# Patient Record
Sex: FEMALE | Race: BLACK OR AFRICAN AMERICAN | NOT HISPANIC OR LATINO | Employment: OTHER | URBAN - METROPOLITAN AREA
[De-identification: names, ages, dates, MRNs, and addresses within clinical notes are randomized per-mention and may not be internally consistent; named-entity substitution may affect disease eponyms.]

---

## 2019-04-16 ENCOUNTER — HOSPITAL ENCOUNTER (INPATIENT)
Facility: OTHER | Age: 64
LOS: 4 days | Discharge: HOME | DRG: 189 | End: 2019-04-20
Attending: HOSPITALIST | Admitting: HOSPITALIST
Payer: COMMERCIAL

## 2019-04-16 DIAGNOSIS — J96.01 ACUTE RESPIRATORY FAILURE WITH HYPOXIA AND HYPERCARBIA: Primary | ICD-10-CM

## 2019-04-16 DIAGNOSIS — J96.22 ACUTE ON CHRONIC RESPIRATORY FAILURE WITH HYPOXIA AND HYPERCAPNIA: ICD-10-CM

## 2019-04-16 DIAGNOSIS — F29 PSYCHOSIS, UNSPECIFIED PSYCHOSIS TYPE: ICD-10-CM

## 2019-04-16 DIAGNOSIS — J96.21 ACUTE ON CHRONIC RESPIRATORY FAILURE WITH HYPOXIA AND HYPERCAPNIA: ICD-10-CM

## 2019-04-16 DIAGNOSIS — R79.89 ELEVATED BRAIN NATRIURETIC PEPTIDE (BNP) LEVEL: ICD-10-CM

## 2019-04-16 DIAGNOSIS — J96.20 ACUTE ON CHRONIC RESPIRATORY FAILURE: ICD-10-CM

## 2019-04-16 DIAGNOSIS — R44.3 HALLUCINATIONS: ICD-10-CM

## 2019-04-16 DIAGNOSIS — R79.89 ELEVATED TROPONIN: ICD-10-CM

## 2019-04-16 DIAGNOSIS — R07.9 ACUTE CHEST PAIN: ICD-10-CM

## 2019-04-16 DIAGNOSIS — J96.02 ACUTE RESPIRATORY FAILURE WITH HYPOXIA AND HYPERCARBIA: Primary | ICD-10-CM

## 2019-04-16 PROBLEM — Z72.0 TOBACCO ABUSE: Status: ACTIVE | Noted: 2019-04-16

## 2019-04-16 PROBLEM — E87.20 LACTIC ACIDOSIS: Status: ACTIVE | Noted: 2019-04-16

## 2019-04-16 LAB
ALBUMIN SERPL BCP-MCNC: 3.9 G/DL (ref 3.5–5.2)
ALLENS TEST: ABNORMAL
ALP SERPL-CCNC: 70 U/L (ref 55–135)
ALT SERPL W/O P-5'-P-CCNC: 22 U/L (ref 10–44)
AMPHET+METHAMPHET UR QL: NEGATIVE
ANION GAP SERPL CALC-SCNC: 15 MMOL/L (ref 8–16)
APTT BLDCRRT: 36.7 SEC (ref 21–32)
AST SERPL-CCNC: 27 U/L (ref 10–40)
BARBITURATES UR QL SCN>200 NG/ML: NEGATIVE
BASOPHILS # BLD AUTO: 0.01 K/UL (ref 0–0.2)
BASOPHILS NFR BLD: 0.1 % (ref 0–1.9)
BENZODIAZ UR QL SCN>200 NG/ML: NEGATIVE
BILIRUB SERPL-MCNC: 0.6 MG/DL (ref 0.1–1)
BNP SERPL-MCNC: 1138 PG/ML (ref 0–99)
BUN SERPL-MCNC: 28 MG/DL (ref 8–23)
BZE UR QL SCN: NEGATIVE
CALCIUM SERPL-MCNC: 9.7 MG/DL (ref 8.7–10.5)
CANNABINOIDS UR QL SCN: NEGATIVE
CHLORIDE SERPL-SCNC: 96 MMOL/L (ref 95–110)
CHOLEST SERPL-MCNC: 258 MG/DL (ref 120–199)
CHOLEST/HDLC SERPL: 3.4 {RATIO} (ref 2–5)
CO2 SERPL-SCNC: 28 MMOL/L (ref 23–29)
CREAT SERPL-MCNC: 1.2 MG/DL (ref 0.5–1.4)
CREAT UR-MCNC: 74 MG/DL (ref 15–325)
DELSYS: ABNORMAL
DIFFERENTIAL METHOD: ABNORMAL
EOSINOPHIL # BLD AUTO: 0 K/UL (ref 0–0.5)
EOSINOPHIL NFR BLD: 0 % (ref 0–8)
ERYTHROCYTE [DISTWIDTH] IN BLOOD BY AUTOMATED COUNT: 13.9 % (ref 11.5–14.5)
ERYTHROCYTE [SEDIMENTATION RATE] IN BLOOD BY WESTERGREN METHOD: 28 MM/H
EST. GFR  (AFRICAN AMERICAN): 56 ML/MIN/1.73 M^2
EST. GFR  (NON AFRICAN AMERICAN): 48 ML/MIN/1.73 M^2
ESTIMATED AVG GLUCOSE: 114 MG/DL (ref 68–131)
ETHANOL UR-MCNC: <10 MG/DL
FIO2: 28
FLOW: 2
GLUCOSE SERPL-MCNC: 169 MG/DL (ref 70–110)
HBA1C MFR BLD HPLC: 5.6 % (ref 4–5.6)
HCO3 UR-SCNC: 34.5 MMOL/L (ref 24–28)
HCT VFR BLD AUTO: 45.4 % (ref 37–48.5)
HDLC SERPL-MCNC: 77 MG/DL (ref 40–75)
HDLC SERPL: 29.8 % (ref 20–50)
HGB BLD-MCNC: 15.1 G/DL (ref 12–16)
INR PPP: 1 (ref 0.8–1.2)
LACTATE SERPL-SCNC: 2.5 MMOL/L (ref 0.5–2.2)
LACTATE SERPL-SCNC: 3.4 MMOL/L (ref 0.5–2.2)
LDLC SERPL CALC-MCNC: 167.8 MG/DL (ref 63–159)
LYMPHOCYTES # BLD AUTO: 0.7 K/UL (ref 1–4.8)
LYMPHOCYTES NFR BLD: 5.4 % (ref 18–48)
MAGNESIUM SERPL-MCNC: 2 MG/DL (ref 1.6–2.6)
MCH RBC QN AUTO: 31.8 PG (ref 27–31)
MCHC RBC AUTO-ENTMCNC: 33.3 G/DL (ref 32–36)
MCV RBC AUTO: 96 FL (ref 82–98)
METHADONE UR QL SCN>300 NG/ML: NEGATIVE
MODE: ABNORMAL
MONOCYTES # BLD AUTO: 0.5 K/UL (ref 0.3–1)
MONOCYTES NFR BLD: 4.3 % (ref 4–15)
NEUTROPHILS # BLD AUTO: 11 K/UL (ref 1.8–7.7)
NEUTROPHILS NFR BLD: 90 % (ref 38–73)
NONHDLC SERPL-MCNC: 181 MG/DL
OPIATES UR QL SCN: NORMAL
PCO2 BLDA: 56.5 MMHG (ref 35–45)
PCP UR QL SCN>25 NG/ML: NEGATIVE
PH SMN: 7.39 [PH] (ref 7.35–7.45)
PHOSPHATE SERPL-MCNC: 6.1 MG/DL (ref 2.7–4.5)
PLATELET # BLD AUTO: 374 K/UL (ref 150–350)
PMV BLD AUTO: 10.7 FL (ref 9.2–12.9)
PO2 BLDA: 76 MMHG (ref 80–100)
POC BE: 10 MMOL/L
POC SATURATED O2: 95 % (ref 95–100)
POCT GLUCOSE: 188 MG/DL (ref 70–110)
POTASSIUM SERPL-SCNC: 3.8 MMOL/L (ref 3.5–5.1)
PROCALCITONIN SERPL IA-MCNC: 0.31 NG/ML
PROT SERPL-MCNC: 7.1 G/DL (ref 6–8.4)
PROTHROMBIN TIME: 11.3 SEC (ref 9–12.5)
RBC # BLD AUTO: 4.75 M/UL (ref 4–5.4)
SAMPLE: ABNORMAL
SITE: ABNORMAL
SODIUM SERPL-SCNC: 139 MMOL/L (ref 136–145)
SP02: 96
TOXICOLOGY INFORMATION: NORMAL
TRIGL SERPL-MCNC: 66 MG/DL (ref 30–150)
TROPONIN I SERPL DL<=0.01 NG/ML-MCNC: 0.25 NG/ML (ref 0–0.03)
TROPONIN I SERPL DL<=0.01 NG/ML-MCNC: 0.26 NG/ML (ref 0–0.03)
TROPONIN I SERPL DL<=0.01 NG/ML-MCNC: 0.35 NG/ML (ref 0–0.03)
WBC # BLD AUTO: 12.19 K/UL (ref 3.9–12.7)

## 2019-04-16 PROCEDURE — 25000242 PHARM REV CODE 250 ALT 637 W/ HCPCS: Performed by: INTERNAL MEDICINE

## 2019-04-16 PROCEDURE — 83605 ASSAY OF LACTIC ACID: CPT

## 2019-04-16 PROCEDURE — 99222 1ST HOSP IP/OBS MODERATE 55: CPT | Mod: ,,, | Performed by: INTERNAL MEDICINE

## 2019-04-16 PROCEDURE — 84145 PROCALCITONIN (PCT): CPT

## 2019-04-16 PROCEDURE — 83036 HEMOGLOBIN GLYCOSYLATED A1C: CPT

## 2019-04-16 PROCEDURE — 94640 AIRWAY INHALATION TREATMENT: CPT

## 2019-04-16 PROCEDURE — 99900035 HC TECH TIME PER 15 MIN (STAT)

## 2019-04-16 PROCEDURE — 85730 THROMBOPLASTIN TIME PARTIAL: CPT

## 2019-04-16 PROCEDURE — 80307 DRUG TEST PRSMV CHEM ANLYZR: CPT

## 2019-04-16 PROCEDURE — 25000003 PHARM REV CODE 250: Performed by: HOSPITALIST

## 2019-04-16 PROCEDURE — 85025 COMPLETE CBC W/AUTO DIFF WBC: CPT | Mod: 91

## 2019-04-16 PROCEDURE — 27000221 HC OXYGEN, UP TO 24 HOURS

## 2019-04-16 PROCEDURE — 36415 COLL VENOUS BLD VENIPUNCTURE: CPT

## 2019-04-16 PROCEDURE — 99233 PR SUBSEQUENT HOSPITAL CARE,LEVL III: ICD-10-PCS | Mod: ,,, | Performed by: HOSPITALIST

## 2019-04-16 PROCEDURE — S4991 NICOTINE PATCH NONLEGEND: HCPCS | Performed by: HOSPITALIST

## 2019-04-16 PROCEDURE — 83605 ASSAY OF LACTIC ACID: CPT | Mod: 91

## 2019-04-16 PROCEDURE — 99223 PR INITIAL HOSPITAL CARE,LEVL III: ICD-10-PCS | Mod: ,,, | Performed by: NURSE PRACTITIONER

## 2019-04-16 PROCEDURE — 99233 SBSQ HOSP IP/OBS HIGH 50: CPT | Mod: ,,, | Performed by: HOSPITALIST

## 2019-04-16 PROCEDURE — 83880 ASSAY OF NATRIURETIC PEPTIDE: CPT | Mod: 91

## 2019-04-16 PROCEDURE — 36600 WITHDRAWAL OF ARTERIAL BLOOD: CPT

## 2019-04-16 PROCEDURE — 99223 1ST HOSP IP/OBS HIGH 75: CPT | Mod: ,,, | Performed by: NURSE PRACTITIONER

## 2019-04-16 PROCEDURE — 99232 SBSQ HOSP IP/OBS MODERATE 35: CPT | Mod: GT,,, | Performed by: PSYCHIATRY & NEUROLOGY

## 2019-04-16 PROCEDURE — 84100 ASSAY OF PHOSPHORUS: CPT

## 2019-04-16 PROCEDURE — 82803 BLOOD GASES ANY COMBINATION: CPT

## 2019-04-16 PROCEDURE — 63600175 PHARM REV CODE 636 W HCPCS: Performed by: HOSPITALIST

## 2019-04-16 PROCEDURE — 80053 COMPREHEN METABOLIC PANEL: CPT | Mod: 91

## 2019-04-16 PROCEDURE — 99232 PR SUBSEQUENT HOSPITAL CARE,LEVL II: ICD-10-PCS | Mod: GT,,, | Performed by: PSYCHIATRY & NEUROLOGY

## 2019-04-16 PROCEDURE — 27000190 HC CPAP FULL FACE MASK W/VALVE

## 2019-04-16 PROCEDURE — 94660 CPAP INITIATION&MGMT: CPT

## 2019-04-16 PROCEDURE — 85610 PROTHROMBIN TIME: CPT

## 2019-04-16 PROCEDURE — 63600175 PHARM REV CODE 636 W HCPCS: Performed by: NURSE PRACTITIONER

## 2019-04-16 PROCEDURE — 25000242 PHARM REV CODE 250 ALT 637 W/ HCPCS: Performed by: NURSE PRACTITIONER

## 2019-04-16 PROCEDURE — 99222 PR INITIAL HOSPITAL CARE,LEVL II: ICD-10-PCS | Mod: ,,, | Performed by: INTERNAL MEDICINE

## 2019-04-16 PROCEDURE — 84484 ASSAY OF TROPONIN QUANT: CPT | Mod: 91

## 2019-04-16 PROCEDURE — 94761 N-INVAS EAR/PLS OXIMETRY MLT: CPT

## 2019-04-16 PROCEDURE — 80061 LIPID PANEL: CPT

## 2019-04-16 PROCEDURE — 25000003 PHARM REV CODE 250: Performed by: NURSE PRACTITIONER

## 2019-04-16 PROCEDURE — 20000000 HC ICU ROOM

## 2019-04-16 PROCEDURE — 83735 ASSAY OF MAGNESIUM: CPT | Mod: 91

## 2019-04-16 RX ORDER — IBUPROFEN 200 MG
1 TABLET ORAL DAILY
Status: DISCONTINUED | OUTPATIENT
Start: 2019-04-16 | End: 2019-04-20 | Stop reason: HOSPADM

## 2019-04-16 RX ORDER — FLUTICASONE FUROATE AND VILANTEROL 200; 25 UG/1; UG/1
1 POWDER RESPIRATORY (INHALATION) DAILY
Status: DISCONTINUED | OUTPATIENT
Start: 2019-04-16 | End: 2019-04-20 | Stop reason: HOSPADM

## 2019-04-16 RX ORDER — PREDNISONE 20 MG/1
60 TABLET ORAL DAILY
Status: DISCONTINUED | OUTPATIENT
Start: 2019-04-16 | End: 2019-04-16

## 2019-04-16 RX ORDER — HYDRALAZINE HYDROCHLORIDE 20 MG/ML
15 INJECTION INTRAMUSCULAR; INTRAVENOUS EVERY 4 HOURS PRN
Status: DISCONTINUED | OUTPATIENT
Start: 2019-04-16 | End: 2019-04-20 | Stop reason: HOSPADM

## 2019-04-16 RX ORDER — DIVALPROEX SODIUM 125 MG/1
250 CAPSULE, COATED PELLETS ORAL NIGHTLY
Status: DISCONTINUED | OUTPATIENT
Start: 2019-04-16 | End: 2019-04-19

## 2019-04-16 RX ORDER — HYDRALAZINE HYDROCHLORIDE 20 MG/ML
10 INJECTION INTRAMUSCULAR; INTRAVENOUS EVERY 8 HOURS PRN
Status: DISCONTINUED | OUTPATIENT
Start: 2019-04-16 | End: 2019-04-16

## 2019-04-16 RX ORDER — IPRATROPIUM BROMIDE AND ALBUTEROL SULFATE 2.5; .5 MG/3ML; MG/3ML
3 SOLUTION RESPIRATORY (INHALATION) EVERY 4 HOURS
Status: DISCONTINUED | OUTPATIENT
Start: 2019-04-16 | End: 2019-04-20 | Stop reason: HOSPADM

## 2019-04-16 RX ORDER — DOXYCYCLINE HYCLATE 100 MG
100 TABLET ORAL EVERY 12 HOURS
Status: DISCONTINUED | OUTPATIENT
Start: 2019-04-16 | End: 2019-04-20 | Stop reason: HOSPADM

## 2019-04-16 RX ORDER — ACETAMINOPHEN 325 MG/1
650 TABLET ORAL EVERY 4 HOURS PRN
Status: DISCONTINUED | OUTPATIENT
Start: 2019-04-16 | End: 2019-04-20 | Stop reason: HOSPADM

## 2019-04-16 RX ORDER — PREDNISONE 20 MG/1
40 TABLET ORAL DAILY
Status: DISCONTINUED | OUTPATIENT
Start: 2019-04-17 | End: 2019-04-18

## 2019-04-16 RX ORDER — MORPHINE SULFATE 2 MG/ML
2 INJECTION, SOLUTION INTRAMUSCULAR; INTRAVENOUS EVERY 4 HOURS PRN
Status: DISCONTINUED | OUTPATIENT
Start: 2019-04-16 | End: 2019-04-16

## 2019-04-16 RX ORDER — SODIUM CHLORIDE 0.9 % (FLUSH) 0.9 %
10 SYRINGE (ML) INJECTION
Status: DISCONTINUED | OUTPATIENT
Start: 2019-04-16 | End: 2019-04-20 | Stop reason: HOSPADM

## 2019-04-16 RX ORDER — ONDANSETRON 8 MG/1
8 TABLET, ORALLY DISINTEGRATING ORAL EVERY 8 HOURS PRN
Status: DISCONTINUED | OUTPATIENT
Start: 2019-04-16 | End: 2019-04-20 | Stop reason: HOSPADM

## 2019-04-16 RX ORDER — ENOXAPARIN SODIUM 100 MG/ML
40 INJECTION SUBCUTANEOUS EVERY 24 HOURS
Status: DISCONTINUED | OUTPATIENT
Start: 2019-04-16 | End: 2019-04-20 | Stop reason: HOSPADM

## 2019-04-16 RX ADMIN — IPRATROPIUM BROMIDE AND ALBUTEROL SULFATE 3 ML: .5; 3 SOLUTION RESPIRATORY (INHALATION) at 04:04

## 2019-04-16 RX ADMIN — DIVALPROEX SODIUM 250 MG: 125 CAPSULE, COATED PELLETS ORAL at 09:04

## 2019-04-16 RX ADMIN — IPRATROPIUM BROMIDE AND ALBUTEROL SULFATE 3 ML: .5; 3 SOLUTION RESPIRATORY (INHALATION) at 03:04

## 2019-04-16 RX ADMIN — HYDRALAZINE HYDROCHLORIDE 10 MG: 20 INJECTION INTRAMUSCULAR; INTRAVENOUS at 06:04

## 2019-04-16 RX ADMIN — PREDNISONE 60 MG: 20 TABLET ORAL at 08:04

## 2019-04-16 RX ADMIN — DOXYCYCLINE HYCLATE 100 MG: 100 TABLET, COATED ORAL at 08:04

## 2019-04-16 RX ADMIN — ENOXAPARIN SODIUM 40 MG: 100 INJECTION SUBCUTANEOUS at 06:04

## 2019-04-16 RX ADMIN — IPRATROPIUM BROMIDE AND ALBUTEROL SULFATE 3 ML: .5; 3 SOLUTION RESPIRATORY (INHALATION) at 12:04

## 2019-04-16 RX ADMIN — IPRATROPIUM BROMIDE AND ALBUTEROL SULFATE 3 ML: .5; 3 SOLUTION RESPIRATORY (INHALATION) at 07:04

## 2019-04-16 RX ADMIN — MORPHINE SULFATE 2 MG: 2 INJECTION, SOLUTION INTRAMUSCULAR; INTRAVENOUS at 04:04

## 2019-04-16 RX ADMIN — DOXYCYCLINE HYCLATE 100 MG: 100 TABLET, COATED ORAL at 09:04

## 2019-04-16 RX ADMIN — FLUTICASONE FUROATE AND VILANTEROL TRIFENATATE 1 PUFF: 200; 25 POWDER RESPIRATORY (INHALATION) at 12:04

## 2019-04-16 NOTE — HPI
"The patient is a 63 year old black female chronic tobacco smoker with history of COPD who comes in with 3 days of greenish productive cough and SOB.  She has a history of poor compliance to medications.  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  She was so dyspneic today she was unable to have conversations.  She denies any associated chest pain.  No fever/chills.  No sick contacts.  No myalgia.  She denies any history of coronary artery disease but she has been admitted to the ICU for hypercarbic respiratory failure and hypertensive emergency.      The patient has been transferred to [facility] for Pulmonology and Critical Care services.       "

## 2019-04-16 NOTE — PLAN OF CARE
CM met with pt and spouse at bedside for initial discharge planning assessment.    Pt states her PCP is a  in [facility],  to locate and enter into [facility].    [facility] choice [facility].    Pt and spouse deny any mental health issues.    Spouse will transport home at time of discharge.    CM to follow for plans and arrangements.     04/16/19 0943   Discharge Assessment   Assessment Type Discharge Planning Assessment   Confirmed/corrected address and phone number on facesheet? Yes   Assessment information obtained from? Patient;Caregiver;Medical Record   Expected Length of Stay (days) 3   Communicated expected length of stay with patient/caregiver yes   Prior to hospitilization cognitive status: Alert/Oriented   Prior to hospitalization functional status: Independent   Current cognitive status: Alert/Oriented   Current Functional Status: Independent   Lives With spouse   Able to Return to Prior Arrangements yes   Who are your caregiver(s) and their phone number(s)? Griffin Gnozalez, spouse,[phone]   Patient's perception of discharge disposition home or selfcare   Readmission Within the Last 30 Days no previous admission in last 30 days  (pt transferred in from [facility] for pul CC)   Patient currently being followed by outpatient case management? No   Equipment Currently Used at Home none   Do you have any problems affording any of your prescribed medications? TBD   Is the patient taking medications as prescribed? yes   Does the patient have transportation home? Yes   Does the patient receive services at the [facility]? No   Discharge Plan A Home   Discharge Plan B Home   DME Needed Upon Discharge  none   Patient/Family in Agreement with Plan yes

## 2019-04-16 NOTE — PROGRESS NOTES
Pt received on 2LNC;SPO2 normal. 0719 ABG was done. No changes were made. Treatments were given and tolerated well. Will continue to monitor.

## 2019-04-16 NOTE — ASSESSMENT & PLAN NOTE
-could have been due to hypoxia versus mild pulmonary infection  clinically improving  -Repeat in AM.

## 2019-04-16 NOTE — CONSULTS
"Pulmonary / Critical Care Medicine  Consult Note    Primary Attending:  Jordan Natarajan MD   Primary Team: Hospital Medicine   Consultant Attending: Kerry Hernandez MD   Consultant Fellow: Buddy Ceja MD     Reason for Consult  "Hypoxemic/hypercapnic respiratory failure"     History of Present Illness:  Ms. Gonzalez is a 63 year old lady with a long standing smoking history and a reported diagnosis of COPD, who presented to [facility] yesterday evening complaining that she was much more short of breath than usual.  Ms. Gonzalez is a chronically short of breath but able to carry out some of her ADLs without assistance.  Over the past several days she has developed worsening exertional dyspnea, orthopnea and PND prompting her to go to the [facility] emergency department.  There she was found to be in relatively well compensated hypercapnic respiratory failure with hypoxemia, placed on BiPAP and transferred to [facility] for consultation with Pulmonology.  By the time I examined Ms. Gonzalez this morning, she was sitting comfortably on the side of her bed and reported feeling much better and her symptoms near baseline.  She denies any recent or chronic cough, wheezing, sputum production, hemoptysis or chest pain.  She does however, report a significant amount of unintentional weight loss.  Over the course of our conversation it became clear that Ms. Gonzalez also experiences visual hallucinations and delusions.  Her son was present at the bedside, and confirmed that she occasionally demonstrates unusual behavior.     Past Medical History:   COPD (chronic obstructive pulmonary disease)     Hypertension         Past Surgical History:   HYSTERECTOMY          Allergies:  No Known Allergies     Medications:   Home Medications:   [COMPLETED] albuterol-ipratropium 2.5 mg-0.5 mg/3 mL nebulizer solution 3 mL  3 mL Nebulization Once    [COMPLETED] albuterol-ipratropium 2.5 mg-0.5 mg/3 mL nebulizer solution 3 mL  3 " mL Nebulization Once    [COMPLETED] aspirin tablet 325 mg  325 mg Oral ED 1 Time    [COMPLETED] enoxaparin injection 50 mg  1 mg/kg Subcutaneous Once    [COMPLETED] furosemide injection 40 mg  40 mg Intravenous ED 1 Time    [COMPLETED] levoFLOXacin 500 mg/100 mL IVPB 500 mg  500 mg Intravenous Once    [COMPLETED] methylPREDNISolone sodium succinate injection 125 mg  125 mg Intravenous ED 1 Time    [COMPLETED] morphine injection 4 mg  4 mg Intravenous ED 1 Time    [COMPLETED] nitroGLYCERIN 2% TD oint ointment 0.5 inch  0.5 inch Topical (Top) ED 1 Time    [DISCONTINUED] nitroGLYCERIN SL tablet 0.4 mg  0.4 mg Sublingual ED 1 Time         Current Medications:  Scheduled:   albuterol-ipratropium  3 mL Nebulization Q4H    doxycycline  100 mg Oral Q12H    enoxaparin  40 mg Subcutaneous Daily    fluticasone-vilanterol  1 puff Inhalation Daily    nicotine  1 patch Transdermal Daily     predniSONE  40 mg Oral Daily     Continuous Infusions:    PRN:   acetaminophen, hydrALAZINE, ondansetron, pneumoc 13-raya conj-dip cr(PF), sodium chloride 0.9%     Social History:  Tobacco:  reports that she has been smoking.  She started smoking about 39 years ago. She has been smoking about 1.00 pack per day. She has never used smokeless tobacco.   EtOH:  reports that she does not drink alcohol.   Illicit Drugs: Denies   Occupation Retirednurse.       Family History:  Mother: No known pulmonary disease   Father: Alzheimer's disease     Review of Systems:  · Other than those symptoms mentioned above, an extensive review of systems was unremarkable.     Vital Signs   Temp:  [97.5 °F (36.4 °C)-98.4 °F (36.9 °C)]   Pulse:  []   Resp:  [22-91]   BP: (130-199)/()   SpO2:  [85 %-97 %]    Physical Exam   Gen: appears older than stated age, cachectic, no distress   HEENT: lips, mucosa, and tongue normal; teeth and gums normal and no throat erythema  conjunctivae/corneas clear. PERRL.   CVS: tachycardic, no M/G/R   Chest:  normal respiratory effort and diminished breath sounds bilaterally   Abdomen: soft, non-tender non-distended; bowel sounds normal   Ext: warm, well perfused and no cyanosis or edema, or clubbing   Skin: no rashes, no ecchymoses, no petechiae   Neuro: oriented, normal mood, grossly non-focal   Lines: IV, Day# 1      Labs   Recent Labs   Lab 04/16/19 04/16/19 04/16/19   WBC 13.30*  --  12.19  --    RBC 5.39  --  4.75  --    HGB 16.6*  --  15.1  --    HCT 51.2*   < > 45.4  --    *  --  374*  --    MCV 95  --  96  --    MCH 30.9  --  31.8*  --    MCHC 32.5  --  33.3  --      --  139  --    K 4.0  --  3.8  --    CL 94*  --  96  --    CO2 32*  --  28  --    BUN 23  --  28*  --    CREATININE 1.0  --  1.2  --    MG 1.9  --  2.0  --    ALT 25  --  22  --    AST 36  --  27  --    ALKPHOS 72  --  70  --    BILITOT <0.1*  --  0.6  --    PROT 8.1  --  7.1  --    ALBUMIN 4.5  --  3.9  --    PH  --    < >  --  7.394   PCO2  --    < >  --  56.5*   PO2  --    < >  --  76*   HCO3  --    < >  --  34.5*   POCSATURATED  --    < >  --  95   BE  --    < >  --  10   INR  --   --  1.0  --    APTT  --   --  36.7*  --    CPKMB 6.9*  --   --   --    TROPONINI 0.18*  --  0.263*  --    Trop:   0.263  BNP:   1138  Lactate:  2.5 --> 3.4  Procalcitonin:  0.31      Imaging CXR 04/16/2019 I personally reviewed the films and findings are:, hyperinflated; mildy enlarged cardiac sillohuete; no focal infiltrates      Other Studies:   PFTs      None on file   Echo  None on file      Micro Blood Cx 4/16 Collected   Sputum Cx  None ordered      Assessment/Plan:  1. Compensated acute on chronic hypercapnic/hypoxemic respiratory failure  2. Suspected advanced COPD  3. Suspected acute on chronic congestive heart failure  4. Suspected cognitive decline vs. Other psychiatric illness  5. Tobacco Use  · Continue frequent aerosolized bronchodilators as you are.  · Start long acting bronchodilators; unfortunately the only long acting inhaler on the  inpatient formulary is an ICS/LABA combination.  Recommend discharging on LABA/LAMA.  · Agree with a 5 day course of systemic corticosteroids but at a lower dose (I have already adjusted the prednisone dose to 40 mg daily).  · No compelling objective data to warrant antibiotics, but a short course of doxycycline isn't unreasonable.  Will defer to primary service in this matter.  · Chronic hypercapnia seems to be relatively well compensated.  Recommend titration supplemental oxygen to maintain SpO2 of 88-92% and avoiding SpO2 > 94%.  · NIPPV QHS/PRN.  · I suspect her acute illness is more a result of occult cardiac dysfunction rather than her pulmonary disease, as I see no clear evidence of acute pulmonary decompensation.  · Agree with further evaluation of her cardiac function and gentle diuresis.  · Please obtain spirometry piror to discharge (order placed for bedside sarah to be performed tomorrow)  · Thank you for the consult.  PCCM will sign off at this time; please feel free to call with any additional questions or concerns.    Buddy Ceja MD  Pulmonary / Critical Care Fellow  Cell [phone]  04/16/2019  10:36 AM

## 2019-04-16 NOTE — SUBJECTIVE & OBJECTIVE
"Interval History: No acute events.  Breathing better.  Denies pain.  Calm and collected, but the more you talk to her, the more her thoughts seem to go of target.  Nursing and consulting MDs report she was hallucinating.  This does not occur during my interview.  When asked if she sees things others do not, she states, "Shadows" and points to an actual shadow by the window.  Then she goes on the talk about the shadow's elbow and it's thin skin and how that makes her worry about her own thin skinned elbows.  She was redirectible for short times.  She was completely oriented to self, year, month, city, hospital and president.  Her  did not think this was out of the normal for her.      Review of Systems   Constitutional: Negative for activity change, chills, diaphoresis and fatigue.   HENT: Negative for congestion, drooling and hearing loss.    Eyes: Negative for discharge.   Respiratory: Negative for apnea, chest tightness, shortness of breath and wheezing.    Cardiovascular: Negative for palpitations and leg swelling.   Gastrointestinal: Negative for abdominal distention, abdominal pain, constipation and diarrhea.   Musculoskeletal: Negative for arthralgias and gait problem.   Skin: Negative for rash.   Neurological: Negative for seizures, light-headedness and numbness.   Hematological: Negative for adenopathy.   Psychiatric/Behavioral: Positive for decreased concentration and hallucinations. Negative for agitation, behavioral problems, self-injury and suicidal ideas. The patient is nervous/anxious.      Objective:     Vital Signs (Most Recent):  Temp: 97.5 °F (36.4 °C) (04/16/19 0705)  Pulse: 108 (04/16/19 0800)  Resp: (!) 47 (04/16/19 0800)  BP: (!) 161/80 (04/16/19 0800)  SpO2: (!) 88 % (04/16/19 0800) Vital Signs (24h Range):  Temp:  [97.5 °F (36.4 °C)-98.4 °F (36.9 °C)] 97.5 °F (36.4 °C)  Pulse:  [] 108  Resp:  [22-91] 47  SpO2:  [85 %-97 %] 88 %  BP: (130-199)/() 161/80     Weight: 51 kg " "(112 lb 7 oz)  Body mass index is 18.15 kg/m².    Intake/Output Summary (Last 24 hours) at 4/16/2019 1208  Last data filed at 4/16/2019 1023  Gross per 24 hour   Intake --   Output 150 ml   Net -150 ml      Physical Exam   Constitutional: She is oriented to person, place, and time. She appears well-developed and well-nourished.   HENT:   Head: Normocephalic and atraumatic.   Eyes: Pupils are equal, round, and reactive to light. EOM are normal.   Neck: Normal range of motion. Neck supple.   Cardiovascular: Normal rate, regular rhythm and normal heart sounds.   Pulmonary/Chest: Effort normal and breath sounds normal. No respiratory distress.   Abdominal: Soft. Bowel sounds are normal. She exhibits no distension. There is no tenderness.   Musculoskeletal: Normal range of motion. She exhibits no edema.   Neurological: She is oriented to person, place, and time. No cranial nerve deficit. Coordination normal.   Skin: Skin is warm and dry.   Psychiatric:   Mood "ok", affect congruent, thought patterns are tangential and rapidly osscilating, denies SI/HI, denies AH, unclear if she is actually hallucinating or not.   Vitals reviewed.      Significant Labs: All pertinent labs within the past 24 hours have been reviewed.    Significant Imaging: I have reviewed and interpreted all pertinent imaging results/findings within the past 24 hours.  "

## 2019-04-16 NOTE — SUBJECTIVE & OBJECTIVE
Past Medical History:   Diagnosis Date    COPD (chronic obstructive pulmonary disease)     Hypertension        Past Surgical History:   Procedure Laterality Date    HYSTERECTOMY         Review of patient's allergies indicates:  No Known Allergies    Current Facility-Administered Medications on File Prior to Encounter   Medication    [COMPLETED] albuterol-ipratropium 2.5 mg-0.5 mg/3 mL nebulizer solution 3 mL    [COMPLETED] albuterol-ipratropium 2.5 mg-0.5 mg/3 mL nebulizer solution 3 mL    [COMPLETED] aspirin tablet 325 mg    [COMPLETED] enoxaparin injection 50 mg    [COMPLETED] furosemide injection 40 mg    [COMPLETED] levoFLOXacin 500 mg/100 mL IVPB 500 mg    [COMPLETED] methylPREDNISolone sodium succinate injection 125 mg    [COMPLETED] morphine injection 4 mg    [COMPLETED] nitroGLYCERIN 2% TD oint ointment 0.5 inch    [DISCONTINUED] nitroGLYCERIN SL tablet 0.4 mg     No current outpatient medications on file prior to encounter.     Family History     None        Tobacco Use    Smoking status: Current Every Day Smoker   Substance and Sexual Activity    Alcohol use: Not on file    Drug use: Not on file    Sexual activity: Not on file     Review of Systems   Constitutional: Positive for activity change and fatigue. Negative for appetite change and fever.   HENT: Negative for congestion, ear pain, rhinorrhea and sinus pressure.    Eyes: Negative for pain and discharge.   Respiratory: Positive for cough, shortness of breath and wheezing. Negative for chest tightness.    Cardiovascular: Negative for chest pain and leg swelling.   Gastrointestinal: Negative for abdominal distention, abdominal pain, diarrhea, nausea and vomiting.   Endocrine: Negative for cold intolerance and heat intolerance.   Genitourinary: Negative for difficulty urinating, flank pain, frequency, hematuria and urgency.   Musculoskeletal: Positive for arthralgias and myalgias. Negative for joint swelling.   Allergic/Immunologic:  Negative for environmental allergies and food allergies.   Neurological: Negative for dizziness, weakness, light-headedness and headaches.   Hematological: Does not bruise/bleed easily.   Psychiatric/Behavioral: Negative for agitation, behavioral problems and decreased concentration.     Objective:     Vital Signs (Most Recent):  Temp: 97.9 °F (36.6 °C) (04/16/19 0330)  Pulse: 101 (04/16/19 0330)  Resp: (!) 26 (04/16/19 0330)  BP: (!) 144/89 (04/16/19 0330)  SpO2: 97 % (04/16/19 0345) Vital Signs (24h Range):  Temp:  [97.9 °F (36.6 °C)-98.4 °F (36.9 °C)] 97.9 °F (36.6 °C)  Pulse:  [101-129] 101  Resp:  [25-91] 26  SpO2:  [85 %-97 %] 97 %  BP: (130-199)/() 144/89     Weight: 51 kg (112 lb 7 oz)  Body mass index is 18.15 kg/m².    Physical Exam   Constitutional: She is oriented to person, place, and time. She appears well-developed.   HENT:   Head: Normocephalic.   Eyes: Conjunctivae are normal. Right eye exhibits no discharge. Left eye exhibits no discharge.   Neck: Normal range of motion. Neck supple.   Cardiovascular: Regular rhythm, normal heart sounds and intact distal pulses. Tachycardia present.   Pulses:       Radial pulses are 1+ on the right side, and 1+ on the left side.        Dorsalis pedis pulses are 1+ on the right side, and 1+ on the left side.   Pulmonary/Chest: Effort normal. Tachypnea noted. No respiratory distress. She has decreased breath sounds in the right middle field, the right lower field, the left middle field and the left lower field.   Abdominal: Soft. She exhibits no distension. Bowel sounds are decreased. There is no tenderness.   Musculoskeletal: Normal range of motion.   Neurological: She is alert and oriented to person, place, and time. She has normal strength. GCS eye subscore is 4. GCS verbal subscore is 5. GCS motor subscore is 6.   Skin: Skin is warm and dry.   Psychiatric: She has a normal mood and affect. Her speech is normal and behavior is normal.           Significant  Labs: All pertinent labs within the past 24 hours have been reviewed.    Significant Imaging: I have reviewed all pertinent imaging results/findings within the past 24 hours.

## 2019-04-16 NOTE — CONSULTS
"Tele-Consultation to ICU Department from Psychiatry    Please see previous notes:    Patient agreeable to consultation via telepsychiatry.    Consultation started: 4/16/2019 at 1:47pm ended at 2;27pm resumed at 4:44pm  The chief complaint leading to psychiatric consultation is: "Reports of hallucinations at times, non-linear racing thoughts."  This consultation was requested by Dr.John Natarajan, the ICU Department attending physician.  The location of the consulting psychiatrist is [address] [place] [address].  The patient location is [facility].  The patient arrived at the ED at: 1:21am  Also present with the patient at the time of the consultation: nursing    Patient Identification:  Patient information was obtained from patient, spouse/SO and past medical records.  Patient presented voluntarily to the Emergency Department ambulatory.    History of Present Illness:  Donita Gonzalez is a 63 y.o. female with some unspecified past psychiatric history that presented for Acute on chronic respiratory failure.Psychiary consulted for concerning symptoms of hallucinations and racing thoughts.     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 Interrupted pt and asked if she was diagnosed with any psychiatric problems in the past or experienced anything like depression. Pt states "Not depression but I have chastity grieving" When asked she was grieving about what pt responded " I am grieving about 2 million dollars in [employer]" Pt feels that she is owed this much money but she has not heard from anyone about it and feels like someone " "stole all her money. Pt perseverated on this for a long time. Then pt started to ramble about lead "you know capital P and little b" She believes that a company called [employer] poisoned her and that's why she needed to get a hysterectomy. During a brief disconnection of the telemed machine. Per nursing pt was claiming all her information was travelling through the air.     Collateral   Per  pt " has always been this way" however upon further pressing for details he reluctantly admits she does infact need to be on psychiatric medication but refuses to take it. Informed him that upon my evaluation pt sounded psychotic, he reluctantly reports that 'maybe she has been lately". 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 A lot is the history  gave was not adding up.  Upon further inquiring how she is taking care of herself at home he reports "she doesn't he reports she doesn't take her medication for her medical problems nor go see her doctors. He reports that she now avoids even going outside and has been losing a lot of weight as well. He is open to resuming her psych meds if it mean "I get my wife back please help her"    Attempted to contact son, Bret [phone], but didn't answer and unable to leave voicemail as its VMbox is full    Psychiatric History:   Hospitalization: No per pt but yes per   Medication Trials: Yes per chart review pt was on Zyprexa and Depakote in 2005  Suicide Attempts: no  Violence: denied  Depression: denied  Brandi: yes currently exhibiting some grandiosity, irritability, flight of ideas and pressured speech  AH's: denied  VHs: "see some shadows"  Delusions: yes paranoid    Review of Systems:  History obtained from unobtainable from patient due to mental status and lack of " "cooperation    Past Medical History:   Past Medical History:   Diagnosis Date    COPD (chronic obstructive pulmonary disease)     Hypertension         Seizures: as child febrile   Head trauma/l.o.c.: unable to assess    Allergies:   Review of patient's allergies indicates:  No Known Allergies    Medications in ER:   Medications   sodium chloride 0.9% flush 10 mL (has no administration in time range)   acetaminophen tablet 650 mg (has no administration in time range)   ondansetron disintegrating tablet 8 mg (has no administration in time range)   albuterol-ipratropium 2.5 mg-0.5 mg/3 mL nebulizer solution 3 mL (3 mLs Nebulization Given 4/16/19 1246)   doxycycline tablet 100 mg (100 mg Oral Given 4/16/19 0808)   pneumoc 13-raya conj-dip cr(PF) (PREVNAR 13 (PF)) 0.5 mL (has no administration in time range)   hydrALAZINE injection 15 mg (has no administration in time range)   nicotine 14 mg/24 hr 1 patch (1 patch Transdermal Not Given 4/16/19 0808)   enoxaparin injection 40 mg (has no administration in time range)   predniSONE tablet 40 mg (has no administration in time range)   fluticasone-vilanterol 200-25 mcg/dose diskus inhaler 1 puff (1 puff Inhalation Given 4/16/19 1245)       Medications at home: unable to report any    Substance Abuse History:   Alchohol: none  Drug: none  Tobacco: yes but  unable to report.    Legal History:   Past charges/incarcerations: unable to fully assess  Pending charges: unable to fully assess    Family Psychiatric History: mother had depression "they kept her in the hospital for it"    Social History:   History of Physical/Sexual Abuse: unable to assess  Education: college grad   Employment/Disability: retired nurse  Financial: stable  Relationship Status/Sexual Orientation:     Children: 4  Housing Status: with family (  and 2 adult sons)  Congregation: unable to assess   History: "I worked in the [employer]"  Recreational Activities: unable to assess  Access to " "Gun: none    Current Evaluation:     Constitutional  Vitals:  Vitals:    04/16/19 0515 04/16/19 0610 04/16/19 0700 04/16/19 0705   BP:  (!) 176/85 (!) 166/84    Pulse: 104  102    Resp: (!) 25  (!) 33    Temp:    97.5 °F (36.4 °C)   TempSrc:    Oral   SpO2: (!) 90%  (!) 94%    Weight:       Height:        04/16/19 0726 04/16/19 0730 04/16/19 0800 04/16/19 0900   BP:  (!) 171/85 (!) 161/80 (!) 176/83   Pulse: 106 104 108 102   Resp: (!) 28 (!) 34 (!) 47 (!) 37   Temp:       TempSrc:       SpO2: 96% (!) 94% (!) 88% (!) 93%   Weight:       Height:        04/16/19 1000 04/16/19 1100 04/16/19 1200 04/16/19 1245   BP: (!) 156/80 (!) 177/97 (!) 153/72    Pulse: 110 108 (!) 112 104   Resp: (!) 38 (!) 33 (!) 42 18   Temp:       TempSrc:       SpO2: (!) 93% (!) 94% (!) 90%    Weight:       Height:        04/16/19 1246 04/16/19 1300 04/16/19 1330   BP:  (!) 149/77 (!) 162/98   Pulse: 105 102 (!) 112   Resp: 18 (!) 41 (!) 40   Temp:      TempSrc:      SpO2:  95% 95%   Weight:      Height:         General:  unremarkable, age appropriate     Musculoskeletal  Muscle Strength/Tone:   moving arms normally   Gait & Station:   sitting on stretcher     Psychiatric  Level of Consciousness: alert  Orientation: oriented to person, place and time  Grooming: in hospital gown  Psychomotor Behavior: + psychomotor agitation/ restlessness  Speech: some rapid speech loud volume  Language: uses words appropriately  Mood: 'ok"  Affect: irritable, hostile, iappropriate  Thought Process: disorganized, flight of ideas, racing thoguhts  Associations: loose at times  Thought Content: paranoid delusions, + VH denied any SI/HI   Memory: impaired unable to immediately recall 3/3 words  Attention: impaired unable to follow commands; when asked to spell the word "HOUSE" pt spells "CASA" and then when again instructed to spell it pt spelled "HAUS"  Fund of Knowledge: unable to fully test   Abstraction: Impaired; pt unable to explain what the following " "proverb meant pt explains that the phrase "dont  a book by its cover " means "you look on the inside and its all written there". She failed to explain another proverb as well  Insight: impaired  Judgement: impaired    Relevant Elements of Neurological Exam: no abnormality of posture noted    Assessment - Diagnosis - Goals:     Impression  Pt is a 64 y/o female with some unspecified past psychiatric history that presented for Acute on chronic respiratory failure . Upon psychiatric evaluation pt is exhibiting a lot of paranoid delusions, some hallucinations and has disorganized thought process. She also had impaired attention and abstract thinking on testing. Concerned pt may be gravely disabled at home as she has been refusing to take any of her medications or keep up with her doctors appointments.  reports she has been losing a lot of weight as well.      Diagnosis:   Unspecified psychotic disorder at baseline  R/O delirium   R/O underlying Dementia    Rec:   Dispo- unsure if home is safe for her to return in current state. Will need to get pts sons ( who reside with pt) involved as I am not sure how reliable husbands history is as he may be minimizing her symptoms.     Psych meds  May resume Depakote PO or IV 250mg qhs for mood stabilization and sleep with plan to titrate up based on response  May use PRN Zyprexa 2.5mg q6 PO/IM for non redirectable agitation ; do not combine or administer within one hour of giving a Benzodiazepine and please monitor QTC     Legal  Low threshold for PEC as pt is gravely disabled but unsure if outpt psych vs. inpt claribel psych may be appropriate . Would recommending contacting pts children   Recommend 1;1 sitter    Other  · Recommend head CT  · Recommend MOCA memory test by bed side to assess for dementia OR outpt Neuropsych testing  · DELIRIUM BEHAVIOR MANAGEMENT  · Please do CAM ICU test daily- pt is oriented but her attention is impaired  · PLEASE utilize CHEMICAL " restraints with PRN meds first for agitation. Minimize use of PHYSICAL restraints OR have periods of being out of physical restraints if possible.  · Keep window shades open and room lit during day and room dim at night in order to promote normal sleep-wake cycles  · Encourage family at bedside. [place] patient often to situation, location, date.  · Continue to Limit or Discontinue use of Narcotics, Benzos and Anti-cholinergic medications as they may worsen delirium.  · Continue medical workup for causative etiology of Delirium.      More than 50% of the time was spent counseling/coordinating care    Laboratory Data:   Labs Reviewed   COMPREHENSIVE METABOLIC PANEL - Abnormal; Notable for the following components:       Result Value    Glucose 169 (*)     BUN, Bld 28 (*)     eGFR if  56 (*)     eGFR if non  48 (*)     All other components within normal limits    Narrative:     STAT, if not done in ED, then at 2nd and 6th hour from  initial draw.   PHOSPHORUS - Abnormal; Notable for the following components:    Phosphorus 6.1 (*)     All other components within normal limits    Narrative:     STAT, if not done in ED, then at 2nd and 6th hour from  initial draw.   LACTIC ACID, PLASMA - Abnormal; Notable for the following components:    Lactate (Lactic Acid) 2.5 (*)     All other components within normal limits    Narrative:     STAT, if not done in ED, then at 2nd and 6th hour from  initial draw.   TROPONIN I - Abnormal; Notable for the following components:    Troponin I 0.263 (*)     All other components within normal limits    Narrative:     STAT, if not done in ED, then at 2nd and 6th hour from  initial draw.   LIPID PANEL - Abnormal; Notable for the following components:    Cholesterol 258 (*)     HDL 77 (*)     LDL Cholesterol 167.8 (*)     All other components within normal limits    Narrative:     Fasting   B-TYPE NATRIURETIC PEPTIDE - Abnormal; Notable for the following  components:    BNP 1,138 (*)     All other components within normal limits    Narrative:     STAT, if not done in ED, then at 2nd and 6th hour from  initial draw.   CBC W/ AUTO DIFFERENTIAL - Abnormal; Notable for the following components:    MCH 31.8 (*)     Platelets 374 (*)     Gran # (ANC) 11.0 (*)     Lymph # 0.7 (*)     Gran% 90.0 (*)     Lymph% 5.4 (*)     All other components within normal limits    Narrative:     STAT, if not done in ED, then at 2nd and 6th hour from  initial draw.   APTT - Abnormal; Notable for the following components:    aPTT 36.7 (*)     All other components within normal limits    Narrative:     STAT, if not done in ED, then at 2nd and 6th hour from  initial draw.   PROCALCITONIN - Abnormal; Notable for the following components:    Procalcitonin 0.31 (*)     All other components within normal limits   TROPONIN I - Abnormal; Notable for the following components:    Troponin I 0.253 (*)     All other components within normal limits    Narrative:     STAT, if not done in ED, then at 2nd and 6th hour from  initial draw.   LACTIC ACID, PLASMA - Abnormal; Notable for the following components:    Lactate (Lactic Acid) 3.4 (*)     All other components within normal limits   ISTAT PROCEDURE - Abnormal; Notable for the following components:    POC PCO2 56.5 (*)     POC PO2 76 (*)     POC HCO3 34.5 (*)     All other components within normal limits   POCT GLUCOSE - Abnormal; Notable for the following components:    POCT Glucose 188 (*)     All other components within normal limits   HEMOGLOBIN A1C    Narrative:     STAT, if not done in ED, then at 2nd and 6th hour from  initial draw.   MAGNESIUM    Narrative:     STAT, if not done in ED, then at 2nd and 6th hour from  initial draw.   PROTIME-INR    Narrative:     STAT, if not done in ED, then at 2nd and 6th hour from  initial draw.   TOXICOLOGY SCREEN, URINE, RANDOM (COMPLIANCE)   TROPONIN I         Consulting clinician was informed of the  encounter and consult note.    Consultation ended: 4/16/2019 at 5:10pm

## 2019-04-16 NOTE — ASSESSMENT & PLAN NOTE
-Admitted to inpatient status in our ICU  -Thought secondary to COPD however she has a mildly elevated troponin and elevated BNP so could have CHF component as well.  -Was on bipap initially but has been weaned down to supplemental O2  -Suspect chronic CO2 retainer and chronic yet undiagnosed COPD  -Continue prednisone, doxycycline and duonebs.  -Case discussed with pulm critical care whose input is appreciated  -Plan PFTs prior to discharge.  -Transfer to the floor today.

## 2019-04-16 NOTE — ASSESSMENT & PLAN NOTE
-Nursing and consulting MDs note she has had hallucinations.  I do not witness this during my exam.  -However, the more you talk to her, the more her thoughts become tangential and difficult to follow.  -No SI/HI/AH/VH.  She does admit to one brief psychiatric hospitalization before, but I cannot understand why she was there.  -Will consult tele-psych for evaluation.

## 2019-04-16 NOTE — ASSESSMENT & PLAN NOTE
BNP- 690    Lasix at outside facility- Does not appear to be significantly fluid overloaded  Echo pending

## 2019-04-16 NOTE — PROGRESS NOTES
"[facility] Medicine  Progress Note    Patient Name: Donita Gonzalez  MRN: [id number]  Patient Class: IP- Inpatient   Admission Date: 4/16/2019  Length of Stay: 0 days  Attending Physician: Jordan Natarajan MD  Primary Care Provider: Jordan Campbell MD        Subjective:     Principal Problem:Acute on chronic respiratory failure with hypoxia and hypercapnia    HPI:  The patient is a 63 year old black female chronic tobacco smoker with history of COPD who comes in with 3 days of greenish productive cough and SOB.  She has a history of poor compliance to medications.  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  She was so dyspneic today she was unable to have conversations.  She denies any associated chest pain.  No fever/chills.  No sick contacts.  No myalgia.  She denies any history of coronary artery disease but she has been admitted to the ICU for hypercarbic respiratory failure and hypertensive emergency.      The patient has been transferred to [facility] for Pulmonology and Critical Care services.         Hospital Course:  No notes on file    Interval History: No acute events.  Breathing better.  Denies pain.  Calm and collected, but the more you talk to her, the more her thoughts seem to go of target.  Nursing and consulting MDs report she was hallucinating.  This does not occur during my interview.  When asked if she sees things others do not, she states, "Shadows" and points to an actual shadow by the window.  Then she goes on the talk about the shadow's elbow and it's thin skin and how that makes her worry about her own thin skinned elbows.  She was redirectible for short times.  She was completely oriented to self, year, month, city, hospital and president.  Her  did not think this was out of the normal for her.      Review of Systems   Constitutional: Negative for activity change, chills, diaphoresis and fatigue.   HENT: Negative for congestion, drooling " and hearing loss.    Eyes: Negative for discharge.   Respiratory: Negative for apnea, chest tightness, shortness of breath and wheezing.    Cardiovascular: Negative for palpitations and leg swelling.   Gastrointestinal: Negative for abdominal distention, abdominal pain, constipation and diarrhea.   Musculoskeletal: Negative for arthralgias and gait problem.   Skin: Negative for rash.   Neurological: Negative for seizures, light-headedness and numbness.   Hematological: Negative for adenopathy.   Psychiatric/Behavioral: Positive for decreased concentration and hallucinations. Negative for agitation, behavioral problems, self-injury and suicidal ideas. The patient is nervous/anxious.      Objective:     Vital Signs (Most Recent):  Temp: 97.5 °F (36.4 °C) (04/16/19 0705)  Pulse: 108 (04/16/19 0800)  Resp: (!) 47 (04/16/19 0800)  BP: (!) 161/80 (04/16/19 0800)  SpO2: (!) 88 % (04/16/19 0800) Vital Signs (24h Range):  Temp:  [97.5 °F (36.4 °C)-98.4 °F (36.9 °C)] 97.5 °F (36.4 °C)  Pulse:  [] 108  Resp:  [22-91] 47  SpO2:  [85 %-97 %] 88 %  BP: (130-199)/() 161/80     Weight: 51 kg (112 lb 7 oz)  Body mass index is 18.15 kg/m².    Intake/Output Summary (Last 24 hours) at 4/16/2019 1208  Last data filed at 4/16/2019 1023  Gross per 24 hour   Intake --   Output 150 ml   Net -150 ml      Physical Exam   Constitutional: She is oriented to person, place, and time. She appears well-developed and well-nourished.   HENT:   Head: Normocephalic and atraumatic.   Eyes: Pupils are equal, round, and reactive to light. EOM are normal.   Neck: Normal range of motion. Neck supple.   Cardiovascular: Normal rate, regular rhythm and normal heart sounds.   Pulmonary/Chest: Effort normal and breath sounds normal. No respiratory distress.   Abdominal: Soft. Bowel sounds are normal. She exhibits no distension. There is no tenderness.   Musculoskeletal: Normal range of motion. She exhibits no edema.   Neurological: She is oriented to  "person, place, and time. No cranial nerve deficit. Coordination normal.   Skin: Skin is warm and dry.   Psychiatric:   Mood "ok", affect congruent, thought patterns are tangential and rapidly osscilating, denies SI/HI, denies AH, unclear if she is actually hallucinating or not.   Vitals reviewed.      Significant Labs: All pertinent labs within the past 24 hours have been reviewed.    Significant Imaging: I have reviewed and interpreted all pertinent imaging results/findings within the past 24 hours.    Assessment/Plan:      * Acute on chronic respiratory failure with hypoxia and hypercapnia  -Admitted to inpatient status in our ICU  -Thought secondary to COPD however she has a mildly elevated troponin and elevated BNP so could have CHF component as well.  -Was on bipap initially but has been weaned down to supplemental O2  -Suspect chronic CO2 retainer and chronic yet undiagnosed COPD  -Continue prednisone, doxycycline and duonebs.  -Case discussed with pulm critical care whose input is appreciated  -Plan PFTs prior to discharge.  -Transfer to the floor today.    Elevated troponin  -Troponin is gently trending up.  She has no chest pain and is clinically improved  -Await echo  -Consult cardiology.      Hallucinations  -Nursing and consulting MDs note she has had hallucinations.  I do not witness this during my exam.  -However, the more you talk to her, the more her thoughts become tangential and difficult to follow.  -No SI/HI/AH/VH.  She does admit to one brief psychiatric hospitalization before, but I cannot understand why she was there.  -Will consult tele-psych for evaluation.      Lactic acidosis  -could have been due to hypoxia versus mild pulmonary infection  clinically improving  -Repeat in AM.      Tobacco abuse  -Counselled on cessation.  -NRT offered      Elevated brain natriuretic peptide (BNP) level  -Echo pending    VTE Risk Mitigation (From admission, onward)        Ordered     enoxaparin injection " 40 mg  Daily      04/16/19 0713     Place KODI hose  Until discontinued      04/16/19 0352     Place sequential compression device  Until discontinued      04/16/19 0352     IP VTE LOW RISK PATIENT  Once      04/16/19 0352              Jordan Natarajan MD  Department of Hospital Medicine   [facility]

## 2019-04-16 NOTE — ASSESSMENT & PLAN NOTE
-Troponin is gently trending up.  She has no chest pain and is clinically improved  -Await echo  -Consult cardiology.

## 2019-04-16 NOTE — H&P
"[facility] Medicine  History & Physical    Patient Name: Donita Gonzalez  MRN: [id number]  Admission Date: 4/16/2019  Attending Physician: Jordan Natarjaan MD   Primary Care Provider: Primary Doctor No         Patient information was obtained from patient, past medical records and ER records.     Subjective:     Principal Problem:Acute on chronic respiratory failure with hypoxia and hypercapnia    Chief Complaint: No chief complaint on file.       HPI: The patient is a 63 year old black female chronic tobacco smoker with history of COPD who comes in with 3 days of greenish productive cough and SOB.  She has a history of poor compliance to medications.  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  She was so dyspneic today she was unable to have conversations.  She denies any associated chest pain.  No fever/chills.  No sick contacts.  No myalgia.  She denies any history of coronary artery disease but she has been admitted to the ICU for hypercarbic respiratory failure and hypertensive emergency.      The patient has been transferred to [facility] for Pulmonology and Critical Care services.         Past Medical History:   Diagnosis Date    COPD (chronic obstructive pulmonary disease)     Hypertension        Past Surgical History:   Procedure Laterality Date    HYSTERECTOMY         Review of patient's allergies indicates:  No Known Allergies    Current Facility-Administered Medications on File Prior to Encounter   Medication    [COMPLETED] albuterol-ipratropium 2.5 mg-0.5 mg/3 mL nebulizer solution 3 mL    [COMPLETED] albuterol-ipratropium 2.5 mg-0.5 mg/3 mL nebulizer solution 3 mL    [COMPLETED] aspirin tablet 325 mg    [COMPLETED] enoxaparin injection 50 mg    [COMPLETED] furosemide injection 40 mg    [COMPLETED] levoFLOXacin 500 mg/100 mL IVPB 500 mg    [COMPLETED] methylPREDNISolone sodium succinate injection 125 mg    [COMPLETED] morphine injection 4 mg "    [COMPLETED] nitroGLYCERIN 2% TD oint ointment 0.5 inch    [DISCONTINUED] nitroGLYCERIN SL tablet 0.4 mg     No current outpatient medications on file prior to encounter.     Family History     None        Tobacco Use    Smoking status: Current Every Day Smoker   Substance and Sexual Activity    Alcohol use: Not on file    Drug use: Not on file    Sexual activity: Not on file     Review of Systems   Constitutional: Positive for activity change and fatigue. Negative for appetite change and fever.   HENT: Negative for congestion, ear pain, rhinorrhea and sinus pressure.    Eyes: Negative for pain and discharge.   Respiratory: Positive for cough, shortness of breath and wheezing. Negative for chest tightness.    Cardiovascular: Negative for chest pain and leg swelling.   Gastrointestinal: Negative for abdominal distention, abdominal pain, diarrhea, nausea and vomiting.   Endocrine: Negative for cold intolerance and heat intolerance.   Genitourinary: Negative for difficulty urinating, flank pain, frequency, hematuria and urgency.   Musculoskeletal: Positive for arthralgias and myalgias. Negative for joint swelling.   Allergic/Immunologic: Negative for environmental allergies and food allergies.   Neurological: Negative for dizziness, weakness, light-headedness and headaches.   Hematological: Does not bruise/bleed easily.   Psychiatric/Behavioral: Negative for agitation, behavioral problems and decreased concentration.     Objective:     Vital Signs (Most Recent):  Temp: 97.9 °F (36.6 °C) (04/16/19 0330)  Pulse: 101 (04/16/19 0330)  Resp: (!) 26 (04/16/19 0330)  BP: (!) 144/89 (04/16/19 0330)  SpO2: 97 % (04/16/19 0345) Vital Signs (24h Range):  Temp:  [97.9 °F (36.6 °C)-98.4 °F (36.9 °C)] 97.9 °F (36.6 °C)  Pulse:  [101-129] 101  Resp:  [25-91] 26  SpO2:  [85 %-97 %] 97 %  BP: (130-199)/() 144/89     Weight: 51 kg (112 lb 7 oz)  Body mass index is 18.15 kg/m².    Physical Exam   Constitutional: She is  oriented to person, place, and time. She appears well-developed.   HENT:   Head: Normocephalic.   Eyes: Conjunctivae are normal. Right eye exhibits no discharge. Left eye exhibits no discharge.   Neck: Normal range of motion. Neck supple.   Cardiovascular: Regular rhythm, normal heart sounds and intact distal pulses. Tachycardia present.   Pulses:       Radial pulses are 1+ on the right side, and 1+ on the left side.        Dorsalis pedis pulses are 1+ on the right side, and 1+ on the left side.   Pulmonary/Chest: Effort normal. Tachypnea noted. No respiratory distress. She has decreased breath sounds in the right middle field, the right lower field, the left middle field and the left lower field.   Abdominal: Soft. She exhibits no distension. Bowel sounds are decreased. There is no tenderness.   Musculoskeletal: Normal range of motion.   Neurological: She is alert and oriented to person, place, and time. She has normal strength. GCS eye subscore is 4. GCS verbal subscore is 5. GCS motor subscore is 6.   Skin: Skin is warm and dry.   Psychiatric: She has a normal mood and affect. Her speech is normal and behavior is normal.           Significant Labs: All pertinent labs within the past 24 hours have been reviewed.    Significant Imaging: I have reviewed all pertinent imaging results/findings within the past 24 hours.    Assessment/Plan:     * Acute on chronic respiratory failure with hypoxia and hypercapnia  CO2- 61, PO2- 84. CXR consistent with significant COPD history.    Prednisone  Duonebs  Doxycycline  Consult Pulm/CC      Elevated brain natriuretic peptide (BNP) level  BNP- 690    Lasix at outside facility- Does not appear to be significantly fluid overloaded  Echo pending      VTE Risk Mitigation (From admission, onward)        Ordered     Place KODI hose  Until discontinued      04/16/19 0352     Place sequential compression device  Until discontinued      04/16/19 0352     IP VTE LOW RISK PATIENT  Once       04/16/19 0352             Khari Baez NP  Department of Hospital Medicine   [facility]   (0) independent

## 2019-04-16 NOTE — PLAN OF CARE
Problem: Noninvasive Ventilation Acute  Goal: Effective Unassisted Ventilation and Oxygenation    Intervention: Monitor and Manage Noninvasive Ventilation  Patient received on Bipap on documented settings and placed on same settings with no change in respiratory status, will continue to monitor.

## 2019-04-16 NOTE — PLAN OF CARE
Problem: Adult Inpatient Plan of Care  Goal: Plan of Care Review  Outcome: Ongoing (interventions implemented as appropriate)  Mrs. Gonzalez is currently resting, VSS, NAD. Denies pain following PRN IV morphine admin. Pt transitioned from bipap to 2LNC; per NP, keep O2 sats >88%; pt denies SOB @ rest; does exhibit dyspnea on exertion. No cough or sputum production since admit. No c/o chest pain. Due to void following lasix admin @ [facility]. Diet order placed and pt provided sandwich tray; tolerated well. Repositions in bed independently.  remains @ bedside. He and pt up to date with POC. Will continue to monitor.

## 2019-04-16 NOTE — ASSESSMENT & PLAN NOTE
CO2- 61, PO2- 84. CXR consistent with significant COPD history.    Prednisone  Duonebs  Doxycycline  Consult Pulm/CC

## 2019-04-17 PROBLEM — J96.01 ACUTE RESPIRATORY FAILURE WITH HYPOXIA AND HYPERCARBIA: Status: ACTIVE | Noted: 2019-04-16

## 2019-04-17 PROBLEM — N17.9 AKI (ACUTE KIDNEY INJURY): Status: ACTIVE | Noted: 2019-04-17

## 2019-04-17 PROBLEM — J96.02 ACUTE RESPIRATORY FAILURE WITH HYPOXIA AND HYPERCARBIA: Status: ACTIVE | Noted: 2019-04-16

## 2019-04-17 PROBLEM — E87.5 HYPERKALEMIA: Status: ACTIVE | Noted: 2019-04-17

## 2019-04-17 PROBLEM — R79.89 ELEVATED BRAIN NATRIURETIC PEPTIDE (BNP) LEVEL: Status: RESOLVED | Noted: 2019-04-16 | Resolved: 2019-04-17

## 2019-04-17 PROBLEM — N17.9 AKI (ACUTE KIDNEY INJURY): Status: RESOLVED | Noted: 2019-04-17 | Resolved: 2019-04-17

## 2019-04-17 LAB
ANION GAP SERPL CALC-SCNC: 14 MMOL/L (ref 8–16)
ANION GAP SERPL CALC-SCNC: 14 MMOL/L (ref 8–16)
AORTIC ROOT ANNULUS: 3.13 CM
AORTIC VALVE CUSP SEPERATION: 1.42 CM
AV INDEX (PROSTH): 0.56
AV MEAN GRADIENT: 5.37 MMHG
AV PEAK GRADIENT: 10.76 MMHG
AV VALVE AREA: 2.16 CM2
AV VELOCITY RATIO: 0.51
BASOPHILS # BLD AUTO: 0.01 K/UL (ref 0–0.2)
BASOPHILS NFR BLD: 0.1 % (ref 0–1.9)
BSA FOR ECHO PROCEDURE: 1.54 M2
BUN SERPL-MCNC: 45 MG/DL (ref 8–23)
BUN SERPL-MCNC: 47 MG/DL (ref 8–23)
CALCIUM SERPL-MCNC: 9.4 MG/DL (ref 8.7–10.5)
CALCIUM SERPL-MCNC: 9.7 MG/DL (ref 8.7–10.5)
CHLORIDE SERPL-SCNC: 97 MMOL/L (ref 95–110)
CHLORIDE SERPL-SCNC: 98 MMOL/L (ref 95–110)
CO2 SERPL-SCNC: 20 MMOL/L (ref 23–29)
CO2 SERPL-SCNC: 24 MMOL/L (ref 23–29)
CREAT SERPL-MCNC: 1.4 MG/DL (ref 0.5–1.4)
CREAT SERPL-MCNC: 1.5 MG/DL (ref 0.5–1.4)
CV ECHO LV RWT: 0.64 CM
DIFFERENTIAL METHOD: ABNORMAL
DOP CALC AO PEAK VEL: 1.64 M/S
DOP CALC AO VTI: 25.12 CM
DOP CALC LVOT AREA: 3.83 CM2
DOP CALC LVOT DIAMETER: 2.21 CM
DOP CALC LVOT PEAK VEL: 0.83 M/S
DOP CALC LVOT STROKE VOLUME: 54.37 CM3
DOP CALCLVOT PEAK VEL VTI: 14.18 CM
E WAVE DECELERATION TIME: 180.13 MSEC
E/A RATIO: 0.82
E/E' RATIO: 10.13
ECHO LV POSTERIOR WALL: 1.55 CM (ref 0.6–1.1)
EOSINOPHIL # BLD AUTO: 0 K/UL (ref 0–0.5)
EOSINOPHIL NFR BLD: 0 % (ref 0–8)
ERYTHROCYTE [DISTWIDTH] IN BLOOD BY AUTOMATED COUNT: 13.8 % (ref 11.5–14.5)
EST. GFR  (AFRICAN AMERICAN): 42 ML/MIN/1.73 M^2
EST. GFR  (AFRICAN AMERICAN): 46 ML/MIN/1.73 M^2
EST. GFR  (NON AFRICAN AMERICAN): 37 ML/MIN/1.73 M^2
EST. GFR  (NON AFRICAN AMERICAN): 40 ML/MIN/1.73 M^2
FRACTIONAL SHORTENING: 23 % (ref 28–44)
GLUCOSE SERPL-MCNC: 103 MG/DL (ref 70–110)
GLUCOSE SERPL-MCNC: 175 MG/DL (ref 70–110)
HCT VFR BLD AUTO: 46.1 % (ref 37–48.5)
HGB BLD-MCNC: 15.5 G/DL (ref 12–16)
INTERVENTRICULAR SEPTUM: 1.49 CM (ref 0.6–1.1)
LA MAJOR: 5.43 CM
LA MINOR: 5.61 CM
LA WIDTH: 5.16 CM
LACTATE SERPL-SCNC: 3.4 MMOL/L (ref 0.5–2.2)
LEFT ATRIUM SIZE: 2.56 CM
LEFT ATRIUM VOLUME INDEX: 39.6 ML/M2
LEFT ATRIUM VOLUME: 61.96 CM3
LEFT INTERNAL DIMENSION IN SYSTOLE: 3.75 CM (ref 2.1–4)
LEFT VENTRICLE DIASTOLIC VOLUME INDEX: 69.99 ML/M2
LEFT VENTRICLE DIASTOLIC VOLUME: 109.59 ML
LEFT VENTRICLE MASS INDEX: 200.1 G/M2
LEFT VENTRICLE SYSTOLIC VOLUME INDEX: 38.4 ML/M2
LEFT VENTRICLE SYSTOLIC VOLUME: 60.16 ML
LEFT VENTRICULAR INTERNAL DIMENSION IN DIASTOLE: 4.84 CM (ref 3.5–6)
LEFT VENTRICULAR MASS: 313.37 G
LV LATERAL E/E' RATIO: 9
LV SEPTAL E/E' RATIO: 11.57
LYMPHOCYTES # BLD AUTO: 1.2 K/UL (ref 1–4.8)
LYMPHOCYTES NFR BLD: 7.3 % (ref 18–48)
MAGNESIUM SERPL-MCNC: 3.4 MG/DL (ref 1.6–2.6)
MCH RBC QN AUTO: 31.8 PG (ref 27–31)
MCHC RBC AUTO-ENTMCNC: 33.6 G/DL (ref 32–36)
MCV RBC AUTO: 95 FL (ref 82–98)
MONOCYTES # BLD AUTO: 0.9 K/UL (ref 0.3–1)
MONOCYTES NFR BLD: 5.3 % (ref 4–15)
MV PEAK A VEL: 0.99 M/S
MV PEAK E VEL: 0.81 M/S
NEUTROPHILS # BLD AUTO: 14.8 K/UL (ref 1.8–7.7)
NEUTROPHILS NFR BLD: 86.9 % (ref 38–73)
PISA TR MAX VEL: 2.77 M/S
PLATELET # BLD AUTO: 412 K/UL (ref 150–350)
PMV BLD AUTO: 10.8 FL (ref 9.2–12.9)
POTASSIUM SERPL-SCNC: 4.3 MMOL/L (ref 3.5–5.1)
POTASSIUM SERPL-SCNC: 5.4 MMOL/L (ref 3.5–5.1)
PV PEAK VELOCITY: 1.01 CM/S
RA MAJOR: 4.92 CM
RA WIDTH: 4.87 CM
RBC # BLD AUTO: 4.88 M/UL (ref 4–5.4)
SINUS: 3.34 CM
SODIUM SERPL-SCNC: 132 MMOL/L (ref 136–145)
SODIUM SERPL-SCNC: 135 MMOL/L (ref 136–145)
STJ: 3.14 CM
TDI LATERAL: 0.09
TDI SEPTAL: 0.07
TDI: 0.08
TR MAX PG: 30.69 MMHG
TROPONIN I SERPL DL<=0.01 NG/ML-MCNC: 0.28 NG/ML (ref 0–0.03)
WBC # BLD AUTO: 17.02 K/UL (ref 3.9–12.7)

## 2019-04-17 PROCEDURE — S4991 NICOTINE PATCH NONLEGEND: HCPCS | Performed by: HOSPITALIST

## 2019-04-17 PROCEDURE — 94761 N-INVAS EAR/PLS OXIMETRY MLT: CPT

## 2019-04-17 PROCEDURE — 99900035 HC TECH TIME PER 15 MIN (STAT)

## 2019-04-17 PROCEDURE — 63600175 PHARM REV CODE 636 W HCPCS: Performed by: HOSPITALIST

## 2019-04-17 PROCEDURE — 36415 COLL VENOUS BLD VENIPUNCTURE: CPT

## 2019-04-17 PROCEDURE — 25000003 PHARM REV CODE 250: Performed by: HOSPITALIST

## 2019-04-17 PROCEDURE — 27000221 HC OXYGEN, UP TO 24 HOURS

## 2019-04-17 PROCEDURE — 63600175 PHARM REV CODE 636 W HCPCS: Performed by: INTERNAL MEDICINE

## 2019-04-17 PROCEDURE — 83735 ASSAY OF MAGNESIUM: CPT

## 2019-04-17 PROCEDURE — 94640 AIRWAY INHALATION TREATMENT: CPT

## 2019-04-17 PROCEDURE — 25000003 PHARM REV CODE 250: Performed by: NURSE PRACTITIONER

## 2019-04-17 PROCEDURE — 99233 SBSQ HOSP IP/OBS HIGH 50: CPT | Mod: ,,, | Performed by: HOSPITALIST

## 2019-04-17 PROCEDURE — 25000242 PHARM REV CODE 250 ALT 637 W/ HCPCS: Performed by: HOSPITALIST

## 2019-04-17 PROCEDURE — 25000242 PHARM REV CODE 250 ALT 637 W/ HCPCS: Performed by: NURSE PRACTITIONER

## 2019-04-17 PROCEDURE — 25000003 PHARM REV CODE 250: Performed by: INTERNAL MEDICINE

## 2019-04-17 PROCEDURE — 83605 ASSAY OF LACTIC ACID: CPT

## 2019-04-17 PROCEDURE — 20000000 HC ICU ROOM

## 2019-04-17 PROCEDURE — 80048 BASIC METABOLIC PNL TOTAL CA: CPT | Mod: 91

## 2019-04-17 PROCEDURE — 84484 ASSAY OF TROPONIN QUANT: CPT

## 2019-04-17 PROCEDURE — 99233 PR SUBSEQUENT HOSPITAL CARE,LEVL III: ICD-10-PCS | Mod: ,,, | Performed by: HOSPITALIST

## 2019-04-17 PROCEDURE — 85025 COMPLETE CBC W/AUTO DIFF WBC: CPT

## 2019-04-17 RX ORDER — SODIUM CHLORIDE 9 MG/ML
INJECTION, SOLUTION INTRAVENOUS CONTINUOUS
Status: DISCONTINUED | OUTPATIENT
Start: 2019-04-17 | End: 2019-04-18

## 2019-04-17 RX ORDER — METOPROLOL TARTRATE 50 MG/1
50 TABLET ORAL 2 TIMES DAILY
Status: DISCONTINUED | OUTPATIENT
Start: 2019-04-17 | End: 2019-04-20 | Stop reason: HOSPADM

## 2019-04-17 RX ORDER — METOPROLOL TARTRATE 25 MG/1
25 TABLET, FILM COATED ORAL ONCE
Status: COMPLETED | OUTPATIENT
Start: 2019-04-17 | End: 2019-04-17

## 2019-04-17 RX ORDER — METOPROLOL TARTRATE 25 MG/1
25 TABLET, FILM COATED ORAL 2 TIMES DAILY
Status: DISCONTINUED | OUTPATIENT
Start: 2019-04-17 | End: 2019-04-17

## 2019-04-17 RX ADMIN — NICOTINE 1 PATCH: 14 PATCH, EXTENDED RELEASE TRANSDERMAL at 09:04

## 2019-04-17 RX ADMIN — IPRATROPIUM BROMIDE AND ALBUTEROL SULFATE 3 ML: .5; 3 SOLUTION RESPIRATORY (INHALATION) at 11:04

## 2019-04-17 RX ADMIN — IPRATROPIUM BROMIDE AND ALBUTEROL SULFATE 3 ML: .5; 3 SOLUTION RESPIRATORY (INHALATION) at 03:04

## 2019-04-17 RX ADMIN — SODIUM CHLORIDE: 0.9 INJECTION, SOLUTION INTRAVENOUS at 01:04

## 2019-04-17 RX ADMIN — METOPROLOL TARTRATE 50 MG: 50 TABLET ORAL at 08:04

## 2019-04-17 RX ADMIN — SODIUM CHLORIDE: 0.9 INJECTION, SOLUTION INTRAVENOUS at 07:04

## 2019-04-17 RX ADMIN — DIVALPROEX SODIUM 250 MG: 125 CAPSULE, COATED PELLETS ORAL at 08:04

## 2019-04-17 RX ADMIN — ENOXAPARIN SODIUM 40 MG: 100 INJECTION SUBCUTANEOUS at 05:04

## 2019-04-17 RX ADMIN — METOPROLOL TARTRATE 25 MG: 25 TABLET ORAL at 09:04

## 2019-04-17 RX ADMIN — IPRATROPIUM BROMIDE AND ALBUTEROL SULFATE 3 ML: .5; 3 SOLUTION RESPIRATORY (INHALATION) at 07:04

## 2019-04-17 RX ADMIN — FLUTICASONE FUROATE AND VILANTEROL TRIFENATATE 1 PUFF: 200; 25 POWDER RESPIRATORY (INHALATION) at 07:04

## 2019-04-17 RX ADMIN — IPRATROPIUM BROMIDE AND ALBUTEROL SULFATE 3 ML: .5; 3 SOLUTION RESPIRATORY (INHALATION) at 04:04

## 2019-04-17 RX ADMIN — DOXYCYCLINE HYCLATE 100 MG: 100 TABLET, COATED ORAL at 09:04

## 2019-04-17 RX ADMIN — IPRATROPIUM BROMIDE AND ALBUTEROL SULFATE 3 ML: .5; 3 SOLUTION RESPIRATORY (INHALATION) at 12:04

## 2019-04-17 RX ADMIN — PREDNISONE 40 MG: 20 TABLET ORAL at 09:04

## 2019-04-17 RX ADMIN — DOXYCYCLINE HYCLATE 100 MG: 100 TABLET, COATED ORAL at 08:04

## 2019-04-17 NOTE — ASSESSMENT & PLAN NOTE
-Admitted to inpatient status in our ICU  -Thought secondary to COPD however she has a mildly elevated troponin and elevated BNP so could have CHF component as well.  -Was on bipap initially but has now been weaned back to room air  -Suspect chronic CO2 retainer and chronic yet undiagnosed COPD  -Continue prednisone, doxycycline, advair and duonebs.  -Plan PFTs prior to discharge.  -Echo shows normal EF and grade I diastolic dysfunction, so do not believe fluid overload was a primary factor in her respiratory distress.  -Discussed with Dr. Chung and will plan stress test tomorrow.  -Continue care in ICU for now.

## 2019-04-17 NOTE — SUBJECTIVE & OBJECTIVE
Interval History: No acute events.  Breathing comfortably on room air.  Denies pain.  No hallucinations, but still disorganized thoughts at times.      Review of Systems   Constitutional: Negative for activity change, chills, diaphoresis and fatigue.   HENT: Negative for congestion, drooling and hearing loss.    Eyes: Negative for discharge.   Respiratory: Negative for apnea, chest tightness, shortness of breath and wheezing.    Cardiovascular: Negative for palpitations and leg swelling.   Gastrointestinal: Negative for abdominal distention, abdominal pain, constipation and diarrhea.   Musculoskeletal: Negative for arthralgias and gait problem.   Skin: Negative for rash.   Neurological: Negative for seizures, light-headedness and numbness.   Hematological: Negative for adenopathy.   Psychiatric/Behavioral: Positive for decreased concentration. Negative for agitation, behavioral problems, hallucinations, self-injury and suicidal ideas. The patient is not nervous/anxious.      Objective:     Vital Signs (Most Recent):  Temp: 98.7 °F (37.1 °C) (04/17/19 0715)  Pulse: 88 (04/17/19 1120)  Resp: (!) 22 (04/17/19 1120)  BP: (!) 140/88 (04/17/19 1100)  SpO2: (!) 92 % (04/17/19 1120) Vital Signs (24h Range):  Temp:  [98.1 °F (36.7 °C)-99.2 °F (37.3 °C)] 98.7 °F (37.1 °C)  Pulse:  [] 88  Resp:  [22-42] 22  SpO2:  [73 %-98 %] 92 %  BP: (128-190)/() 140/88     Weight: 51 kg (112 lb 7 oz)  Body mass index is 18.15 kg/m².    Intake/Output Summary (Last 24 hours) at 4/17/2019 1257  Last data filed at 4/17/2019 1105  Gross per 24 hour   Intake 120 ml   Output 800 ml   Net -680 ml      Physical Exam   Constitutional: She is oriented to person, place, and time. She appears well-developed and well-nourished.   HENT:   Head: Normocephalic and atraumatic.   Eyes: Pupils are equal, round, and reactive to light. EOM are normal.   Neck: Normal range of motion. Neck supple.   Cardiovascular: Normal rate, regular rhythm and  "normal heart sounds.   Pulmonary/Chest: Effort normal and breath sounds normal. No respiratory distress.   Abdominal: Soft. Bowel sounds are normal. She exhibits no distension. There is no tenderness.   Musculoskeletal: Normal range of motion. She exhibits no edema.   Neurological: She is oriented to person, place, and time. No cranial nerve deficit. Coordination normal.   Skin: Skin is warm and dry.   Psychiatric:   Calm and cooperative.  Mood "ok", affect congruent, thought patterns are more linear today, but still disorganized at times, denies SI/HI, denies AH and VH today.   Vitals reviewed.      Significant Labs: All pertinent labs within the past 24 hours have been reviewed.    Significant Imaging: I have reviewed and interpreted all pertinent imaging results/findings within the past 24 hours.  "

## 2019-04-17 NOTE — ASSESSMENT & PLAN NOTE
-Troponin gently trended up yesterday but has gone down today.  -No chest pain and SOB has resolved.  -Echo reviewed.  -D/w Dr. Chung and plan stress test tomorrow.

## 2019-04-17 NOTE — PROGRESS NOTES
Patient refused to do a Pulmonary Function Test on today.@ 9:57 a.m. Attempt to go get patient to complete PFT test.

## 2019-04-17 NOTE — CONSULTS
[facility]  Consult    History of Present Illness:   present to the emergency room here with complaints of cough, and breathlessness.  She says she use to have burning in the chest about 2 months ago, however has not had any chest pain in the recent past.    She says she used to smoke a pack and a half a cigarettes daily, now is down to less than a pack of cigarettes a day.  She has had longstanding hypertension.  She says she was told she had seizures when she is angry however was never placed on seizure medications.    No medications prior to admission.       Review of patient's allergies indicates:  No Known Allergies    Past Medical History:   Diagnosis Date    COPD (chronic obstructive pulmonary disease)     Hypertension      Past Surgical History:   Procedure Laterality Date    HYSTERECTOMY       History reviewed. No pertinent family history.  Social History     Tobacco Use    Smoking status: Current Every Day Smoker     Packs/day: 1.00     Start date: 4/16/1980    Smokeless tobacco: Never Used   Substance Use Topics    Alcohol use: Never     Frequency: Never    Drug use: Not on file        Physical Exam:  The carotid upstroke is brisk.  There is no carotid bruit.  The chest reveals intercostal retraction.  Rare scattered rhonchi.  The heart sounds are distant.  There is no appreciable murmur or gallop.  There is no pedal edema.    The lateral cardiogram shows sinus tachycardia with frequent PACs.  Left ventricular hypertrophy with secondary ST T wave abnormalities.  Right atrial enlargement and a vertical heart suggests lung disease.    The troponins are trending upwards and a minimally elevated.    Impression:   Acute on chronic obstructive lung disease  Hypertensive heart disease with concentric left ventricular hypertrophy and diastolic left ventricular dysfunction  Longstanding history of heavy cigarette smoking  Past history of burning in the chest, minimal troponin  elevations, suspect she has come coronary artery disease    Will add a beta-blocker, and schedule a Lexiscan Cardiolite test for the morning.    Thank you for the opportunity of seeing Donita Gonzalez in consultation

## 2019-04-17 NOTE — ASSESSMENT & PLAN NOTE
-could have been due to hypoxia versus mild pulmonary infection vs dehydration  -Lactic acid remains elevated.  -Will give gentle IV fluids overnight.  -Repeat in AM.

## 2019-04-17 NOTE — PROGRESS NOTES
"[facility] Medicine  Progress Note    Patient Name: Donita Gonzalez  MRN: [id number]  Patient Class: IP- Inpatient   Admission Date: 4/16/2019  Length of Stay: 1 days  Attending Physician: Jordan Natarajan MD  Primary Care Provider: Jordan Campbell MD        Subjective:     Principal Problem:Acute respiratory failure with hypoxia and hypercarbia    HPI:  The patient is a 63 year old black female chronic tobacco smoker with history of COPD who comes in with 3 days of greenish productive cough and SOB.  She has a history of poor compliance to medications.  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  She was so dyspneic today she was unable to have conversations.  She denies any associated chest pain.  No fever/chills.  No sick contacts.  No myalgia.  She denies any history of coronary artery disease but she has been admitted to the ICU for hypercarbic respiratory failure and hypertensive emergency.      The patient has been transferred to [facility] for Pulmonology and Critical Care services.         Hospital Course:  No notes on file    Interval History: No acute events.  Breathing comfortably on room air.  Denies pain.  No hallucinations, but still disorganized thoughts at times.      Review of Systems   Constitutional: Negative for activity change, chills, diaphoresis and fatigue.   HENT: Negative for congestion, drooling and hearing loss.    Eyes: Negative for discharge.   Respiratory: Negative for apnea, chest tightness, shortness of breath and wheezing.    Cardiovascular: Negative for palpitations and leg swelling.   Gastrointestinal: Negative for abdominal distention, abdominal pain, constipation and diarrhea.   Musculoskeletal: Negative for arthralgias and gait problem.   Skin: Negative for rash.   Neurological: Negative for seizures, light-headedness and numbness.   Hematological: Negative for adenopathy.   Psychiatric/Behavioral: Positive for decreased " "concentration. Negative for agitation, behavioral problems, hallucinations, self-injury and suicidal ideas. The patient is not nervous/anxious.      Objective:     Vital Signs (Most Recent):  Temp: 98.7 °F (37.1 °C) (04/17/19 0715)  Pulse: 88 (04/17/19 1120)  Resp: (!) 22 (04/17/19 1120)  BP: (!) 140/88 (04/17/19 1100)  SpO2: (!) 92 % (04/17/19 1120) Vital Signs (24h Range):  Temp:  [98.1 °F (36.7 °C)-99.2 °F (37.3 °C)] 98.7 °F (37.1 °C)  Pulse:  [] 88  Resp:  [22-42] 22  SpO2:  [73 %-98 %] 92 %  BP: (128-190)/() 140/88     Weight: 51 kg (112 lb 7 oz)  Body mass index is 18.15 kg/m².    Intake/Output Summary (Last 24 hours) at 4/17/2019 1257  Last data filed at 4/17/2019 1105  Gross per 24 hour   Intake 120 ml   Output 800 ml   Net -680 ml      Physical Exam   Constitutional: She is oriented to person, place, and time. She appears well-developed and well-nourished.   HENT:   Head: Normocephalic and atraumatic.   Eyes: Pupils are equal, round, and reactive to light. EOM are normal.   Neck: Normal range of motion. Neck supple.   Cardiovascular: Normal rate, regular rhythm and normal heart sounds.   Pulmonary/Chest: Effort normal and breath sounds normal. No respiratory distress.   Abdominal: Soft. Bowel sounds are normal. She exhibits no distension. There is no tenderness.   Musculoskeletal: Normal range of motion. She exhibits no edema.   Neurological: She is oriented to person, place, and time. No cranial nerve deficit. Coordination normal.   Skin: Skin is warm and dry.   Psychiatric:   Calm and cooperative.  Mood "ok", affect congruent, thought patterns are more linear today, but still disorganized at times, denies SI/HI, denies AH and VH today.   Vitals reviewed.      Significant Labs: All pertinent labs within the past 24 hours have been reviewed.    Significant Imaging: I have reviewed and interpreted all pertinent imaging results/findings within the past 24 hours.    Assessment/Plan:      * Acute " respiratory failure with hypoxia and hypercarbia  -Admitted to inpatient status in our ICU  -Thought secondary to COPD however she has a mildly elevated troponin and elevated BNP so could have CHF component as well.  -Was on bipap initially but has now been weaned back to room air  -Suspect chronic CO2 retainer and chronic yet undiagnosed COPD  -Continue prednisone, doxycycline, advair and duonebs.  -Plan PFTs prior to discharge.  -Echo shows normal EF and grade I diastolic dysfunction, so do not believe fluid overload was a primary factor in her respiratory distress.  -Discussed with Dr. Chung and will plan stress test tomorrow.  -Continue care in ICU for now.    Elevated troponin  -Troponin gently trended up yesterday but has gone down today.  -No chest pain and SOB has resolved.  -Echo reviewed.  -D/w Dr. Chung and plan stress test tomorrow.      Hallucinations  -Nursing and consulting MDs noted on 4/16 that she has had hallucinations.  I do not witness this during my exam on 4/16 or 4/17.  -She was seen by psych (tele-psych) on 4/16 who recommended close observation and low threshold for PEC.  Also recommended starting depakote, which she had previously been on.  This was started and her thought process is more linear today.  -She still has no SI/HI/AH/VH.    -Do not believe she requires PEC at this time.  Would benefit from outpatient psych follow up.    Lactic acidosis  -could have been due to hypoxia versus mild pulmonary infection vs dehydration  -Lactic acid remains elevated.  -Will give gentle IV fluids overnight.  -Repeat in AM.      Hyperkalemia  -This was lab error.  -Repeat today shows normal K      Tobacco abuse  -Counselled on cessation.  -NRT offered      VTE Risk Mitigation (From admission, onward)        Ordered     enoxaparin injection 40 mg  Daily      04/16/19 9511     Place KODI hose  Until discontinued      04/16/19 6262     Place sequential compression device  Until discontinued       04/16/19 0352     IP VTE LOW RISK PATIENT  Once      04/16/19 0352              Jordan Natarajan MD  Department of Hospital Medicine   [facility]

## 2019-04-17 NOTE — PLAN OF CARE
Problem: Adult Inpatient Plan of Care  Goal: Plan of Care Review  Outcome: Ongoing (interventions implemented as appropriate)  Patient up sitting on side of bed on room air saturations 89-91% ,Q 4 aerosol treatment and daily DPI given tolerated well will monitor.

## 2019-04-17 NOTE — ASSESSMENT & PLAN NOTE
-Nursing and consulting MDs noted on 4/16 that she has had hallucinations.  I do not witness this during my exam on 4/16 or 4/17.  -She was seen by psych (tele-psych) on 4/16 who recommended close observation and low threshold for PEC.  Also recommended starting depakote, which she had previously been on.  This was started and her thought process is more linear today.  -She still has no SI/HI/AH/VH.    -Do not believe she requires PEC at this time.  Would benefit from outpatient psych follow up.

## 2019-04-18 PROBLEM — E87.5 HYPERKALEMIA: Status: RESOLVED | Noted: 2019-04-17 | Resolved: 2019-04-18

## 2019-04-18 LAB
ANION GAP SERPL CALC-SCNC: 9 MMOL/L (ref 8–16)
BASOPHILS # BLD AUTO: 0.01 K/UL (ref 0–0.2)
BASOPHILS NFR BLD: 0.1 % (ref 0–1.9)
BUN SERPL-MCNC: 49 MG/DL (ref 8–23)
CALCIUM SERPL-MCNC: 8.1 MG/DL (ref 8.7–10.5)
CHLORIDE SERPL-SCNC: 105 MMOL/L (ref 95–110)
CO2 SERPL-SCNC: 23 MMOL/L (ref 23–29)
CREAT SERPL-MCNC: 1 MG/DL (ref 0.5–1.4)
DIFFERENTIAL METHOD: ABNORMAL
EOSINOPHIL # BLD AUTO: 0 K/UL (ref 0–0.5)
EOSINOPHIL NFR BLD: 0 % (ref 0–8)
ERYTHROCYTE [DISTWIDTH] IN BLOOD BY AUTOMATED COUNT: 14.1 % (ref 11.5–14.5)
EST. GFR  (AFRICAN AMERICAN): >60 ML/MIN/1.73 M^2
EST. GFR  (NON AFRICAN AMERICAN): >60 ML/MIN/1.73 M^2
GLUCOSE SERPL-MCNC: 80 MG/DL (ref 70–110)
HCT VFR BLD AUTO: 44.1 % (ref 37–48.5)
HGB BLD-MCNC: 14.4 G/DL (ref 12–16)
LACTATE SERPL-SCNC: 1.9 MMOL/L (ref 0.5–2.2)
LYMPHOCYTES # BLD AUTO: 0.8 K/UL (ref 1–4.8)
LYMPHOCYTES NFR BLD: 6.8 % (ref 18–48)
MAGNESIUM SERPL-MCNC: 2.1 MG/DL (ref 1.6–2.6)
MCH RBC QN AUTO: 31.6 PG (ref 27–31)
MCHC RBC AUTO-ENTMCNC: 32.7 G/DL (ref 32–36)
MCV RBC AUTO: 97 FL (ref 82–98)
MONOCYTES # BLD AUTO: 0.9 K/UL (ref 0.3–1)
MONOCYTES NFR BLD: 7.8 % (ref 4–15)
NEUTROPHILS # BLD AUTO: 9.4 K/UL (ref 1.8–7.7)
NEUTROPHILS NFR BLD: 84.9 % (ref 38–73)
PLATELET # BLD AUTO: 359 K/UL (ref 150–350)
PMV BLD AUTO: 10.9 FL (ref 9.2–12.9)
POTASSIUM SERPL-SCNC: 3.7 MMOL/L (ref 3.5–5.1)
RBC # BLD AUTO: 4.56 M/UL (ref 4–5.4)
SODIUM SERPL-SCNC: 137 MMOL/L (ref 136–145)
WBC # BLD AUTO: 11.01 K/UL (ref 3.9–12.7)

## 2019-04-18 PROCEDURE — 25000003 PHARM REV CODE 250: Performed by: HOSPITALIST

## 2019-04-18 PROCEDURE — 25000242 PHARM REV CODE 250 ALT 637 W/ HCPCS: Performed by: HOSPITALIST

## 2019-04-18 PROCEDURE — S4991 NICOTINE PATCH NONLEGEND: HCPCS | Performed by: HOSPITALIST

## 2019-04-18 PROCEDURE — 63600175 PHARM REV CODE 636 W HCPCS: Performed by: HOSPITALIST

## 2019-04-18 PROCEDURE — 83735 ASSAY OF MAGNESIUM: CPT

## 2019-04-18 PROCEDURE — 94640 AIRWAY INHALATION TREATMENT: CPT

## 2019-04-18 PROCEDURE — 99233 PR SUBSEQUENT HOSPITAL CARE,LEVL III: ICD-10-PCS | Mod: ,,, | Performed by: HOSPITALIST

## 2019-04-18 PROCEDURE — 11000001 HC ACUTE MED/SURG PRIVATE ROOM

## 2019-04-18 PROCEDURE — 99233 SBSQ HOSP IP/OBS HIGH 50: CPT | Mod: ,,, | Performed by: HOSPITALIST

## 2019-04-18 PROCEDURE — 85025 COMPLETE CBC W/AUTO DIFF WBC: CPT

## 2019-04-18 PROCEDURE — 25000003 PHARM REV CODE 250: Performed by: INTERNAL MEDICINE

## 2019-04-18 PROCEDURE — 80048 BASIC METABOLIC PNL TOTAL CA: CPT

## 2019-04-18 PROCEDURE — 36415 COLL VENOUS BLD VENIPUNCTURE: CPT

## 2019-04-18 PROCEDURE — 27000221 HC OXYGEN, UP TO 24 HOURS

## 2019-04-18 PROCEDURE — 83605 ASSAY OF LACTIC ACID: CPT

## 2019-04-18 PROCEDURE — 94761 N-INVAS EAR/PLS OXIMETRY MLT: CPT

## 2019-04-18 RX ORDER — PREDNISONE 20 MG/1
20 TABLET ORAL DAILY
Status: DISCONTINUED | OUTPATIENT
Start: 2019-04-19 | End: 2019-04-20

## 2019-04-18 RX ORDER — OLANZAPINE 2.5 MG/1
2.5 TABLET ORAL EVERY 6 HOURS PRN
Status: DISCONTINUED | OUTPATIENT
Start: 2019-04-18 | End: 2019-04-20 | Stop reason: HOSPADM

## 2019-04-18 RX ADMIN — DOXYCYCLINE HYCLATE 100 MG: 100 TABLET, COATED ORAL at 08:04

## 2019-04-18 RX ADMIN — FLUTICASONE FUROATE AND VILANTEROL TRIFENATATE 1 PUFF: 200; 25 POWDER RESPIRATORY (INHALATION) at 07:04

## 2019-04-18 RX ADMIN — METOPROLOL TARTRATE 50 MG: 50 TABLET ORAL at 08:04

## 2019-04-18 RX ADMIN — IPRATROPIUM BROMIDE AND ALBUTEROL SULFATE 3 ML: .5; 3 SOLUTION RESPIRATORY (INHALATION) at 03:04

## 2019-04-18 RX ADMIN — IPRATROPIUM BROMIDE AND ALBUTEROL SULFATE 3 ML: .5; 3 SOLUTION RESPIRATORY (INHALATION) at 11:04

## 2019-04-18 RX ADMIN — SODIUM CHLORIDE: 0.9 INJECTION, SOLUTION INTRAVENOUS at 03:04

## 2019-04-18 RX ADMIN — NICOTINE 1 PATCH: 14 PATCH, EXTENDED RELEASE TRANSDERMAL at 08:04

## 2019-04-18 RX ADMIN — PREDNISONE 40 MG: 20 TABLET ORAL at 08:04

## 2019-04-18 RX ADMIN — IPRATROPIUM BROMIDE AND ALBUTEROL SULFATE 3 ML: .5; 3 SOLUTION RESPIRATORY (INHALATION) at 07:04

## 2019-04-18 RX ADMIN — DIVALPROEX SODIUM 250 MG: 125 CAPSULE, COATED PELLETS ORAL at 09:04

## 2019-04-18 RX ADMIN — METOPROLOL TARTRATE 50 MG: 50 TABLET ORAL at 09:04

## 2019-04-18 RX ADMIN — DOXYCYCLINE HYCLATE 100 MG: 100 TABLET, COATED ORAL at 09:04

## 2019-04-18 RX ADMIN — ENOXAPARIN SODIUM 40 MG: 100 INJECTION SUBCUTANEOUS at 04:04

## 2019-04-18 NOTE — NURSING
Pt transported off the unit with RN sitter and family   No distress noted  Telemetry box placed on the patient upon entering the room  Wallace RN at the bedside to receive the patient

## 2019-04-18 NOTE — ASSESSMENT & PLAN NOTE
-Admitted to inpatient status in our ICU  -Thought secondary to COPD however she has a mildly elevated troponin and elevated BNP so could have CHF component as well.  -Was on bipap initially but has now been weaned back to room air  -Suspect chronic CO2 retainer and chronic yet undiagnosed COPD  -Continue prednisone, doxycycline, advair and duonebs.  -She refuses PFTs   -Echo shows normal EF and grade I diastolic dysfunction, so do not believe fluid overload was a primary factor in her respiratory distress.  -Had planned stress test today but she declines this.    -Continue current medical care.

## 2019-04-18 NOTE — ASSESSMENT & PLAN NOTE
-Troponin mildly elevated and last measurement was trending down.  -No chest pain and SOB has resolved.  -Etiology is unclear.  She has minimal diastolic dysfunction and normal renal function.  -Echo reviewed and no wall motion abnormalities.  -D/w Dr. Chung and had planned stress test today.  Patient declines further testing at this time.  Will need stress test when metnal status improved and patient agreeable.  Could be done as outpatient in next few weeks.

## 2019-04-18 NOTE — PLAN OF CARE
Problem: Respiratory Compromise COPD (Chronic Obstructive Pulmonary Disease)  Goal: Effective Oxygenation and Ventilation  Outcome: Ongoing (interventions implemented as appropriate)  Lung fields with scattered crackles  On room air   Oxygen saturation in the high 90's  No shortness of breath noted   Respirations even and unlabored

## 2019-04-18 NOTE — NURSING
Patient is awake alert and oriented x4. However she will begin to mumble about leap year and Dino Gras , and her birthday all clumped together. Patient remains PEC with sitter at the bedside. Patient does have family with her at this time and appears to be interacting appropriately with them. Will continue to monitor.

## 2019-04-18 NOTE — CONSULTS
"4/18/2019 10:51 AM   Donita Gonzalez   1955   [id number]        Telepsychiatry Consult f/u     SUBJECTIVE:   From hospital medicine note from yesterday:  "The patient is a 63 year old black female chronic tobacco smoker with history of COPD who comes in with 3 days of greenish productive cough and SOB.  She has a history of poor compliance to medications.  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  She was so dyspneic today she was unable to have conversations.  She denies any associated chest pain.  No fever/chills.  No sick contacts.  No myalgia.  She denies any history of coronary artery disease but she has been admitted to the ICU for hypercarbic respiratory failure and hypertensive emergency."     From telepsych consult from 04/16/19:  "Pt seen sitting in bed reports that she was told she needs to talk to the psychiatrist because she was "seeing shadows and shapes" When asked to clarify what shapes pt reports "like argenis shapes you see on TV" When asked how long she has been having these hallucinations pt reports " since graduate school, I was writing a thesis you know a thesis on the book is Rita and Louis" pt then rambled on this thesis. Interrupted pt and asked if she was diagnosed with any psychiatric problems in the past or experienced anything like depression. Pt states "Not depression but I have chastity grieving" When asked she was grieving about what pt responded " I am grieving about 2 million dollars in [employer]" Pt feels that she is owed this much money but she has not heard from anyone about it and feels like someone stole all her money. Pt perseverated on this for a long time. Then pt started to ramble about lead "you know capital P and little b" She believes that a company called [employer] poisoned her and that's why she needed to get a hysterectomy. During a brief disconnection of the telemed machine. Per nursing pt was claiming all her information was " "travelling through the air.   Collateral    Per  pt " has always been this way" however upon further pressing for details he reluctantly admits she does infact need to be on psychiatric medication but refuses to take it. Informed him that upon my evaluation pt sounded psychotic, he reluctantly reports that 'maybe she has been lately". 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 A lot is the history  gave was not adding up. Upon further inquiring how she is taking care of herself at home he reports "she doesn't he reports she doesn't take her medication for her medical problems nor go see her doctors. He reports that she now avoids even going outside and has been losing a lot of weight as well. He is open to resuming her psych meds if it mean "I get my wife back please help her""    As per hospital medicine MD  Marker: this morning pt. Was more delusional and agitated with disorganized thought process. Refused cardiac stress test today. PEC was completed this morning.    On interview by me today: states, that today is 2019, knows today is Thursday, knows where she is; denies AH's, states, that she sees shadows from trees, states that she does not want a pulmonary function test because patients have  from the procedure, she does not want a cardiac stress test because it is bad for her heart. Pt. Shows disorganized thought process.    Pt. Does not want me to call  Griffin or son Bret.   Griffin, [phone]  Son Bret [phone]     Current Medications:   Scheduled Meds:    albuterol-ipratropium  3 mL Nebulization Q4H    divalproex  250 mg Oral QHS    doxycycline  100 mg Oral Q12H    enoxaparin  40 mg Subcutaneous Daily    fluticasone-vilanterol  1 puff Inhalation Daily    " metoprolol tartrate  50 mg Oral BID    nicotine  1 patch Transdermal Daily    predniSONE  40 mg Oral Daily      PRN Meds: acetaminophen, hydrALAZINE, OLANZapine, ondansetron, pneumoc 13-raya conj-dip cr(PF), sodium chloride 0.9%   Psychotherapeutics (From admission, onward)    Start     Stop Route Frequency Ordered    04/18/19 1117  OLANZapine tablet 2.5 mg      -- Oral Every 6 hours PRN 04/18/19 1017          Allergies:   Review of patient's allergies indicates:  No Known Allergies     OBJECTIVE:   Vitals   Vitals:    04/18/19 0900   BP: (!) 143/74   Pulse: 90   Resp: (!) 21   Temp:         Labs/Imaging/Studies:   Recent Results (from the past 36 hour(s))   CBC with Automated Differential    Collection Time: 04/17/19  4:34 AM   Result Value Ref Range    WBC 17.02 (H) 3.90 - 12.70 K/uL    RBC 4.88 4.00 - 5.40 M/uL    Hemoglobin 15.5 12.0 - 16.0 g/dL    Hematocrit 46.1 37.0 - 48.5 %    MCV 95 82 - 98 fL    MCH 31.8 (H) 27.0 - 31.0 pg    MCHC 33.6 32.0 - 36.0 g/dL    RDW 13.8 11.5 - 14.5 %    Platelets 412 (H) 150 - 350 K/uL    MPV 10.8 9.2 - 12.9 fL    Gran # (ANC) 14.8 (H) 1.8 - 7.7 K/uL    Lymph # 1.2 1.0 - 4.8 K/uL    Mono # 0.9 0.3 - 1.0 K/uL    Eos # 0.0 0.0 - 0.5 K/uL    Baso # 0.01 0.00 - 0.20 K/uL    Gran% 86.9 (H) 38.0 - 73.0 %    Lymph% 7.3 (L) 18.0 - 48.0 %    Mono% 5.3 4.0 - 15.0 %    Eosinophil% 0.0 0.0 - 8.0 %    Basophil% 0.1 0.0 - 1.9 %    Differential Method Automated    Magnesium    Collection Time: 04/17/19  5:50 AM   Result Value Ref Range    Magnesium 3.4 (H) 1.6 - 2.6 mg/dL   Basic metabolic panel    Collection Time: 04/17/19  5:50 AM   Result Value Ref Range    Sodium 132 (L) 136 - 145 mmol/L    Potassium 5.4 (H) 3.5 - 5.1 mmol/L    Chloride 98 95 - 110 mmol/L    CO2 20 (L) 23 - 29 mmol/L    Glucose 175 (H) 70 - 110 mg/dL    BUN, Bld 47 (H) 8 - 23 mg/dL    Creatinine 1.5 (H) 0.5 - 1.4 mg/dL    Calcium 9.4 8.7 - 10.5 mg/dL    Anion Gap 14 8 - 16 mmol/L    eGFR if  42 (A) >60  mL/min/1.73 m^2    eGFR if non African American 37 (A) >60 mL/min/1.73 m^2   Lactic acid, plasma    Collection Time: 04/17/19  7:36 AM   Result Value Ref Range    Lactate (Lactic Acid) 3.4 (H) 0.5 - 2.2 mmol/L   Basic metabolic panel    Collection Time: 04/17/19  7:36 AM   Result Value Ref Range    Sodium 135 (L) 136 - 145 mmol/L    Potassium 4.3 3.5 - 5.1 mmol/L    Chloride 97 95 - 110 mmol/L    CO2 24 23 - 29 mmol/L    Glucose 103 70 - 110 mg/dL    BUN, Bld 45 (H) 8 - 23 mg/dL    Creatinine 1.4 0.5 - 1.4 mg/dL    Calcium 9.7 8.7 - 10.5 mg/dL    Anion Gap 14 8 - 16 mmol/L    eGFR if African American 46 (A) >60 mL/min/1.73 m^2    eGFR if non African American 40 (A) >60 mL/min/1.73 m^2   Troponin I    Collection Time: 04/17/19  7:36 AM   Result Value Ref Range    Troponin I 0.282 (H) 0.000 - 0.026 ng/mL   Magnesium    Collection Time: 04/18/19  3:39 AM   Result Value Ref Range    Magnesium 2.1 1.6 - 2.6 mg/dL   CBC with Automated Differential    Collection Time: 04/18/19  3:39 AM   Result Value Ref Range    WBC 11.01 3.90 - 12.70 K/uL    RBC 4.56 4.00 - 5.40 M/uL    Hemoglobin 14.4 12.0 - 16.0 g/dL    Hematocrit 44.1 37.0 - 48.5 %    MCV 97 82 - 98 fL    MCH 31.6 (H) 27.0 - 31.0 pg    MCHC 32.7 32.0 - 36.0 g/dL    RDW 14.1 11.5 - 14.5 %    Platelets 359 (H) 150 - 350 K/uL    MPV 10.9 9.2 - 12.9 fL    Gran # (ANC) 9.4 (H) 1.8 - 7.7 K/uL    Lymph # 0.8 (L) 1.0 - 4.8 K/uL    Mono # 0.9 0.3 - 1.0 K/uL    Eos # 0.0 0.0 - 0.5 K/uL    Baso # 0.01 0.00 - 0.20 K/uL    Gran% 84.9 (H) 38.0 - 73.0 %    Lymph% 6.8 (L) 18.0 - 48.0 %    Mono% 7.8 4.0 - 15.0 %    Eosinophil% 0.0 0.0 - 8.0 %    Basophil% 0.1 0.0 - 1.9 %    Differential Method Automated    Basic metabolic panel    Collection Time: 04/18/19  3:39 AM   Result Value Ref Range    Sodium 137 136 - 145 mmol/L    Potassium 3.7 3.5 - 5.1 mmol/L    Chloride 105 95 - 110 mmol/L    CO2 23 23 - 29 mmol/L    Glucose 80 70 - 110 mg/dL    BUN, Bld 49 (H) 8 - 23 mg/dL    Creatinine  1.0 0.5 - 1.4 mg/dL    Calcium 8.1 (L) 8.7 - 10.5 mg/dL    Anion Gap 9 8 - 16 mmol/L    eGFR if African American >60 >60 mL/min/1.73 m^2    eGFR if non African American >60 >60 mL/min/1.73 m^2        Mental Status Exam:   Arousal: alert  Appearance: appears stated age  Behavior/Cooperation: cooperative   Psychomotor: currently calm  Speech: normal rate, rhythm, volume  Mood: steady   Affect: constricted   Thought Process: disorganized   Thought Content: denies SI/HI  Associations: loose  Attention/Concentration: able to focus on interview  Insight: appears limited   Judgment: appears limited    ASSESSMENT/PLAN:   Impression:   Psychosis, unspecified  R/o component of delirium  Abnormal EKG from 04/16/19 with QTc 572    Pt. Currently appears gravely disabled.    Case d/w hospital medicine MD Dr. Natarajan.    Recommendations:   - PEC  - please have sitter with patient at all times  - Can continue Depakote 250 mg at bedtime for now, obtain serum VPA level, consider increasing dose to 500 mg at bedtime  - Can continue prn Zyprexa, follow EKG/QTc      Jorge Scott M.D.  4/18/2019

## 2019-04-18 NOTE — ASSESSMENT & PLAN NOTE
-could have been due to hypoxia versus mild pulmonary infection vs dehydration  -Lactic acid remained elevated on 4/17 so she was given IV fluids.  -Repeat lactic acid today.

## 2019-04-18 NOTE — PROGRESS NOTES
"[facility] Medicine  Progress Note    Patient Name: Donita Gonzalez  MRN: [id number]  Patient Class: IP- Inpatient   Admission Date: 4/16/2019  Length of Stay: 2 days  Attending Physician: Jordan Natarajan MD  Primary Care Provider: Jordan Campbell MD        Subjective:     Principal Problem:Acute respiratory failure with hypoxia and hypercarbia    HPI:  The patient is a 63 year old black female chronic tobacco smoker with history of COPD who comes in with 3 days of greenish productive cough and SOB.  She has a history of poor compliance to medications.  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  She was so dyspneic today she was unable to have conversations.  She denies any associated chest pain.  No fever/chills.  No sick contacts.  No myalgia.  She denies any history of coronary artery disease but she has been admitted to the ICU for hypercarbic respiratory failure and hypertensive emergency.      The patient has been transferred to [facility] for Pulmonology and Critical Care services.         Hospital Course:  No notes on file    Interval History: No acute events overnight.  Nursing notes that she continued with hallucinations overnight.  This morning she is much more agitated and incoherant.  She has clear paranoia and delusions regarding medical procedures.  She does not consent for stress test or spirometry because both will "have lasers that burn my lungs"  Breathing comfortably on room air.  Denies pain.      Review of Systems   Constitutional: Negative for activity change, chills, diaphoresis and fatigue.   HENT: Negative for congestion, drooling and hearing loss.    Eyes: Negative for discharge.   Respiratory: Negative for apnea, chest tightness, shortness of breath and wheezing.    Cardiovascular: Negative for palpitations and leg swelling.   Gastrointestinal: Negative for abdominal distention, abdominal pain, constipation and diarrhea.   Musculoskeletal: " Negative for arthralgias and gait problem.   Skin: Negative for rash.   Neurological: Negative for seizures, light-headedness and numbness.   Hematological: Negative for adenopathy.   Psychiatric/Behavioral: Positive for agitation, confusion, decreased concentration and hallucinations. Negative for behavioral problems, self-injury and suicidal ideas. The patient is not nervous/anxious.      Objective:     Vital Signs (Most Recent):  Temp: 97.9 °F (36.6 °C) (04/18/19 1105)  Pulse: 79 (04/18/19 1139)  Resp: 20 (04/18/19 1139)  BP: 124/73 (04/18/19 1105)  SpO2: 96 % (04/18/19 1139) Vital Signs (24h Range):  Temp:  [97.9 °F (36.6 °C)-98.9 °F (37.2 °C)] 97.9 °F (36.6 °C)  Pulse:  [] 79  Resp:  [15-47] 20  SpO2:  [90 %-99 %] 96 %  BP: (119-160)/(66-95) 124/73     Weight: 51 kg (112 lb 7 oz)  Body mass index is 18.15 kg/m².    Intake/Output Summary (Last 24 hours) at 4/18/2019 1149  Last data filed at 4/18/2019 0900  Gross per 24 hour   Intake 905 ml   Output 1550 ml   Net -645 ml      Physical Exam   Constitutional: She is oriented to person, place, and time. She appears well-developed and well-nourished.   HENT:   Head: Normocephalic and atraumatic.   Eyes: Pupils are equal, round, and reactive to light. EOM are normal.   Neck: Normal range of motion. Neck supple.   Cardiovascular: Normal rate, regular rhythm and normal heart sounds.   Pulmonary/Chest: Effort normal and breath sounds normal. No respiratory distress.   Abdominal: Soft. Bowel sounds are normal. She exhibits no distension. There is no tenderness.   Musculoskeletal: Normal range of motion. She exhibits no edema.   Neurological: She is oriented to person, place, and time. No cranial nerve deficit. Coordination normal.   Skin: Skin is warm and dry.   Psychiatric:   Generally calm in bedside chair, but much more agitated today.  She is clearly paranoid and delusional.  She is oriented to self, year, city, hospital and president.  When asked the year she  "says, "18, 19, 20" and demands that all three be recorded.  She is able to say she knows it is 19.  Refuses tests due to "there are lasers that will burn my lungs".  Very disorganized thought process.  Denies SI/HI.  No active hallucinations during my visit.   Vitals reviewed.      Significant Labs: All pertinent labs within the past 24 hours have been reviewed.    Significant Imaging: I have reviewed and interpreted all pertinent imaging results/findings within the past 24 hours.    Assessment/Plan:      * Acute respiratory failure with hypoxia and hypercarbia  -Admitted to inpatient status in our ICU  -Thought secondary to COPD however she has a mildly elevated troponin and elevated BNP so could have CHF component as well.  -Was on bipap initially but has now been weaned back to room air  -Suspect chronic CO2 retainer and chronic yet undiagnosed COPD  -Continue prednisone, doxycycline, advair and duonebs.  -She refuses PFTs   -Echo shows normal EF and grade I diastolic dysfunction, so do not believe fluid overload was a primary factor in her respiratory distress.  -Had planned stress test today but she declines this.    -Continue current medical care.    Hallucinations  -Nursing and consulting MDs noted on 4/16 that she was having hallucinations.  These are again noted overnight in nursing notes on 4/17.    -This morning on 4/18 she is much more agitated, paranoid, delusional and with severely disordered thought processes.  I do not witness any SI/HI or hallucinations.  -I do not believe she is delirious and I see no evidence of a reversible metabolic encephalopathy.  -She was seen by psych (tele-psych) on 4/16 who recommended close observation and low threshold for PEC and starting depakote which we did.  -Will add PRN zyprexa.  -Will decrease dose of prednisone.  -Held off on PEC initially but kept her in the ICU for close monitoring.  Today, her mental condition has deteriorated and I do believe she is gravely " disabled.  As such I am placing PEC and consulting tele-psych again.  She may benefit from geriatric psych placement.    Lactic acidosis  -could have been due to hypoxia versus mild pulmonary infection vs dehydration  -Lactic acid remained elevated on 4/17 so she was given IV fluids.  -Repeat lactic acid today.    Elevated troponin  -Troponin mildly elevated and last measurement was trending down.  -No chest pain and SOB has resolved.  -Etiology is unclear.  She has minimal diastolic dysfunction and normal renal function.  -Echo reviewed and no wall motion abnormalities.  -D/w Dr. Chung and had planned stress test today.  Patient declines further testing at this time.  Will need stress test when metnal status improved and patient agreeable.  Could be done as outpatient in next few weeks.      Tobacco abuse  -Counselled on cessation.  -NRT offered        VTE Risk Mitigation (From admission, onward)        Ordered     enoxaparin injection 40 mg  Daily      04/16/19 0713     Place KODI hose  Until discontinued      04/16/19 0352     Place sequential compression device  Until discontinued      04/16/19 0352     IP VTE LOW RISK PATIENT  Once      04/16/19 0352              Jordan Natarajan MD  Department of Hospital Medicine   [facility]

## 2019-04-18 NOTE — NURSING
Pts clothes at bedside with .  stated he would take them home with him. Will continue to monitor.

## 2019-04-18 NOTE — ASSESSMENT & PLAN NOTE
-Nursing and consulting MDs noted on 4/16 that she was having hallucinations.  These are again noted overnight in nursing notes on 4/17.    -This morning on 4/18 she is much more agitated, paranoid, delusional and with severely disordered thought processes.  I do not witness any SI/HI or hallucinations.  -I do not believe she is delirious and I see no evidence of a reversible metabolic encephalopathy.  -She was seen by psych (tele-psych) on 4/16 who recommended close observation and low threshold for PEC and starting depakote which we did.  -Will add PRN zyprexa.  -Will decrease dose of prednisone.  -Check depakote level in AM.  -Held off on PEC initially but kept her in the ICU for close monitoring.  Today, her mental condition has deteriorated and I do believe she is gravely disabled.  As such I am placing PEC and consulting tele-psych again.    -She may benefit from geriatric psych placement.

## 2019-04-18 NOTE — NURSING
Report called to Catalina and all questions answered  Patient sitting up in bedside chair   In no distress   Will transfer with sitter and cardiac monitoring

## 2019-04-18 NOTE — PROGRESS NOTES
Rambling incoherently this morning.  The patient refused her stress test this morning.  She is telling me of the number of patients she knows that  having this procedure.    Vitals:    19 0600 19 0705 19 0722 19 0800   BP: (!) 149/91 (!) 143/74  (!) 151/81   BP Location:  Right arm  Right arm   Patient Position:  Sitting  Sitting   Pulse: 86 92 77 90   Resp: (!) 30 16 15 (!) 21   Temp:  97.9 °F (36.6 °C)     TempSrc:  Oral     SpO2: 98% (!) 91% 99% 96%   Weight:       Height:         Chest clear  Heart no murmur or gallop    I would keep her on the present medical management.  I think she needs her psychiatric state addressed and treated.

## 2019-04-18 NOTE — SUBJECTIVE & OBJECTIVE
"Interval History: No acute events overnight.  Nursing notes that she continued with hallucinations overnight.  This morning she is much more agitated and incoherant.  She has clear paranoia and delusions regarding medical procedures.  She does not consent for stress test or spirometry because both will "have lasers that burn my lungs"  Breathing comfortably on room air.  Denies pain.      Review of Systems   Constitutional: Negative for activity change, chills, diaphoresis and fatigue.   HENT: Negative for congestion, drooling and hearing loss.    Eyes: Negative for discharge.   Respiratory: Negative for apnea, chest tightness, shortness of breath and wheezing.    Cardiovascular: Negative for palpitations and leg swelling.   Gastrointestinal: Negative for abdominal distention, abdominal pain, constipation and diarrhea.   Musculoskeletal: Negative for arthralgias and gait problem.   Skin: Negative for rash.   Neurological: Negative for seizures, light-headedness and numbness.   Hematological: Negative for adenopathy.   Psychiatric/Behavioral: Positive for agitation, confusion, decreased concentration and hallucinations. Negative for behavioral problems, self-injury and suicidal ideas. The patient is not nervous/anxious.      Objective:     Vital Signs (Most Recent):  Temp: 97.9 °F (36.6 °C) (04/18/19 1105)  Pulse: 79 (04/18/19 1139)  Resp: 20 (04/18/19 1139)  BP: 124/73 (04/18/19 1105)  SpO2: 96 % (04/18/19 1139) Vital Signs (24h Range):  Temp:  [97.9 °F (36.6 °C)-98.9 °F (37.2 °C)] 97.9 °F (36.6 °C)  Pulse:  [] 79  Resp:  [15-47] 20  SpO2:  [90 %-99 %] 96 %  BP: (119-160)/(66-95) 124/73     Weight: 51 kg (112 lb 7 oz)  Body mass index is 18.15 kg/m².    Intake/Output Summary (Last 24 hours) at 4/18/2019 1149  Last data filed at 4/18/2019 0900  Gross per 24 hour   Intake 905 ml   Output 1550 ml   Net -645 ml      Physical Exam   Constitutional: She is oriented to person, place, and time. She appears " "well-developed and well-nourished.   HENT:   Head: Normocephalic and atraumatic.   Eyes: Pupils are equal, round, and reactive to light. EOM are normal.   Neck: Normal range of motion. Neck supple.   Cardiovascular: Normal rate, regular rhythm and normal heart sounds.   Pulmonary/Chest: Effort normal and breath sounds normal. No respiratory distress.   Abdominal: Soft. Bowel sounds are normal. She exhibits no distension. There is no tenderness.   Musculoskeletal: Normal range of motion. She exhibits no edema.   Neurological: She is oriented to person, place, and time. No cranial nerve deficit. Coordination normal.   Skin: Skin is warm and dry.   Psychiatric:   Generally calm in bedside chair, but much more agitated today.  She is clearly paranoid and delusional.  She is oriented to self, year, city, hospital and president.  When asked the year she says, "18, 19, 20" and demands that all three be recorded.  She is able to say she knows it is 19.  Refuses tests due to "there are lasers that will burn my lungs".  Very disorganized thought process.  Denies SI/HI.  No active hallucinations during my visit.   Vitals reviewed.      Significant Labs: All pertinent labs within the past 24 hours have been reviewed.    Significant Imaging: I have reviewed and interpreted all pertinent imaging results/findings within the past 24 hours.  "

## 2019-04-18 NOTE — NURSING
Attempted to call report - unable to take report working on sitter situation for the next shift- explained that she has a sitter with her

## 2019-04-18 NOTE — PLAN OF CARE
Problem: Adult Inpatient Plan of Care  Goal: Plan of Care Review  Outcome: Ongoing (interventions implemented as appropriate)  Patient VSS, afebrile. Patient remains in the chair throughout the night, oxygen saturations remains above 88% on 1LNC. Patient ambulates to the bathroom with assist. Patient continues to hallucinate, and requires consistent redirection. Patient updated on the plan of care. RN will continue to monitor.

## 2019-04-18 NOTE — PLAN OF CARE
Problem: Adult Inpatient Plan of Care  Goal: Plan of Care Review  Outcome: Ongoing (interventions implemented as appropriate)  Patient in no apparent distress. Sat's  93-96 % on 1 lpm. Received on room air and placed on O2 by NYASIA Lyman. Aerosol treatments given Q 4 . Will continue to monitor.

## 2019-04-19 PROBLEM — I10 ESSENTIAL HYPERTENSION: Status: ACTIVE | Noted: 2019-04-19

## 2019-04-19 LAB
ANION GAP SERPL CALC-SCNC: 8 MMOL/L (ref 8–16)
BASOPHILS # BLD AUTO: 0 K/UL (ref 0–0.2)
BASOPHILS NFR BLD: 0 % (ref 0–1.9)
BUN SERPL-MCNC: 39 MG/DL (ref 8–23)
CALCIUM SERPL-MCNC: 9.3 MG/DL (ref 8.7–10.5)
CHLORIDE SERPL-SCNC: 103 MMOL/L (ref 95–110)
CO2 SERPL-SCNC: 28 MMOL/L (ref 23–29)
CREAT SERPL-MCNC: 1 MG/DL (ref 0.5–1.4)
DIFFERENTIAL METHOD: ABNORMAL
EOSINOPHIL # BLD AUTO: 0 K/UL (ref 0–0.5)
EOSINOPHIL NFR BLD: 0.1 % (ref 0–8)
ERYTHROCYTE [DISTWIDTH] IN BLOOD BY AUTOMATED COUNT: 14 % (ref 11.5–14.5)
EST. GFR  (AFRICAN AMERICAN): >60 ML/MIN/1.73 M^2
EST. GFR  (NON AFRICAN AMERICAN): >60 ML/MIN/1.73 M^2
GLUCOSE SERPL-MCNC: 83 MG/DL (ref 70–110)
HCT VFR BLD AUTO: 44.6 % (ref 37–48.5)
HGB BLD-MCNC: 14.3 G/DL (ref 12–16)
LYMPHOCYTES # BLD AUTO: 0.8 K/UL (ref 1–4.8)
LYMPHOCYTES NFR BLD: 6.8 % (ref 18–48)
MAGNESIUM SERPL-MCNC: 2.6 MG/DL (ref 1.6–2.6)
MCH RBC QN AUTO: 31.2 PG (ref 27–31)
MCHC RBC AUTO-ENTMCNC: 32.1 G/DL (ref 32–36)
MCV RBC AUTO: 97 FL (ref 82–98)
MONOCYTES # BLD AUTO: 1.1 K/UL (ref 0.3–1)
MONOCYTES NFR BLD: 8.9 % (ref 4–15)
NEUTROPHILS # BLD AUTO: 10.1 K/UL (ref 1.8–7.7)
NEUTROPHILS NFR BLD: 84 % (ref 38–73)
PLATELET # BLD AUTO: 398 K/UL (ref 150–350)
PMV BLD AUTO: 11 FL (ref 9.2–12.9)
POTASSIUM SERPL-SCNC: 5.2 MMOL/L (ref 3.5–5.1)
RBC # BLD AUTO: 4.59 M/UL (ref 4–5.4)
SODIUM SERPL-SCNC: 139 MMOL/L (ref 136–145)
VALPROATE SERPL-MCNC: 31.1 UG/ML (ref 50–100)
WBC # BLD AUTO: 12.08 K/UL (ref 3.9–12.7)

## 2019-04-19 PROCEDURE — 99233 SBSQ HOSP IP/OBS HIGH 50: CPT | Mod: ,,, | Performed by: HOSPITALIST

## 2019-04-19 PROCEDURE — 11000001 HC ACUTE MED/SURG PRIVATE ROOM

## 2019-04-19 PROCEDURE — 85025 COMPLETE CBC W/AUTO DIFF WBC: CPT

## 2019-04-19 PROCEDURE — 99233 PR SUBSEQUENT HOSPITAL CARE,LEVL III: ICD-10-PCS | Mod: ,,, | Performed by: HOSPITALIST

## 2019-04-19 PROCEDURE — 36415 COLL VENOUS BLD VENIPUNCTURE: CPT

## 2019-04-19 PROCEDURE — 94761 N-INVAS EAR/PLS OXIMETRY MLT: CPT

## 2019-04-19 PROCEDURE — 99900035 HC TECH TIME PER 15 MIN (STAT)

## 2019-04-19 PROCEDURE — 83735 ASSAY OF MAGNESIUM: CPT

## 2019-04-19 PROCEDURE — 25000003 PHARM REV CODE 250: Performed by: HOSPITALIST

## 2019-04-19 PROCEDURE — 25000003 PHARM REV CODE 250: Performed by: INTERNAL MEDICINE

## 2019-04-19 PROCEDURE — 99232 PR SUBSEQUENT HOSPITAL CARE,LEVL II: ICD-10-PCS | Mod: GT,,, | Performed by: PSYCHIATRY & NEUROLOGY

## 2019-04-19 PROCEDURE — 63600175 PHARM REV CODE 636 W HCPCS: Performed by: HOSPITALIST

## 2019-04-19 PROCEDURE — 80048 BASIC METABOLIC PNL TOTAL CA: CPT

## 2019-04-19 PROCEDURE — 94640 AIRWAY INHALATION TREATMENT: CPT

## 2019-04-19 PROCEDURE — 25000242 PHARM REV CODE 250 ALT 637 W/ HCPCS: Performed by: HOSPITALIST

## 2019-04-19 PROCEDURE — 80164 ASSAY DIPROPYLACETIC ACD TOT: CPT

## 2019-04-19 PROCEDURE — S4991 NICOTINE PATCH NONLEGEND: HCPCS | Performed by: HOSPITALIST

## 2019-04-19 PROCEDURE — 99232 SBSQ HOSP IP/OBS MODERATE 35: CPT | Mod: GT,,, | Performed by: PSYCHIATRY & NEUROLOGY

## 2019-04-19 RX ORDER — HYDROCHLOROTHIAZIDE 12.5 MG/1
12.5 TABLET ORAL DAILY
Status: DISCONTINUED | OUTPATIENT
Start: 2019-04-19 | End: 2019-04-20

## 2019-04-19 RX ORDER — DIVALPROEX SODIUM 125 MG/1
500 CAPSULE, COATED PELLETS ORAL NIGHTLY
Status: DISCONTINUED | OUTPATIENT
Start: 2019-04-19 | End: 2019-04-20 | Stop reason: HOSPADM

## 2019-04-19 RX ORDER — AMLODIPINE BESYLATE 5 MG/1
5 TABLET ORAL DAILY
Status: DISCONTINUED | OUTPATIENT
Start: 2019-04-19 | End: 2019-04-20

## 2019-04-19 RX ADMIN — HYDRALAZINE HYDROCHLORIDE 15 MG: 20 INJECTION INTRAMUSCULAR; INTRAVENOUS at 07:04

## 2019-04-19 RX ADMIN — IPRATROPIUM BROMIDE AND ALBUTEROL SULFATE 3 ML: .5; 3 SOLUTION RESPIRATORY (INHALATION) at 03:04

## 2019-04-19 RX ADMIN — METOPROLOL TARTRATE 50 MG: 50 TABLET ORAL at 09:04

## 2019-04-19 RX ADMIN — NICOTINE 1 PATCH: 14 PATCH, EXTENDED RELEASE TRANSDERMAL at 09:04

## 2019-04-19 RX ADMIN — IPRATROPIUM BROMIDE AND ALBUTEROL SULFATE 3 ML: .5; 3 SOLUTION RESPIRATORY (INHALATION) at 07:04

## 2019-04-19 RX ADMIN — PREDNISONE 20 MG: 20 TABLET ORAL at 09:04

## 2019-04-19 RX ADMIN — METOPROLOL TARTRATE 50 MG: 50 TABLET ORAL at 08:04

## 2019-04-19 RX ADMIN — AMLODIPINE BESYLATE 5 MG: 5 TABLET ORAL at 11:04

## 2019-04-19 RX ADMIN — ENOXAPARIN SODIUM 40 MG: 100 INJECTION SUBCUTANEOUS at 04:04

## 2019-04-19 RX ADMIN — DOXYCYCLINE HYCLATE 100 MG: 100 TABLET, COATED ORAL at 08:04

## 2019-04-19 RX ADMIN — FLUTICASONE FUROATE AND VILANTEROL TRIFENATATE 1 PUFF: 200; 25 POWDER RESPIRATORY (INHALATION) at 08:04

## 2019-04-19 RX ADMIN — HYDROCHLOROTHIAZIDE 12.5 MG: 12.5 CAPSULE ORAL at 09:04

## 2019-04-19 RX ADMIN — IPRATROPIUM BROMIDE AND ALBUTEROL SULFATE 3 ML: .5; 3 SOLUTION RESPIRATORY (INHALATION) at 08:04

## 2019-04-19 RX ADMIN — DIVALPROEX SODIUM 500 MG: 125 CAPSULE, COATED PELLETS ORAL at 08:04

## 2019-04-19 RX ADMIN — IPRATROPIUM BROMIDE AND ALBUTEROL SULFATE 3 ML: .5; 3 SOLUTION RESPIRATORY (INHALATION) at 11:04

## 2019-04-19 RX ADMIN — DOXYCYCLINE HYCLATE 100 MG: 100 TABLET, COATED ORAL at 09:04

## 2019-04-19 NOTE — ASSESSMENT & PLAN NOTE
-Admitted to inpatient status in our ICU  -Thought secondary to COPD however she has a mildly elevated troponin and elevated BNP so could have CHF component as well.  -Was on bipap initially but has now been weaned back to room air  -Suspect chronic CO2 retainer and chronic yet undiagnosed COPD  -Continue prednisone at lower dose as well as doxycycline, advair and duonebs.  -Echo shows normal EF and grade I diastolic dysfunction, so do not believe fluid overload was a primary factor in her respiratory distress.  --She refuses PFTs.  Had planned stress test 4/18 but she declines this as well.  -Continue current medical care.  -Off room supplemental O2 now.  Will plan 6 minute walk test tomorrow to confirm.

## 2019-04-19 NOTE — ASSESSMENT & PLAN NOTE
-Nursing and consulting MDs noted on  that she was having hallucinations.  These were again noted overnight in nursing notes on .  On the morning of  she was much more agitated, paranoid, delusional and with severely disordered thought processes.  At that time I did not witness any SI/HI or hallucinations.  -On  depakote started at recommendations of tele-psych consultant.  -PEC placed  as she was much more agitated with severely disorganized thought process with paranoia and delusions.    -Today she is less agitated and thought process is more linear.  However, she is having active hallucinations (sees her  son sitting in bedside chair).  -Will continue PRN zyprexa (have not had to give so far)  -Will increase divalproex dose based on low VPA level this morning.  -Will repeat tele-psych consult today.  -She may benefit from geriatric psych placement.

## 2019-04-19 NOTE — PLAN OF CARE
Problem: Adult Inpatient Plan of Care  Goal: Plan of Care Review  Outcome: Ongoing (interventions implemented as appropriate)  Maintaining saturation of 90% on room air. Tolerates rx well. Respirations shallow and fast.

## 2019-04-19 NOTE — PROGRESS NOTES
"[facility] Medicine  Progress Note    Patient Name: Donita Gonzalez  MRN: [id number]  Patient Class: IP- Inpatient   Admission Date: 2019  Length of Stay: 3 days  Attending Physician: Jordan Natarajan MD  Primary Care Provider: Jordan Campbell MD        Subjective:     Principal Problem:Acute respiratory failure with hypoxia and hypercarbia    HPI:  The patient is a 63 year old black female chronic tobacco smoker with history of COPD who comes in with 3 days of greenish productive cough and SOB.  She has a history of poor compliance to medications.  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  She was so dyspneic today she was unable to have conversations.  She denies any associated chest pain.  No fever/chills.  No sick contacts.  No myalgia.  She denies any history of coronary artery disease but she has been admitted to the ICU for hypercarbic respiratory failure and hypertensive emergency.      The patient has been transferred to [facility] for Pulmonology and Critical Care services.         Hospital Course:  No notes on file    Interval History: No acute events overnight.  Bedside sitter reports she has been calm, talking to herself, and obviously hallucinating.  She denies any pain or shortness of breath at this time and is on room air.  She is less agitated, but remains paranoid and with hallucinations noting she sees her  son sitting in the bedside chair.    Review of Systems   Constitutional: Negative for activity change, chills, diaphoresis and fatigue.   HENT: Negative for congestion, drooling and hearing loss.    Eyes: Negative for discharge.   Respiratory: Negative for apnea, chest tightness, shortness of breath and wheezing.    Cardiovascular: Negative for palpitations and leg swelling.   Gastrointestinal: Negative for abdominal distention, abdominal pain, constipation and diarrhea.   Musculoskeletal: Negative for arthralgias and gait problem. "   Skin: Negative for rash.   Neurological: Negative for seizures, light-headedness and numbness.   Hematological: Negative for adenopathy.   Psychiatric/Behavioral: Positive for decreased concentration and hallucinations. Negative for agitation, behavioral problems, confusion, self-injury and suicidal ideas. The patient is not nervous/anxious.      Objective:     Vital Signs (Most Recent):  Temp: 98 °F (36.7 °C) (19 07)  Pulse: 80 (19 1000)  Resp: 18 (19 0805)  BP: (!) 171/100 (19 0720)  SpO2: (!) 91 % (19 07) Vital Signs (24h Range):  Temp:  [96.5 °F (35.8 °C)-98.4 °F (36.9 °C)] 98 °F (36.7 °C)  Pulse:  [79-94] 80  Resp:  [16-42] 18  SpO2:  [84 %-98 %] 91 %  BP: (120-190)/() 171/100     Weight: 51 kg (112 lb 7 oz)  Body mass index is 18.15 kg/m².    Intake/Output Summary (Last 24 hours) at 2019 1025  Last data filed at 2019 1900  Gross per 24 hour   Intake 120 ml   Output 450 ml   Net -330 ml      Physical Exam   Constitutional: She is oriented to person, place, and time. She appears well-developed and well-nourished.   HENT:   Head: Normocephalic and atraumatic.   Eyes: Pupils are equal, round, and reactive to light. EOM are normal.   Neck: Normal range of motion. Neck supple.   Cardiovascular: Normal rate, regular rhythm and normal heart sounds.   Pulmonary/Chest: Effort normal and breath sounds normal. No respiratory distress.   Abdominal: Soft. Bowel sounds are normal. She exhibits no distension. There is no tenderness.   Musculoskeletal: Normal range of motion. She exhibits no edema.   Neurological: She is oriented to person, place, and time. No cranial nerve deficit. Coordination normal.   Skin: Skin is warm and dry.   Psychiatric:   Calm and not agitated this morning.  Remains oriented to self, month, year, city, hospital and president.  Thought process is more organized today.  Denies SI/HI but notes seeing her  son sitting in the bedside chair.    Vitals reviewed.      Significant Labs: All pertinent labs within the past 24 hours have been reviewed.    Significant Imaging: I have reviewed and interpreted all pertinent imaging results/findings within the past 24 hours.    Assessment/Plan:      * Acute respiratory failure with hypoxia and hypercarbia  -Admitted to inpatient status in our ICU  -Thought secondary to COPD however she has a mildly elevated troponin and elevated BNP so could have CHF component as well.  -Was on bipap initially but has now been weaned back to room air  -Suspect chronic CO2 retainer and chronic yet undiagnosed COPD  -Continue prednisone at lower dose as well as doxycycline, advair and duonebs.  -Echo shows normal EF and grade I diastolic dysfunction, so do not believe fluid overload was a primary factor in her respiratory distress.  --She refuses PFTs.  Had planned stress test  but she declines this as well.  -Continue current medical care.  -Off room supplemental O2 now.  Will plan 6 minute walk test tomorrow to confirm.    Hallucinations  -Nursing and consulting MDs noted on  that she was having hallucinations.  These were again noted overnight in nursing notes on .  On the morning of  she was much more agitated, paranoid, delusional and with severely disordered thought processes.  At that time I did not witness any SI/HI or hallucinations.  -On  depakote started at recommendations of tele-psych consultant.  -PEC placed  as she was much more agitated with severely disorganized thought process with paranoia and delusions.    -Today she is less agitated and thought process is more linear.  However, she is having active hallucinations (sees her  son sitting in bedside chair).  -Will continue PRN zyprexa (have not had to give so far)  -Will increase divalproex dose based on low VPA level this morning.  -Will repeat tele-psych consult today.  -She may benefit from geriatric psych placement.    Lactic  acidosis  -could have been due to hypoxia versus mild pulmonary infection vs dehydration  -Lactic acid remained elevated on 4/17 so she was given IV fluids.  -Repeat lactic acid today.    Elevated troponin  -Troponin mildly elevated and last measurement was trending down.  -No chest pain and SOB has resolved.  -Etiology is unclear.  She has minimal diastolic dysfunction and normal renal function.  -Echo reviewed and no wall motion abnormalities.  -D/w Dr. Chung and had planned stress test today.  Patient declines further testing at this time.  Will need stress test when metnal status improved and patient agreeable.  Could be done as outpatient in next few weeks.      Essential hypertension  -BP remains elevated  -Continue metoprolol tartrate  -Add hctz today      Tobacco abuse  -Counselled on cessation.  -NRT offered      VTE Risk Mitigation (From admission, onward)        Ordered     enoxaparin injection 40 mg  Daily      04/16/19 0713     Place KODI hose  Until discontinued      04/16/19 0352     Place sequential compression device  Until discontinued      04/16/19 0352     IP VTE LOW RISK PATIENT  Once      04/16/19 0352              Jordan Natarajan MD  Department of Hospital Medicine   [facility]

## 2019-04-19 NOTE — PLAN OF CARE
Problem: Adult Inpatient Plan of Care  Goal: Plan of Care Review  Outcome: Ongoing (interventions implemented as appropriate)  Pt resting in bed. NAD, VSS on RA, AOx4. Confused and rambling. Sitter at bedside. Pt involuntarily PEC'd. Pt not complaining of pain. Ambulatory with stand by assist to the bathroom. POC reviewed with pt. Bed low and locked. Personal items and call light within reach. Will continue to monitor.

## 2019-04-19 NOTE — PLAN OF CARE
Problem: Adult Inpatient Plan of Care  Goal: Plan of Care Review  Outcome: Ongoing (interventions implemented as appropriate)  Pt on RA. No distress noted. Aerosol tx & MDI given. Will continue to monitor.

## 2019-04-19 NOTE — PROGRESS NOTES
Denies shortness of breath.  Patient continues to have hallucinations.  Vitals:    04/19/19 0720 04/19/19 0800 04/19/19 0805 04/19/19 1000   BP: (!) 171/100      BP Location:       Patient Position:       Pulse: 85 93 89 80   Resp: 20  18    Temp: 98 °F (36.7 °C)      TempSrc:       SpO2: (!) 91%      Weight:       Height:         Chest clear  Heart no gallop  No ankle edema    Will add a calcium channel blocker for better control of blood pressure    Discussed with Dr. Natarajan

## 2019-04-19 NOTE — SUBJECTIVE & OBJECTIVE
Interval History: No acute events overnight.  Bedside sitter reports she has been calm, talking to herself, and obviously hallucinating.  She denies any pain or shortness of breath at this time and is on room air.  She is less agitated, but remains paranoid and with hallucinations noting she sees her  son sitting in the bedside chair.    Review of Systems   Constitutional: Negative for activity change, chills, diaphoresis and fatigue.   HENT: Negative for congestion, drooling and hearing loss.    Eyes: Negative for discharge.   Respiratory: Negative for apnea, chest tightness, shortness of breath and wheezing.    Cardiovascular: Negative for palpitations and leg swelling.   Gastrointestinal: Negative for abdominal distention, abdominal pain, constipation and diarrhea.   Musculoskeletal: Negative for arthralgias and gait problem.   Skin: Negative for rash.   Neurological: Negative for seizures, light-headedness and numbness.   Hematological: Negative for adenopathy.   Psychiatric/Behavioral: Positive for decreased concentration and hallucinations. Negative for agitation, behavioral problems, confusion, self-injury and suicidal ideas. The patient is not nervous/anxious.      Objective:     Vital Signs (Most Recent):  Temp: 98 °F (36.7 °C) (19 07)  Pulse: 80 (19 1000)  Resp: 18 (19 0805)  BP: (!) 171/100 (19)  SpO2: (!) 91 % (19) Vital Signs (24h Range):  Temp:  [96.5 °F (35.8 °C)-98.4 °F (36.9 °C)] 98 °F (36.7 °C)  Pulse:  [79-94] 80  Resp:  [16-42] 18  SpO2:  [84 %-98 %] 91 %  BP: (120-190)/() 171/100     Weight: 51 kg (112 lb 7 oz)  Body mass index is 18.15 kg/m².    Intake/Output Summary (Last 24 hours) at 2019 1025  Last data filed at 2019 1900  Gross per 24 hour   Intake 120 ml   Output 450 ml   Net -330 ml      Physical Exam   Constitutional: She is oriented to person, place, and time. She appears well-developed and well-nourished.   HENT:   Head:  Normocephalic and atraumatic.   Eyes: Pupils are equal, round, and reactive to light. EOM are normal.   Neck: Normal range of motion. Neck supple.   Cardiovascular: Normal rate, regular rhythm and normal heart sounds.   Pulmonary/Chest: Effort normal and breath sounds normal. No respiratory distress.   Abdominal: Soft. Bowel sounds are normal. She exhibits no distension. There is no tenderness.   Musculoskeletal: Normal range of motion. She exhibits no edema.   Neurological: She is oriented to person, place, and time. No cranial nerve deficit. Coordination normal.   Skin: Skin is warm and dry.   Psychiatric:   Calm and not agitated this morning.  Remains oriented to self, month, year, city, hospital and president.  Thought process is more organized today.  Denies SI/HI but notes seeing her  son sitting in the bedside chair.   Vitals reviewed.      Significant Labs: All pertinent labs within the past 24 hours have been reviewed.    Significant Imaging: I have reviewed and interpreted all pertinent imaging results/findings within the past 24 hours.

## 2019-04-19 NOTE — PLAN OF CARE
Problem: Adult Inpatient Plan of Care  Goal: Plan of Care Review  Outcome: Ongoing (interventions implemented as appropriate)  Plan of care reviewed with patient.  Sitter at bed-side.  Visual and Auditory hallucinations observed throughout shift, patient oriented to person/place/time.  Cardiac monitoring maintained.  Purposeful rounding completed, call bell within reach.  No needs at this time, will continue to monitor.

## 2019-04-19 NOTE — PLAN OF CARE
Problem: Adult Inpatient Plan of Care  Goal: Plan of Care Review  Outcome: Ongoing (interventions implemented as appropriate)  Back on O2 for sat of 84,, hospitalist notified.

## 2019-04-19 NOTE — PLAN OF CARE
Problem: Adult Inpatient Plan of Care  Goal: Plan of Care Review  Outcome: Ongoing (interventions implemented as appropriate)  POC reviewed with patient. Patient free from falls or injury throughout shift. Purposeful rounding performed. No needs at this time. Bed lowered and locked, SRX2, room near nurses station, sitter to remain at bedside. Will continue to monitor.

## 2019-04-20 VITALS
DIASTOLIC BLOOD PRESSURE: 89 MMHG | TEMPERATURE: 98 F | WEIGHT: 112.44 LBS | HEIGHT: 66 IN | RESPIRATION RATE: 18 BRPM | BODY MASS INDEX: 18.07 KG/M2 | SYSTOLIC BLOOD PRESSURE: 173 MMHG | HEART RATE: 76 BPM | OXYGEN SATURATION: 100 %

## 2019-04-20 LAB
ANION GAP SERPL CALC-SCNC: 8 MMOL/L (ref 8–16)
BASOPHILS # BLD AUTO: 0 K/UL (ref 0–0.2)
BASOPHILS NFR BLD: 0 % (ref 0–1.9)
BUN SERPL-MCNC: 33 MG/DL (ref 8–23)
CALCIUM SERPL-MCNC: 9.5 MG/DL (ref 8.7–10.5)
CHLORIDE SERPL-SCNC: 101 MMOL/L (ref 95–110)
CO2 SERPL-SCNC: 32 MMOL/L (ref 23–29)
CREAT SERPL-MCNC: 0.9 MG/DL (ref 0.5–1.4)
DIFFERENTIAL METHOD: ABNORMAL
EOSINOPHIL # BLD AUTO: 0 K/UL (ref 0–0.5)
EOSINOPHIL NFR BLD: 0.3 % (ref 0–8)
ERYTHROCYTE [DISTWIDTH] IN BLOOD BY AUTOMATED COUNT: 14 % (ref 11.5–14.5)
EST. GFR  (AFRICAN AMERICAN): >60 ML/MIN/1.73 M^2
EST. GFR  (NON AFRICAN AMERICAN): >60 ML/MIN/1.73 M^2
GLUCOSE SERPL-MCNC: 63 MG/DL (ref 70–110)
HCT VFR BLD AUTO: 44.8 % (ref 37–48.5)
HGB BLD-MCNC: 14.4 G/DL (ref 12–16)
LYMPHOCYTES # BLD AUTO: 1.2 K/UL (ref 1–4.8)
LYMPHOCYTES NFR BLD: 11.1 % (ref 18–48)
MAGNESIUM SERPL-MCNC: 2 MG/DL (ref 1.6–2.6)
MCH RBC QN AUTO: 31 PG (ref 27–31)
MCHC RBC AUTO-ENTMCNC: 32.1 G/DL (ref 32–36)
MCV RBC AUTO: 96 FL (ref 82–98)
MONOCYTES # BLD AUTO: 0.8 K/UL (ref 0.3–1)
MONOCYTES NFR BLD: 7.6 % (ref 4–15)
NEUTROPHILS # BLD AUTO: 8.4 K/UL (ref 1.8–7.7)
NEUTROPHILS NFR BLD: 80.5 % (ref 38–73)
PLATELET # BLD AUTO: 458 K/UL (ref 150–350)
PMV BLD AUTO: 10.6 FL (ref 9.2–12.9)
POTASSIUM SERPL-SCNC: 3.7 MMOL/L (ref 3.5–5.1)
RBC # BLD AUTO: 4.65 M/UL (ref 4–5.4)
SODIUM SERPL-SCNC: 141 MMOL/L (ref 136–145)
WBC # BLD AUTO: 10.45 K/UL (ref 3.9–12.7)

## 2019-04-20 PROCEDURE — 99239 PR HOSPITAL DISCHARGE DAY,>30 MIN: ICD-10-PCS | Mod: ,,, | Performed by: HOSPITALIST

## 2019-04-20 PROCEDURE — S4991 NICOTINE PATCH NONLEGEND: HCPCS | Performed by: HOSPITALIST

## 2019-04-20 PROCEDURE — 25000003 PHARM REV CODE 250: Performed by: HOSPITALIST

## 2019-04-20 PROCEDURE — 94640 AIRWAY INHALATION TREATMENT: CPT

## 2019-04-20 PROCEDURE — 25000242 PHARM REV CODE 250 ALT 637 W/ HCPCS: Performed by: HOSPITALIST

## 2019-04-20 PROCEDURE — 99239 HOSP IP/OBS DSCHRG MGMT >30: CPT | Mod: ,,, | Performed by: HOSPITALIST

## 2019-04-20 PROCEDURE — 63600175 PHARM REV CODE 636 W HCPCS: Performed by: HOSPITALIST

## 2019-04-20 PROCEDURE — 80048 BASIC METABOLIC PNL TOTAL CA: CPT

## 2019-04-20 PROCEDURE — 94761 N-INVAS EAR/PLS OXIMETRY MLT: CPT

## 2019-04-20 PROCEDURE — 83735 ASSAY OF MAGNESIUM: CPT

## 2019-04-20 PROCEDURE — 25000003 PHARM REV CODE 250: Performed by: PHYSICIAN ASSISTANT

## 2019-04-20 PROCEDURE — 85025 COMPLETE CBC W/AUTO DIFF WBC: CPT

## 2019-04-20 PROCEDURE — 36415 COLL VENOUS BLD VENIPUNCTURE: CPT

## 2019-04-20 RX ORDER — DOXYCYCLINE HYCLATE 100 MG
100 TABLET ORAL EVERY 12 HOURS
Qty: 4 TABLET | Refills: 0 | Status: SHIPPED | OUTPATIENT
Start: 2019-04-20 | End: 2019-04-22

## 2019-04-20 RX ORDER — HYDROCHLOROTHIAZIDE 25 MG/1
25 TABLET ORAL DAILY
Qty: 30 TABLET | Refills: 1 | Status: SHIPPED | OUTPATIENT
Start: 2019-04-21 | End: 2019-12-01 | Stop reason: SDUPTHER

## 2019-04-20 RX ORDER — DIVALPROEX SODIUM 500 MG/1
500 TABLET, FILM COATED, EXTENDED RELEASE ORAL NIGHTLY
Qty: 30 TABLET | Refills: 1 | Status: ON HOLD | OUTPATIENT
Start: 2019-04-20 | End: 2020-09-16 | Stop reason: HOSPADM

## 2019-04-20 RX ORDER — METOPROLOL TARTRATE 50 MG/1
50 TABLET ORAL 2 TIMES DAILY
Qty: 60 TABLET | Refills: 1 | Status: SHIPPED | OUTPATIENT
Start: 2019-04-20 | End: 2019-12-01 | Stop reason: SDUPTHER

## 2019-04-20 RX ORDER — BENZONATATE 100 MG/1
100 CAPSULE ORAL 3 TIMES DAILY PRN
Status: DISCONTINUED | OUTPATIENT
Start: 2019-04-20 | End: 2019-04-20 | Stop reason: HOSPADM

## 2019-04-20 RX ORDER — HYDROCHLOROTHIAZIDE 25 MG/1
25 TABLET ORAL DAILY
Status: DISCONTINUED | OUTPATIENT
Start: 2019-04-20 | End: 2019-04-20 | Stop reason: HOSPADM

## 2019-04-20 RX ORDER — AMLODIPINE BESYLATE 5 MG/1
10 TABLET ORAL DAILY
Status: DISCONTINUED | OUTPATIENT
Start: 2019-04-20 | End: 2019-04-20 | Stop reason: HOSPADM

## 2019-04-20 RX ORDER — PREDNISONE 10 MG/1
10 TABLET ORAL DAILY
Status: DISCONTINUED | OUTPATIENT
Start: 2019-04-20 | End: 2019-04-20 | Stop reason: HOSPADM

## 2019-04-20 RX ORDER — ALBUTEROL SULFATE 90 UG/1
2 AEROSOL, METERED RESPIRATORY (INHALATION) EVERY 6 HOURS PRN
Qty: 18 G | Refills: 1 | Status: SHIPPED | OUTPATIENT
Start: 2019-04-20 | End: 2020-04-19

## 2019-04-20 RX ORDER — AMLODIPINE BESYLATE 10 MG/1
10 TABLET ORAL DAILY
Qty: 30 TABLET | Refills: 1 | Status: SHIPPED | OUTPATIENT
Start: 2019-04-21 | End: 2020-06-24 | Stop reason: SDUPTHER

## 2019-04-20 RX ORDER — IBUPROFEN 200 MG
1 TABLET ORAL DAILY
Refills: 0 | COMMUNITY
Start: 2019-04-21 | End: 2019-05-27

## 2019-04-20 RX ORDER — PREDNISONE 10 MG/1
10 TABLET ORAL DAILY
Qty: 2 TABLET | Refills: 0 | Status: SHIPPED | OUTPATIENT
Start: 2019-04-21 | End: 2019-04-23

## 2019-04-20 RX ADMIN — HYDROCHLOROTHIAZIDE 25 MG: 25 TABLET ORAL at 09:04

## 2019-04-20 RX ADMIN — NICOTINE 1 PATCH: 14 PATCH, EXTENDED RELEASE TRANSDERMAL at 09:04

## 2019-04-20 RX ADMIN — BENZONATATE 100 MG: 100 CAPSULE ORAL at 01:04

## 2019-04-20 RX ADMIN — AMLODIPINE BESYLATE 10 MG: 5 TABLET ORAL at 08:04

## 2019-04-20 RX ADMIN — IPRATROPIUM BROMIDE AND ALBUTEROL SULFATE 3 ML: .5; 3 SOLUTION RESPIRATORY (INHALATION) at 11:04

## 2019-04-20 RX ADMIN — DOXYCYCLINE HYCLATE 100 MG: 100 TABLET, COATED ORAL at 08:04

## 2019-04-20 RX ADMIN — IPRATROPIUM BROMIDE AND ALBUTEROL SULFATE 3 ML: .5; 3 SOLUTION RESPIRATORY (INHALATION) at 07:04

## 2019-04-20 RX ADMIN — METOPROLOL TARTRATE 50 MG: 50 TABLET ORAL at 08:04

## 2019-04-20 RX ADMIN — PREDNISONE 10 MG: 10 TABLET ORAL at 09:04

## 2019-04-20 RX ADMIN — FLUTICASONE FUROATE AND VILANTEROL TRIFENATATE 1 PUFF: 200; 25 POWDER RESPIRATORY (INHALATION) at 07:04

## 2019-04-20 RX ADMIN — IPRATROPIUM BROMIDE AND ALBUTEROL SULFATE 3 ML: .5; 3 SOLUTION RESPIRATORY (INHALATION) at 03:04

## 2019-04-20 NOTE — ASSESSMENT & PLAN NOTE
-BP remains elevated but is improved  -Discharge home on norvasc 10mg daily, HCTZ 25mg daily and metoprolol 50mg bid.  -Needs to follow up with pcp within 1-2 weeks for blood pressure check.

## 2019-04-20 NOTE — PLAN OF CARE
04/20/19 1058   Final Note   Assessment Type Final Discharge Note   Anticipated Discharge Disposition Home   Hospital Follow Up  Appt(s) scheduled? Yes   Discharge plans and expectations educations in teach back method with documentation complete? Yes   Right Care Referral Info   Post Acute Recommendation No Care

## 2019-04-20 NOTE — PLAN OF CARE
Problem: Adult Inpatient Plan of Care  Goal: Plan of Care Review  Outcome: Ongoing (interventions implemented as appropriate)  Pt on RA. Sats 94%. No distress noted. Aerosol tx & MDI given. Will continue to monitor.

## 2019-04-20 NOTE — PROCEDURES
"Instructions for measuring Oxygen Saturation  to qualify for Home Oxygen:    Please obtain and document (REPLACE # SIGNS AND COPY AND PASTE TEXT INSIDE QUOTATION MARKS) the following for Home Oxygen:    This must be performed and documented within 2 days of discharge.    "Pulse Oximetry:   100% SpO2 on room air at rest on 4/20/19                                If 88% or less, STOP and document.     If 89% or more, measure and  document the following:    "Pulse Oximetry: 100% SpO2 on room air at rest on 4/20/19                           98% SpO2  on room air with activity/exercise on 4/20/19                           No oxgyen needed with activity/excercise on 4/20/19    (NOTE:  FOR OXYGEN WITH ACTIVITY - MEDICARE WANTS TO SEE THAT THE OXYGEN INCREASES ONCE A PATIENT HAS WALKED AND IS BACK ON THE OXYGEN)        "

## 2019-04-20 NOTE — ASSESSMENT & PLAN NOTE
-Troponin mildly elevated and last measurement trended down.  -No chest pain and SOB has resolved.  -Etiology is unclear.  She has minimal diastolic dysfunction and normal renal function.  -Echo reviewed and no wall motion abnormalities.  -D/w Dr. Chung and had planned stress test but she adamantly refuses this.  I believe she needs this and encourage her to follow up with Dr. Chung for arrangement of this in the outpatient setting.  She states she will do this.

## 2019-04-20 NOTE — ASSESSMENT & PLAN NOTE
-Nursing and consulting MDs noted on 4/16 that she was having hallucinations.  These were again noted overnight in nursing notes on 4/17.  On the morning of 4/18 she was much more agitated, paranoid, delusional and with severely disordered thought processes.  At that time I did not witness any SI/HI or hallucinations.  -On 4/16 depakote started at recommendations of tele-psych consultant.  -PEC placed 4/18 as she was much more agitated with severely disorganized thought process with paranoia and delusions.    -As we tapered down prednisone and added depakote her hallucinations resolved.  -No hallucinations overnight and thinking is much more linear today.  Family states she is at baseline.  -Will continue depakote at discharge and place ambulatory referral to psychiatry as I do believe she would benefit from outpatient assessment.

## 2019-04-20 NOTE — ASSESSMENT & PLAN NOTE
-could have been due to hypoxia versus mild pulmonary infection vs dehydration  -Lactic acid remained elevated on 4/17 so she was given IV fluids.  -Lactic acid has normalized.

## 2019-04-20 NOTE — PROGRESS NOTES
Pt no longer pec or cec and sitter removed from room.  Waiting on 6 min walk before discharge.  Will continue to monitor.

## 2019-04-20 NOTE — DISCHARGE SUMMARY
"[facility] Hospital Medicine  Discharge Summary      Patient Name: Donita Gonzalez  MRN: [id number]  Admission Date: 4/16/2019  Hospital Length of Stay: 4 days  Discharge Date and Time:  04/20/2019 11:49 AM  Attending Physician: Pippa Natarajan MD   Discharging Provider: Pippa Natarajan MD  Primary Care Provider: Pippa Campbell MD      HPI:   The patient is a 63 year old black female chronic tobacco smoker with history of COPD who comes in with 3 days of greenish productive cough and SOB.  She has a history of poor compliance to medications.  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  She was so dyspneic today she was unable to have conversations.  She denies any associated chest pain.  No fever/chills.  No sick contacts.  No myalgia.  She denies any history of coronary artery disease but she has been admitted to the ICU for hypercarbic respiratory failure and hypertensive emergency.      The patient has been transferred to [facility] for Pulmonology and Critical Care services.     Consults:   Consults (From admission, onward)        Status Ordering Provider     Inpatient consult to Cardiology  Once     Provider:  Dutch Chung MD    Completed PIPPA NATARAJAN     Inpatient consult to Pulmonary Critical Care  Once     Provider:  Buddy Ceja MD    Completed KENYON KLEIN     Inpatient consult to Social Work  Once     Provider:  (Not yet assigned)    Completed PIPPA NATARAJAN     Inpatient consult to Telemedicine - Psyc  Once     Provider:  Phyllis Banuelos MD    Completed PIPPA NATARAJAN     Inpatient consult to Telemedicine - Psyc  Once     Provider:  (Not yet assigned)    Completed PIPPA NATARAJAN     Inpatient consult to Telemedicine - Psyc  Once     Provider:  Carla Maradiaga MD    Acknowledged PIPPA NATARAJAN        Hospital Course By Problem:     * Acute respiratory failure with hypoxia and hypercarbia  -Admitted to inpatient status in our ICU  -Thought " secondary to COPD however she has a mildly elevated troponin and elevated BNP so could have CHF component as well.  -Was on bipap initially but has now been weaned back to room air  -Suspect chronic CO2 retainer and chronic yet undiagnosed COPD  -Echo shows normal EF and grade I diastolic dysfunction, so do not believe fluid overload was a primary factor in her respiratory distress.  -She refuses PFTs.  Had planned stress test 4/18 but she declines this as well.  -She was treated with advair, duonebs, doxycycline and prednisone and has had a marked improvement  -Will discharge to complete the short course of prednisone and doxycycline.  -Will discharge on PRN albuterol inhaler and Anoro ellipta (LABA/LAMA)        Hallucinations  -Nursing and consulting MDs noted on 4/16 that she was having hallucinations.  These were again noted overnight in nursing notes on 4/17.  On the morning of 4/18 she was much more agitated, paranoid, delusional and with severely disordered thought processes.  At that time I did not witness any SI/HI or hallucinations.  -On 4/16 depakote started at recommendations of tele-psych consultant.  -PEC placed 4/18 as she was much more agitated with severely disorganized thought process with paranoia and delusions.    -As we tapered down prednisone and added depakote her hallucinations resolved.  -No hallucinations overnight and thinking is much more linear today.  Family states she is at baseline.  -Will continue depakote at discharge and place ambulatory referral to psychiatry as I do believe she would benefit from outpatient assessment.    Lactic acidosis  -could have been due to hypoxia versus mild pulmonary infection vs dehydration  -Lactic acid remained elevated on 4/17 so she was given IV fluids.  -Lactic acid has normalized.    Elevated troponin  -Troponin mildly elevated and last measurement trended down.  -No chest pain and SOB has resolved.  -Etiology is unclear.  She has minimal diastolic  dysfunction and normal renal function.  -Echo reviewed and no wall motion abnormalities.  -D/w Dr. Chung and had planned stress test but she adamantly refuses this.  I believe she needs this and encourage her to follow up with Dr. Chung for arrangement of this in the outpatient setting.  She states she will do this.      Essential hypertension  -BP remains elevated but is improved  -Discharge home on norvasc 10mg daily, HCTZ 25mg daily and metoprolol 50mg bid.  -Needs to follow up with pcp within 1-2 weeks for blood pressure check.      Tobacco abuse  -Counselled on cessation.  -NRT offered        Final Active Diagnoses:    Diagnosis Date Noted POA    PRINCIPAL PROBLEM:  Acute respiratory failure with hypoxia and hypercarbia [J96.01, J96.02] 04/16/2019 Yes    Hallucinations [R44.3] 04/16/2019 Yes    Elevated troponin [R74.8] 04/16/2019 Yes    Lactic acidosis [E87.2] 04/16/2019 Yes    Essential hypertension [I10] 04/19/2019 Yes    Tobacco abuse [Z72.0] 04/16/2019 Yes      Problems Resolved During this Admission:    Diagnosis Date Noted Date Resolved POA    Psychosis [F29]  04/17/2019 Yes    Acute on chronic respiratory failure with hypoxia and hypercapnia [J96.21, J96.22]  04/19/2019 No    Hyperkalemia [E87.5] 04/17/2019 04/18/2019 No    GOYO (acute kidney injury) [N17.9] 04/17/2019 04/17/2019 No    Elevated brain natriuretic peptide (BNP) level [R79.89] 04/16/2019 04/17/2019 Yes       Discharged Condition: fair    Disposition: Home or Self Care    Follow Up:  Follow-up Information     Jordan Campbell MD In 1 week.    Specialties:  Internal Medicine, Pediatrics  Contact information:  [address] [place] [address] [phone]             Dutch Chung MD In 1 week.    Specialty:  Cardiology  Why:  To discuss heart health and stress test.  Contact information:  [address] [place] [address] [phone]                 Patient Instructions:      Ambulatory Referral to  Psychiatry   Referral Priority: Routine Referral Type: Psychiatric   Referral Reason: Specialty Services Required   Requested Specialty: Psychiatry   Number of Visits Requested: 1     Diet Cardiac     Notify your health care provider if you experience any of the following:  increased confusion or weakness     Notify your health care provider if you experience any of the following:  persistent dizziness, light-headedness, or visual disturbances     Notify your health care provider if you experience any of the following:  worsening rash     Notify your health care provider if you experience any of the following:  severe persistent headache     Notify your health care provider if you experience any of the following:  difficulty breathing or increased cough     Notify your health care provider if you experience any of the following:  severe uncontrolled pain     Notify your health care provider if you experience any of the following:  persistent nausea and vomiting or diarrhea     Notify your health care provider if you experience any of the following:  temperature >100.4     Activity as tolerated       Significant Diagnostic Studies: Labs:   BMP:   Recent Labs   Lab 04/19/19  0514 04/20/19 0417   GLU 83 63*    141   K 5.2* 3.7    101   CO2 28 32*   BUN 39* 33*   CREATININE 1.0 0.9   CALCIUM 9.3 9.5   MG 2.6 2.0   , CMP   Recent Labs   Lab 04/19/19  0514 04/20/19 0417    141   K 5.2* 3.7    101   CO2 28 32*   GLU 83 63*   BUN 39* 33*   CREATININE 1.0 0.9   CALCIUM 9.3 9.5   ANIONGAP 8 8   ESTGFRAFRICA >60 >60   EGFRNONAA >60 >60   , CBC   Recent Labs   Lab 04/19/19  0514 04/20/19 0417   WBC 12.08 10.45   HGB 14.3 14.4   HCT 44.6 44.8   * 458*   , Lipid Panel   Lab Results   Component Value Date    CHOL 258 (H) 04/16/2019    HDL 77 (H) 04/16/2019    LDLCALC 167.8 (H) 04/16/2019    TRIG 66 04/16/2019    CHOLHDL 29.8 04/16/2019   , Troponin   Recent Labs   Lab 04/17/19  0736   TROPONINI  0.282*    and A1C:   Recent Labs   Lab 04/16/19  0439   HGBA1C 5.6       Pending Diagnostic Studies:     Procedure Component Value Units Date/Time    NM Myocardial Perfusion Spect Multi Pharmacologic [[id number]]     Order Status:  Sent Lab Status:  No result          Medications:  Reconciled Home Medications:      Medication List      START taking these medications    albuterol 90 mcg/actuation inhaler  Commonly known as:  VENTOLIN HFA  Inhale 2 puffs into the lungs every 6 (six) hours as needed for Wheezing. Rescue     amLODIPine 10 MG tablet  Commonly known as:  NORVASC  Take 1 tablet (10 mg total) by mouth once daily.  Start taking on:  4/21/2019     divalproex  MG Tb24  Commonly known as:  DEPAKOTE  Take 1 tablet (500 mg total) by mouth every evening.     doxycycline 100 MG tablet  Commonly known as:  VIBRA-TABS  Take 1 tablet (100 mg total) by mouth every 12 (twelve) hours. for 2 days     hydroCHLOROthiazide 25 MG tablet  Commonly known as:  HYDRODIURIL  Take 1 tablet (25 mg total) by mouth once daily.  Start taking on:  4/21/2019     metoprolol tartrate 50 MG tablet  Commonly known as:  LOPRESSOR  Take 1 tablet (50 mg total) by mouth 2 (two) times daily.     nicotine 14 mg/24 hr  Commonly known as:  NICODERM CQ  Place 1 patch onto the skin once daily.  Start taking on:  4/21/2019     predniSONE 10 MG tablet  Commonly known as:  DELTASONE  Take 1 tablet (10 mg total) by mouth once daily. for 2 days  Start taking on:  4/21/2019     umeclidinium-vilanterol 62.5-25 mcg/actuation Dsdv  Commonly known as:  ANORO ELLIPTA  Inhale 1 puff into the lungs once daily. Controller            Indwelling Lines/Drains at time of discharge:   Lines/Drains/Airways          None          Time spent on the discharge of patient: 35 minutes  Patient was seen and examined on the date of discharge and determined to be suitable for discharge.         Jordan Natarajan MD  Department of Hospital Medicine  [facility]

## 2019-04-20 NOTE — ASSESSMENT & PLAN NOTE
-Admitted to inpatient status in our ICU  -Thought secondary to COPD however she has a mildly elevated troponin and elevated BNP so could have CHF component as well.  -Was on bipap initially but has now been weaned back to room air  -Suspect chronic CO2 retainer and chronic yet undiagnosed COPD  -Echo shows normal EF and grade I diastolic dysfunction, so do not believe fluid overload was a primary factor in her respiratory distress.  -She refuses PFTs.  Had planned stress test 4/18 but she declines this as well.  -She was treated with advair, duonebs, doxycycline and prednisone and has had a marked improvement  -Will discharge to complete the short course of prednisone and doxycycline.  -Will discharge on PRN albuterol inhaler and Anoro ellipta (LABA/LAMA)

## 2019-04-20 NOTE — NURSING
Discharge instructions given and pxs sent to pharmacy and pt and  verbalize understanding.  Hep loc out and catheter intact.  No distress noted and anxious for discharge.  Verbalizes understanding of need to call psychiatry on Monday.

## 2019-04-20 NOTE — PLAN OF CARE
Problem: Adult Inpatient Plan of Care  Goal: Plan of Care Review  Outcome: Ongoing (interventions implemented as appropriate)  Patient in no apparent distress. Sat's 91-95  % on room air. Aerosol treatments given Q 4. Patient only allowing blow by, refuses mask . Will continue to monitor.

## 2019-04-20 NOTE — CONSULTS
[facility]  Psychiatry  Teleconsultation Note    Please see previous notes:    Patient agreeable to consultation via telepsychiatry.    Consultation started: 4/19/2019 at 8:00pm  The chief complaint leading to psychiatric consultation is: behavior changes  This consultation was requested by Dr. Natarajan, the Emergency Department attending physician.  The location of the consulting psychiatrist is [facility] .  The patient location is [facility].  The patient arrived at the ED at: not known    Also present with the patient at the time of the consultation: family    Inpatient consult to Telemedicine - Psyc  Consult performed by: Carla Maradiaga MD  Consult ordered by: Jordan Natarajan MD        Subjective:     History of Present Illness:  No notes on file     The patient is a 63 year old female with no formal psychiatric history who presented to the hospital with respiratory difficulty. Psychiatry was consulted on 4/16/2019 for concerns about hallucinations. Recommendations were made to give Depakote scheduled as well as prn Zyprexa for possible psychosis. Psychiatrist on 4/16/2019 suspected a possible delirium process to be present. The request for a second consult was done per notes as there was concern that the pateint was having some visual hallucinations and was paranoid over night. The patient, upon interview, denies all psychotic symptoms. She is unaware of any behaviors reported in the chart. She notes that she has seen a psychiatrist in the past, prior to having a hysterectomy as there wanted to be assurance of her wanting her uterus removed. The patient denies any IOR/paranoia/AVH/agitation/thought insertion/thought withdrawal. She denies any other psychotic symptoms. Her  and son are present as well. They both note that there is no change in her behavior and neither have any concerns about behaviors reported as they report that sh edidn't have any psychosis prior to coming in to  the hospital. The  also corroborates the story about the patient having had to see a psychiatrist prior to having a hysterectomy. He denies any other psychiatric history outside of this.     Past Psychiatric History:see HPI  Medical History: refer to chart    MSE: alert, oriented x 4; mood-euthymic; speech-appropriate; affect-full; denies SI/HIAVH; no perseverations; fund of knowledge-average; I/J-fair     No new subjective & objective note has been filed under this hospital service since the last note was generated.    Assessment/Plan:   The patient is a 63 year old female who presents having had some respiratory difficulties who was seen by psychiatrist once secondary to concerns of psychotic sympotms. There was suspicion of delirium by the psychiatrist on 4/16/2019 and it is very likely that this is the case. The patient doesn't appear to have any prior psychiatric illnesses/conditions. She has a normal MSE at this time and congnition appears to be intact. Also, her family who is present note that she is currently functioning at baseline. A possible waxing/waning of behaviors might suggest a delirious process which is possible due to medical issues which are currently being addressed. Would not make any further recommendations and would continue with the plan as delineated in the first consult done on 4/16/2019.     1. Continue plans from first psychiatric consult with no new changes       Time with patient: 15 minutes     More than 50% of the time was spent counseling/coordinating care    Consulting clinician was informed of the encounter and consult note.    Consultation ended: 4/19/2019 at Consult Start Time: 04/19/2019 20:30  Consult End Time: 04/19/2019 21:00          Carla Maradiaga MD   Psychiatry  [facility]

## 2019-04-22 NOTE — PHYSICIAN QUERY
PT Name: Donita Gonzalez  MR #: [id number]    Physician Query Form - Nutrition Clarification     CDS/: Natalee Rivera               Contact information: [email or web address]    This form is a permanent document in the medical record.     Query Date: April 22, 2019    By submitting this query, we are merely seeking further clarification of documentation.. Please utilize your independent clinical judgment when addressing the question(s) below.    The Medical record contains the following:   Indicators  Supporting Clinical Findings Location in Medical Record    % of Estimated Energy Intake over a time frame from p.o., TF, or TPN      Weight Status over a time frame      Subcutaneous Fat and/or Muscle Loss      Fluid Accumulation or Edema      Reduced  Strength     x Wt / BMI / Usual Body Weight Weight: 51 kg (112 lb 7 oz)   Body mass index is 18.15 kg/m²   H&P 4/16    Delayed Wound Healing / Failure to Thrive     x Acute or Chronic Illness Acute respiratory failure with hypoxia and hypercarbia     Hallucinations       Lactic acidosis     Essential hypertension    hypertensive emergency             DS 4/20    Medication      Treatment     x Other She does however, report a significant amount of unintentional weight loss      He reports that she now avoids even going outside and has been losing a lot of weight as well   Pulm CN 4/16          Psych CN 4/16     AND / ASPEN Clinical Characteristics (October 2011)  A minimum of two characteristics is recommended for diagnosing either moderate or severe malnutrition   Mild Malnutrition Moderate Malnutrition Severe Malnutrition   Energy Intake from p.o., TF or TPN. < 75% intake of estimated energy needs for less than 7 days < 75% intake of estimated energy needs for greater than 7 days < 50% intake of estimated energy needs for > 5 days   Weight Loss 1-2% in 1 month  5% in 3 months  7.5% in 6 months  10% in 1 year 1-2 % in 1 week  5% in 1 month  7.5% in 3  months  10% in 6 months  20% in 1 year > 2% in 1 week  > 5% in 1 month  > 7.5% in 3 months  > 10% in 6 months  > 20% in 1 year   Physical Findings     None *Mild subcutaneous fat and/or muscle loss  *Mild fluid accumulation  *Stage II decubitus  *Surgical wound or non-healing wound *Mod/severe subcutaneous fat and/or muscle loss  *Mod/severe fluid accumulation  *Stage III or IV decubitus  *Non-healing surgical wound     Provider, please specify diagnosis or diagnoses associated with above clinical findings.    [x ] Mild Protein-Calorie Malnutrition   [  ] Abnormal Weight Loss   [  ] Underweight   [  ] Other Nutritional Diagnosis (please specify):    [  ] Other:    [  ] Clinically Undetermined       Please document in your progress notes daily for the duration of treatment until resolved and include in your discharge summary.

## 2019-04-22 NOTE — PHYSICIAN QUERY
PT Name: Donita Gonzalez  MR #: [id number]  Physician Query Form - Renal Condition Clarification     CDS/: Natalee Rivera               Contact information: [email or web address]    This form is a permanent document in the medical record.     QueryDate: April 22, 2019    By submitting this query, we are merely seeking further clarification of documentation. Please utilize your independent clinical judgment when addressing the question(s) below.    The Medical record contains the following:   Indicator Supporting Clinical Findings Location in Medical Record    Kidney (Renal) Insufficiency     x Kidney (Renal) Failure / Injury GOYO (acute kidney injury)   DS 4/20    Nephrotoxic Agents     x BUN/Creatinine GFR BUN = 28, 47, 45, 49, 39, 33    Creatinine = 1.2, 1.5, 1.4, 1.0, 0.9    GFR = 56, 42, 46, >60     Labs 4/16 - 4/20    Urine: Casts         Eosinophils     x Dehydration Lactic acidosis   -could have been due to hypoxia versus mild pulmonary infection vs dehydration   HM PN 4/17    Nausea/Vomiting      Dialysis/CRRT     x Treatment: Will give gentle IV fluids overnight.     PN 4/17    Other:      Acute Kidney Injury / Acute Renal Failure has different defining criteria. A generally accepted guideline  is:   A greater than 100% (2X) rise in serum creatinine from baseline* occurring during the course of a single hospital stay.   *Baseline as determined by the providers judgment and consideration of previous lab values and other documentation, if available.    A diagnosis of Acute Kidney Injury/ Acute Renal Failure should incorporate abnormal labs and clinical findings that are clinically significant      References: 1. Yamilex et al. Acute renal failure-definition, outcome measures, animal models, fluid therapy and information technology needs: the Second International Consensus Conference of the Acute Dialysis Quality Initiative (ADQI) Group. Crit Care 2004; 8:B204; 2. Roland et al. Acute Kidney Injury  Network: report of an initiative to improve outcomes in acute kidney injury. Crit Care 2007; 11:R31; 3. Kidney Disease: Improving Global Outcomes (KDIGO). Acute Kidney Injury Work Group. KDIGO clinical practice guidelines for acute kidney injury. Kidney Int Suppl 2012; 2:1.    The clinical guidelines noted below is only a system guideline, it does not replace the providers clinical judgment.    Provider, please specify the diagnosis or diagnoses associated with above clinical findings.    [   ] Other Acute Kidney Failure/Injury (please specify): ____________     [   ] Unspecified Acute Kidney Failure/Injury      [   x] Acute Renal Insufficiency  Consider if SCr rise is transient and normalizes quickly with no efforts at real resuscitation of vital signs and perfusion   [   ] Other (please specify): _________________________________   [   ]  Clinically Undetermined       Please document in your progress notes daily for the duration of treatment until resolved and include in your discharge summary.

## 2019-05-27 ENCOUNTER — OFFICE VISIT (OUTPATIENT)
Dept: PRIMARY CARE CLINIC | Facility: CLINIC | Age: 64
End: 2019-05-27
Payer: COMMERCIAL

## 2019-05-27 VITALS
SYSTOLIC BLOOD PRESSURE: 131 MMHG | BODY MASS INDEX: 20.73 KG/M2 | HEIGHT: 66 IN | DIASTOLIC BLOOD PRESSURE: 73 MMHG | RESPIRATION RATE: 18 BRPM | WEIGHT: 129 LBS | OXYGEN SATURATION: 96 % | TEMPERATURE: 98 F | HEART RATE: 68 BPM

## 2019-05-27 DIAGNOSIS — R53.83 FATIGUE, UNSPECIFIED TYPE: ICD-10-CM

## 2019-05-27 DIAGNOSIS — R56.9 SEIZURE: ICD-10-CM

## 2019-05-27 DIAGNOSIS — L30.9 ECZEMA, UNSPECIFIED TYPE: ICD-10-CM

## 2019-05-27 DIAGNOSIS — Z72.0 TOBACCO ABUSE: ICD-10-CM

## 2019-05-27 DIAGNOSIS — J44.9 CHRONIC OBSTRUCTIVE PULMONARY DISEASE, UNSPECIFIED COPD TYPE: Primary | ICD-10-CM

## 2019-05-27 DIAGNOSIS — I10 ESSENTIAL HYPERTENSION: ICD-10-CM

## 2019-05-27 DIAGNOSIS — Z87.09 H/O ACUTE RESPIRATORY FAILURE: ICD-10-CM

## 2019-05-27 DIAGNOSIS — L85.3 DRY SKIN: ICD-10-CM

## 2019-05-27 DIAGNOSIS — H54.40 BLINDNESS OF RIGHT EYE: ICD-10-CM

## 2019-05-27 PROCEDURE — 3078F DIAST BP <80 MM HG: CPT | Mod: CPTII,S$GLB,, | Performed by: INTERNAL MEDICINE

## 2019-05-27 PROCEDURE — 96372 PR INJECTION,THERAP/PROPH/DIAG2ST, IM OR SUBCUT: ICD-10-PCS | Mod: S$GLB,,, | Performed by: INTERNAL MEDICINE

## 2019-05-27 PROCEDURE — 99203 OFFICE O/P NEW LOW 30 MIN: CPT | Mod: 25,S$GLB,, | Performed by: INTERNAL MEDICINE

## 2019-05-27 PROCEDURE — 99203 PR OFFICE/OUTPT VISIT, NEW, LEVL III, 30-44 MIN: ICD-10-PCS | Mod: 25,S$GLB,, | Performed by: INTERNAL MEDICINE

## 2019-05-27 PROCEDURE — 3078F PR MOST RECENT DIASTOLIC BLOOD PRESSURE < 80 MM HG: ICD-10-PCS | Mod: CPTII,S$GLB,, | Performed by: INTERNAL MEDICINE

## 2019-05-27 PROCEDURE — 3008F BODY MASS INDEX DOCD: CPT | Mod: CPTII,S$GLB,, | Performed by: INTERNAL MEDICINE

## 2019-05-27 PROCEDURE — 3075F PR MOST RECENT SYSTOLIC BLOOD PRESS GE 130-139MM HG: ICD-10-PCS | Mod: CPTII,S$GLB,, | Performed by: INTERNAL MEDICINE

## 2019-05-27 PROCEDURE — 99999 PR PBB SHADOW E&M-EST. PATIENT-LVL IV: CPT | Mod: PBBFAC,,, | Performed by: INTERNAL MEDICINE

## 2019-05-27 PROCEDURE — 99999 PR PBB SHADOW E&M-EST. PATIENT-LVL IV: ICD-10-PCS | Mod: PBBFAC,,, | Performed by: INTERNAL MEDICINE

## 2019-05-27 PROCEDURE — 96372 THER/PROPH/DIAG INJ SC/IM: CPT | Mod: S$GLB,,, | Performed by: INTERNAL MEDICINE

## 2019-05-27 PROCEDURE — 3008F PR BODY MASS INDEX (BMI) DOCUMENTED: ICD-10-PCS | Mod: CPTII,S$GLB,, | Performed by: INTERNAL MEDICINE

## 2019-05-27 PROCEDURE — 3075F SYST BP GE 130 - 139MM HG: CPT | Mod: CPTII,S$GLB,, | Performed by: INTERNAL MEDICINE

## 2019-05-27 RX ORDER — TRIAMCINOLONE ACETONIDE 5 MG/G
CREAM TOPICAL 2 TIMES DAILY
Qty: 15 G | Refills: 2 | Status: ON HOLD | OUTPATIENT
Start: 2019-05-27 | End: 2020-09-16 | Stop reason: HOSPADM

## 2019-05-27 RX ORDER — AMMONIUM LACTATE 12 G/100G
LOTION TOPICAL
Qty: 225 G | Refills: 3 | Status: ON HOLD | OUTPATIENT
Start: 2019-05-27 | End: 2020-09-16 | Stop reason: HOSPADM

## 2019-05-27 RX ORDER — CYANOCOBALAMIN 1000 UG/ML
2000 INJECTION, SOLUTION INTRAMUSCULAR; SUBCUTANEOUS ONCE
Status: COMPLETED | OUTPATIENT
Start: 2019-05-27 | End: 2019-05-27

## 2019-05-27 RX ORDER — ALBUTEROL SULFATE 90 UG/1
2 AEROSOL, METERED RESPIRATORY (INHALATION) EVERY 4 HOURS PRN
Qty: 1 INHALER | Refills: 5 | Status: ON HOLD | OUTPATIENT
Start: 2019-05-27 | End: 2020-09-16 | Stop reason: HOSPADM

## 2019-05-27 RX ADMIN — CYANOCOBALAMIN 2000 MCG: 1000 INJECTION, SOLUTION INTRAMUSCULAR; SUBCUTANEOUS at 10:05

## 2019-05-27 NOTE — PROGRESS NOTES
Subjective:       Patient ID: Donita Gonzalez is a 63 y.o. female.    Chief Complaint: Hospital Follow Up    HPI  patient was just recently admitted to the hospital for COPD with respiratory failure hypoxemia improved with treatment patient quit smoking since April 2019 CC also had appointment to see cardiologist tomorrow he is on albuterol metered-dose inhaler p.r.n. for short of breath she also has history of blindness in the right eye hit history of seizure with visual symptoms/in light better with Depakote she also have the itching in both her the and dry skin all over the body and tired and fatigued  Review of Systems   Constitutional: Negative for activity change, fatigue and unexpected weight change.   HENT: Negative for congestion, dental problem, nosebleeds and rhinorrhea.    Eyes: Positive for visual disturbance (Blindness right eye).   Respiratory: Negative for shortness of breath and wheezing.    Cardiovascular: Negative for chest pain and palpitations.   Gastrointestinal: Negative for constipation, diarrhea and nausea.   Genitourinary: Negative for difficulty urinating, dysuria and hematuria.   Musculoskeletal: Negative for arthralgias and myalgias.   Skin: Negative for rash.   Neurological: Positive for seizures (Seizure with physical symptom). Negative for weakness and headaches.   Psychiatric/Behavioral: Negative for behavioral problems and dysphoric mood. The patient is not nervous/anxious.        Objective:      Physical Exam   Constitutional: She is oriented to person, place, and time. She appears well-developed and well-nourished. No distress.   HENT:   Head: Normocephalic and atraumatic.   Right Ear: External ear normal.   Left Ear: External ear normal.   Nose: Nose normal.   Mouth/Throat: Oropharynx is clear and moist. No oropharyngeal exudate.   Eyes: Pupils are equal, round, and reactive to light. Conjunctivae and EOM are normal. Right eye exhibits no discharge. Left eye exhibits no discharge.    A blindness of the right eye severe degree vision   Neck: Normal range of motion. Neck supple. No thyromegaly present.   Cardiovascular: Normal rate, regular rhythm, normal heart sounds and intact distal pulses. Exam reveals no gallop and no friction rub.   No murmur heard.  Pulmonary/Chest: Effort normal. No respiratory distress. She has wheezes (Bilateral expiratory wheezing). She has no rales. She exhibits no tenderness.   Abdominal: Soft. Bowel sounds are normal. She exhibits no distension. There is no tenderness. There is no rebound and no guarding.   Musculoskeletal: Normal range of motion. She exhibits no edema, tenderness or deformity.   Lymphadenopathy:     She has no cervical adenopathy.   Neurological: She is alert and oriented to person, place, and time.   Skin: Skin is warm and dry. Capillary refill takes less than 2 seconds. No rash noted. No erythema.   Dry scaly skin in the extremities and also at the opening of the ear canal bilaterally   Psychiatric: She has a normal mood and affect. Judgment and thought content normal.   Nursing note and vitals reviewed.      Assessment:       1. Chronic obstructive pulmonary disease, unspecified COPD type    2. H/O acute respiratory failure    3. Dry skin    4. Essential hypertension    5. Tobacco abuse    6. Seizure    7. Eczema, unspecified type    8. Fatigue, unspecified type    9. Blindness of right eye        Plan:       Chronic obstructive pulmonary disease, unspecified COPD type  -     umeclidinium-vilanterol (ANORO ELLIPTA) 62.5-25 mcg/actuation DsDv; Inhale 1 puff into the lungs once daily. Controller  Dispense: 60 each; Refill: 3  -     albuterol (PROVENTIL/VENTOLIN HFA) 90 mcg/actuation inhaler; Inhale 2 puffs into the lungs every 4 (four) hours as needed for Wheezing or Shortness of Breath. Rescue  Dispense: 1 Inhaler; Refill: 5  -     X-Ray Chest PA And Lateral; Future; Expected date: 05/27/2019    H/O acute respiratory failure    Dry skin  -      ammonium lactate (LAC-HYDRIN) 12 % lotion; Apply topically as needed for Dry Skin.  Dispense: 225 g; Refill: 3    Essential hypertension  Comments:  Stable on diet medication  Orders:  -     CBC auto differential; Future; Expected date: 05/27/2019  -     Comprehensive metabolic panel; Future; Expected date: 05/27/2019  -     Lipid panel; Future; Expected date: 05/27/2019  -     POCT URINE DIPSTICK WITHOUT MICROSCOPE  -     SCHEDULED EKG 12-LEAD (to Muse); Future    Tobacco abuse  Comments:  Patient quit smoking almost 2 months    Seizure  -     VALPROIC ACID; Future; Expected date: 05/27/2019    Eczema, unspecified type  -     triamcinolone acetonide 0.5% (KENALOG) 0.5 % Crea; Apply topically 2 (two) times daily.  Dispense: 15 g; Refill: 2    Fatigue, unspecified type  -     cyanocobalamin injection 2,000 mcg    Blindness of right eye  -     Ambulatory Referral to Ophthalmology

## 2019-05-27 NOTE — PROGRESS NOTES
Verified pt ID using name and . NKDA. Administered 2000 mcg B12 in left VG per physician order using aseptic technique. Aspirated and no blood return noted. Pt tolerated well with no adverse reactions noted.

## 2019-05-31 DIAGNOSIS — Z12.11 COLON CANCER SCREENING: ICD-10-CM

## 2019-06-04 ENCOUNTER — OFFICE VISIT (OUTPATIENT)
Dept: CARDIOLOGY | Facility: CLINIC | Age: 64
End: 2019-06-04
Attending: INTERNAL MEDICINE
Payer: COMMERCIAL

## 2019-06-04 VITALS
HEIGHT: 66 IN | SYSTOLIC BLOOD PRESSURE: 163 MMHG | WEIGHT: 129 LBS | DIASTOLIC BLOOD PRESSURE: 79 MMHG | BODY MASS INDEX: 20.73 KG/M2 | HEART RATE: 91 BPM

## 2019-06-04 DIAGNOSIS — J96.11 CHRONIC RESPIRATORY FAILURE WITH HYPOXIA AND HYPERCAPNIA: ICD-10-CM

## 2019-06-04 DIAGNOSIS — I10 ESSENTIAL HYPERTENSION: Primary | ICD-10-CM

## 2019-06-04 DIAGNOSIS — J96.12 CHRONIC RESPIRATORY FAILURE WITH HYPOXIA AND HYPERCAPNIA: ICD-10-CM

## 2019-06-04 DIAGNOSIS — Z72.0 TOBACCO ABUSE: ICD-10-CM

## 2019-06-04 DIAGNOSIS — J44.9 CHRONIC OBSTRUCTIVE PULMONARY DISEASE, UNSPECIFIED COPD TYPE: ICD-10-CM

## 2019-06-04 PROCEDURE — 99214 OFFICE O/P EST MOD 30 MIN: CPT | Mod: S$GLB,,, | Performed by: INTERNAL MEDICINE

## 2019-06-04 PROCEDURE — 3077F SYST BP >= 140 MM HG: CPT | Mod: CPTII,S$GLB,, | Performed by: INTERNAL MEDICINE

## 2019-06-04 PROCEDURE — 3008F PR BODY MASS INDEX (BMI) DOCUMENTED: ICD-10-PCS | Mod: CPTII,S$GLB,, | Performed by: INTERNAL MEDICINE

## 2019-06-04 PROCEDURE — 3008F BODY MASS INDEX DOCD: CPT | Mod: CPTII,S$GLB,, | Performed by: INTERNAL MEDICINE

## 2019-06-04 PROCEDURE — 3078F PR MOST RECENT DIASTOLIC BLOOD PRESSURE < 80 MM HG: ICD-10-PCS | Mod: CPTII,S$GLB,, | Performed by: INTERNAL MEDICINE

## 2019-06-04 PROCEDURE — 99214 PR OFFICE/OUTPT VISIT, EST, LEVL IV, 30-39 MIN: ICD-10-PCS | Mod: S$GLB,,, | Performed by: INTERNAL MEDICINE

## 2019-06-04 PROCEDURE — 3077F PR MOST RECENT SYSTOLIC BLOOD PRESSURE >= 140 MM HG: ICD-10-PCS | Mod: CPTII,S$GLB,, | Performed by: INTERNAL MEDICINE

## 2019-06-04 PROCEDURE — 3078F DIAST BP <80 MM HG: CPT | Mod: CPTII,S$GLB,, | Performed by: INTERNAL MEDICINE

## 2019-06-04 NOTE — LETTER
June 4, 2019      Jordan Campbell MD  [address] [place] [address]           [facility]  [facility] [facility], [address]  [facility] [address]  Phone: [phone]  Fax: [phone]          Patient: Donita Gonzalez   MR Number: [id number]   YOB: 1955   Date of Visit: 6/4/2019       Dear Dr. Jordan Campbell:    Thank you for referring Donita Gonzalez to me for evaluation. Attached you will find relevant portions of my assessment and plan of care.    If you have questions, please do not hesitate to call me. I look forward to following Donita Gonzalez along with you.    Sincerely,    Dutch Chung MD    Enclosure  CC:  No Recipients    If you would like to receive this communication electronically, please contact [email or web address] or [phone] to request more information on [facility] Link access.    For providers and/or their staff who would like to refer a patient to [facility], please contact us through our one-stop-shop provider referral line, [facility], at [phone].    If you feel you have received this communication in error or would no longer like to receive these types of communications, please e-mail [email or web address]

## 2019-06-04 NOTE — PROGRESS NOTES
Subjective:    Patient ID:  Donita Gonzalez is a 63 y.o. female     HPI   Here for follow-up after recent acute on chronic respiratory failure with hypoxemia and hypercarbia, COPD, essential hypertension, and cigarette smoking.    I am feeling well,  placed me on Depakote.  I am now smoking only once in a while.    Current Outpatient Medications   Medication Sig    albuterol (PROVENTIL/VENTOLIN HFA) 90 mcg/actuation inhaler Inhale 2 puffs into the lungs every 4 (four) hours as needed for Wheezing or Shortness of Breath. Rescue    albuterol (VENTOLIN HFA) 90 mcg/actuation inhaler Inhale 2 puffs into the lungs every 6 (six) hours as needed for Wheezing. Rescue    amLODIPine (NORVASC) 10 MG tablet Take 1 tablet (10 mg total) by mouth once daily.    ammonium lactate (LAC-HYDRIN) 12 % lotion Apply topically as needed for Dry Skin.    divalproex ER (DEPAKOTE) 500 MG Tb24 Take 1 tablet (500 mg total) by mouth every evening.    hydroCHLOROthiazide (HYDRODIURIL) 25 MG tablet Take 1 tablet (25 mg total) by mouth once daily.    metoprolol tartrate (LOPRESSOR) 50 MG tablet Take 1 tablet (50 mg total) by mouth 2 (two) times daily.    triamcinolone acetonide 0.5% (KENALOG) 0.5 % Crea Apply topically 2 (two) times daily.    umeclidinium-vilanterol (ANORO ELLIPTA) 62.5-25 mcg/actuation DsDv Inhale 1 puff into the lungs once daily. Controller     No current facility-administered medications for this visit.          Review of Systems   Constitution: Negative for chills, decreased appetite, fever, weight gain and weight loss.   HENT: Negative for congestion, hearing loss and sore throat.    Eyes: Negative for blurred vision, double vision and visual disturbance.   Cardiovascular: Positive for dyspnea on exertion. Negative for chest pain, claudication, leg swelling, palpitations and syncope.   Respiratory: Negative for cough, hemoptysis, shortness of breath, sputum production and wheezing.    Endocrine: Negative for cold  "intolerance and heat intolerance.   Hematologic/Lymphatic: Negative for bleeding problem. Does not bruise/bleed easily.   Skin: Negative for color change, dry skin, flushing and itching.   Musculoskeletal: Negative for back pain, joint pain and myalgias.   Gastrointestinal: Negative for abdominal pain, anorexia, constipation, diarrhea, dysphagia, nausea and vomiting.        No bleeding per rectum   Genitourinary: Negative for dysuria, flank pain, frequency, hematuria and nocturia.   Neurological: Negative for dizziness, headaches, light-headedness, loss of balance, seizures and tremors.   Psychiatric/Behavioral: Negative for altered mental status and depression.     Vitals:    06/04/19 1230   BP: (!) 163/79   Pulse: 91   Weight: 58.5 kg (129 lb)   Height: 5' 6" (1.676 m)    Blood pressure recheck 142/78     Objective:    Physical Exam   Constitutional: She is oriented to person, place, and time. She appears well-developed and well-nourished.   HENT:   Head: Normocephalic and atraumatic.   Nose: Nose normal.   Mouth/Throat: Oropharynx is clear and moist.   Eyes: Pupils are equal, round, and reactive to light. Conjunctivae and EOM are normal.   Neck: Neck supple. No tracheal deviation present. No thyromegaly present.   Cardiovascular: Normal rate, regular rhythm and intact distal pulses. Exam reveals no gallop and no friction rub.   No murmur heard.  Pulmonary/Chest: No respiratory distress. She has no wheezes. She has no rales. She exhibits no tenderness.   Abdominal: Soft. Bowel sounds are normal. She exhibits no distension and no mass. There is no tenderness. There is no rebound and no guarding.   Musculoskeletal: Normal range of motion.   Lymphadenopathy:     She has no cervical adenopathy.   Neurological: She is alert and oriented to person, place, and time.   Skin: Skin is warm and dry.   Psychiatric: Her behavior is normal.         Assessment:       1. Essential hypertension    2. Chronic obstructive pulmonary " disease, unspecified COPD type    3. Chronic respiratory failure with hypoxia and hypercapnia    4. Tobacco abuse         Plan:     again counseled regarding smoking cessation.  Continue present pharmacological regimen.

## 2019-06-07 DIAGNOSIS — Z12.39 BREAST CANCER SCREENING: ICD-10-CM

## 2019-12-16 ENCOUNTER — LAB VISIT (OUTPATIENT)
Dept: LAB | Facility: HOSPITAL | Age: 64
End: 2019-12-16
Attending: INTERNAL MEDICINE
Payer: COMMERCIAL

## 2019-12-16 ENCOUNTER — OFFICE VISIT (OUTPATIENT)
Dept: PRIMARY CARE CLINIC | Facility: CLINIC | Age: 64
End: 2019-12-16
Payer: COMMERCIAL

## 2019-12-16 VITALS
SYSTOLIC BLOOD PRESSURE: 170 MMHG | BODY MASS INDEX: 21.27 KG/M2 | RESPIRATION RATE: 20 BRPM | TEMPERATURE: 98 F | OXYGEN SATURATION: 94 % | DIASTOLIC BLOOD PRESSURE: 82 MMHG | HEART RATE: 72 BPM | HEIGHT: 67 IN | WEIGHT: 135.5 LBS

## 2019-12-16 DIAGNOSIS — E78.5 HYPERLIPIDEMIA, UNSPECIFIED HYPERLIPIDEMIA TYPE: ICD-10-CM

## 2019-12-16 DIAGNOSIS — Z12.11 ENCOUNTER FOR SCREENING COLONOSCOPY: ICD-10-CM

## 2019-12-16 DIAGNOSIS — R06.02 SOB (SHORTNESS OF BREATH): ICD-10-CM

## 2019-12-16 DIAGNOSIS — Z13.220 ENCOUNTER FOR LIPID SCREENING FOR CARDIOVASCULAR DISEASE: ICD-10-CM

## 2019-12-16 DIAGNOSIS — Z13.6 ENCOUNTER FOR LIPID SCREENING FOR CARDIOVASCULAR DISEASE: ICD-10-CM

## 2019-12-16 DIAGNOSIS — Z12.31 ENCOUNTER FOR SCREENING MAMMOGRAM FOR BREAST CANCER: ICD-10-CM

## 2019-12-16 DIAGNOSIS — I70.0 THORACIC AORTA ATHEROSCLEROSIS: ICD-10-CM

## 2019-12-16 DIAGNOSIS — I10 ESSENTIAL HYPERTENSION: Primary | ICD-10-CM

## 2019-12-16 DIAGNOSIS — Z11.59 NEED FOR HEPATITIS C SCREENING TEST: ICD-10-CM

## 2019-12-16 DIAGNOSIS — J44.9 CHRONIC OBSTRUCTIVE PULMONARY DISEASE, UNSPECIFIED COPD TYPE: ICD-10-CM

## 2019-12-16 DIAGNOSIS — Z23 ENCOUNTER FOR VACCINATION: ICD-10-CM

## 2019-12-16 LAB
BILIRUB SERPL-MCNC: NORMAL MG/DL
BLOOD URINE, POC: NORMAL
COLOR, POC UA: YELLOW
GLUCOSE UR QL STRIP: NORMAL
KETONES UR QL STRIP: NORMAL
LEUKOCYTE ESTERASE URINE, POC: NORMAL
NITRITE, POC UA: NORMAL
PH, POC UA: 85
PROTEIN, POC: NORMAL
SPECIFIC GRAVITY, POC UA: 1.01
UROBILINOGEN, POC UA: NORMAL

## 2019-12-16 PROCEDURE — 99214 OFFICE O/P EST MOD 30 MIN: CPT | Mod: 25,S$GLB,, | Performed by: INTERNAL MEDICINE

## 2019-12-16 PROCEDURE — 90686 FLU VACCINE (QUAD) GREATER THAN OR EQUAL TO 3YO PRESERVATIVE FREE IM: ICD-10-PCS | Mod: S$GLB,,, | Performed by: INTERNAL MEDICINE

## 2019-12-16 PROCEDURE — 99214 PR OFFICE/OUTPT VISIT, EST, LEVL IV, 30-39 MIN: ICD-10-PCS | Mod: 25,S$GLB,, | Performed by: INTERNAL MEDICINE

## 2019-12-16 PROCEDURE — 81002 POCT URINE DIPSTICK WITHOUT MICROSCOPE: ICD-10-PCS | Mod: S$GLB,,, | Performed by: INTERNAL MEDICINE

## 2019-12-16 PROCEDURE — 99999 PR PBB SHADOW E&M-EST. PATIENT-LVL IV: CPT | Mod: PBBFAC,,, | Performed by: INTERNAL MEDICINE

## 2019-12-16 PROCEDURE — 93005 ELECTROCARDIOGRAM TRACING: CPT | Mod: S$GLB,,, | Performed by: INTERNAL MEDICINE

## 2019-12-16 PROCEDURE — 90732 PPSV23 VACC 2 YRS+ SUBQ/IM: CPT | Mod: S$GLB,,, | Performed by: INTERNAL MEDICINE

## 2019-12-16 PROCEDURE — 99999 PR PBB SHADOW E&M-EST. PATIENT-LVL IV: ICD-10-PCS | Mod: PBBFAC,,, | Performed by: INTERNAL MEDICINE

## 2019-12-16 PROCEDURE — 90714 TD VACCINE GREATER THAN OR EQUAL TO 7YO PRESERVATIVE FREE IM: ICD-10-PCS | Mod: S$GLB,,, | Performed by: INTERNAL MEDICINE

## 2019-12-16 PROCEDURE — 3008F BODY MASS INDEX DOCD: CPT | Mod: CPTII,S$GLB,, | Performed by: INTERNAL MEDICINE

## 2019-12-16 PROCEDURE — 90714 TD VACC NO PRESV 7 YRS+ IM: CPT | Mod: S$GLB,,, | Performed by: INTERNAL MEDICINE

## 2019-12-16 PROCEDURE — 3079F DIAST BP 80-89 MM HG: CPT | Mod: CPTII,S$GLB,, | Performed by: INTERNAL MEDICINE

## 2019-12-16 PROCEDURE — 90732 PNEUMOCOCCAL POLYSACCHARIDE VACCINE 23-VALENT =>2YO SQ IM: ICD-10-PCS | Mod: S$GLB,,, | Performed by: INTERNAL MEDICINE

## 2019-12-16 PROCEDURE — 90472 IMMUNIZATION ADMIN EACH ADD: CPT | Mod: S$GLB,,, | Performed by: INTERNAL MEDICINE

## 2019-12-16 PROCEDURE — 90471 FLU VACCINE (QUAD) GREATER THAN OR EQUAL TO 3YO PRESERVATIVE FREE IM: ICD-10-PCS | Mod: S$GLB,,, | Performed by: INTERNAL MEDICINE

## 2019-12-16 PROCEDURE — 3077F PR MOST RECENT SYSTOLIC BLOOD PRESSURE >= 140 MM HG: ICD-10-PCS | Mod: CPTII,S$GLB,, | Performed by: INTERNAL MEDICINE

## 2019-12-16 PROCEDURE — 90472 PNEUMOCOCCAL POLYSACCHARIDE VACCINE 23-VALENT =>2YO SQ IM: ICD-10-PCS | Mod: S$GLB,,, | Performed by: INTERNAL MEDICINE

## 2019-12-16 PROCEDURE — 90686 IIV4 VACC NO PRSV 0.5 ML IM: CPT | Mod: S$GLB,,, | Performed by: INTERNAL MEDICINE

## 2019-12-16 PROCEDURE — 93005 EKG 12-LEAD: ICD-10-PCS | Mod: S$GLB,,, | Performed by: INTERNAL MEDICINE

## 2019-12-16 PROCEDURE — 3008F PR BODY MASS INDEX (BMI) DOCUMENTED: ICD-10-PCS | Mod: CPTII,S$GLB,, | Performed by: INTERNAL MEDICINE

## 2019-12-16 PROCEDURE — 90471 IMMUNIZATION ADMIN: CPT | Mod: S$GLB,,, | Performed by: INTERNAL MEDICINE

## 2019-12-16 PROCEDURE — 3077F SYST BP >= 140 MM HG: CPT | Mod: CPTII,S$GLB,, | Performed by: INTERNAL MEDICINE

## 2019-12-16 PROCEDURE — 81002 URINALYSIS NONAUTO W/O SCOPE: CPT | Mod: S$GLB,,, | Performed by: INTERNAL MEDICINE

## 2019-12-16 PROCEDURE — 82274 ASSAY TEST FOR BLOOD FECAL: CPT

## 2019-12-16 PROCEDURE — 3079F PR MOST RECENT DIASTOLIC BLOOD PRESSURE 80-89 MM HG: ICD-10-PCS | Mod: CPTII,S$GLB,, | Performed by: INTERNAL MEDICINE

## 2019-12-16 RX ORDER — ATORVASTATIN CALCIUM 20 MG/1
20 TABLET, FILM COATED ORAL DAILY
Qty: 90 TABLET | Refills: 3 | Status: SHIPPED | OUTPATIENT
Start: 2019-12-16 | End: 2020-06-24 | Stop reason: SDUPTHER

## 2019-12-16 RX ORDER — AZITHROMYCIN 250 MG/1
TABLET, FILM COATED ORAL
Qty: 6 TABLET | Refills: 0 | Status: SHIPPED | OUTPATIENT
Start: 2019-12-16 | End: 2019-12-20

## 2019-12-16 RX ORDER — LOSARTAN POTASSIUM AND HYDROCHLOROTHIAZIDE 12.5; 5 MG/1; MG/1
1 TABLET ORAL DAILY
Qty: 90 TABLET | Refills: 3 | Status: SHIPPED | OUTPATIENT
Start: 2019-12-16 | End: 2020-06-24 | Stop reason: SDUPTHER

## 2019-12-16 RX ORDER — METHYLPREDNISOLONE 4 MG/1
TABLET ORAL
Qty: 1 PACKAGE | Refills: 0 | Status: SHIPPED | OUTPATIENT
Start: 2019-12-16 | End: 2020-06-24

## 2019-12-16 NOTE — PROGRESS NOTES
Verified pt ID using name and . NKDA. Administered Fluarix Quadrivalent IM in Upper L. Deltoid, Pneumo 23 IM in Lower L. Deltoid, and TD Vaccine IM in R. Deltoid per physician order using aseptic technique. Aspirated and no blood return noted. Pt tolerated well with no adverse reactions noted.

## 2019-12-16 NOTE — PROGRESS NOTES
Subjective:       Patient ID: Donita Gonzalez is a 63 y.o. female.    Chief Complaint: Hypertension and Cough    HPI  patient complained of high blood pressure coughing and short of breath no chest pain no fever she quit smoking 1 month ago she was seen by Cardiology in the past had cardiac workup echocardiogram left ventricular hypertrophy cardiac stress test is still pending patient has not had flu vaccine or pneumococcal vaccine yet will do today and will need blood test soon last chest x-ray 6 months ago no acute change she deny nausea vomiting constipation or diarrhea no weight change  Review of Systems   Constitutional: Negative for unexpected weight change.   HENT: Negative for congestion and nosebleeds.    Eyes: Negative for visual disturbance.   Respiratory: Positive for shortness of breath.    Cardiovascular: Negative for chest pain and palpitations.   Gastrointestinal: Negative for abdominal pain, constipation and diarrhea.   Genitourinary: Negative for difficulty urinating.   Musculoskeletal: Negative for back pain.   Neurological: Negative for headaches.       Objective:      Physical Exam   Constitutional: She is oriented to person, place, and time. She appears well-developed and well-nourished. No distress.   HENT:   Head: Normocephalic and atraumatic.   Right Ear: External ear normal.   Left Ear: External ear normal.   Nose: Nose normal.   Mouth/Throat: Oropharynx is clear and moist. No oropharyngeal exudate.   Eyes: Pupils are equal, round, and reactive to light. Conjunctivae and EOM are normal. Right eye exhibits no discharge. Left eye exhibits no discharge.   Neck: Normal range of motion. Neck supple. No thyromegaly present.   Cardiovascular: Normal rate, regular rhythm, normal heart sounds and intact distal pulses. Exam reveals no gallop and no friction rub.   No murmur heard.  Pulmonary/Chest: Effort normal and breath sounds normal. No respiratory distress. She has no wheezes. She has no rales. She  exhibits no tenderness.   Decreased breath sound bilaterally   Abdominal: Soft. Bowel sounds are normal. She exhibits no distension. There is no tenderness. There is no rebound and no guarding.   Musculoskeletal: Normal range of motion. She exhibits no edema, tenderness or deformity.   Lymphadenopathy:     She has no cervical adenopathy.   Neurological: She is alert and oriented to person, place, and time.   Skin: Skin is warm and dry. Capillary refill takes less than 2 seconds. No rash noted. No erythema.   Psychiatric: She has a normal mood and affect. Judgment and thought content normal.   Nursing note and vitals reviewed.      Assessment:       1. Essential hypertension    2. Chronic obstructive pulmonary disease, unspecified COPD type    3. Thoracic aorta atherosclerosis    4. Hyperlipidemia, unspecified hyperlipidemia type    5. Encounter for screening colonoscopy    6. Encounter for lipid screening for cardiovascular disease    7. Need for hepatitis C screening test    8. Encounter for screening mammogram for breast cancer    9. Encounter for vaccination    10. SOB (shortness of breath)        Plan:       Essential hypertension  Comments:  Not control with metoprolol alone wet losartan HCTZ repeat blood pressure check in 1 week patient does not want to take Norvasc  Orders:  -     losartan-hydrochlorothiazide 50-12.5 mg (HYZAAR) 50-12.5 mg per tablet; Take 1 tablet by mouth once daily.  Dispense: 90 tablet; Refill: 3  -     CBC auto differential; Future; Expected date: 12/16/2019  -     Comprehensive metabolic panel; Future; Expected date: 12/16/2019  -     TSH; Future; Expected date: 12/16/2019  -     POCT urine dipstick without microscope  -     EKG 12-lead; Future; Expected date: 12/16/2019    Chronic obstructive pulmonary disease, unspecified COPD type  -     methylPREDNISolone (MEDROL DOSEPACK) 4 mg tablet; use as directed  Dispense: 1 Package; Refill: 0  -     azithromycin (Z-MAURO) 250 MG tablet; 2 tabs  by mouth day 1, then 1 tab by mouth daily x 4 days  Dispense: 6 tablet; Refill: 0  -     X-Ray Chest PA And Lateral; Future; Expected date: 12/16/2019    Thoracic aorta atherosclerosis    Hyperlipidemia, unspecified hyperlipidemia type  -     atorvastatin (LIPITOR) 20 MG tablet; Take 1 tablet (20 mg total) by mouth once daily.  Dispense: 90 tablet; Refill: 3  -     Lipid panel; Future; Expected date: 12/16/2019    Encounter for screening colonoscopy  -     Fecal Immunochemical Test (iFOBT); Future; Expected date: 12/16/2019    Encounter for lipid screening for cardiovascular disease    Need for hepatitis C screening test  -     Hepatitis C antibody; Future; Expected date: 12/16/2019    Encounter for screening mammogram for breast cancer  -     Mammo Digital Screening Bilat without CA; Future; Expected date: 12/30/2019    Encounter for vaccination  -     Pneumococcal Polysaccharide Vaccine (23 Valent) (SQ/IM)  -     Td Vaccine (Adult) - Preservative Free  -     Influenza - Quadrivalent (6 months+) (PF)    SOB (shortness of breath)  Comments:  prob COPD exacerbation pt seen by cardioology in recent past cardiac w/u negative will treat as COPD exacerbation

## 2019-12-17 DIAGNOSIS — I10 ESSENTIAL HYPERTENSION: Primary | ICD-10-CM

## 2019-12-17 RX ORDER — CLONIDINE HYDROCHLORIDE 0.1 MG/1
0.1 TABLET ORAL EVERY 8 HOURS
Qty: 270 TABLET | Refills: 3 | Status: ON HOLD | OUTPATIENT
Start: 2019-12-17 | End: 2020-09-16 | Stop reason: HOSPADM

## 2019-12-17 NOTE — TELEPHONE ENCOUNTER
Spoke with pt and had c/o b/p 172/110 and was 172/113 at 11 this a.m.  Spoke with Dr. Campbell and Clonidine .01mg 1 po every 8 hours called to [facility].  Called pt and explained Clonidine and the need to come in with machine to check against our machine.  Pt will come in 0830

## 2019-12-18 DIAGNOSIS — R94.31 ABNORMAL EKG: ICD-10-CM

## 2019-12-18 DIAGNOSIS — I10 ESSENTIAL HYPERTENSION: Primary | ICD-10-CM

## 2019-12-19 LAB — HEMOCCULT STL QL IA: NEGATIVE

## 2020-06-24 ENCOUNTER — TELEPHONE (OUTPATIENT)
Dept: PRIMARY CARE CLINIC | Facility: CLINIC | Age: 65
End: 2020-06-24

## 2020-06-24 ENCOUNTER — CLINICAL SUPPORT (OUTPATIENT)
Dept: PRIMARY CARE CLINIC | Facility: CLINIC | Age: 65
End: 2020-06-24
Payer: COMMERCIAL

## 2020-06-24 ENCOUNTER — OFFICE VISIT (OUTPATIENT)
Dept: PRIMARY CARE CLINIC | Facility: CLINIC | Age: 65
End: 2020-06-24
Payer: COMMERCIAL

## 2020-06-24 VITALS
HEIGHT: 67 IN | RESPIRATION RATE: 18 BRPM | OXYGEN SATURATION: 92 % | WEIGHT: 126.31 LBS | BODY MASS INDEX: 19.82 KG/M2 | HEART RATE: 109 BPM | TEMPERATURE: 98 F | DIASTOLIC BLOOD PRESSURE: 120 MMHG | SYSTOLIC BLOOD PRESSURE: 184 MMHG

## 2020-06-24 DIAGNOSIS — J96.12 CHRONIC RESPIRATORY FAILURE WITH HYPOXIA AND HYPERCAPNIA: ICD-10-CM

## 2020-06-24 DIAGNOSIS — R06.02 SOB (SHORTNESS OF BREATH): ICD-10-CM

## 2020-06-24 DIAGNOSIS — R05.9 COUGHING: ICD-10-CM

## 2020-06-24 DIAGNOSIS — J96.11 CHRONIC RESPIRATORY FAILURE WITH HYPOXIA AND HYPERCAPNIA: ICD-10-CM

## 2020-06-24 DIAGNOSIS — R60.0 PERIPHERAL EDEMA: ICD-10-CM

## 2020-06-24 DIAGNOSIS — I10 ESSENTIAL HYPERTENSION: ICD-10-CM

## 2020-06-24 DIAGNOSIS — Z72.0 TOBACCO ABUSE: ICD-10-CM

## 2020-06-24 DIAGNOSIS — R06.02 SOB (SHORTNESS OF BREATH): Primary | ICD-10-CM

## 2020-06-24 DIAGNOSIS — J44.9 CHRONIC OBSTRUCTIVE PULMONARY DISEASE, UNSPECIFIED COPD TYPE: ICD-10-CM

## 2020-06-24 PROCEDURE — 3080F DIAST BP >= 90 MM HG: CPT | Mod: CPTII,S$GLB,, | Performed by: INTERNAL MEDICINE

## 2020-06-24 PROCEDURE — U0003 INFECTIOUS AGENT DETECTION BY NUCLEIC ACID (DNA OR RNA); SEVERE ACUTE RESPIRATORY SYNDROME CORONAVIRUS 2 (SARS-COV-2) (CORONAVIRUS DISEASE [COVID-19]), AMPLIFIED PROBE TECHNIQUE, MAKING USE OF HIGH THROUGHPUT TECHNOLOGIES AS DESCRIBED BY CMS-2020-01-R: HCPCS

## 2020-06-24 PROCEDURE — 99214 OFFICE O/P EST MOD 30 MIN: CPT | Mod: S$GLB,,, | Performed by: INTERNAL MEDICINE

## 2020-06-24 PROCEDURE — 99999 PR PBB SHADOW E&M-EST. PATIENT-LVL V: CPT | Mod: PBBFAC,,, | Performed by: INTERNAL MEDICINE

## 2020-06-24 PROCEDURE — 3077F PR MOST RECENT SYSTOLIC BLOOD PRESSURE >= 140 MM HG: ICD-10-PCS | Mod: CPTII,S$GLB,, | Performed by: INTERNAL MEDICINE

## 2020-06-24 PROCEDURE — 99999 PR PBB SHADOW E&M-EST. PATIENT-LVL V: ICD-10-PCS | Mod: PBBFAC,,, | Performed by: INTERNAL MEDICINE

## 2020-06-24 PROCEDURE — 93010 ELECTROCARDIOGRAM REPORT: CPT | Mod: S$GLB,,, | Performed by: INTERNAL MEDICINE

## 2020-06-24 PROCEDURE — 3077F SYST BP >= 140 MM HG: CPT | Mod: CPTII,S$GLB,, | Performed by: INTERNAL MEDICINE

## 2020-06-24 PROCEDURE — 93005 ELECTROCARDIOGRAM TRACING: CPT | Mod: S$GLB,,, | Performed by: INTERNAL MEDICINE

## 2020-06-24 PROCEDURE — 3008F BODY MASS INDEX DOCD: CPT | Mod: CPTII,S$GLB,, | Performed by: INTERNAL MEDICINE

## 2020-06-24 PROCEDURE — 99214 PR OFFICE/OUTPT VISIT, EST, LEVL IV, 30-39 MIN: ICD-10-PCS | Mod: S$GLB,,, | Performed by: INTERNAL MEDICINE

## 2020-06-24 PROCEDURE — 3008F PR BODY MASS INDEX (BMI) DOCUMENTED: ICD-10-PCS | Mod: CPTII,S$GLB,, | Performed by: INTERNAL MEDICINE

## 2020-06-24 PROCEDURE — 3080F PR MOST RECENT DIASTOLIC BLOOD PRESSURE >= 90 MM HG: ICD-10-PCS | Mod: CPTII,S$GLB,, | Performed by: INTERNAL MEDICINE

## 2020-06-24 PROCEDURE — 93010 EKG 12-LEAD: ICD-10-PCS | Mod: S$GLB,,, | Performed by: INTERNAL MEDICINE

## 2020-06-24 PROCEDURE — 93005 EKG 12-LEAD: ICD-10-PCS | Mod: S$GLB,,, | Performed by: INTERNAL MEDICINE

## 2020-06-24 RX ORDER — CLONIDINE HYDROCHLORIDE 0.2 MG/1
0.2 TABLET ORAL
Status: COMPLETED | OUTPATIENT
Start: 2020-06-24 | End: 2020-06-24

## 2020-06-24 RX ADMIN — CLONIDINE HYDROCHLORIDE 0.2 MG: 0.2 TABLET ORAL at 01:06

## 2020-06-24 NOTE — TELEPHONE ENCOUNTER
----- Message from Keyla Anglin sent at 6/24/2020  3:05 PM CDT -----  Regarding: Pts son Mr. Howard Mobile [phone] or  [phone]  Mr. Howard would like a call back in regards to him wanting to know if his mother is ready to be picked up? Patient came in to see you today for annual physical for twelve O'clock on today.

## 2020-06-24 NOTE — PROGRESS NOTES
Subjective:       Patient ID: Donita Gonzalez is a 64 y.o. female.    Chief Complaint: Follow-up and Edema (bilateral foot swelling x 1 week)    HPI  patient with complaint of increased fluid in her lower extremity in the last 2 week and short of breath coughing up white phlegm still smoking until 1 week ago she states she quits patient currently only take HCTZ and clonidine at home for her high blood pressure not sure about other medication patient very poor historian and nobody else around to last further question patient states she live at home with  and 2 sons she deny fever she saw her cardiologist 1 years ago no further cardiac workup except instruct to quit smoking since he have COPD with chronic respiratory failure  Review of Systems    Objective:      Physical Exam  Vitals signs and nursing note reviewed.   Constitutional:       General: She is not in acute distress.     Appearance: She is well-developed.   HENT:      Head: Normocephalic and atraumatic.      Right Ear: External ear normal.      Left Ear: External ear normal.      Nose: Nose normal.      Mouth/Throat:      Pharynx: No oropharyngeal exudate.   Eyes:      General:         Right eye: No discharge.         Left eye: No discharge.      Conjunctiva/sclera: Conjunctivae normal.      Pupils: Pupils are equal, round, and reactive to light.   Neck:      Musculoskeletal: Normal range of motion and neck supple.      Thyroid: No thyromegaly.   Cardiovascular:      Rate and Rhythm: Normal rate and regular rhythm.      Heart sounds: Normal heart sounds. No murmur. No friction rub. No gallop.    Pulmonary:      Effort: Pulmonary effort is normal. No respiratory distress.      Breath sounds: Normal breath sounds. No wheezing or rales.   Chest:      Chest wall: No tenderness.   Abdominal:      General: Bowel sounds are normal. There is no distension.      Palpations: Abdomen is soft.      Tenderness: There is no abdominal tenderness. There is no guarding  or rebound.   Musculoskeletal: Normal range of motion.         General: No tenderness or deformity.      Right lower leg: Edema (plus2-3 pitting edema from the foot into the groin) present.      Left lower leg: Edema (Plus two pitting edema from the foot into the left groin) present.   Lymphadenopathy:      Cervical: No cervical adenopathy.   Skin:     General: Skin is warm and dry.      Findings: No erythema or rash.   Neurological:      Mental Status: She is alert and oriented to person, place, and time.      Comments: Mildly incoherent   Psychiatric:         Thought Content: Thought content normal.         Judgment: Judgment normal.      Comments: anxious         Assessment:       1. SOB (shortness of breath)    2. Essential hypertension    3. Chronic obstructive pulmonary disease, unspecified COPD type    4. Tobacco abuse    5. Chronic respiratory failure with hypoxia and hypercapnia    6. Peripheral edema    7. Coughing        Plan:       SOB (shortness of breath)  Comments:  Patient EKG abnormal possible acute cardiac event sent to emergency room for further evaluation and treatment  Orders:  -     EKG 12-lead; Future; Expected date: 06/24/2020  -     Brain Natriuretic Peptide; Future; Expected date: 06/24/2020  -     Troponin I; Future; Expected date: 06/24/2020  -     COVID-19 Routine Screening; Future; Expected date: 06/24/2020  -     X-Ray Chest PA And Lateral; Future; Expected date: 06/24/2020    Essential hypertension  -     cloNIDine tablet 0.2 mg  -     CBC auto differential; Future; Expected date: 06/24/2020  -     Comprehensive metabolic panel; Future; Expected date: 06/24/2020    Chronic obstructive pulmonary disease, unspecified COPD type    Tobacco abuse  -     Ambulatory referral/consult to [facility] Smoking Cessation; Future; Expected date: 07/01/2020    Chronic respiratory failure with hypoxia and hypercapnia    Peripheral edema  -     TSH; Future; Expected date: 06/24/2020  -     Brain  Natriuretic Peptide; Future; Expected date: 06/24/2020    Coughing  -     COVID-19 Routine Screening; Future; Expected date: 06/24/2020

## 2020-06-24 NOTE — PROGRESS NOTES
Verified pt ID using name and . NKDA. Administered Clonidine 0.2 mg orally per physician order Pt tolerated well with no adverse reactions noted.

## 2020-06-27 LAB — SARS-COV-2 RNA RESP QL NAA+PROBE: NOT DETECTED

## 2020-08-26 PROBLEM — I48.91 ATRIAL FIBRILLATION WITH RAPID VENTRICULAR RESPONSE: Status: ACTIVE | Noted: 2020-08-26

## 2020-08-27 PROBLEM — I63.511 ACUTE ISCHEMIC RIGHT MCA STROKE: Status: ACTIVE | Noted: 2020-08-27

## 2020-08-27 PROBLEM — I50.21 ACUTE SYSTOLIC HEART FAILURE: Status: ACTIVE | Noted: 2020-08-27

## 2020-08-28 PROBLEM — K92.2 GI BLEED: Status: ACTIVE | Noted: 2020-08-28

## 2020-08-28 PROBLEM — Z71.3 ENCOUNTER FOR DIETARY CONSULTATION: Status: ACTIVE | Noted: 2020-08-28

## 2020-08-28 PROBLEM — R04.0 EPISTAXIS: Status: ACTIVE | Noted: 2020-08-28

## 2020-08-28 PROBLEM — I63.9 CEREBROVASCULAR ACCIDENT (CVA): Status: ACTIVE | Noted: 2020-08-27

## 2020-08-29 ENCOUNTER — HOSPITAL ENCOUNTER (INPATIENT)
Facility: HOSPITAL | Age: 65
LOS: 18 days | Discharge: NURSING, REHAB OR LONG-TERM CARE | DRG: 004 | End: 2020-09-16
Attending: PSYCHIATRY & NEUROLOGY | Admitting: PSYCHIATRY & NEUROLOGY
Payer: COMMERCIAL

## 2020-08-29 DIAGNOSIS — I63.9 STROKE: ICD-10-CM

## 2020-08-29 DIAGNOSIS — I50.9 HEART FAILURE: ICD-10-CM

## 2020-08-29 DIAGNOSIS — Z46.59 ENCOUNTER FOR OROGASTRIC (OG) TUBE PLACEMENT: ICD-10-CM

## 2020-08-29 DIAGNOSIS — I63.411 EMBOLIC STROKE INVOLVING RIGHT MIDDLE CEREBRAL ARTERY: ICD-10-CM

## 2020-08-29 DIAGNOSIS — I63.9 CVA (CEREBRAL VASCULAR ACCIDENT): ICD-10-CM

## 2020-08-29 DIAGNOSIS — T50.905A MEDICATION ADVERSE EFFECT: ICD-10-CM

## 2020-08-29 DIAGNOSIS — R94.31 QT PROLONGATION: ICD-10-CM

## 2020-08-29 DIAGNOSIS — I63.9 ISCHEMIC STROKE: ICD-10-CM

## 2020-08-29 DIAGNOSIS — R00.0 TACHYCARDIA: ICD-10-CM

## 2020-08-29 PROCEDURE — 94002 VENT MGMT INPAT INIT DAY: CPT

## 2020-08-29 PROCEDURE — 85610 PROTHROMBIN TIME: CPT

## 2020-08-29 PROCEDURE — 99900026 HC AIRWAY MAINTENANCE (STAT)

## 2020-08-29 PROCEDURE — 80053 COMPREHEN METABOLIC PANEL: CPT | Mod: 91

## 2020-08-29 PROCEDURE — 63600175 PHARM REV CODE 636 W HCPCS

## 2020-08-29 PROCEDURE — 86850 RBC ANTIBODY SCREEN: CPT

## 2020-08-29 PROCEDURE — 84443 ASSAY THYROID STIM HORMONE: CPT

## 2020-08-29 PROCEDURE — 80061 LIPID PANEL: CPT

## 2020-08-29 PROCEDURE — 82803 BLOOD GASES ANY COMBINATION: CPT

## 2020-08-29 PROCEDURE — 94761 N-INVAS EAR/PLS OXIMETRY MLT: CPT

## 2020-08-29 PROCEDURE — 81001 URINALYSIS AUTO W/SCOPE: CPT

## 2020-08-29 PROCEDURE — 99900035 HC TECH TIME PER 15 MIN (STAT)

## 2020-08-29 PROCEDURE — 82800 BLOOD PH: CPT

## 2020-08-29 PROCEDURE — 20000000 HC ICU ROOM

## 2020-08-29 PROCEDURE — 27000221 HC OXYGEN, UP TO 24 HOURS

## 2020-08-29 PROCEDURE — 85520 HEPARIN ASSAY: CPT

## 2020-08-29 PROCEDURE — 37799 UNLISTED PX VASCULAR SURGERY: CPT

## 2020-08-29 PROCEDURE — 83036 HEMOGLOBIN GLYCOSYLATED A1C: CPT

## 2020-08-29 PROCEDURE — 85730 THROMBOPLASTIN TIME PARTIAL: CPT | Mod: 91

## 2020-08-29 PROCEDURE — 85025 COMPLETE CBC W/AUTO DIFF WBC: CPT | Mod: 91

## 2020-08-29 RX ORDER — CEPHALEXIN 250 MG/1
250 CAPSULE ORAL EVERY 12 HOURS
Status: DISCONTINUED | OUTPATIENT
Start: 2020-08-30 | End: 2020-08-30

## 2020-08-29 RX ORDER — PROPOFOL 10 MG/ML
5 INJECTION, EMULSION INTRAVENOUS CONTINUOUS
Status: DISCONTINUED | OUTPATIENT
Start: 2020-08-30 | End: 2020-08-30

## 2020-08-29 RX ORDER — ATORVASTATIN CALCIUM 20 MG/1
40 TABLET, FILM COATED ORAL DAILY
Status: DISCONTINUED | OUTPATIENT
Start: 2020-08-30 | End: 2020-09-15

## 2020-08-29 RX ORDER — DILTIAZEM HCL 1 MG/ML
5 INJECTION, SOLUTION INTRAVENOUS CONTINUOUS
Status: DISCONTINUED | OUTPATIENT
Start: 2020-08-30 | End: 2020-08-30

## 2020-08-29 RX ORDER — PROPOFOL 10 MG/ML
INJECTION, EMULSION INTRAVENOUS
Status: COMPLETED
Start: 2020-08-29 | End: 2020-08-29

## 2020-08-29 RX ADMIN — PROPOFOL 50 MCG/KG/MIN: 10 INJECTION, EMULSION INTRAVENOUS at 11:08

## 2020-08-29 RX ADMIN — DILTIAZEM HYDROCHLORIDE 15 MG/HR: 5 INJECTION INTRAVENOUS at 11:08

## 2020-08-30 PROBLEM — I63.411 EMBOLIC STROKE INVOLVING RIGHT MIDDLE CEREBRAL ARTERY: Status: ACTIVE | Noted: 2020-08-29

## 2020-08-30 PROBLEM — G93.6 CYTOTOXIC CEREBRAL EDEMA: Status: ACTIVE | Noted: 2020-08-30

## 2020-08-30 LAB
ABO + RH BLD: NORMAL
ALBUMIN SERPL BCP-MCNC: 2.5 G/DL (ref 3.5–5.2)
ALBUMIN SERPL BCP-MCNC: 2.8 G/DL (ref 3.5–5.2)
ALLENS TEST: ABNORMAL
ALLENS TEST: ABNORMAL
ALP SERPL-CCNC: 63 U/L (ref 55–135)
ALP SERPL-CCNC: 65 U/L (ref 55–135)
ALT SERPL W/O P-5'-P-CCNC: 31 U/L (ref 10–44)
ALT SERPL W/O P-5'-P-CCNC: 36 U/L (ref 10–44)
ANION GAP SERPL CALC-SCNC: 10 MMOL/L (ref 8–16)
ANION GAP SERPL CALC-SCNC: 9 MMOL/L (ref 8–16)
ANISOCYTOSIS BLD QL SMEAR: SLIGHT
ANISOCYTOSIS BLD QL SMEAR: SLIGHT
APTT BLDCRRT: 29.5 SEC (ref 21–32)
AST SERPL-CCNC: 24 U/L (ref 10–40)
AST SERPL-CCNC: 27 U/L (ref 10–40)
BACTERIA #/AREA URNS AUTO: ABNORMAL /HPF
BASOPHILS # BLD AUTO: 0.01 K/UL (ref 0–0.2)
BASOPHILS # BLD AUTO: 0.02 K/UL (ref 0–0.2)
BASOPHILS NFR BLD: 0.1 % (ref 0–1.9)
BASOPHILS NFR BLD: 0.2 % (ref 0–1.9)
BILIRUB SERPL-MCNC: 0.5 MG/DL (ref 0.1–1)
BILIRUB SERPL-MCNC: 0.7 MG/DL (ref 0.1–1)
BILIRUB UR QL STRIP: NEGATIVE
BLD GP AB SCN CELLS X3 SERPL QL: NORMAL
BUN SERPL-MCNC: 57 MG/DL (ref 8–23)
BUN SERPL-MCNC: 58 MG/DL (ref 8–23)
BURR CELLS BLD QL SMEAR: ABNORMAL
BURR CELLS BLD QL SMEAR: ABNORMAL
CALCIUM SERPL-MCNC: 8.3 MG/DL (ref 8.7–10.5)
CALCIUM SERPL-MCNC: 8.7 MG/DL (ref 8.7–10.5)
CHLORIDE SERPL-SCNC: 100 MMOL/L (ref 95–110)
CHLORIDE SERPL-SCNC: 99 MMOL/L (ref 95–110)
CHOLEST SERPL-MCNC: 139 MG/DL (ref 120–199)
CHOLEST/HDLC SERPL: 2.8 {RATIO} (ref 2–5)
CLARITY UR REFRACT.AUTO: ABNORMAL
CO2 SERPL-SCNC: 30 MMOL/L (ref 23–29)
CO2 SERPL-SCNC: 30 MMOL/L (ref 23–29)
COLOR UR AUTO: YELLOW
CREAT SERPL-MCNC: 2.1 MG/DL (ref 0.5–1.4)
CREAT SERPL-MCNC: 2.1 MG/DL (ref 0.5–1.4)
CREAT UR-MCNC: 74 MG/DL (ref 15–325)
DACRYOCYTES BLD QL SMEAR: ABNORMAL
DELSYS: ABNORMAL
DELSYS: ABNORMAL
DIFFERENTIAL METHOD: ABNORMAL
DIFFERENTIAL METHOD: ABNORMAL
EOSINOPHIL # BLD AUTO: 0 K/UL (ref 0–0.5)
EOSINOPHIL # BLD AUTO: 0 K/UL (ref 0–0.5)
EOSINOPHIL NFR BLD: 0.2 % (ref 0–8)
EOSINOPHIL NFR BLD: 0.2 % (ref 0–8)
ERYTHROCYTE [DISTWIDTH] IN BLOOD BY AUTOMATED COUNT: 14.7 % (ref 11.5–14.5)
ERYTHROCYTE [DISTWIDTH] IN BLOOD BY AUTOMATED COUNT: 14.9 % (ref 11.5–14.5)
ERYTHROCYTE [SEDIMENTATION RATE] IN BLOOD BY WESTERGREN METHOD: 15 MM/H
ERYTHROCYTE [SEDIMENTATION RATE] IN BLOOD BY WESTERGREN METHOD: 15 MM/H
EST. GFR  (AFRICAN AMERICAN): 28.1 ML/MIN/1.73 M^2
EST. GFR  (AFRICAN AMERICAN): 28.1 ML/MIN/1.73 M^2
EST. GFR  (NON AFRICAN AMERICAN): 24.3 ML/MIN/1.73 M^2
EST. GFR  (NON AFRICAN AMERICAN): 24.3 ML/MIN/1.73 M^2
ESTIMATED AVG GLUCOSE: 126 MG/DL (ref 68–131)
FACT X PPP CHRO-ACNC: 0.22 IU/ML (ref 0.3–0.7)
FIO2: 100
FIO2: 50
GIANT PLATELETS BLD QL SMEAR: PRESENT
GLUCOSE SERPL-MCNC: 108 MG/DL (ref 70–110)
GLUCOSE SERPL-MCNC: 112 MG/DL (ref 70–110)
GLUCOSE UR QL STRIP: NEGATIVE
HBA1C MFR BLD HPLC: 6 % (ref 4–5.6)
HCO3 UR-SCNC: 30.4 MMOL/L (ref 24–28)
HCO3 UR-SCNC: 34.3 MMOL/L (ref 24–28)
HCT VFR BLD AUTO: 36.3 % (ref 37–48.5)
HCT VFR BLD AUTO: 37.6 % (ref 37–48.5)
HDLC SERPL-MCNC: 49 MG/DL (ref 40–75)
HDLC SERPL: 35.3 % (ref 20–50)
HGB BLD-MCNC: 11.1 G/DL (ref 12–16)
HGB BLD-MCNC: 11.9 G/DL (ref 12–16)
HGB UR QL STRIP: ABNORMAL
HYALINE CASTS UR QL AUTO: 1 /LPF
HYPOCHROMIA BLD QL SMEAR: ABNORMAL
HYPOCHROMIA BLD QL SMEAR: ABNORMAL
IMM GRANULOCYTES # BLD AUTO: 0.03 K/UL (ref 0–0.04)
IMM GRANULOCYTES # BLD AUTO: 0.03 K/UL (ref 0–0.04)
IMM GRANULOCYTES NFR BLD AUTO: 0.3 % (ref 0–0.5)
IMM GRANULOCYTES NFR BLD AUTO: 0.3 % (ref 0–0.5)
INR PPP: 0.9 (ref 0.8–1.2)
KETONES UR QL STRIP: NEGATIVE
LDLC SERPL CALC-MCNC: 70.2 MG/DL (ref 63–159)
LEUKOCYTE ESTERASE UR QL STRIP: ABNORMAL
LYMPHOCYTES # BLD AUTO: 0.3 K/UL (ref 1–4.8)
LYMPHOCYTES # BLD AUTO: 0.4 K/UL (ref 1–4.8)
LYMPHOCYTES NFR BLD: 2.6 % (ref 18–48)
LYMPHOCYTES NFR BLD: 3.4 % (ref 18–48)
MAGNESIUM SERPL-MCNC: 2.4 MG/DL (ref 1.6–2.6)
MCH RBC QN AUTO: 30.2 PG (ref 27–31)
MCH RBC QN AUTO: 30.3 PG (ref 27–31)
MCHC RBC AUTO-ENTMCNC: 30.6 G/DL (ref 32–36)
MCHC RBC AUTO-ENTMCNC: 31.6 G/DL (ref 32–36)
MCV RBC AUTO: 96 FL (ref 82–98)
MCV RBC AUTO: 99 FL (ref 82–98)
MICROSCOPIC COMMENT: ABNORMAL
MIN VOL: 6.26
MIN VOL: 6.41
MODE: ABNORMAL
MODE: ABNORMAL
MONOCYTES # BLD AUTO: 0.8 K/UL (ref 0.3–1)
MONOCYTES # BLD AUTO: 0.8 K/UL (ref 0.3–1)
MONOCYTES NFR BLD: 7.2 % (ref 4–15)
MONOCYTES NFR BLD: 7.9 % (ref 4–15)
NEUTROPHILS # BLD AUTO: 9.5 K/UL (ref 1.8–7.7)
NEUTROPHILS # BLD AUTO: 9.6 K/UL (ref 1.8–7.7)
NEUTROPHILS NFR BLD: 88.7 % (ref 38–73)
NEUTROPHILS NFR BLD: 88.9 % (ref 38–73)
NITRITE UR QL STRIP: NEGATIVE
NON-SQ EPI CELLS #/AREA URNS AUTO: <1 /HPF
NONHDLC SERPL-MCNC: 90 MG/DL
NRBC BLD-RTO: 0 /100 WBC
NRBC BLD-RTO: 0 /100 WBC
OVALOCYTES BLD QL SMEAR: ABNORMAL
PCO2 BLDA: 49.4 MMHG (ref 35–45)
PCO2 BLDA: 54.7 MMHG (ref 35–45)
PEEP: 5
PEEP: 5
PH SMN: 7.4 [PH] (ref 7.35–7.45)
PH SMN: 7.41 [PH] (ref 7.35–7.45)
PH UR STRIP: 5 [PH] (ref 5–8)
PHOSPHATE SERPL-MCNC: 4.7 MG/DL (ref 2.7–4.5)
PIP: 21
PIP: 22
PLATELET # BLD AUTO: 359 K/UL (ref 150–350)
PLATELET # BLD AUTO: 362 K/UL (ref 150–350)
PLATELET BLD QL SMEAR: ABNORMAL
PLATELET BLD QL SMEAR: ABNORMAL
PMV BLD AUTO: 11.1 FL (ref 9.2–12.9)
PMV BLD AUTO: 11.8 FL (ref 9.2–12.9)
PO2 BLDA: 328 MMHG (ref 80–100)
PO2 BLDA: 84 MMHG (ref 80–100)
POC BE: 10 MMOL/L
POC BE: 6 MMOL/L
POC SATURATED O2: 100 % (ref 95–100)
POC SATURATED O2: 96 % (ref 95–100)
POC TCO2: 32 MMOL/L (ref 23–27)
POC TCO2: 36 MMOL/L (ref 23–27)
POCT GLUCOSE: 109 MG/DL (ref 70–110)
POCT GLUCOSE: 128 MG/DL (ref 70–110)
POIKILOCYTOSIS BLD QL SMEAR: SLIGHT
POLYCHROMASIA BLD QL SMEAR: ABNORMAL
POLYCHROMASIA BLD QL SMEAR: ABNORMAL
POTASSIUM SERPL-SCNC: 4.2 MMOL/L (ref 3.5–5.1)
POTASSIUM SERPL-SCNC: 4.3 MMOL/L (ref 3.5–5.1)
PROT SERPL-MCNC: 5.4 G/DL (ref 6–8.4)
PROT SERPL-MCNC: 5.8 G/DL (ref 6–8.4)
PROT UR QL STRIP: ABNORMAL
PROTHROMBIN TIME: 10.5 SEC (ref 9–12.5)
RBC # BLD AUTO: 3.67 M/UL (ref 4–5.4)
RBC # BLD AUTO: 3.93 M/UL (ref 4–5.4)
RBC #/AREA URNS AUTO: 25 /HPF (ref 0–4)
SAMPLE: ABNORMAL
SAMPLE: ABNORMAL
SCHISTOCYTES BLD QL SMEAR: PRESENT
SITE: ABNORMAL
SITE: ABNORMAL
SMUDGE CELLS BLD QL SMEAR: PRESENT
SODIUM SERPL-SCNC: 139 MMOL/L (ref 136–145)
SODIUM SERPL-SCNC: 139 MMOL/L (ref 136–145)
SODIUM UR-SCNC: 22 MMOL/L (ref 20–250)
SP GR UR STRIP: 1.01 (ref 1–1.03)
SP02: 100
SP02: 97
SQUAMOUS #/AREA URNS AUTO: 1 /HPF
TARGETS BLD QL SMEAR: ABNORMAL
TRIGL SERPL-MCNC: 99 MG/DL (ref 30–150)
TSH SERPL DL<=0.005 MIU/L-ACNC: 0.45 UIU/ML (ref 0.4–4)
URATE CRY UR QL COMP ASSIST: ABNORMAL
URN SPEC COLLECT METH UR: ABNORMAL
VT: 400
VT: 400
WBC # BLD AUTO: 10.7 K/UL (ref 3.9–12.7)
WBC # BLD AUTO: 10.8 K/UL (ref 3.9–12.7)
WBC #/AREA URNS AUTO: 8 /HPF (ref 0–5)
WBC CLUMPS UR QL AUTO: ABNORMAL
WBC TOXIC VACUOLES BLD QL SMEAR: PRESENT
YEAST UR QL AUTO: ABNORMAL

## 2020-08-30 PROCEDURE — 87070 CULTURE OTHR SPECIMN AEROBIC: CPT

## 2020-08-30 PROCEDURE — 63600175 PHARM REV CODE 636 W HCPCS: Performed by: PHYSICIAN ASSISTANT

## 2020-08-30 PROCEDURE — 99222 1ST HOSP IP/OBS MODERATE 55: CPT | Mod: ,,, | Performed by: OTOLARYNGOLOGY

## 2020-08-30 PROCEDURE — 37799 UNLISTED PX VASCULAR SURGERY: CPT

## 2020-08-30 PROCEDURE — 36620 INSERTION CATHETER ARTERY: CPT

## 2020-08-30 PROCEDURE — 84300 ASSAY OF URINE SODIUM: CPT

## 2020-08-30 PROCEDURE — 87186 SC STD MICRODIL/AGAR DIL: CPT | Mod: 59

## 2020-08-30 PROCEDURE — 99900035 HC TECH TIME PER 15 MIN (STAT)

## 2020-08-30 PROCEDURE — C9113 INJ PANTOPRAZOLE SODIUM, VIA: HCPCS | Performed by: PHYSICIAN ASSISTANT

## 2020-08-30 PROCEDURE — 27200966 HC CLOSED SUCTION SYSTEM

## 2020-08-30 PROCEDURE — 99222 PR INITIAL HOSPITAL CARE,LEVL II: ICD-10-PCS | Mod: ,,, | Performed by: OTOLARYNGOLOGY

## 2020-08-30 PROCEDURE — 94003 VENT MGMT INPAT SUBQ DAY: CPT

## 2020-08-30 PROCEDURE — 83735 ASSAY OF MAGNESIUM: CPT

## 2020-08-30 PROCEDURE — 99900026 HC AIRWAY MAINTENANCE (STAT)

## 2020-08-30 PROCEDURE — 27000221 HC OXYGEN, UP TO 24 HOURS

## 2020-08-30 PROCEDURE — 20000000 HC ICU ROOM

## 2020-08-30 PROCEDURE — S0028 INJECTION, FAMOTIDINE, 20 MG: HCPCS | Performed by: STUDENT IN AN ORGANIZED HEALTH CARE EDUCATION/TRAINING PROGRAM

## 2020-08-30 PROCEDURE — 82803 BLOOD GASES ANY COMBINATION: CPT

## 2020-08-30 PROCEDURE — 87077 CULTURE AEROBIC IDENTIFY: CPT

## 2020-08-30 PROCEDURE — 82570 ASSAY OF URINE CREATININE: CPT

## 2020-08-30 PROCEDURE — 84100 ASSAY OF PHOSPHORUS: CPT

## 2020-08-30 PROCEDURE — 99233 PR SUBSEQUENT HOSPITAL CARE,LEVL III: ICD-10-PCS | Mod: ,,, | Performed by: PSYCHIATRY & NEUROLOGY

## 2020-08-30 PROCEDURE — 63600175 PHARM REV CODE 636 W HCPCS: Performed by: PSYCHIATRY & NEUROLOGY

## 2020-08-30 PROCEDURE — 27200188 HC TRANSDUCER, ART ADULT/PEDS

## 2020-08-30 PROCEDURE — 87205 SMEAR GRAM STAIN: CPT

## 2020-08-30 PROCEDURE — 25000003 PHARM REV CODE 250: Performed by: STUDENT IN AN ORGANIZED HEALTH CARE EDUCATION/TRAINING PROGRAM

## 2020-08-30 PROCEDURE — 94761 N-INVAS EAR/PLS OXIMETRY MLT: CPT

## 2020-08-30 PROCEDURE — 80053 COMPREHEN METABOLIC PANEL: CPT

## 2020-08-30 PROCEDURE — 85025 COMPLETE CBC W/AUTO DIFF WBC: CPT

## 2020-08-30 PROCEDURE — 87040 BLOOD CULTURE FOR BACTERIA: CPT

## 2020-08-30 PROCEDURE — 43752 NASAL/OROGASTRIC W/TUBE PLMT: CPT

## 2020-08-30 PROCEDURE — 25000003 PHARM REV CODE 250: Performed by: UROLOGY

## 2020-08-30 PROCEDURE — 51702 INSERT TEMP BLADDER CATH: CPT

## 2020-08-30 PROCEDURE — 82800 BLOOD PH: CPT

## 2020-08-30 PROCEDURE — 99233 SBSQ HOSP IP/OBS HIGH 50: CPT | Mod: ,,, | Performed by: PSYCHIATRY & NEUROLOGY

## 2020-08-30 RX ORDER — METOPROLOL TARTRATE 1 MG/ML
5 INJECTION, SOLUTION INTRAVENOUS EVERY 10 MIN PRN
Status: COMPLETED | OUTPATIENT
Start: 2020-08-30 | End: 2020-09-05

## 2020-08-30 RX ORDER — METOPROLOL TARTRATE 1 MG/ML
5 INJECTION, SOLUTION INTRAVENOUS ONCE
Status: COMPLETED | OUTPATIENT
Start: 2020-08-30 | End: 2020-08-30

## 2020-08-30 RX ORDER — DEXMEDETOMIDINE HYDROCHLORIDE 4 UG/ML
0.5 INJECTION, SOLUTION INTRAVENOUS CONTINUOUS
Status: DISCONTINUED | OUTPATIENT
Start: 2020-08-30 | End: 2020-09-04

## 2020-08-30 RX ORDER — DILTIAZEM HCL 1 MG/ML
5 INJECTION, SOLUTION INTRAVENOUS CONTINUOUS
Status: DISCONTINUED | OUTPATIENT
Start: 2020-08-30 | End: 2020-09-02

## 2020-08-30 RX ORDER — LORAZEPAM/0.9% SODIUM CHLORIDE 100MG/0.1L
2 PLASTIC BAG, INJECTION (ML) INTRAVENOUS ONCE
Status: COMPLETED | OUTPATIENT
Start: 2020-08-30 | End: 2020-08-30

## 2020-08-30 RX ORDER — PANTOPRAZOLE SODIUM 40 MG/10ML
40 INJECTION, POWDER, LYOPHILIZED, FOR SOLUTION INTRAVENOUS EVERY 12 HOURS
Status: DISCONTINUED | OUTPATIENT
Start: 2020-08-30 | End: 2020-09-16 | Stop reason: HOSPADM

## 2020-08-30 RX ORDER — NOREPINEPHRINE BITARTRATE/D5W 4MG/250ML
0.02 PLASTIC BAG, INJECTION (ML) INTRAVENOUS CONTINUOUS
Status: DISCONTINUED | OUTPATIENT
Start: 2020-08-30 | End: 2020-08-31

## 2020-08-30 RX ORDER — FAMOTIDINE 10 MG/ML
20 INJECTION INTRAVENOUS DAILY
Status: DISCONTINUED | OUTPATIENT
Start: 2020-08-30 | End: 2020-08-30

## 2020-08-30 RX ORDER — ONDANSETRON 2 MG/ML
4 INJECTION INTRAMUSCULAR; INTRAVENOUS EVERY 6 HOURS PRN
Status: DISCONTINUED | OUTPATIENT
Start: 2020-08-30 | End: 2020-09-16 | Stop reason: HOSPADM

## 2020-08-30 RX ORDER — ACETAMINOPHEN 325 MG/1
650 TABLET ORAL EVERY 6 HOURS PRN
Status: DISCONTINUED | OUTPATIENT
Start: 2020-08-30 | End: 2020-09-15

## 2020-08-30 RX ORDER — CHLORHEXIDINE GLUCONATE ORAL RINSE 1.2 MG/ML
15 SOLUTION DENTAL 2 TIMES DAILY
Status: DISCONTINUED | OUTPATIENT
Start: 2020-08-30 | End: 2020-08-31

## 2020-08-30 RX ORDER — CEFEPIME HYDROCHLORIDE 1 G/1
1 INJECTION, POWDER, FOR SOLUTION INTRAMUSCULAR; INTRAVENOUS
Status: DISCONTINUED | OUTPATIENT
Start: 2020-08-30 | End: 2020-09-01

## 2020-08-30 RX ADMIN — MAGNESIUM SULFATE 2 G: 2 INJECTION INTRAVENOUS at 01:08

## 2020-08-30 RX ADMIN — METOROPROLOL TARTRATE 5 MG: 5 INJECTION, SOLUTION INTRAVENOUS at 01:08

## 2020-08-30 RX ADMIN — DEXMEDETOMIDINE HYDROCHLORIDE 0.4 MCG/KG/HR: 4 INJECTION, SOLUTION INTRAVENOUS at 01:08

## 2020-08-30 RX ADMIN — FAMOTIDINE 20 MG: 10 INJECTION INTRAVENOUS at 08:08

## 2020-08-30 RX ADMIN — Medication 0.02 MCG/KG/MIN: at 09:08

## 2020-08-30 RX ADMIN — PANTOPRAZOLE SODIUM 40 MG: 40 INJECTION, POWDER, LYOPHILIZED, FOR SOLUTION INTRAVENOUS at 12:08

## 2020-08-30 RX ADMIN — CEPHALEXIN 250 MG: 250 CAPSULE ORAL at 08:08

## 2020-08-30 RX ADMIN — Medication 0.04 MCG/KG/MIN: at 02:08

## 2020-08-30 RX ADMIN — ATORVASTATIN CALCIUM 40 MG: 20 TABLET, FILM COATED ORAL at 08:08

## 2020-08-30 RX ADMIN — DILTIAZEM HYDROCHLORIDE 15 MG/HR: 5 INJECTION INTRAVENOUS at 04:08

## 2020-08-30 RX ADMIN — METOROPROLOL TARTRATE 5 MG: 5 INJECTION, SOLUTION INTRAVENOUS at 09:08

## 2020-08-30 RX ADMIN — CHLORHEXIDINE GLUCONATE 0.12% ORAL RINSE 15 ML: 1.2 LIQUID ORAL at 08:08

## 2020-08-30 RX ADMIN — CEPHALEXIN 250 MG: 250 CAPSULE ORAL at 01:08

## 2020-08-30 RX ADMIN — CHLORHEXIDINE GLUCONATE 0.12% ORAL RINSE 15 ML: 1.2 LIQUID ORAL at 09:08

## 2020-08-30 RX ADMIN — DILTIAZEM HYDROCHLORIDE 7.5 MG/HR: 5 INJECTION INTRAVENOUS at 07:08

## 2020-08-30 RX ADMIN — DEXMEDETOMIDINE HYDROCHLORIDE 0.5 MCG/KG/HR: 4 INJECTION, SOLUTION INTRAVENOUS at 08:08

## 2020-08-30 RX ADMIN — CEFEPIME 1 G: 1 INJECTION, POWDER, FOR SOLUTION INTRAMUSCULAR; INTRAVENOUS at 03:08

## 2020-08-30 RX ADMIN — PANTOPRAZOLE SODIUM 40 MG: 40 INJECTION, POWDER, LYOPHILIZED, FOR SOLUTION INTRAVENOUS at 09:08

## 2020-08-30 NOTE — ASSESSMENT & PLAN NOTE
- Last seen normal on 08/25/20 with no intervention; intubated and sedated.    - Imaging shows large right ischemic stroke in the posterior frontal and parietal regions; no repeat imaging   - SBP < 160; vasopressors as needed  - Euna   - CBC, CMP, coags, UA, ABG, CXR, anti XA, Echo, and lipid panel   - hold anticoagulation in the setting of epistaxis

## 2020-08-30 NOTE — PLAN OF CARE
[facility] Care Plan    POC reviewed with Donita Gonzalez and family at 1400. Pt verbalized understanding. Questions and concerns addressed. No acute events today. Repeat CTH done, pt intermittently following commands from [id number]. Metoprolol 5mg given x1. Levo intermittently on throughout day, dilt maxed at 15. ~20 mL bloody drainage from OGT, small amount of bloody drainage from nose, large amount of oral secretions. Full bath and linen change done. Pt progressing toward goals. Will continue to monitor. See below and flowsheets for full assessment and VS info.       Neuro:  Manuel Coma Scale  Best Eye Response: 1-->(E1) none  Best Motor Response: 5-->(M5) localizes pain  Best Verbal Response: 1-->(V1) none  [place] Coma Scale Score: 7  Assessment Qualifiers: patient intubated, no eye obstruction present        24 hr Temp:  [98.1 °F (36.7 °C)-99.5 °F (37.5 °C)]     CV:   Rhythm: atrial rhythm, goal HR <120  BP goals:   SBP < 160  MAP > 65    Resp:   O2 Device (Oxygen Therapy): ventilator  Vent Mode: A/C  Set Rate: 15 BPM  Oxygen Concentration (%): 50  Vt Set: 400 mL  PEEP/CPAP: 5 cmH20    Plan: N/A    GI/:  ROXANE Total Score: 1  Diet/Nutrition Received: NPO  Last Bowel Movement: 08/26/20  Voiding Characteristics: urethral catheter (bladder)    Intake/Output Summary (Last 24 hours) at 8/30/2020 1818  Last data filed at 8/30/2020 1805  Gross per 24 hour   Intake 358.33 ml   Output 1000 ml   Net -641.67 ml     Unmeasured Output  Stool Occurrence: 0  Emesis Occurrence: 0  Pad Count: 0    Labs/Accuchecks:  Recent Labs   Lab 08/30/20  0256   WBC 10.70   RBC 3.67*   HGB 11.1*   HCT 36.3*   *      Recent Labs   Lab 08/30/20  0256      K 4.3   CO2 30*      BUN 57*   CREATININE 2.1*   ALKPHOS 63   ALT 31   AST 24   BILITOT 0.7      Recent Labs   Lab 08/29/20  2335   INR 0.9   APTT 29.5      Recent Labs   Lab 08/26/20  1832   TROPONINI 0.07*       Electrolytes: N/A - electrolytes WDL  Accuchecks: Q4H    Gtts:    dexmedetomidine (PRECEDEX) infusion Stopped (08/30/20 0605)    dilTIAZem 15 mg/hr (08/30/20 1805)    norepinephrine bitartrate-D5W Stopped (08/30/20 1305)    propofoL Stopped (08/30/20 0123)       LDA/Wounds:  Lines/Drains/Airways       Central Venous Catheter Line              Percutaneous Central Line Insertion/Assessment - Triple Lumen  08/28/20 1024 2 days              Drain                   NG/OG Tube 08/29/20 2215 orogastric Left mouth less than 1 day         Urethral Catheter 08/29/20 2215 less than 1 day              Airway                   Airway - Non-Surgical 08/26/20 0931 Endotracheal Tube 4 days              Arterial Line              Arterial Line 08/29/20 2247 Right Radial less than 1 day              Peripheral Intravenous Line                   Peripheral IV - Single Lumen 08/30/20 1200 18 G Left;Posterior Forearm less than 1 day                  Wounds: No  Wound care consulted: No

## 2020-08-30 NOTE — NURSING
Moderate sized blood clot suctioned from mouth with red blood coming from OGT on LIWS. [place] QUIROZ aware and coming to BS.  250cc red output from OGT since admit. MAPs 55-60 after multiple mike pushes. Dilt, propofol, and precedex gtts all turned off. MAPs now 65-70. WCTM.

## 2020-08-30 NOTE — ASSESSMENT & PLAN NOTE
- Repeat EKG   - daily Mg and Phos; replace as needed  - dilt gtt d/c in the setting of hypotension   - metoprolol PRN

## 2020-08-30 NOTE — PLAN OF CARE
[facility] Care Plan    POC reviewed with Donita Gonzalez at 0300. Pt intubated and unable to verbalize understanding. Questions and concerns unable to be addressed. Will continue to monitor. See below and flowsheets for full assessment and VS info.   Dilt gtt for RVR  Precedex for minimal sedation  Art line and barr placed on admit  Bloody output from OGT and mouth  Balloon catheters in place in both nares for excessive epistaxis  ENT consulted  Sustained Vtach -- see note      Neuro:  Manuel Coma Scale  Best Eye Response: 1-->(E1) none  Best Motor Response: 6-->(M6) obeys commands  Best Verbal Response: 1-->(V1) none  Manuel Coma Scale Score: 8  Assessment Qualifiers: patient chemically sedated or paralyzed, patient intubated        24hr Temp:  [98 °F (36.7 °C)-99.5 °F (37.5 °C)]     CV:   Rhythm: atrial rhythm  BP goals:   SBP < 160  MAP > 65    Resp:   O2 Device (Oxygen Therapy): ventilator  Vent Mode: A/C  Set Rate: 15 BPM  Oxygen Concentration (%): 50  Vt Set: 400 mL  PEEP/CPAP: 5 cmH20    Plan: wean to extubate; risk for aspiration due to moderate bleeding    GI/:  ROXANE Total Score: 1  Diet/Nutrition Received: NPO  Last Bowel Movement: 08/26/20  Voiding Characteristics: urethral catheter (bladder)    Intake/Output Summary (Last 24 hours) at 8/30/2020 0444  Last data filed at 8/30/2020 0405  Gross per 24 hour   Intake 121.5 ml   Output 430 ml   Net -308.5 ml     Unmeasured Output  Stool Occurrence: 0  Emesis Occurrence: 0  Pad Count: 0    Labs/Accuchecks:  Recent Labs   Lab 08/30/20  0256   WBC 10.70   RBC 3.67*   HGB 11.1*   HCT 36.3*   *      Recent Labs   Lab 08/30/20  0256      K 4.3   CO2 30*      BUN 57*   CREATININE 2.1*   ALKPHOS 63   ALT 31   AST 24   BILITOT 0.7      Recent Labs   Lab 08/29/20  2335   INR 0.9   APTT 29.5      Recent Labs   Lab 08/26/20  1832   TROPONINI 0.07*       Electrolytes: No replacement orders  Accuchecks: Q6H    Gtts:   dexmedetomidine (PRECEDEX) infusion 0.3  mcg/kg/hr (08/30/20 0405)    dilTIAZem Stopped (08/30/20 0105)    norepinephrine bitartrate-D5W Stopped (08/30/20 0305)    propofoL Stopped (08/30/20 0123)       LDA/Wounds:  Lines/Drains/Airways       Central Venous Catheter Line              Percutaneous Central Line Insertion/Assessment - Triple Lumen  08/28/20 1024 1 day              Drain                   NG/OG Tube 08/29/20 2215 orogastric Left mouth less than 1 day         Urethral Catheter 08/29/20 2215 less than 1 day              Airway                   Airway - Non-Surgical 08/26/20 0931 Endotracheal Tube 3 days              Arterial Line              Arterial Line 08/29/20 2247 Right Radial less than 1 day              Peripheral Intravenous Line                   Peripheral IV - Single Lumen 08/26/20 0945 18 G Left Wrist 3 days                  Wounds: No  Wound care consulted: No

## 2020-08-30 NOTE — PLAN OF CARE
Recommendations    1. Once medically able, initiate enteral nutrition - Isosource 1.5 at 40mL/hr.    -Will provide 1440kcal, 65g protein, and 733mL fluid. Will monitor.   Goals: Pt to receive nutrition by RD follow-up.

## 2020-08-30 NOTE — HPI
64 year old female with a PMH significant for COPD, HTN, and every day smoker who presented at OSH on 08/26/20 with acute left sided facial droop and left sided weakness (last known normal 08/25/20). Pt was not given TPA or endovascular intervention, and she was intubated on 08/26/20 for agitation. CTH on 08/26/20 showed right posterior frontal/parietal infarction with no intracranial hemorrhage. Hospital course complicated by Afib with RVR in which she was started on lovenox and then transitioned to heparin gtt for GOYO and HFrEF. Pt was transferred to NICU for higher level of care and persistent epistaxis. Upon arrival Pt was intubated and sedated, Rhinorocket  Bilaterally in place, propofol and diltiazem gtt. Pt was able to move the right side and moves her left side to noxious stimuli. A-line placed.

## 2020-08-30 NOTE — SUBJECTIVE & OBJECTIVE
Medications:  Continuous Infusions:   dexmedetomidine (PRECEDEX) infusion Stopped (08/30/20 0605)    dilTIAZem 12.5 mg/hr (08/30/20 1405)    norepinephrine bitartrate-D5W Stopped (08/30/20 1305)    propofoL Stopped (08/30/20 0123)     Scheduled Meds:   atorvastatin  40 mg Per OG tube Daily    ceFEPime (MAXIPIME) IVPB  1 g Intravenous Q24H    chlorhexidine  15 mL Mouth/Throat BID    pantoprozole (PROTONIX) IV  40 mg Intravenous Q12H     PRN Meds:metoprolol     Current Facility-Administered Medications on File Prior to Encounter   Medication    [DISCONTINUED] albuterol-ipratropium 2.5 mg-0.5 mg/3 mL nebulizer solution 3 mL    [DISCONTINUED] atorvastatin tablet 40 mg    [DISCONTINUED] dexmedetomidine (PRECEDEX) 400mcg/100mL 0.9% NaCL infusion    [DISCONTINUED] diltiaZEM 125 mg in D5W 125 mL infusion    [DISCONTINUED] heparin 25,000 units in dextrose 5% (100 units/ml) IV bolus from bag - ADDITIONAL PRN BOLUS - 30 units/kg    [DISCONTINUED] heparin 25,000 units in dextrose 5% (100 units/ml) IV bolus from bag - ADDITIONAL PRN BOLUS - 60 units/kg    [DISCONTINUED] heparin 25,000 units in dextrose 5% 250 mL (100 units/mL) infusion LOW INTENSITY nomogram - [facility]    [DISCONTINUED] metoprolol tartrate (LOPRESSOR) tablet 100 mg    [DISCONTINUED] NORepinephrine bitartrate 8 mg in dextrose 5% 250 mL infusion    [DISCONTINUED] oxymetazoline 0.05 % nasal spray 2 spray    [DISCONTINUED] pantoprazole injection 40 mg    [DISCONTINUED] propofol (DIPRIVAN) 10 mg/mL infusion    [DISCONTINUED] sodium chloride 0.9% flush 10 mL     Current Outpatient Medications on File Prior to Encounter   Medication Sig    albuterol (PROVENTIL/VENTOLIN HFA) 90 mcg/actuation inhaler Inhale 2 puffs into the lungs every 4 (four) hours as needed for Wheezing or Shortness of Breath. Rescue    amLODIPine (NORVASC) 10 MG tablet Take 1 tablet (10 mg total) by mouth once daily.    ammonium lactate (LAC-HYDRIN) 12 % lotion Apply topically as  needed for Dry Skin.    aspirin 325 MG tablet Take 1 tablet (325 mg total) by mouth once daily.    atorvastatin (LIPITOR) 20 MG tablet Take 4 tablets (80 mg total) by mouth once daily.    cloNIDine (CATAPRES) 0.1 MG tablet Take 1 tablet (0.1 mg total) by mouth every 8 (eight) hours.    clopidogreL (PLAVIX) 75 mg tablet Take 1 tablet (75 mg total) by mouth once daily.    divalproex ER (DEPAKOTE) 500 MG Tb24 Take 1 tablet (500 mg total) by mouth every evening. (Patient not taking: Reported on 12/16/2019)    losartan-hydrochlorothiazide 50-12.5 mg (HYZAAR) 50-12.5 mg per tablet Take 1 tablet by mouth once daily.    triamcinolone acetonide 0.5% (KENALOG) 0.5 % Crea Apply topically 2 (two) times daily.    umeclidinium-vilanterol (ANORO ELLIPTA) 62.5-25 mcg/actuation DsDv Inhale 1 puff into the lungs once daily. Controller       Review of patient's allergies indicates:  No Known Allergies    Past Medical History:   Diagnosis Date    COPD (chronic obstructive pulmonary disease)     Hypertension      Past Surgical History:   Procedure Laterality Date    AUGMENTATION OF BREAST      HYSTERECTOMY       Family History     None        Tobacco Use    Smoking status: Current Every Day Smoker     Packs/day: 1.00     Types: Cigarettes     Start date: 4/16/1980    Smokeless tobacco: Never Used   Substance and Sexual Activity    Alcohol use: Never     Frequency: Never    Drug use: Never    Sexual activity: Not on file     Review of Systems  Objective:     Vital Signs (Most Recent):  Temp: 98.2 °F (36.8 °C) (08/30/20 1105)  Pulse: 85 (08/30/20 1405)  Resp: 15 (08/30/20 1405)  BP: (!) 99/57 (08/30/20 1405)  SpO2: 97 % (08/30/20 1405) Vital Signs (24h Range):  Temp:  [98.1 °F (36.7 °C)-99.5 °F (37.5 °C)] 98.2 °F (36.8 °C)  Pulse:  [] 85  Resp:  [15-34] 15  SpO2:  [91 %-100 %] 97 %  BP: ()/(38-99) 99/57  Arterial Line BP: ()/(47-74) 111/59     Weight: 59.9 kg (132 lb 0.9 oz)  Body mass index is 21.98  kg/m².    Date 08/30/20 0700 - 08/31/20 0659   Shift 9322-2852 3387-1052 8583-8397 24 Hour Total   INTAKE   I.V.(mL/kg) 147.9(2.5)   147.9(2.5)   Shift Total(mL/kg) 147.9(2.5)   147.9(2.5)   OUTPUT   Urine(mL/kg/hr) 400(0.8)   400   Shift Total(mL/kg) 400(6.7)   400(6.7)   Weight (kg) 59.9 59.9 59.9 59.9       Physical Exam  Intubated, sedated  Bilateral RR in place  Hemostatic in anterior nose  No blood in oropharynx  Vent Mode: A/C  Oxygen Concentration (%):  [] 50  Resp Rate Total:  [15 br/min-21 br/min] 15 br/min  Vt Set:  [400 mL] 400 mL  PEEP/CPAP:  [5 cmH20] 5 cmH20  Pressure Support:  [0 cmH20] 0 cmH20  Mean Airway Pressure:  [7.6 cmH20-9.7 cmH20] 8 cmH20    Significant Labs:  CBC:   Recent Labs   Lab 08/30/20 0256   WBC 10.70   RBC 3.67*   HGB 11.1*   HCT 36.3*   *   MCV 99*   MCH 30.2   MCHC 30.6*     CMP:   Recent Labs   Lab 08/30/20 0256      CALCIUM 8.3*   ALBUMIN 2.5*   PROT 5.4*      K 4.3   CO2 30*      BUN 57*   CREATININE 2.1*   ALKPHOS 63   ALT 31   AST 24   BILITOT 0.7     Coagulation:   Recent Labs   Lab 08/29/20 2335   LABPROT 10.5   INR 0.9   APTT 29.5       Significant Diagnostics:  None

## 2020-08-30 NOTE — H&P
[facility]  Neurocritical Care  History & Physical    Admit Date: 8/29/2020  Service Date: 08/30/2020  Length of Stay: 1    Subjective:     Chief Complaint: CVA (cerebral vascular accident)    History of Present Illness: 64 year old female with a PMH significant for COPD, HTN, and every day smoker who presented at OSH on 08/26/20 with acute left sided facial droop and left sided weakness (last known normal 08/25/20). Pt was not given TPA or endovascular intervention, and she was intubated on 08/26/20 for agitation. CTH on 08/26/20 showed right posterior frontal/parietal infarction with no intracranial hemorrhage. Hospital course complicated by Afib with RVR in which she was started on lovenox and then transitioned to heparin gtt for GOYO and HFrEF. Pt was transferred to NICU for higher level of care and persistent epistaxis. Upon arrival Pt was intubated and sedated, Rhinorocket  Bilaterally in place, propofol and diltiazem gtt. Pt was able to move the right side and moves her left side to noxious stimuli. A-line placed.       Past Medical History:   Diagnosis Date    COPD (chronic obstructive pulmonary disease)     Hypertension      Past Surgical History:   Procedure Laterality Date    AUGMENTATION OF BREAST      HYSTERECTOMY        Current Facility-Administered Medications on File Prior to Encounter   Medication Dose Route Frequency Provider Last Rate Last Dose    [COMPLETED] potassium chloride packet 40 mEq  40 mEq Per NG tube Once America Whittaker MD   40 mEq at 08/29/20 0912    [DISCONTINUED] albuterol-ipratropium 2.5 mg-0.5 mg/3 mL nebulizer solution 3 mL  3 mL Nebulization Q6H America Whittaker MD   3 mL at 08/29/20 1910    [DISCONTINUED] atorvastatin tablet 40 mg  40 mg Oral Daily America Whittaker MD   40 mg at 08/29/20 0911    [DISCONTINUED] dexmedetomidine (PRECEDEX) 400mcg/100mL 0.9% NaCL infusion  0.3 mcg/kg/hr Intravenous Continuous America Whittaker MD   Stopped at  08/26/20 1503    [DISCONTINUED] diltiaZEM 125 mg in D5W 125 mL infusion  5 mg/hr Intravenous Continuous America Whittaker MD 10 mL/hr at 08/29/20 1700 10 mg/hr at 08/29/20 1700    [DISCONTINUED] heparin 25,000 units in dextrose 5% (100 units/ml) IV bolus from bag - ADDITIONAL PRN BOLUS - 30 units/kg  30 Units/kg (Adjusted) Intravenous PRN America Whittaker MD        [DISCONTINUED] heparin 25,000 units in dextrose 5% (100 units/ml) IV bolus from bag - ADDITIONAL PRN BOLUS - 60 units/kg  60 Units/kg (Adjusted) Intravenous PRN America Whittaker MD        [DISCONTINUED] heparin 25,000 units in dextrose 5% 250 mL (100 units/mL) infusion LOW INTENSITY nomogram - [facility]  12 Units/kg/hr (Adjusted) Intravenous Continuous America Whittaker MD   Stopped at 08/29/20 0822    [DISCONTINUED] metoprolol tartrate (LOPRESSOR) tablet 100 mg  100 mg Oral BID Dallas Segal MD   100 mg at 08/29/20 0911    [DISCONTINUED] NORepinephrine bitartrate 8 mg in dextrose 5% 250 mL infusion  0.02 mcg/kg/min Intravenous Continuous America Whittaker MD 2.2 mL/hr at 08/29/20 1700 0.02 mcg/kg/min at 08/29/20 1700    [DISCONTINUED] oxymetazoline 0.05 % nasal spray 2 spray  2 spray Each Nostril BID America Whittaker MD   2 spray at 08/29/20 0912    [DISCONTINUED] pantoprazole injection 40 mg  40 mg Intravenous BID America Whittaker MD   40 mg at 08/29/20 0911    [DISCONTINUED] propofol (DIPRIVAN) 10 mg/mL infusion  5 mcg/kg/min Intravenous Continuous America Whittaker MD 9.4 mL/hr at 08/29/20 1730 30 mcg/kg/min at 08/29/20 1730    [DISCONTINUED] sodium chloride 0.9% flush 10 mL  10 mL Intravenous PRN America Whittaker MD         Current Outpatient Medications on File Prior to Encounter   Medication Sig Dispense Refill    albuterol (PROVENTIL/VENTOLIN HFA) 90 mcg/actuation inhaler Inhale 2 puffs into the lungs every 4 (four) hours as needed for Wheezing or Shortness of Breath. Rescue 1 Inhaler 5    amLODIPine (NORVASC) 10 MG tablet  Take 1 tablet (10 mg total) by mouth once daily. 30 tablet 1    ammonium lactate (LAC-HYDRIN) 12 % lotion Apply topically as needed for Dry Skin. 225 g 3    aspirin 325 MG tablet Take 1 tablet (325 mg total) by mouth once daily. 90 tablet 3    atorvastatin (LIPITOR) 20 MG tablet Take 4 tablets (80 mg total) by mouth once daily. 360 tablet 3    cloNIDine (CATAPRES) 0.1 MG tablet Take 1 tablet (0.1 mg total) by mouth every 8 (eight) hours. 270 tablet 3    clopidogreL (PLAVIX) 75 mg tablet Take 1 tablet (75 mg total) by mouth once daily. 30 tablet 0    divalproex ER (DEPAKOTE) 500 MG Tb24 Take 1 tablet (500 mg total) by mouth every evening. (Patient not taking: Reported on 12/16/2019) 30 tablet 1    losartan-hydrochlorothiazide 50-12.5 mg (HYZAAR) 50-12.5 mg per tablet Take 1 tablet by mouth once daily. 90 tablet 3    triamcinolone acetonide 0.5% (KENALOG) 0.5 % Crea Apply topically 2 (two) times daily. 15 g 2    umeclidinium-vilanterol (ANORO ELLIPTA) 62.5-25 mcg/actuation DsDv Inhale 1 puff into the lungs once daily. Controller 60 each 3      Allergies: Patient has no known allergies.    History reviewed. No pertinent family history.  Social History     Tobacco Use    Smoking status: Current Every Day Smoker     Packs/day: 1.00     Types: Cigarettes     Start date: 4/16/1980    Smokeless tobacco: Never Used   Substance Use Topics    Alcohol use: Never     Frequency: Never    Drug use: Never     Review of Systems   Unable to perform ROS: Intubated     Objective:     Vitals:    Temp: 99.4 °F (37.4 °C)  Pulse: 75  Rhythm: atrial rhythm  BP: (!) 106/54  MAP (mmHg): 74  Resp: 16  SpO2: (!) 93 %  Oxygen Concentration (%): 50  O2 Device (Oxygen Therapy): ventilator  Vent Mode: A/C  Set Rate: 15 BPM  Vt Set: 400 mL  PEEP/CPAP: 5 cmH20  Peak Airway Pressure: 22 cmH2O  Mean Airway Pressure: 7.6 cmH20  Plateau Pressure: 0 cmH20    Temp  Min: 97.8 °F (36.6 °C)  Max: 99.5 °F (37.5 °C)  Pulse  Min: 75  Max: 131  BP   Min: 74/48  Max: 154/70  MAP (mmHg)  Min: 56  Max: 119  Resp  Min: 15  Max: 34  SpO2  Min: 91 %  Max: 100 %  Oxygen Concentration (%)  Min: 40  Max: 100    08/29 0701 - 08/30 0700  In: 11 [I.V.:11]  Out: 390 [Urine:140; Drains:250]   Unmeasured Output  Stool Occurrence: 0  Emesis Occurrence: 0  Pad Count: 0       Physical Exam  Constitutional:       Appearance: She is ill-appearing.   HENT:      Nose:      Comments: Bilateral rhino rockets.  Eyes:      Pupils: Pupils are equal, round, and reactive to light.   Cardiovascular:      Rate and Rhythm: Rhythm irregular.   Pulmonary:      Breath sounds: Normal breath sounds.   Abdominal:      General: There is no distension.      Palpations: Abdomen is soft.   Neurological:      GCS: GCS eye subscore is 2. GCS verbal subscore is 1. GCS motor subscore is 4.      Comments: Pt withdraws from pain on the left and follows commands on the right.          Today I personally reviewed pertinent medications, lines/drains/airways, imaging, cardiology results, laboratory results, microbiology results, notably:      MRI 08/26/20:  Large area of restricted diffusion consistent with continued evolution of recent infarction involving the right posterior frontal and parietal lobes lobes.  No evidence of hemorrhage.  Chronic microvascular ischemic changes.    Assessment/Plan:     Neuro  * CVA (cerebral vascular accident)  - Last seen normal on 08/25/20 with no intervention; intubated and sedated.    - Imaging shows large right ischemic stroke in the posterior frontal and parietal regions; no repeat imaging   - SBP < 160; vasopressors as needed  - Euna   - CBC, CMP, coags, UA, ABG, CXR, anti XA, Echo, and lipid panel   - hold anticoagulation in the setting of epistaxis       ENT  Epistaxis  - persistent epistaxis in the setting of anticoagulation   - H/H stable at 11.4/34.6   - daily CBC   - ENT consulted; appreciate recs   - started antibiotics empirically and rhino rockets can stay as long  as 5 days if needed.      Pulmonary  Chronic obstructive pulmonary disease  - daily CXRs while intubated   - goal SpO2 is > 88%    Acute on chronic respiratory failure with hypoxia and hypercapnia  - Pt intubated and sedated. Daily CXRs    Cardiac/Vascular  Atrial fibrillation with rapid ventricular response  - Repeat EKG   - daily Mg and Phos; replace as needed  - dilt gtt d/c in the setting of hypotension   - metoprolol PRN     Renal/  GOOY (acute kidney injury)  - BUN/Cr: 54/ 2.2   - continue to trend           The patient is being Prophylaxed for:  Venous Thromboembolism with: Mechanical  Stress Ulcer with: H2B  Ventilator Pneumonia with: chlorhexidine oral care    Activity Orders          Diet NPO: NPO starting at 08/29 8515        Full Code    Lauri Delarosa MD  Neurocritical Care  [facility]

## 2020-08-30 NOTE — ASSESSMENT & PLAN NOTE
64 year old F with recent history of ischemic stroke with epistaxis secondary to anticoagulation    Recommendations  - Leave packs in place for 5 days  - Nasal saline spray to packs q2h to moisten mucosa  - Afrin prn for residual oozing  - Keflex bid for ppx  - Recommend against nasal cannula if extubated, use of face tent instead  - ENT will sign off, please page with any questions or concerns

## 2020-08-30 NOTE — ASSESSMENT & PLAN NOTE
-Stroke risk factor.  The patient was on full dose lovenox, currently dc'd likely 2/2 uncontrolled epistaxis.  -Continue Diltiazem gtt  -Recommend CTH prior to initiating AC

## 2020-08-30 NOTE — CONSULTS
[facility]  Vascular Neurology  Comprehensive Stroke Center  Consult Note    Consults  Assessment/Plan:     * Embolic stroke involving right middle cerebral artery  Donita Gonzalez is a 64 y.o. female with a significant medical history of COPD, HTN, smoker who presents to the hospital as a transfer from [facility] for further evaluation of R MCA stroke.  The patient did not receive tPA due to LKN 8/25/2020 and presenting to the OSH on 8/26/2020.      Antithrombotics for secondary stroke prevention: Anticoagulants: Patient was on full dose Lovenox that was dc'd 2/2 uncontrolled epistaxis.  Will likely need heparin gtt w/bridge to AC.  Repeat CTH pending.    Statins for secondary stroke prevention and hyperlipidemia, if present:   Statins: Atorvastatin- 40 mg daily    Aggressive risk factor modification: HTN, Smoking, HLD, A-Fib     Rehab efforts: The patient has been evaluated by a stroke team provider and the therapy needs have been fully considered based off the presenting complaints and exam findings. The following therapy evaluations are needed: PT/OT/SLP evaluations when the patient is able to participate    Diagnostics ordered/pending: CT scan of head without contrast to asses brain parenchyma    VTE prophylaxis: Mechanical prophylaxis: Place SCDs  pending AC for Afib    BP parameters: Infarct: No intervention, SBP <220        Cytotoxic cerebral edema  -Small area of cytotoxic cerebral edema identified when reviewing brain imaging in the territory of the R middle cerebral artery. There is no mass effect associated with it. We will continue to monitor the patients clinical exam for any worsening of symptoms which may indicate expansion of the stroke or the area of the edema resulting in the clinical change. The pattern is suggestive of cardioembolic etiology in the setting of new onset Afib w/RVR.    -Recommend Neurosurgery consult        Atrial fibrillation with rapid  ventricular response  -Stroke risk factor.  The patient was on full dose lovenox, currently dc'd likely 2/2 uncontrolled epistaxis.  -Continue Diltiazem gtt  -Recommend CTH prior to initiating AC    Acute on chronic respiratory failure with hypoxia and hypercapnia  -Patient is currently intubated and sedated.  -Managed by [facility]    Epistaxis  -Patient had uncontrolled epistaxis that was attributed to NGT placement and AC.  -AC stopped.  Bleeding currently controlled.  -ENT consulted  -Managed by [facility]    GOYO (acute kidney injury)  -Cr 2.1<2.3 on 8/28/2020  -Managed by [facility]    Chronic obstructive pulmonary disease  -Goal SpO2>88%  -Managed by [facility]        STROKE DOCUMENTATION          NIH Scale:  Interval: baseline  1a. Level of Consciousness: 3-->Responds only with reflex motor or autonomic effects or totally unresponsive, flaccid, and areflexic  1b. LOC Questions: 2-->Answers neither question correctly  1c. LOC Commands: 2-->Performs neither task correctly  2. Best Gaze: 1-->Partial gaze palsy, gaze is abnormal in one or both eyes, but forced deviation or total gaze paresis is not present  3. Visual: 1-->Partial hemianopia  4. Facial Palsy: 0-->Normal symmetrical movements  5a. Motor Arm, Left: 4-->No movement  5b. Motor Arm, Right: 3-->No effort against gravity, limb falls  6a. Motor Leg, Left: 4-->No movement  6b. Motor Leg, Right: 3-->No effort against gravity, leg falls to bed immediately  7. Limb Ataxia: 0-->Absent  8. Sensory: 1-->Mild-to-moderate sensory loss, patient feels pinprick is less sharp or is dull on the affected side, or there is a loss of superficial pain with pinprick, but patient is aware of being touched  9. Best Language: 3-->Mute, global aphasia, no usable speech or auditory comprehension  10. Dysarthria: (UN) Intubated or other physical barrier  11. Extinction and Inattention (formerly Neglect): 2-->Profound tracy-inattention/extinction more than 1 modality  Total (NIH Stroke Scale): 29    Modified  Cherryfield Score: 1  Manuel Coma Scale:    ABCD2 Score:    [id number] Score:5  HAS -BLED Score:3  ICH Score:   Hunt & Bell Classification:       Thrombolysis Candidate? No, Out of window     Delays to Thrombolysis?  No    Interventional Revascularization Candidate?   Is the patient eligible for mechanical endovascular reperfusion (LAURA)?  No;  Large core infarct      Hemorrhagic change of an Ischemic Stroke: Does this patient have an ischemic stroke with hemorrhagic changes? No     Subjective:     History of Present Illness:  Donita Gonzalez is a 64 y.o. female with a significant medical history of COPD, HTN, smoker who presents to the hospital as a transfer from [facility] for further evaluation of R MCA stroke.  HPI information gathered from review of the patient's medical record due to the patient currently being intubated and no family at the bedside.  The patient presented to the [facility] ED on 08/26/2020 complaining of L sided weakness, L facial droop, dysarthria.  She was LKN on 08/25/2020.  The patient was agitated and in distress in the ED and was subsequently intubated.  A CTA head and neck was obtained post intubation and revealed findings concerning for acute right posterior frontal and parietal lobe infarction, L ICA w/70% stenosis.  The patient was also noted to have new onset AFib w/RVR and was started on lovenox and diltiazem. She was admitted to that facility for further evaluation.  A TTE w/bubble study was obtained and revealed new onset heart failure w/reduced EF.  On 08/27/2020 a code blue was called due to the patient becoming hypotensive and desatting.  The patient was also noted to have an irregular heart rate and rhythm.  Sedation and diltiazem drip was discontinued resulting in the patient is stabilizing without chest compressions.  On 08/28/2020 the patient was noted to have epistaxis that was not controlled with rhino rockets.  Her H&H remained stable.  The patient was transferred  to [facility] for a higher level of care.  Currently the patient is intubated and sedated with Precedex. R pupil fixed at baseline 2/2 cataracts.  The patient does not follow commands and no spontaneous movement of her extremities are noted. Per her nurse, the patient was on Propofol when she arrived to this facility and followed some commands w/spontaneous movement of the right side and withdrawing from pain on the left.  Propofol was discontinued.             Past Medical History:   Diagnosis Date    COPD (chronic obstructive pulmonary disease)     Hypertension      Past Surgical History:   Procedure Laterality Date    AUGMENTATION OF BREAST      HYSTERECTOMY       History reviewed. No pertinent family history.  Social History     Tobacco Use    Smoking status: Current Every Day Smoker     Packs/day: 1.00     Types: Cigarettes     Start date: 4/16/1980    Smokeless tobacco: Never Used   Substance Use Topics    Alcohol use: Never     Frequency: Never    Drug use: Never     Review of patient's allergies indicates:  No Known Allergies    Medications: I have reviewed the current medication administration record.    Medications Prior to Admission   Medication Sig Dispense Refill Last Dose    albuterol (PROVENTIL/VENTOLIN HFA) 90 mcg/actuation inhaler Inhale 2 puffs into the lungs every 4 (four) hours as needed for Wheezing or Shortness of Breath. Rescue 1 Inhaler 5     amLODIPine (NORVASC) 10 MG tablet Take 1 tablet (10 mg total) by mouth once daily. 30 tablet 1     ammonium lactate (LAC-HYDRIN) 12 % lotion Apply topically as needed for Dry Skin. 225 g 3     aspirin 325 MG tablet Take 1 tablet (325 mg total) by mouth once daily. 90 tablet 3     atorvastatin (LIPITOR) 20 MG tablet Take 4 tablets (80 mg total) by mouth once daily. 360 tablet 3     cloNIDine (CATAPRES) 0.1 MG tablet Take 1 tablet (0.1 mg total) by mouth every 8 (eight) hours. 270 tablet 3     clopidogreL (PLAVIX) 75 mg tablet Take  1 tablet (75 mg total) by mouth once daily. 30 tablet 0     divalproex ER (DEPAKOTE) 500 MG Tb24 Take 1 tablet (500 mg total) by mouth every evening. (Patient not taking: Reported on 12/16/2019) 30 tablet 1     losartan-hydrochlorothiazide 50-12.5 mg (HYZAAR) 50-12.5 mg per tablet Take 1 tablet by mouth once daily. 90 tablet 3     triamcinolone acetonide 0.5% (KENALOG) 0.5 % Crea Apply topically 2 (two) times daily. 15 g 2     umeclidinium-vilanterol (ANORO ELLIPTA) 62.5-25 mcg/actuation DsDv Inhale 1 puff into the lungs once daily. Controller 60 each 3        Review of Systems   Unable to perform ROS: Intubated   Constitutional: Negative for fever.   HENT: Positive for nosebleeds.    Eyes: Negative for discharge and redness.   Respiratory: Positive for shortness of breath and wheezing.    Cardiovascular: Negative for leg swelling.   Gastrointestinal: Negative for diarrhea and vomiting.   Genitourinary: Negative for hematuria.   Musculoskeletal: Negative for neck stiffness.   Skin: Negative for rash.   Neurological: Positive for facial asymmetry, speech difficulty and weakness.   Psychiatric/Behavioral: Positive for agitation.     Objective:     Vital Signs (Most Recent):  Temp: 98.9 °F (37.2 °C) (08/30/20 0305)  Pulse: 87 (08/30/20 0605)  Resp: 17 (08/30/20 0605)  BP: 102/60 (08/30/20 0605)  SpO2: 99 % (08/30/20 0605)    Vital Signs Range (Last 24H):  Temp:  [98 °F (36.7 °C)-99.5 °F (37.5 °C)]   Pulse:  []   Resp:  [15-34]   BP: ()/(38-99)   SpO2:  [91 %-100 %]   Arterial Line BP: ()/(47-74)     Physical Exam  Vitals signs and nursing note reviewed.   Constitutional:       Appearance: She is underweight.      Interventions: She is sedated, intubated and restrained.   HENT:      Right Ear: External ear normal.      Left Ear: External ear normal.      Nose: No rhinorrhea.      Mouth/Throat:      Mouth: Mucous membranes are dry.   Eyes:      General:         Right eye: No discharge.         Left  eye: No discharge.      Conjunctiva/sclera: Conjunctivae normal.   Cardiovascular:      Rate and Rhythm: Tachycardia present. Rhythm irregular.   Pulmonary:      Effort: She is intubated.   Abdominal:      General: Abdomen is flat. There is no distension.      Palpations: Abdomen is soft.   Neurological:      Mental Status: She is unresponsive.      GCS: GCS eye subscore is 1. GCS verbal subscore is 1. GCS motor subscore is 1.      Motor: Weakness present.         Neurological Exam:   LOC: obtunded  Articulation: Untestable due to intubation      Laboratory:  CMP:   Recent Labs   Lab 08/30/20  0256   CALCIUM 8.3*   ALBUMIN 2.5*   PROT 5.4*      K 4.3   CO2 30*      BUN 57*   CREATININE 2.1*   ALKPHOS 63   ALT 31   AST 24   BILITOT 0.7     CBC:   Recent Labs   Lab 08/30/20 0256   WBC 10.70   RBC 3.67*   HGB 11.1*   HCT 36.3*   *   MCV 99*   MCH 30.2   MCHC 30.6*     Lipid Panel:   Recent Labs   Lab 08/29/20  2335   CHOL 139   LDLCALC 70.2   HDL 49   TRIG 99     Coagulation:   Recent Labs   Lab 08/29/20 2335   INR 0.9   APTT 29.5     Hgb A1C:   Recent Labs   Lab 08/29/20 2335   HGBA1C 6.0*     TSH:   Recent Labs   Lab 08/29/20 2335   TSH 0.453       Diagnostic Results:      Brain imaging:  MRI Brain 8/28/2020 Large area of restricted diffusion consistent with continued evolution of recent infarction involving the right posterior frontal and parietal lobes lobes.  No evidence of hemorrhage.   Chronic microvascular ischemic changes    Vessel Imaging:  CTA Head and Neck 8/06/2020     1. Findings compatible with a right posterior frontal/parietal acute infarction.  No intracranial hemorrhage.  2. CTA head: No evidence of high-grade proximal occlusion.  3. CTA neck: Atherosclerosis of the left carotid bulb and proximal internal carotid artery resulting in approximately 70% stenosis.  4. Moderate emphysematous changes of the lung apices.    Cardiac Evaluation:   TTE w/bubble study 8/26/2020      Conclusion  · Moderate concentric left ventricular hypertrophy.  · Moderately to severely decreased left ventricular systolic function. The estimated ejection fraction is 25-30%.  · Left ventricular diastolic dysfunction.  · Moderately to severely reduced right ventricular systolic function.  · Moderate left atrial enlargement.  · Severe right atrial enlargement.  · Moderate mitral regurgitation.  · Moderate tricuspid regurgitation.  · Intermediate central venous pressure (8 mmHg).  · The estimated PA systolic pressure is 42 mmHg.  · Pulmonary hypertension present.  · Negative bubble study.           Becca Irene, SANDY, NP  Comprehensive Stroke Center  Department of Vascular Neurology   [facility]

## 2020-08-30 NOTE — PROCEDURES
"Donita Gonzalez is a 64 y.o. female patient.    Temp: 98.9 °F (37.2 °C) (08/30/20 0305)  Pulse: 87 (08/30/20 0605)  Resp: 17 (08/30/20 0605)  BP: 102/60 (08/30/20 0605)  SpO2: 99 % (08/30/20 0605)  Weight: 59.9 kg (132 lb 0.9 oz) (08/29/20 2305)  Height: 5' 5" (165.1 cm) (08/29/20 2305)       Arterial Line    Date/Time: 8/30/2020 7:20 AM  Location procedure was performed: [facility] NEURO CRITICAL CARE  Performed by: Lauri Delarosa MD  Authorized by: Lauri Delarosa MD   Consent Done: Emergent Situation  Preparation: Patient was prepped and draped in the usual sterile fashion.  Indications: multiple ABGs, respiratory failure and hemodynamic monitoring  Location: right radial  Needle gauge: 20  Seldinger technique: Seldinger technique used  Number of attempts: 2  Complications: No  Specimens: No  Implants: No  Post-procedure: dressing applied  Post-procedure CMS: normal  Patient tolerance: Patient tolerated the procedure well with no immediate complications          Lauri Delarosa  8/30/2020    "

## 2020-08-30 NOTE — PT/OT/SLP PROGRESS
Speech Language Pathology      Donita Gonzalez  MRN: [id number]    Patient not seen today secondary to pt intubated at this time. Please re-consult when medically appropriate. No further skilled ST services warranted at this time.     Neena Eli CF-SLP   8/30/2020

## 2020-08-30 NOTE — HPI
Donita Gonzalez is a 64 y.o.  female who  has a past medical history of COPD (chronic obstructive pulmonary disease) and Hypertension who is currently admitted for ischemic stroke who suffered uncontrolled epistaxis on therapeutic lovenox. Patient had bilateral rhinorockets placed by [facility] for epistaxis control which achieved hemostasis. She is currently sedated and intubated. ENT consulted for management of nasal packs.

## 2020-08-30 NOTE — PT/OT/SLP PROGRESS
Physical Therapy      Patient Name:  Donita Gonzalez   MRN:  [id number]    Patient not seen today secondary to patient remains intubated and exhibiting some agitation. Will follow-up tomorrow as available and appropriate for therapy.     Ana Daniels, PT   8/30/2020

## 2020-08-30 NOTE — HOSPITAL COURSE
08/29/20: Pt admitted to NICU for higher level of care and ENT consult. Pt with a run of Vtach associated with hypotension.   8/31/2020: hold anticoagulation until tomorrow due to GI bleed/epistaxis (at other facility) GI consult, pt in Afib- load with digoxin, pending digoxin level tomorrow, continue diltiazem gtt today, start tube feeds, obtain PIV and d/c central line, continue cefepime for total 7 days  9/1/2020: initiated heparin gtt minimal intensity-when at goal obtain CT head, continue diltiazem gtt, start tube feeds, d/c A-line  9/2/2020: changed to PO diltiazem 60mg q8hr from gtt, lasix, miralax, plan for extubation  9/3/2020: started seroquel 25BID  9/4/2020: increased seroquel to 25 mg BID, magnesium citrate, and try SBT  9/5/2020: place A-line, increased Po dilt, add chest PT, add water flushes and repeat magnesium citrate  9/6/2020: d/c digoxin, add coreg 6.25 BID, d/c water flushes, repeat CMP, add lasix PO 40 mg BID  09/07/2020 Plan to wean FiO2. As we are weaning propofol, precedex will be added. Patient is currently on heparin drip. Daily EKG for QTc monitoring. Patient given one dose of diamox with plans to restart furosemide for metabolic alkalosis. Will discontinue barr tomorrow. F/u with procalcitonin due to increased WBC count. Will speak to family member about Trach/PEG discussion   09/08/2020 Patient remains on her heparin drip. Currently she is rate controlled. Patient is off of her propofol due to significnt hypotension. We are holding the next two doses of lasix and giving her diamox 500 mg for two doses for metabolic alkalosis. Dr. Zavala spoke to the POA, Griffin Gonzalez who would like to proceed with a trach/PEG. Patient was pan cultured today and started on broad spectrum antibiotics. Barr was changed today   09/09/2020 Patient scheduled to get trach/PEG this Friday. Still on broad spectrum antibiotics as she has an elevated WBC. Working up for alternative causes of fever: US of the  upper and lower extremities, rapid COVID, and amylase lipase.   09/10/2020 Patient scheduled for trach/peg for tomorrow. We are holding the heparin drip and the tube feeds for this midnight. No DVT on upper and lower extremities. Continuing the broad spectrum antibiotics until the susceptibilities return. D/c a line as it is no longer working.   09/11/2020 Will go to the OR today for trach and PEG. Cefepime to discontinue after ten days (for positive respiratory culture)  09/12/2020 Patient was restarted on the heparin drip last ngiht. Weaning propofol and precedex by adding oxycodone for the next 24 hours (PRN). Following up on EKG. Spontaneous trial once sedation is weaned. Awaiting stroke recommendation about when to transition to a DOAC. Tube feeds were started at 1 PM today.   9/13/2020 CTH today before transition heparin to DOAC. Weaned off fentanyl and precedex. Responding well to oxycodone. 1x 500 acetazolamide for metabolic alkalosis. Potential d/c to LTAC tomorrow.  9/14 NAEON Transitioned from heparin gtt to Apixaban. Remains intubated. Increased rate early am, Pco2in 50s, repeat ABG the same. Pt with hx of COPD. Secretions tan colored sputum sent for culture. Continue ABXs for now.   09/15/2020 increased leukocytosis overnight without fever  9/16/2020 slight increase overnight remain on cefepime. Possible transfer to LTAC today. Pt has authorization for LTAC and bed available today. V/S stable over night

## 2020-08-30 NOTE — ASSESSMENT & PLAN NOTE
-Small area of cytotoxic cerebral edema identified when reviewing brain imaging in the territory of the R middle cerebral artery. There is no mass effect associated with it. We will continue to monitor the patients clinical exam for any worsening of symptoms which may indicate expansion of the stroke or the area of the edema resulting in the clinical change. The pattern is suggestive of cardioembolic etiology in the setting of new onset Afib w/RVR.    -Recommend Neurosurgery consult

## 2020-08-30 NOTE — CONSULTS
[facility]  Otorhinolaryngology-Head & Neck Surgery  Consult Note    Patient Name: Donita Gonzalez  MRN: [id number]  Code Status: Full Code  Admission Date: 8/29/2020  Hospital Length of Stay: 1 days  Attending Physician: Angelito Hagan MD  Primary Care Provider: Jordan Campbell MD    Patient information was obtained from patient and ER records.     Inpatient consult to ENT  Consult performed by: Sixto Rivas MD  Consult ordered by: Lauri Delarosa MD        Subjective:     Chief Complaint/Reason for Admission: Epistaxis    History of Present Illness: Donita Gonzalez is a 64 y.o.  female who  has a past medical history of COPD (chronic obstructive pulmonary disease) and Hypertension who is currently admitted for ischemic stroke who suffered uncontrolled epistaxis on therapeutic lovenox. Patient had bilateral rhinorockets placed by [facility] for epistaxis control which achieved hemostasis. She is currently sedated and intubated. ENT consulted for management of nasal packs.     Medications:  Continuous Infusions:   dexmedetomidine (PRECEDEX) infusion Stopped (08/30/20 0605)    dilTIAZem 12.5 mg/hr (08/30/20 1405)    norepinephrine bitartrate-D5W Stopped (08/30/20 1305)    propofoL Stopped (08/30/20 0123)     Scheduled Meds:   atorvastatin  40 mg Per OG tube Daily    ceFEPime (MAXIPIME) IVPB  1 g Intravenous Q24H    chlorhexidine  15 mL Mouth/Throat BID    pantoprozole (PROTONIX) IV  40 mg Intravenous Q12H     PRN Meds:metoprolol     Current Facility-Administered Medications on File Prior to Encounter   Medication    [DISCONTINUED] albuterol-ipratropium 2.5 mg-0.5 mg/3 mL nebulizer solution 3 mL    [DISCONTINUED] atorvastatin tablet 40 mg    [DISCONTINUED] dexmedetomidine (PRECEDEX) 400mcg/100mL 0.9% NaCL infusion    [DISCONTINUED] diltiaZEM 125 mg in D5W 125 mL infusion    [DISCONTINUED] heparin 25,000 units in dextrose 5% (100 units/ml) IV bolus from bag - ADDITIONAL PRN BOLUS - 30  units/kg    [DISCONTINUED] heparin 25,000 units in dextrose 5% (100 units/ml) IV bolus from bag - ADDITIONAL PRN BOLUS - 60 units/kg    [DISCONTINUED] heparin 25,000 units in dextrose 5% 250 mL (100 units/mL) infusion LOW INTENSITY nomogram - [facility]    [DISCONTINUED] metoprolol tartrate (LOPRESSOR) tablet 100 mg    [DISCONTINUED] NORepinephrine bitartrate 8 mg in dextrose 5% 250 mL infusion    [DISCONTINUED] oxymetazoline 0.05 % nasal spray 2 spray    [DISCONTINUED] pantoprazole injection 40 mg    [DISCONTINUED] propofol (DIPRIVAN) 10 mg/mL infusion    [DISCONTINUED] sodium chloride 0.9% flush 10 mL     Current Outpatient Medications on File Prior to Encounter   Medication Sig    albuterol (PROVENTIL/VENTOLIN HFA) 90 mcg/actuation inhaler Inhale 2 puffs into the lungs every 4 (four) hours as needed for Wheezing or Shortness of Breath. Rescue    amLODIPine (NORVASC) 10 MG tablet Take 1 tablet (10 mg total) by mouth once daily.    ammonium lactate (LAC-HYDRIN) 12 % lotion Apply topically as needed for Dry Skin.    aspirin 325 MG tablet Take 1 tablet (325 mg total) by mouth once daily.    atorvastatin (LIPITOR) 20 MG tablet Take 4 tablets (80 mg total) by mouth once daily.    cloNIDine (CATAPRES) 0.1 MG tablet Take 1 tablet (0.1 mg total) by mouth every 8 (eight) hours.    clopidogreL (PLAVIX) 75 mg tablet Take 1 tablet (75 mg total) by mouth once daily.    divalproex ER (DEPAKOTE) 500 MG Tb24 Take 1 tablet (500 mg total) by mouth every evening. (Patient not taking: Reported on 12/16/2019)    losartan-hydrochlorothiazide 50-12.5 mg (HYZAAR) 50-12.5 mg per tablet Take 1 tablet by mouth once daily.    triamcinolone acetonide 0.5% (KENALOG) 0.5 % Crea Apply topically 2 (two) times daily.    umeclidinium-vilanterol (ANORO ELLIPTA) 62.5-25 mcg/actuation DsDv Inhale 1 puff into the lungs once daily. Controller       Review of patient's allergies indicates:  No Known Allergies    Past Medical History:    Diagnosis Date    COPD (chronic obstructive pulmonary disease)     Hypertension      Past Surgical History:   Procedure Laterality Date    AUGMENTATION OF BREAST      HYSTERECTOMY       Family History     None        Tobacco Use    Smoking status: Current Every Day Smoker     Packs/day: 1.00     Types: Cigarettes     Start date: 4/16/1980    Smokeless tobacco: Never Used   Substance and Sexual Activity    Alcohol use: Never     Frequency: Never    Drug use: Never    Sexual activity: Not on file     Review of Systems  Objective:     Vital Signs (Most Recent):  Temp: 98.2 °F (36.8 °C) (08/30/20 1105)  Pulse: 85 (08/30/20 1405)  Resp: 15 (08/30/20 1405)  BP: (!) 99/57 (08/30/20 1405)  SpO2: 97 % (08/30/20 1405) Vital Signs (24h Range):  Temp:  [98.1 °F (36.7 °C)-99.5 °F (37.5 °C)] 98.2 °F (36.8 °C)  Pulse:  [] 85  Resp:  [15-34] 15  SpO2:  [91 %-100 %] 97 %  BP: ()/(38-99) 99/57  Arterial Line BP: ()/(47-74) 111/59     Weight: 59.9 kg (132 lb 0.9 oz)  Body mass index is 21.98 kg/m².    Date 08/30/20 0700 - 08/31/20 0659   Shift 4917-1675 4008-2576 5965-6331 24 Hour Total   INTAKE   I.V.(mL/kg) 147.9(2.5)   147.9(2.5)   Shift Total(mL/kg) 147.9(2.5)   147.9(2.5)   OUTPUT   Urine(mL/kg/hr) 400(0.8)   400   Shift Total(mL/kg) 400(6.7)   400(6.7)   Weight (kg) 59.9 59.9 59.9 59.9       Physical Exam  Intubated, sedated  Bilateral RR in place  Hemostatic in anterior nose  No blood in oropharynx  Vent Mode: A/C  Oxygen Concentration (%):  [] 50  Resp Rate Total:  [15 br/min-21 br/min] 15 br/min  Vt Set:  [400 mL] 400 mL  PEEP/CPAP:  [5 cmH20] 5 cmH20  Pressure Support:  [0 cmH20] 0 cmH20  Mean Airway Pressure:  [7.6 cmH20-9.7 cmH20] 8 cmH20    Significant Labs:  CBC:   Recent Labs   Lab 08/30/20 0256   WBC 10.70   RBC 3.67*   HGB 11.1*   HCT 36.3*   *   MCV 99*   MCH 30.2   MCHC 30.6*     CMP:   Recent Labs   Lab 08/30/20 0256      CALCIUM 8.3*   ALBUMIN 2.5*   PROT 5.4*       K 4.3   CO2 30*      BUN 57*   CREATININE 2.1*   ALKPHOS 63   ALT 31   AST 24   BILITOT 0.7     Coagulation:   Recent Labs   Lab 08/29/20  2335   LABPROT 10.5   INR 0.9   APTT 29.5       Significant Diagnostics:  None    Assessment/Plan:     Epistaxis  64 year old F with recent history of ischemic stroke with epistaxis secondary to anticoagulation    Recommendations  - Leave packs in place for 5 days  - Nasal saline spray to packs q2h to moisten mucosa  - Afrin prn for residual oozing  - Keflex bid for ppx  - Recommend against nasal cannula if extubated, use of face tent instead  - ENT will sign off, please page with any questions or concerns      VTE Risk Mitigation (From admission, onward)         Ordered     Reason for No Pharmacological VTE Prophylaxis  Once     Question:  Reasons:  Answer:  Risk of Bleeding    08/29/20 2321     IP VTE HIGH RISK PATIENT  Once      08/29/20 2321     Place sequential compression device  Until discontinued      08/29/20 2321                Thank you for your consult. I will sign off. Please contact us if you have any additional questions.    Sixto Rivas MD  Otorhinolaryngology-Head & Neck Surgery  [facility]

## 2020-08-30 NOTE — ASSESSMENT & PLAN NOTE
Donita Gonzalez is a 64 y.o. female with a significant medical history of COPD, HTN, smoker who presents to the hospital as a transfer from [facility] for further evaluation of R MCA stroke.  The patient did not receive tPA due to LKN 8/25/2020 and presenting to the OSH on 8/26/2020.      Antithrombotics for secondary stroke prevention: Anticoagulants: Patient was on full dose Lovenox that was dc'd 2/2 uncontrolled epistaxis.  Will likely need heparin gtt w/bridge to AC.  Repeat CTH pending.    Statins for secondary stroke prevention and hyperlipidemia, if present:   Statins: Atorvastatin- 40 mg daily    Aggressive risk factor modification: HTN, Smoking, HLD, A-Fib     Rehab efforts: The patient has been evaluated by a stroke team provider and the therapy needs have been fully considered based off the presenting complaints and exam findings. The following therapy evaluations are needed: PT/OT/SLP evaluations when the patient is able to participate    Diagnostics ordered/pending: CT scan of head without contrast to asses brain parenchyma    VTE prophylaxis: Mechanical prophylaxis: Place SCDs  pending AC for Afib    BP parameters: Infarct: No intervention, SBP <220

## 2020-08-30 NOTE — SUBJECTIVE & OBJECTIVE
Past Medical History:   Diagnosis Date    COPD (chronic obstructive pulmonary disease)     Hypertension      Past Surgical History:   Procedure Laterality Date    AUGMENTATION OF BREAST      HYSTERECTOMY       History reviewed. No pertinent family history.  Social History     Tobacco Use    Smoking status: Current Every Day Smoker     Packs/day: 1.00     Types: Cigarettes     Start date: 4/16/1980    Smokeless tobacco: Never Used   Substance Use Topics    Alcohol use: Never     Frequency: Never    Drug use: Never     Review of patient's allergies indicates:  No Known Allergies    Medications: I have reviewed the current medication administration record.    Medications Prior to Admission   Medication Sig Dispense Refill Last Dose    albuterol (PROVENTIL/VENTOLIN HFA) 90 mcg/actuation inhaler Inhale 2 puffs into the lungs every 4 (four) hours as needed for Wheezing or Shortness of Breath. Rescue 1 Inhaler 5     amLODIPine (NORVASC) 10 MG tablet Take 1 tablet (10 mg total) by mouth once daily. 30 tablet 1     ammonium lactate (LAC-HYDRIN) 12 % lotion Apply topically as needed for Dry Skin. 225 g 3     aspirin 325 MG tablet Take 1 tablet (325 mg total) by mouth once daily. 90 tablet 3     atorvastatin (LIPITOR) 20 MG tablet Take 4 tablets (80 mg total) by mouth once daily. 360 tablet 3     cloNIDine (CATAPRES) 0.1 MG tablet Take 1 tablet (0.1 mg total) by mouth every 8 (eight) hours. 270 tablet 3     clopidogreL (PLAVIX) 75 mg tablet Take 1 tablet (75 mg total) by mouth once daily. 30 tablet 0     divalproex ER (DEPAKOTE) 500 MG Tb24 Take 1 tablet (500 mg total) by mouth every evening. (Patient not taking: Reported on 12/16/2019) 30 tablet 1     losartan-hydrochlorothiazide 50-12.5 mg (HYZAAR) 50-12.5 mg per tablet Take 1 tablet by mouth once daily. 90 tablet 3     triamcinolone acetonide 0.5% (KENALOG) 0.5 % Crea Apply topically 2 (two) times daily. 15 g 2     umeclidinium-vilanterol (ANORO  ELLIPTA) 62.5-25 mcg/actuation DsDv Inhale 1 puff into the lungs once daily. Controller 60 each 3        Review of Systems   Unable to perform ROS: Intubated   Constitutional: Negative for fever.   HENT: Positive for nosebleeds.    Eyes: Negative for discharge and redness.   Respiratory: Positive for shortness of breath and wheezing.    Cardiovascular: Negative for leg swelling.   Gastrointestinal: Negative for diarrhea and vomiting.   Genitourinary: Negative for hematuria.   Musculoskeletal: Negative for neck stiffness.   Skin: Negative for rash.   Neurological: Positive for facial asymmetry, speech difficulty and weakness.   Psychiatric/Behavioral: Positive for agitation.     Objective:     Vital Signs (Most Recent):  Temp: 98.9 °F (37.2 °C) (08/30/20 0305)  Pulse: 87 (08/30/20 0605)  Resp: 17 (08/30/20 0605)  BP: 102/60 (08/30/20 0605)  SpO2: 99 % (08/30/20 0605)    Vital Signs Range (Last 24H):  Temp:  [98 °F (36.7 °C)-99.5 °F (37.5 °C)]   Pulse:  []   Resp:  [15-34]   BP: ()/(38-99)   SpO2:  [91 %-100 %]   Arterial Line BP: ()/(47-74)     Physical Exam  Vitals signs and nursing note reviewed.   Constitutional:       Appearance: She is underweight.      Interventions: She is sedated, intubated and restrained.   HENT:      Right Ear: External ear normal.      Left Ear: External ear normal.      Nose: No rhinorrhea.      Mouth/Throat:      Mouth: Mucous membranes are dry.   Eyes:      General:         Right eye: No discharge.         Left eye: No discharge.      Conjunctiva/sclera: Conjunctivae normal.   Cardiovascular:      Rate and Rhythm: Tachycardia present. Rhythm irregular.   Pulmonary:      Effort: She is intubated.   Abdominal:      General: Abdomen is flat. There is no distension.      Palpations: Abdomen is soft.   Neurological:      Mental Status: She is unresponsive.      GCS: GCS eye subscore is 1. GCS verbal subscore is 1. GCS motor subscore is 1.      Motor: Weakness present.          Neurological Exam:   LOC: obtunded  Articulation: Untestable due to intubation      Laboratory:  CMP:   Recent Labs   Lab 08/30/20  0256   CALCIUM 8.3*   ALBUMIN 2.5*   PROT 5.4*      K 4.3   CO2 30*      BUN 57*   CREATININE 2.1*   ALKPHOS 63   ALT 31   AST 24   BILITOT 0.7     CBC:   Recent Labs   Lab 08/30/20 0256   WBC 10.70   RBC 3.67*   HGB 11.1*   HCT 36.3*   *   MCV 99*   MCH 30.2   MCHC 30.6*     Lipid Panel:   Recent Labs   Lab 08/29/20  2335   CHOL 139   LDLCALC 70.2   HDL 49   TRIG 99     Coagulation:   Recent Labs   Lab 08/29/20 2335   INR 0.9   APTT 29.5     Hgb A1C:   Recent Labs   Lab 08/29/20 2335   HGBA1C 6.0*     TSH:   Recent Labs   Lab 08/29/20 2335   TSH 0.453       Diagnostic Results:      Brain imaging:  MRI Brain 8/28/2020 Large area of restricted diffusion consistent with continued evolution of recent infarction involving the right posterior frontal and parietal lobes lobes.  No evidence of hemorrhage.   Chronic microvascular ischemic changes    Vessel Imaging:  CTA Head and Neck 8/06/2020     1. Findings compatible with a right posterior frontal/parietal acute infarction.  No intracranial hemorrhage.  2. CTA head: No evidence of high-grade proximal occlusion.  3. CTA neck: Atherosclerosis of the left carotid bulb and proximal internal carotid artery resulting in approximately 70% stenosis.  4. Moderate emphysematous changes of the lung apices.    Cardiac Evaluation:   TTE w/bubble study 8/26/2020     Conclusion  · Moderate concentric left ventricular hypertrophy.  · Moderately to severely decreased left ventricular systolic function. The estimated ejection fraction is 25-30%.  · Left ventricular diastolic dysfunction.  · Moderately to severely reduced right ventricular systolic function.  · Moderate left atrial enlargement.  · Severe right atrial enlargement.  · Moderate mitral regurgitation.  · Moderate tricuspid regurgitation.  · Intermediate central venous  pressure (8 mmHg).  · The estimated PA systolic pressure is 42 mmHg.  · Pulmonary hypertension present.  · Negative bubble study.

## 2020-08-30 NOTE — CONSULTS
"  [facility]  Adult Nutrition  Consult Note    SUMMARY     Recommendations    1. Once medically able, initiate enteral nutrition - Isosource 1.5 at 40mL/hr.    -Will provide 1440kcal, 65g protein, and 733mL fluid. Will monitor.   Goals: Pt to receive nutrition by RD follow-up.   Nutrition Goal Status: new  Communication of RD Recs: other (comment)(POC)    Reason for Assessment    Reason For Assessment: consult  Diagnosis: stroke/CVA  Relevant Medical History: CPOD, HTN  General Information Comments: Pt intubated. Currently NPO. Per chart review, pt with wt gain. Unable to confirm if pt meets criteria for malnutrition.   Nutrition Discharge Planning: Unclear at this time.     Nutrition/Diet History    Factors Affecting Nutritional Intake: NPO, on mechanical ventilation    Anthropometrics    Temp: 99.3 °F (37.4 °C)  Height Method: Estimated  Height: 5' 5" (165.1 cm)  Height (inches): 65 in  Weight Method: Bed Scale  Weight: 59.9 kg (132 lb 0.9 oz)  Weight (lb): 132.06 lb  Ideal Body Weight (IBW), Female: 125 lb  % Ideal Body Weight, Female (lb): 105.65 %  BMI (Calculated): 22  BMI Grade: 18.5-24.9 - normal     Lab/Procedures/Meds    Pertinent Labs Reviewed: reviewed  Pertinent Labs Comments: BUN 57, Cr 2.1, Ca 8.3, P 4.7  Pertinent Medications Reviewed: reviewed  Pertinent Medications Comments: precedex, famotidine, levo    Estimated/Assessed Needs    Weight Used For Calorie Calculations: 59.9 kg (132 lb 0.9 oz)  Energy Calorie Requirements (kcal): 1346  Energy Need Method: [facility]  Protein Requirements: 72-90g (1-1.2g/kg)  Weight Used For Protein Calculations: 59.9 kg (132 lb 0.9 oz)  Fluid Requirements (mL): Per MD  RDA Method (mL): 1346     Nutrition Prescription Ordered    Current Diet Order: NPO    Evaluation of Received Nutrient/Fluid Intake    Comments: LBM 8/26  % Intake of Estimated Energy Needs: 0 - 25 %  % Meal Intake: NPO    Nutrition Risk    Level of Risk/Frequency of Follow-up: " (2X/week)     Assessment and Plan    Nutrition Problem  Inadequate energy intake    Related to (etiology):   Decreased ability to consume adequate energy    Signs and Symptoms (as evidenced by):   NPO status, no form of nutrition at this time.     Interventions(treatment strategy):  Collaboration of nutrition care with other providers    Nutrition Diagnosis Status:   New    Monitor and Evaluation    Food and Nutrient Intake: energy intake  Food and Nutrient Adminstration: enteral and parenteral nutrition administration  Anthropometric Measurements: weight, weight change  Biochemical Data, Medical Tests and Procedures: gastrointestinal profile  Nutrition-Focused Physical Findings: overall appearance     Nutrition Follow-Up    RD Follow-up?: Yes

## 2020-08-30 NOTE — PT/OT/SLP PROGRESS
Occupational Therapy      Patient Name:  Donita Gonzalez   MRN:  [id number]    Patient not seen today secondary to Other (Comment)(pt is resting but nursing reports she has been moving around. Trying to keep her calm as she had some agitation earlier.). Will follow-up tomorrow.    RORY Yuen  8/30/2020

## 2020-08-30 NOTE — HPI
Donita Gonzalez is a 64 y.o. female with a significant medical history of COPD, HTN, smoker who presents to the hospital as a transfer from [facility] for further evaluation of R MCA stroke.  HPI information gathered from review of the patient's medical record due to the patient currently being intubated and no family at the bedside.  The patient presented to the [facility] ED on 08/26/2020 complaining of L sided weakness, L facial droop, dysarthria.  She was LKN on 08/25/2020.  The patient was agitated and in distress in the ED and was subsequently intubated.  A CTA head and neck was obtained post intubation and revealed findings concerning for acute right posterior frontal and parietal lobe infarction, L ICA w/70% stenosis.  The patient was also noted to have new onset AFib w/RVR and was started on lovenox and diltiazem. She was admitted to that facility for further evaluation.  A TTE w/bubble study was obtained and revealed new onset heart failure w/reduced EF.  On 08/27/2020 a code blue was called due to the patient becoming hypotensive and desatting.  The patient was also noted to have an irregular heart rate and rhythm.  Sedation and diltiazem drip was discontinued resulting in the patient is stabilizing without chest compressions.  On 08/28/2020 the patient was noted to have epistaxis that was not controlled with rhino rockets.  Her H&H remained stable.  The patient was transferred to [facility] for a higher level of care.  Currently the patient is intubated and sedated with Precedex. R pupil fixed at baseline 2/2 cataracts.  The patient does not follow commands and no spontaneous movement of her extremities are noted. Per her nurse, the patient was on Propofol when she arrived to this facility and followed some commands w/spontaneous movement of the right side and withdrawing from pain on the left.  Propofol was discontinued.

## 2020-08-30 NOTE — SUBJECTIVE & OBJECTIVE
Past Medical History:   Diagnosis Date    COPD (chronic obstructive pulmonary disease)     Hypertension      Past Surgical History:   Procedure Laterality Date    AUGMENTATION OF BREAST      HYSTERECTOMY        Current Facility-Administered Medications on File Prior to Encounter   Medication Dose Route Frequency Provider Last Rate Last Dose    [COMPLETED] potassium chloride packet 40 mEq  40 mEq Per NG tube Once America Whittaker MD   40 mEq at 08/29/20 0912    [DISCONTINUED] albuterol-ipratropium 2.5 mg-0.5 mg/3 mL nebulizer solution 3 mL  3 mL Nebulization Q6H America Whittaker MD   3 mL at 08/29/20 1910    [DISCONTINUED] atorvastatin tablet 40 mg  40 mg Oral Daily America Whittaker MD   40 mg at 08/29/20 0911    [DISCONTINUED] dexmedetomidine (PRECEDEX) 400mcg/100mL 0.9% NaCL infusion  0.3 mcg/kg/hr Intravenous Continuous America Whittaker MD   Stopped at 08/26/20 1503    [DISCONTINUED] diltiaZEM 125 mg in D5W 125 mL infusion  5 mg/hr Intravenous Continuous America Whittaker MD 10 mL/hr at 08/29/20 1700 10 mg/hr at 08/29/20 1700    [DISCONTINUED] heparin 25,000 units in dextrose 5% (100 units/ml) IV bolus from bag - ADDITIONAL PRN BOLUS - 30 units/kg  30 Units/kg (Adjusted) Intravenous PRN America Whittaker MD        [DISCONTINUED] heparin 25,000 units in dextrose 5% (100 units/ml) IV bolus from bag - ADDITIONAL PRN BOLUS - 60 units/kg  60 Units/kg (Adjusted) Intravenous PRN America Whittaker MD        [DISCONTINUED] heparin 25,000 units in dextrose 5% 250 mL (100 units/mL) infusion LOW INTENSITY nomogram - [facility]  12 Units/kg/hr (Adjusted) Intravenous Continuous America Whittaker MD   Stopped at 08/29/20 0822    [DISCONTINUED] metoprolol tartrate (LOPRESSOR) tablet 100 mg  100 mg Oral BID Dallas Segal MD   100 mg at 08/29/20 0911    [DISCONTINUED] NORepinephrine bitartrate 8 mg in dextrose 5% 250 mL infusion  0.02 mcg/kg/min Intravenous Continuous America Whittaker MD 2.2 mL/hr at 08/29/20  1700 0.02 mcg/kg/min at 08/29/20 1700    [DISCONTINUED] oxymetazoline 0.05 % nasal spray 2 spray  2 spray Each Nostril BID America Whittaker MD   2 spray at 08/29/20 0912    [DISCONTINUED] pantoprazole injection 40 mg  40 mg Intravenous BID America Whittaker MD   40 mg at 08/29/20 0911    [DISCONTINUED] propofol (DIPRIVAN) 10 mg/mL infusion  5 mcg/kg/min Intravenous Continuous America Whittaker MD 9.4 mL/hr at 08/29/20 1730 30 mcg/kg/min at 08/29/20 1730    [DISCONTINUED] sodium chloride 0.9% flush 10 mL  10 mL Intravenous PRN America Whittaker MD         Current Outpatient Medications on File Prior to Encounter   Medication Sig Dispense Refill    albuterol (PROVENTIL/VENTOLIN HFA) 90 mcg/actuation inhaler Inhale 2 puffs into the lungs every 4 (four) hours as needed for Wheezing or Shortness of Breath. Rescue 1 Inhaler 5    amLODIPine (NORVASC) 10 MG tablet Take 1 tablet (10 mg total) by mouth once daily. 30 tablet 1    ammonium lactate (LAC-HYDRIN) 12 % lotion Apply topically as needed for Dry Skin. 225 g 3    aspirin 325 MG tablet Take 1 tablet (325 mg total) by mouth once daily. 90 tablet 3    atorvastatin (LIPITOR) 20 MG tablet Take 4 tablets (80 mg total) by mouth once daily. 360 tablet 3    cloNIDine (CATAPRES) 0.1 MG tablet Take 1 tablet (0.1 mg total) by mouth every 8 (eight) hours. 270 tablet 3    clopidogreL (PLAVIX) 75 mg tablet Take 1 tablet (75 mg total) by mouth once daily. 30 tablet 0    divalproex ER (DEPAKOTE) 500 MG Tb24 Take 1 tablet (500 mg total) by mouth every evening. (Patient not taking: Reported on 12/16/2019) 30 tablet 1    losartan-hydrochlorothiazide 50-12.5 mg (HYZAAR) 50-12.5 mg per tablet Take 1 tablet by mouth once daily. 90 tablet 3    triamcinolone acetonide 0.5% (KENALOG) 0.5 % Crea Apply topically 2 (two) times daily. 15 g 2    umeclidinium-vilanterol (ANORO ELLIPTA) 62.5-25 mcg/actuation DsDv Inhale 1 puff into the lungs once daily. Controller 60 each 3       Allergies: Patient has no known allergies.    History reviewed. No pertinent family history.  Social History     Tobacco Use    Smoking status: Current Every Day Smoker     Packs/day: 1.00     Types: Cigarettes     Start date: 4/16/1980    Smokeless tobacco: Never Used   Substance Use Topics    Alcohol use: Never     Frequency: Never    Drug use: Never     Review of Systems   Unable to perform ROS: Intubated     Objective:     Vitals:    Temp: 99.4 °F (37.4 °C)  Pulse: 75  Rhythm: atrial rhythm  BP: (!) 106/54  MAP (mmHg): 74  Resp: 16  SpO2: (!) 93 %  Oxygen Concentration (%): 50  O2 Device (Oxygen Therapy): ventilator  Vent Mode: A/C  Set Rate: 15 BPM  Vt Set: 400 mL  PEEP/CPAP: 5 cmH20  Peak Airway Pressure: 22 cmH2O  Mean Airway Pressure: 7.6 cmH20  Plateau Pressure: 0 cmH20    Temp  Min: 97.8 °F (36.6 °C)  Max: 99.5 °F (37.5 °C)  Pulse  Min: 75  Max: 131  BP  Min: 74/48  Max: 154/70  MAP (mmHg)  Min: 56  Max: 119  Resp  Min: 15  Max: 34  SpO2  Min: 91 %  Max: 100 %  Oxygen Concentration (%)  Min: 40  Max: 100    08/29 0701 - 08/30 0700  In: 11 [I.V.:11]  Out: 390 [Urine:140; Drains:250]   Unmeasured Output  Stool Occurrence: 0  Emesis Occurrence: 0  Pad Count: 0       Physical Exam  Constitutional:       Appearance: She is ill-appearing.   HENT:      Nose:      Comments: Bilateral rhino rockets.  Eyes:      Pupils: Pupils are equal, round, and reactive to light.   Cardiovascular:      Rate and Rhythm: Rhythm irregular.   Pulmonary:      Breath sounds: Normal breath sounds.   Abdominal:      General: There is no distension.      Palpations: Abdomen is soft.   Neurological:      GCS: GCS eye subscore is 2. GCS verbal subscore is 1. GCS motor subscore is 4.      Comments: Pt withdraws from pain on the left and follows commands on the right.          Today I personally reviewed pertinent medications, lines/drains/airways, imaging, cardiology results, laboratory results, microbiology results, notably:      MRI  08/26/20:  Large area of restricted diffusion consistent with continued evolution of recent infarction involving the right posterior frontal and parietal lobes lobes.  No evidence of hemorrhage.  Chronic microvascular ischemic changes.

## 2020-08-30 NOTE — ASSESSMENT & PLAN NOTE
-Patient had uncontrolled epistaxis that was attributed to NGT placement and AC.  -AC stopped.  Bleeding currently controlled.  -ENT consulted  -Managed by N[facility]

## 2020-08-30 NOTE — NURSING
Silver ring with large clear jewel given to , Griffin. Griffin to take ring home for safe keeping.

## 2020-08-30 NOTE — ASSESSMENT & PLAN NOTE
- persistent epistaxis in the setting of anticoagulation   - H/H stable at 11.4/34.6   - daily CBC   - ENT consulted; appreciate recs   - started antibiotics empirically and rhino rockets can stay as long as 5 days if needed.

## 2020-08-30 NOTE — NURSING
Pt sustained vtach for approx. 30 secs. Pulse present. Pt back in afib with RVR. [place] QUIROZ aware and metoprolol 5mg IVP ordered with 2g of Mag IVPB. VS now stable.

## 2020-08-30 NOTE — NURSING
Patient arrived to [facility] >>> [facility]   >>>  [facility]    Type of stroke/diagnosis:  CVA    Current symptoms: Follows Commands, withdrawals left extremities, Spontaneous movement Right extremities, pt bleeding from mouth/Nose    Skin assessment done: Y  Wounds noted: None  ROXANE Armband Applied: yes  Patient Belongings on Admit: Pt's ring    NCC notified: MD Elmer notified of pt arrival

## 2020-08-31 LAB
ALBUMIN SERPL BCP-MCNC: 2.2 G/DL (ref 3.5–5.2)
ALLENS TEST: ABNORMAL
ALP SERPL-CCNC: 61 U/L (ref 55–135)
ALT SERPL W/O P-5'-P-CCNC: 23 U/L (ref 10–44)
ANION GAP SERPL CALC-SCNC: 9 MMOL/L (ref 8–16)
ANISOCYTOSIS BLD QL SMEAR: SLIGHT
AST SERPL-CCNC: 25 U/L (ref 10–40)
BASOPHILS # BLD AUTO: 0.01 K/UL (ref 0–0.2)
BASOPHILS NFR BLD: 0.1 % (ref 0–1.9)
BILIRUB SERPL-MCNC: 0.6 MG/DL (ref 0.1–1)
BUN SERPL-MCNC: 54 MG/DL (ref 8–23)
BURR CELLS BLD QL SMEAR: ABNORMAL
CALCIUM SERPL-MCNC: 8.3 MG/DL (ref 8.7–10.5)
CHLORIDE SERPL-SCNC: 104 MMOL/L (ref 95–110)
CO2 SERPL-SCNC: 28 MMOL/L (ref 23–29)
CREAT SERPL-MCNC: 1.7 MG/DL (ref 0.5–1.4)
DELSYS: ABNORMAL
DIFFERENTIAL METHOD: ABNORMAL
EOSINOPHIL # BLD AUTO: 0.1 K/UL (ref 0–0.5)
EOSINOPHIL NFR BLD: 1.3 % (ref 0–8)
ERYTHROCYTE [DISTWIDTH] IN BLOOD BY AUTOMATED COUNT: 15 % (ref 11.5–14.5)
ERYTHROCYTE [SEDIMENTATION RATE] IN BLOOD BY WESTERGREN METHOD: 15 MM/H
EST. GFR  (AFRICAN AMERICAN): 36.2 ML/MIN/1.73 M^2
EST. GFR  (NON AFRICAN AMERICAN): 31.4 ML/MIN/1.73 M^2
FIO2: 50
GLUCOSE SERPL-MCNC: 104 MG/DL (ref 70–110)
HCO3 UR-SCNC: 29.9 MMOL/L (ref 24–28)
HCT VFR BLD AUTO: 32.6 % (ref 37–48.5)
HGB BLD-MCNC: 10 G/DL (ref 12–16)
HYPOCHROMIA BLD QL SMEAR: ABNORMAL
IMM GRANULOCYTES # BLD AUTO: 0.04 K/UL (ref 0–0.04)
IMM GRANULOCYTES NFR BLD AUTO: 0.4 % (ref 0–0.5)
LYMPHOCYTES # BLD AUTO: 0.3 K/UL (ref 1–4.8)
LYMPHOCYTES NFR BLD: 3.1 % (ref 18–48)
MAGNESIUM SERPL-MCNC: 2.3 MG/DL (ref 1.6–2.6)
MCH RBC QN AUTO: 30.5 PG (ref 27–31)
MCHC RBC AUTO-ENTMCNC: 30.7 G/DL (ref 32–36)
MCV RBC AUTO: 99 FL (ref 82–98)
MIN VOL: 6.21
MODE: ABNORMAL
MONOCYTES # BLD AUTO: 0.9 K/UL (ref 0.3–1)
MONOCYTES NFR BLD: 8.9 % (ref 4–15)
NEUTROPHILS # BLD AUTO: 9 K/UL (ref 1.8–7.7)
NEUTROPHILS NFR BLD: 86.2 % (ref 38–73)
NRBC BLD-RTO: 0 /100 WBC
OVALOCYTES BLD QL SMEAR: ABNORMAL
PCO2 BLDA: 46.7 MMHG (ref 35–45)
PEEP: 5
PH SMN: 7.41 [PH] (ref 7.35–7.45)
PHOSPHATE SERPL-MCNC: 3.2 MG/DL (ref 2.7–4.5)
PIP: 27
PLATELET # BLD AUTO: 331 K/UL (ref 150–350)
PLATELET BLD QL SMEAR: ABNORMAL
PMV BLD AUTO: 11.4 FL (ref 9.2–12.9)
PO2 BLDA: 80 MMHG (ref 80–100)
POC BE: 5 MMOL/L
POC SATURATED O2: 96 % (ref 95–100)
POC TCO2: 31 MMOL/L (ref 23–27)
POCT GLUCOSE: 98 MG/DL (ref 70–110)
POIKILOCYTOSIS BLD QL SMEAR: SLIGHT
POTASSIUM SERPL-SCNC: 4 MMOL/L (ref 3.5–5.1)
PROT SERPL-MCNC: 5.2 G/DL (ref 6–8.4)
RBC # BLD AUTO: 3.28 M/UL (ref 4–5.4)
SAMPLE: ABNORMAL
SITE: ABNORMAL
SODIUM SERPL-SCNC: 141 MMOL/L (ref 136–145)
SP02: 95
VT: 400
WBC # BLD AUTO: 10.45 K/UL (ref 3.9–12.7)

## 2020-08-31 PROCEDURE — 99900035 HC TECH TIME PER 15 MIN (STAT)

## 2020-08-31 PROCEDURE — 80053 COMPREHEN METABOLIC PANEL: CPT

## 2020-08-31 PROCEDURE — 99900026 HC AIRWAY MAINTENANCE (STAT)

## 2020-08-31 PROCEDURE — 63600175 PHARM REV CODE 636 W HCPCS: Performed by: NURSE PRACTITIONER

## 2020-08-31 PROCEDURE — 63600175 PHARM REV CODE 636 W HCPCS: Performed by: PHYSICIAN ASSISTANT

## 2020-08-31 PROCEDURE — 94761 N-INVAS EAR/PLS OXIMETRY MLT: CPT

## 2020-08-31 PROCEDURE — 25000003 PHARM REV CODE 250: Performed by: STUDENT IN AN ORGANIZED HEALTH CARE EDUCATION/TRAINING PROGRAM

## 2020-08-31 PROCEDURE — 94003 VENT MGMT INPAT SUBQ DAY: CPT

## 2020-08-31 PROCEDURE — C9113 INJ PANTOPRAZOLE SODIUM, VIA: HCPCS | Performed by: PHYSICIAN ASSISTANT

## 2020-08-31 PROCEDURE — 84100 ASSAY OF PHOSPHORUS: CPT

## 2020-08-31 PROCEDURE — 20000000 HC ICU ROOM

## 2020-08-31 PROCEDURE — 99223 1ST HOSP IP/OBS HIGH 75: CPT | Mod: ,,, | Performed by: INTERNAL MEDICINE

## 2020-08-31 PROCEDURE — 99223 PR INITIAL HOSPITAL CARE,LEVL III: ICD-10-PCS | Mod: ,,, | Performed by: INTERNAL MEDICINE

## 2020-08-31 PROCEDURE — 25000242 PHARM REV CODE 250 ALT 637 W/ HCPCS: Performed by: NURSE PRACTITIONER

## 2020-08-31 PROCEDURE — 85025 COMPLETE CBC W/AUTO DIFF WBC: CPT

## 2020-08-31 PROCEDURE — 27000221 HC OXYGEN, UP TO 24 HOURS

## 2020-08-31 PROCEDURE — 99291 PR CRITICAL CARE, E/M 30-74 MINUTES: ICD-10-PCS | Mod: ,,, | Performed by: PSYCHIATRY & NEUROLOGY

## 2020-08-31 PROCEDURE — 99291 CRITICAL CARE FIRST HOUR: CPT | Mod: ,,, | Performed by: PSYCHIATRY & NEUROLOGY

## 2020-08-31 PROCEDURE — 82803 BLOOD GASES ANY COMBINATION: CPT

## 2020-08-31 PROCEDURE — 37799 UNLISTED PX VASCULAR SURGERY: CPT

## 2020-08-31 PROCEDURE — 83735 ASSAY OF MAGNESIUM: CPT

## 2020-08-31 PROCEDURE — 94640 AIRWAY INHALATION TREATMENT: CPT

## 2020-08-31 PROCEDURE — 25000003 PHARM REV CODE 250: Performed by: NURSE PRACTITIONER

## 2020-08-31 RX ORDER — VALPROIC ACID 250 MG/5ML
500 SOLUTION ORAL EVERY 8 HOURS
Status: DISCONTINUED | OUTPATIENT
Start: 2020-08-31 | End: 2020-08-31

## 2020-08-31 RX ORDER — GLUCAGON 1 MG
1 KIT INJECTION
Status: DISCONTINUED | OUTPATIENT
Start: 2020-08-31 | End: 2020-09-01

## 2020-08-31 RX ORDER — VALPROIC ACID 250 MG/5ML
250 SOLUTION ORAL EVERY 8 HOURS
Status: DISCONTINUED | OUTPATIENT
Start: 2020-08-31 | End: 2020-09-15

## 2020-08-31 RX ORDER — DIGOXIN 0.25 MG/ML
500 INJECTION INTRAMUSCULAR; INTRAVENOUS ONCE
Status: COMPLETED | OUTPATIENT
Start: 2020-08-31 | End: 2020-08-31

## 2020-08-31 RX ORDER — INSULIN ASPART 100 [IU]/ML
1-10 INJECTION, SOLUTION INTRAVENOUS; SUBCUTANEOUS EVERY 6 HOURS PRN
Status: DISCONTINUED | OUTPATIENT
Start: 2020-08-31 | End: 2020-09-01

## 2020-08-31 RX ORDER — IPRATROPIUM BROMIDE AND ALBUTEROL SULFATE 2.5; .5 MG/3ML; MG/3ML
3 SOLUTION RESPIRATORY (INHALATION) EVERY 6 HOURS
Status: DISCONTINUED | OUTPATIENT
Start: 2020-08-31 | End: 2020-09-16 | Stop reason: HOSPADM

## 2020-08-31 RX ORDER — DIGOXIN 125 MCG
0.12 TABLET ORAL DAILY
Status: DISCONTINUED | OUTPATIENT
Start: 2020-09-02 | End: 2020-09-06

## 2020-08-31 RX ORDER — DIGOXIN 0.25 MG/ML
250 INJECTION INTRAMUSCULAR; INTRAVENOUS EVERY 6 HOURS
Status: COMPLETED | OUTPATIENT
Start: 2020-08-31 | End: 2020-09-01

## 2020-08-31 RX ADMIN — PANTOPRAZOLE SODIUM 40 MG: 40 INJECTION, POWDER, LYOPHILIZED, FOR SOLUTION INTRAVENOUS at 09:08

## 2020-08-31 RX ADMIN — VALPROIC ACID 250 MG: 250 SOLUTION ORAL at 09:08

## 2020-08-31 RX ADMIN — CHLORHEXIDINE GLUCONATE 0.12% ORAL RINSE 15 ML: 1.2 LIQUID ORAL at 08:08

## 2020-08-31 RX ADMIN — IPRATROPIUM BROMIDE AND ALBUTEROL SULFATE 3 ML: .5; 2.5 SOLUTION RESPIRATORY (INHALATION) at 12:08

## 2020-08-31 RX ADMIN — ATORVASTATIN CALCIUM 40 MG: 20 TABLET, FILM COATED ORAL at 08:08

## 2020-08-31 RX ADMIN — CEFEPIME 1 G: 1 INJECTION, POWDER, FOR SOLUTION INTRAMUSCULAR; INTRAVENOUS at 02:08

## 2020-08-31 RX ADMIN — VALPROIC ACID 250 MG: 250 SOLUTION ORAL at 02:08

## 2020-08-31 RX ADMIN — DIGOXIN 500 MCG: 0.25 INJECTION INTRAMUSCULAR; INTRAVENOUS at 09:08

## 2020-08-31 RX ADMIN — IPRATROPIUM BROMIDE AND ALBUTEROL SULFATE 3 ML: .5; 2.5 SOLUTION RESPIRATORY (INHALATION) at 07:08

## 2020-08-31 RX ADMIN — PANTOPRAZOLE SODIUM 40 MG: 40 INJECTION, POWDER, LYOPHILIZED, FOR SOLUTION INTRAVENOUS at 08:08

## 2020-08-31 RX ADMIN — DIGOXIN 250 MCG: 250 INJECTION, SOLUTION INTRAMUSCULAR; INTRAVENOUS; PARENTERAL at 06:08

## 2020-08-31 NOTE — SUBJECTIVE & OBJECTIVE
Past Medical History:   Diagnosis Date    COPD (chronic obstructive pulmonary disease)     Hypertension        Past Surgical History:   Procedure Laterality Date    AUGMENTATION OF BREAST      HYSTERECTOMY         Review of patient's allergies indicates:  No Known Allergies  Family History     None        Tobacco Use    Smoking status: Current Every Day Smoker     Packs/day: 1.00     Types: Cigarettes     Start date: 4/16/1980    Smokeless tobacco: Never Used   Substance and Sexual Activity    Alcohol use: Never     Frequency: Never    Drug use: Never    Sexual activity: Not on file     Review of Systems   Unable to perform ROS: Intubated     Objective:     Vital Signs (Most Recent):  Temp: 98.6 °F (37 °C) (08/31/20 0300)  Pulse: 79 (08/31/20 0740)  Resp: (!) 26 (08/31/20 0740)  BP: (!) 106/54 (08/31/20 0600)  SpO2: 97 % (08/31/20 0740) Vital Signs (24h Range):  Temp:  [98.2 °F (36.8 °C)-98.6 °F (37 °C)] 98.6 °F (37 °C)  Pulse:  [] 79  Resp:  [15-30] 26  SpO2:  [94 %-100 %] 97 %  BP: ()/(54-81) 106/54  Arterial Line BP: ()/(54-87) 117/64     Weight: 59.9 kg (132 lb 0.9 oz) (08/29/20 2305)  Body mass index is 21.98 kg/m².      Intake/Output Summary (Last 24 hours) at 8/31/2020 1044  Last data filed at 8/31/2020 0600  Gross per 24 hour   Intake 402.12 ml   Output 810 ml   Net -407.88 ml       Lines/Drains/Airways     Central Venous Catheter Line            Percutaneous Central Line Insertion/Assessment - Triple Lumen  08/28/20 1024 3 days          Drain                 NG/OG Tube 08/29/20 2215 orogastric Left mouth 1 day         Urethral Catheter 08/29/20 2215 1 day          Airway                 Airway - Non-Surgical 08/26/20 0931 Endotracheal Tube 5 days          Arterial Line            Arterial Line 08/29/20 2247 Right Radial 1 day          Peripheral Intravenous Line                 Peripheral IV - Single Lumen 08/30/20 1200 18 G Left;Posterior Forearm less than 1 day                 Physical Exam    Gen: intubated, sedated  HENT: ETT and OG tube in place, bilateral nasal rockets inflated in nares  Eyes: anicteric sclerae, EOMI grossly  Neck: supple, no visible masses/goiter  Cardiac: irrregularly irregular, no M/R/G, S1/S2 present  Lungs: CTAB, no crackles, no wheezes  Abd: soft, NT/ND, normoactive BS  Ext: no LE edema, warm, well perfused  Skin: skin intact on exposed body parts, no visible rashes, lesions  Neuro:sedated, spontaneously moving right side, no movement on left    Significant Labs:  CBC:   Recent Labs   Lab 08/29/20  2335 08/30/20  0256 08/31/20  0207   WBC 10.80 10.70 10.45   HGB 11.9* 11.1* 10.0*   HCT 37.6 36.3* 32.6*   * 359* 331     CMP:   Recent Labs   Lab 08/31/20  0207      CALCIUM 8.3*   ALBUMIN 2.2*   PROT 5.2*      K 4.0   CO2 28      BUN 54*   CREATININE 1.7*   ALKPHOS 61   ALT 23   AST 25   BILITOT 0.6     Coagulation:   Recent Labs   Lab 08/29/20 2335   INR 0.9   APTT 29.5       Significant Imaging:  Imaging results within the past 24 hours have been reviewed.

## 2020-08-31 NOTE — PLAN OF CARE
Admit Date:  8/29/2020  9:47 PM      Admit Diagnosis:  CVA (cerebral vascular accident) [I63.9]      CM met with patient in room for [place] Planning Assessment.  Patient is intubated on the vent and unable to answer questions.  Phone call to Griffin Gonzalez () [phone].  Per , the patient lives with  and oldest son, Lee Ledesma,  in a single story house with no step(s) to enter.  The home has a tub/shower combo.   Per , the patient was independent with ADLS and used no dme for ambulation.   stated that the patient has a rolling walker from a past hospital stay.  Per , the patient is a retired nurse.   Patient will have assistance from her  (works, limited assistance)  and son upon discharge.   stated that  he will be taking a vacation in the next few weeks and will be of more assistance. Discharge Planning Booklet left in room for  and discussed.  All questions addressed.  CM will follow for needs.    Past Medical History:   Diagnosis Date    COPD (chronic obstructive pulmonary disease)     Hypertension           08/31/20 1312   Discharge Assessment   Assessment Type Discharge Planning Assessment   Confirmed/corrected address and phone number on facesheet? Yes   Assessment information obtained from? Caregiver  (, Griffin Gonzalez, [phone])   Expected Length of Stay (days) 7   Communicated expected length of stay with patient/caregiver yes   Prior to hospitilization cognitive status: Alert/Oriented   Prior to hospitalization functional status: Independent   Current cognitive status: Coma/Sedated/Intubated   Current Functional Status: Completely Dependent   Facility Arrived From: [facility]   Lives With spouse;child(shree), adult  ( and son, Lee Ledesma)   Able to Return to Prior Arrangements yes   Is patient able to care for self after discharge? Unable to determine at this time (comments)   Who are your caregiver(s) and their phone  number(s)? Ofe Gonzalez () [phone].   Patient's perception of discharge disposition other (comments)  (pascale)   Readmission Within the Last 30 Days no previous admission in last 30 days   Patient currently being followed by outpatient case management? No   Patient currently receives any other outside agency services? No   Equipment Currently Used at Home walker, rolling  (not using)   Do you have any problems affording any of your prescribed medications? No   Does the patient have transportation home? Yes   Transportation Anticipated family or friend will provide   Does the patient receive services at the [facility]? No   Discharge Plan A Rehab   Discharge Plan B Skilled Nursing Facility   DME Needed Upon Discharge  other (see comments)  (tbd)   Patient/Family in Agreement with Plan yes                PCP:  Jordan Campbell MD  [phone]        Pharmacy:    [id number] - [place] - [address] [facility] [address]  [address] [place] [address]  Phone: [phone] Fax: [phone]        Emergency Contacts:  Extended Emergency Contact Information  Primary Emergency Contact: ofe gonzalez  Address: [address] [place] [address]  Home Phone: [phone]  Mobile Phone: [phone]  Relation: Spouse  Preferred language: English   needed? No  Secondary Emergency Contact: Lee Ledesma  Home Phone: [phone]  Mobile Phone: [phone]  Relation: Son   needed? No       Ofe Gonzalez () [phone]   Ofe's work phone: [phone]   Lee Ledesma (son) [phone]  Romeo Gonzalez (son) [phone]          Insurance:    Payor: [employer] BLUE SHIELD / Plan: BCBS ALL OUT OF STATE / Product Type: PPO /     Mai Gonzalez RN, CCRN-K, [facility]  Neuro-Critical Care   X [phone]    08/31/2020  1:16 PM

## 2020-08-31 NOTE — PLAN OF CARE
POC reviewed with pt and family at 1700. Pt  verbalized understanding. Questions and concerns addressed with pt . Pt following commands throughout the day. Dilt gtt titrated to maintain HR <120. Pt remains intubated; possible extubation tomorrow. Nasal rockets remain in place, small amount of sanguineous drainage noted with pt movement. Pt remains NPO. R IJ central line d/c'ed. No acute events today. Pt progressing toward goals. Will continue to monitor. See flowsheets for full assessment and VS info.

## 2020-08-31 NOTE — PROGRESS NOTES
Patient's chart was reviewed by a stroke team provider.  Patient intubated.  There is no new imaging to review.  Pending diagnostics to follow up on include: none currently. CT head 8/30/20 Continued evolution of right frontal and parietal lobe infarctions with no evidence of hemorrhage. Associated edema is noted with no midline shift. Mild mass effect on the right lateral ventricle without hydrocephalus.  For other recommendations please see our previous note completed on: 8/30/20    There are no new recommendations at this time. Will continue to follow. Discussed patient with staff. Please contact stroke team for any questions or concerns.

## 2020-08-31 NOTE — ASSESSMENT & PLAN NOTE
- Last seen normal on 08/25/20 with no intervention; intubated and sedated.    - Imaging shows large right ischemic stroke in the posterior frontal and parietal regions; no repeat imaging   - SBP < 160  - Euna   - CBC, CMP, coags, UA, ABG, CXR, anti XA, Echo, and lipid panel   - hold anticoagulation in the setting of epistaxis

## 2020-08-31 NOTE — ASSESSMENT & PLAN NOTE
- Repeat EKG   - daily Mg and Phos; replace as needed  - continue dilt gtt today  - loaded with digoxin, pending dig level tomorrow   - hold anticoagulation until tomorrow

## 2020-08-31 NOTE — PROGRESS NOTES
[facility]  Neurocritical Care  Progress Note    Admit Date: 8/29/2020  Service Date: 08/31/2020  Length of Stay: 2    Subjective:     Chief Complaint: Embolic stroke involving right middle cerebral artery    History of Present Illness: 64 year old female with a PMH significant for COPD, HTN, and every day smoker who presented at OSH on 08/26/20 with acute left sided facial droop and left sided weakness (last known normal 08/25/20). Pt was not given TPA or endovascular intervention, and she was intubated on 08/26/20 for agitation. CTH on 08/26/20 showed right posterior frontal/parietal infarction with no intracranial hemorrhage. Hospital course complicated by Afib with RVR in which she was started on lovenox and then transitioned to heparin gtt for GOYO and HFrEF. Pt was transferred to NICU for higher level of care and persistent epistaxis. Upon arrival Pt was intubated and sedated, Rhinorocket  Bilaterally in place, propofol and diltiazem gtt. Pt was able to move the right side and moves her left side to noxious stimuli. A-line placed.       Hospital Course: 08/29/20: Pt admitted to NICU for higher level of care and ENT consult. Pt with a run of Vtach associated with hypotension.   8/31/2020: hold anticoagulation until tomorrow due to GI bleed/epistaxis (at other facility) GI consult, pt in Afib- load with digoxin, pending digoxin level tomorrow, continue diltiazem gtt today, start tube feeds, obtain PIV and d/c central line, continue cefepime for total 7 days        Review of Systems  Unable to obtain a complete ROS due to level of consciousness.  Objective:     Vitals:  Temp: 98.3 °F (36.8 °C)  Pulse: 96  Rhythm: atrial rhythm  BP: (!) 143/67  MAP (mmHg): 96  Resp: 15  SpO2: 95 %  Oxygen Concentration (%): 40  O2 Device (Oxygen Therapy): ventilator  Vent Mode: A/C  Set Rate: 15 BPM  Vt Set: 400 mL  PEEP/CPAP: 5 cmH20  Peak Airway Pressure: 27 cmH2O  Mean Airway Pressure: 8.2 cmH20  Plateau  Pressure: 16 cmH20    Temp  Min: 98.3 °F (36.8 °C)  Max: 98.6 °F (37 °C)  Pulse  Min: 76  Max: 109  BP  Min: 99/57  Max: 146/76  MAP (mmHg)  Min: 70  Max: 103  Resp  Min: 15  Max: 32  SpO2  Min: 94 %  Max: 100 %  Oxygen Concentration (%)  Min: 40  Max: 50    08/30 0701 - 08/31 0700  In: 444.3 [I.V.:444.3]  Out: 1060 [Urine:1060]   Unmeasured Output  Stool Occurrence: 0  Emesis Occurrence: 0  Pad Count: 0       Physical Exam  GA:  comfortable, no acute distress.   HEENT: No scleral icterus or JVD.   Pulmonary: Clear to auscultation A/L.   Cardiac: RRR S1 & S2 w/o rubs/murmurs/gallops.   Abdominal: Bowel sounds present x 4. No appreciable hepatosplenomegaly.  Skin: No jaundice, rashes, or visible lesions.  Neuro:  --GCS: E1 VT1 M6  --Mental Status:  Doesn't opens eyes, follows commands on the R side intermittently  --CN II-XII grossly intact.   --Pupils  Left pupil 3mm, R pupil 6 fixed   --Corneal reflex, gag, cough intact.  --LUE withdraws  --RUE spont  --LLE withdraws  --RLE spont    Medications:  Continuousdexmedetomidine (PRECEDEX) infusion, Last Rate: Stopped (08/31/20 0900)  dilTIAZem, Last Rate: 7.5 mg/hr (08/31/20 1100)    Scheduledalbuterol-ipratropium, 3 mL, Q6H  atorvastatin, 40 mg, Daily  ceFEPime (MAXIPIME) IVPB, 1 g, Q24H  digoxin, 250 mcg, Q6H  [START ON 9/2/2020] digoxin, 0.125 mg, Daily  pantoprozole (PROTONIX) IV, 40 mg, Q12H  valproic acid (as sodium salt), 250 mg, Q8H    PRNacetaminophen, 650 mg, Q6H PRN  dextrose 50%, 12.5 g, PRN  glucagon (human recombinant), 1 mg, PRN  insulin aspart U-100, 1-10 Units, Q6H PRN  metoprolol, 5 mg, Q10 Min PRN  ondansetron, 4 mg, Q6H PRN      Today I personally reviewed pertinent medications, lines/drains/airways, imaging, cardiology results, laboratory results,     Diet  Diet NPO      Assessment/Plan:     Neuro  * Embolic stroke involving right middle cerebral artery  - Last seen normal on 08/25/20 with no intervention; intubated and sedated.    - Imaging shows  large right ischemic stroke in the posterior frontal and parietal regions; no repeat imaging   - SBP < 160  - Euna   - CBC, CMP, coags, UA, ABG, CXR, anti XA, Echo, and lipid panel   - hold anticoagulation in the setting of epistaxis       ENT  Epistaxis  - persistent epistaxis in the setting of anticoagulation   - H/H stable at 11.4/34.6   - daily CBC   - ENT consulted; appreciate recs   - started antibiotics empirically and rhino rockets can stay as long as 5 days if needed.        Pulmonary  Chronic obstructive pulmonary disease  - daily CXRs while intubated   - goal SpO2 is > 88%    Cardiac/Vascular  Atrial fibrillation with rapid ventricular response  - Repeat EKG   - daily Mg and Phos; replace as needed  - continue dilt gtt today  - loaded with digoxin, pending dig level tomorrow   - hold anticoagulation until tomorrow    Renal/  GOYO (acute kidney injury)  - BUN/Cr: 54/ 1.7  - trending down    GI  GI bleed  - upper gi bleed at outside hospital  - hold starting anticoagulation due to recent upper GI bleed at outside facility and epistaxis (2 rhino rockets)  - consult GI -appreciate reccs.            The patient is being Prophylaxed for:  Venous Thromboembolism with: Mechanical  Stress Ulcer with: PPI  Ventilator Pneumonia with: not applicable    Activity Orders          Diet NPO: NPO starting at 08/30 0250        Full Code    Lona Contreras NP  Neurocritical Care  [facility]    Critical care time > 31 min.

## 2020-08-31 NOTE — PT/OT/SLP PROGRESS
Physical Therapy      Patient Name:  Donita Gonzalez   MRN:  [id number]    Patient is still intubated and unable to participate in PT initial evaluation this morning. Will continue to follow for PT evaluation as she is able.       Mireille Carrillo, PT, DPT  8/31/2020

## 2020-08-31 NOTE — PT/OT/SLP PROGRESS
Occupational Therapy      Patient Name:  Donita Gonzalez   MRN:  [id number]    Patient not seen today secondary pt remains intubated at this time. OT to check status at later date when appropriate for further functional activity/mobility.     RORY Morris  8/31/2020

## 2020-08-31 NOTE — CONSULTS
[facility]  Gastroenterology  Consult Note    Patient Name: Donita Gonzalez  MRN: [id number]  Admission Date: 8/29/2020  Hospital Length of Stay: 2 days  Code Status: Full Code   Attending Provider: Omaira Muñoz MD   Consulting Provider: Kahlil Russell MD  Primary Care Physician: Jordan Campbell MD  Principal Problem:Embolic stroke involving right middle cerebral artery    Inpatient consult to Gastroenterology  Consult performed by: Kahlil Russell MD  Consult ordered by: Lona Contreras NP        Subjective:     HPI:  A 64 female PMH COPD, HTN, cigarette smoking, recent admission 8/26 for left-sided facial droop and left-sided concerning for acute frontal/parietal infarction.  Hospital course complicated by AFib with RVR and heart failure with reduced ejection fraction.  Initially placed on Lovenox and subsequently heparin drip.  At outside hospital, patient was noted to recurring epistaxis as well as small amount dark red blood in NG tube.  No melena.  Her hemoglobin has been stable.  She was seen by ENT with nasal packing placed with control of epistaxis.    GI at outside hospital recommended continuing anticoagulation through bleeding given recent acute stroke.  GI consult for further anticoagulation recommendations.    Past Medical History:   Diagnosis Date    COPD (chronic obstructive pulmonary disease)     Hypertension        Past Surgical History:   Procedure Laterality Date    AUGMENTATION OF BREAST      HYSTERECTOMY         Review of patient's allergies indicates:  No Known Allergies  Family History     None        Tobacco Use    Smoking status: Current Every Day Smoker     Packs/day: 1.00     Types: Cigarettes     Start date: 4/16/1980    Smokeless tobacco: Never Used   Substance and Sexual Activity    Alcohol use: Never     Frequency: Never    Drug use: Never    Sexual activity: Not on file     Review of Systems   Unable to perform ROS: Intubated     Objective:     Vital Signs  (Most Recent):  Temp: 98.6 °F (37 °C) (08/31/20 0300)  Pulse: 79 (08/31/20 0740)  Resp: (!) 26 (08/31/20 0740)  BP: (!) 106/54 (08/31/20 0600)  SpO2: 97 % (08/31/20 0740) Vital Signs (24h Range):  Temp:  [98.2 °F (36.8 °C)-98.6 °F (37 °C)] 98.6 °F (37 °C)  Pulse:  [] 79  Resp:  [15-30] 26  SpO2:  [94 %-100 %] 97 %  BP: ()/(54-81) 106/54  Arterial Line BP: ()/(54-87) 117/64     Weight: 59.9 kg (132 lb 0.9 oz) (08/29/20 2305)  Body mass index is 21.98 kg/m².      Intake/Output Summary (Last 24 hours) at 8/31/2020 1044  Last data filed at 8/31/2020 0600  Gross per 24 hour   Intake 402.12 ml   Output 810 ml   Net -407.88 ml       Lines/Drains/Airways     Central Venous Catheter Line            Percutaneous Central Line Insertion/Assessment - Triple Lumen  08/28/20 1024 3 days          Drain                 NG/OG Tube 08/29/20 2215 orogastric Left mouth 1 day         Urethral Catheter 08/29/20 2215 1 day          Airway                 Airway - Non-Surgical 08/26/20 0931 Endotracheal Tube 5 days          Arterial Line            Arterial Line 08/29/20 2247 Right Radial 1 day          Peripheral Intravenous Line                 Peripheral IV - Single Lumen 08/30/20 1200 18 G Left;Posterior Forearm less than 1 day                Physical Exam    Gen: intubated, sedated  HENT: ETT and OG tube in place, bilateral nasal rockets inflated in nares  Eyes: anicteric sclerae, EOMI grossly  Neck: supple, no visible masses/goiter  Cardiac: irrregularly irregular, no M/R/G, S1/S2 present  Lungs: CTAB, no crackles, no wheezes  Abd: soft, NT/ND, normoactive BS  Ext: no LE edema, warm, well perfused  Skin: skin intact on exposed body parts, no visible rashes, lesions  Neuro:sedated, spontaneously moving right side, no movement on left    Significant Labs:  CBC:   Recent Labs   Lab 08/29/20  2335 08/30/20  0256 08/31/20  0207   WBC 10.80 10.70 10.45   HGB 11.9* 11.1* 10.0*   HCT 37.6 36.3* 32.6*   * 359* 331      CMP:   Recent Labs   Lab 08/31/20  0207      CALCIUM 8.3*   ALBUMIN 2.2*   PROT 5.2*      K 4.0   CO2 28      BUN 54*   CREATININE 1.7*   ALKPHOS 61   ALT 23   AST 25   BILITOT 0.6     Coagulation:   Recent Labs   Lab 08/29/20  2335   INR 0.9   APTT 29.5       Significant Imaging:  Imaging results within the past 24 hours have been reviewed.    Assessment/Plan:     GI bleed  Patient with recent cerebral infarction with residual left-sided weakness, currently intubated and sedated.  Developed epistaxis with significant bleeding after initiation of heparin drip for AFib.  Also noted dark OG output at outside hospital and on arrival.  Very minimal output since admission less than 20 cc.  Bilateral nares currently packed ENT.  Epistaxis is the most likely cause of her bleeding, as well as micro trauma from NG/OG placement.    Seen by GI in the hospital recommendations that need for anticoagulation for neurologic/cardiovascular issues outweigh risk of bleeding from GI source.    1.  Agree with previous GI recommendations, okay for anticoagulation  2.  Continue PPI b.i.d.  3.  Trend hemoglobin, transfuse hemoglobin <7  4.  Defer to ENT further management of epistaxis  5.  Continue excellent critical care, extubate and remove OG tube as tolerated        Thank you for your consult. I will sign off. Please contact us if you have any additional questions.    Kahlil Russell MD  Gastroenterology  [facility]

## 2020-08-31 NOTE — PLAN OF CARE
08/31/20 1055   Post-Acute Status   Post-Acute Authorization Placement   Post-Acute Placement Status Awaiting Internal Medical Clearance  (vent)     Zofia Pantoja LCSW  Neurocritical Care   [facility]  [id number]

## 2020-08-31 NOTE — PLAN OF CARE
for payor- [employer] [place]    Direct phone #[phone]    Marlena Brower RN, BSN, CM  Utilization Management  [facility]

## 2020-08-31 NOTE — HPI
A 64 female PMH COPD, HTN, cigarette smoking, recent admission 8/26 for left-sided facial droop and left-sided concerning for acute frontal/parietal infarction.  Hospital course complicated by AFib with RVR and heart failure with reduced ejection fraction.  Initially placed on Lovenox and subsequently heparin drip.  At outside hospital, patient was noted to recurring epistaxis as well as small amount dark red blood in NG tube.  No melena.  Her hemoglobin has been stable.  She was seen by ENT with nasal packing placed with control of epistaxis.    GI at outside hospital recommended continuing anticoagulation through bleeding given recent acute stroke.  GI consult for further anticoagulation recommendations.

## 2020-08-31 NOTE — ASSESSMENT & PLAN NOTE
- upper gi bleed at outside hospital  - hold starting anticoagulation due to recent upper GI bleed at outside facility and epistaxis (2 rhino rockets)  - consult GI -appreciate reccs.

## 2020-08-31 NOTE — ASSESSMENT & PLAN NOTE
Patient with recent cerebral infarction with residual left-sided weakness, currently intubated and sedated.  Developed epistaxis with significant bleeding after initiation of heparin drip for AFib.  Also noted dark OG output at outside hospital and on arrival.  Very minimal output since admission less than 20 cc.  Bilateral nares currently packed ENT.  Epistaxis is the most likely cause of her bleeding, as well as micro trauma from NG/OG placement.    Seen by GI in the hospital recommendations that need for anticoagulation for neurologic/cardiovascular issues outweigh risk of bleeding from GI source.    1.  Agree with previous GI recommendations, okay for anticoagulation  2.  Continue PPI b.i.d.  3.  Trend hemoglobin, transfuse hemoglobin <7  4.  Defer to ENT further management of epistaxis  5.  Continue excellent critical care, extubate and remove OG tube as tolerated

## 2020-08-31 NOTE — SUBJECTIVE & OBJECTIVE
Review of Systems  Unable to obtain a complete ROS due to level of consciousness.  Objective:     Vitals:  Temp: 98.3 °F (36.8 °C)  Pulse: 96  Rhythm: atrial rhythm  BP: (!) 143/67  MAP (mmHg): 96  Resp: 15  SpO2: 95 %  Oxygen Concentration (%): 40  O2 Device (Oxygen Therapy): ventilator  Vent Mode: A/C  Set Rate: 15 BPM  Vt Set: 400 mL  PEEP/CPAP: 5 cmH20  Peak Airway Pressure: 27 cmH2O  Mean Airway Pressure: 8.2 cmH20  Plateau Pressure: 16 cmH20    Temp  Min: 98.3 °F (36.8 °C)  Max: 98.6 °F (37 °C)  Pulse  Min: 76  Max: 109  BP  Min: 99/57  Max: 146/76  MAP (mmHg)  Min: 70  Max: 103  Resp  Min: 15  Max: 32  SpO2  Min: 94 %  Max: 100 %  Oxygen Concentration (%)  Min: 40  Max: 50    08/30 0701 - 08/31 0700  In: 444.3 [I.V.:444.3]  Out: 1060 [Urine:1060]   Unmeasured Output  Stool Occurrence: 0  Emesis Occurrence: 0  Pad Count: 0       Physical Exam  GA:  comfortable, no acute distress.   HEENT: No scleral icterus or JVD.   Pulmonary: Clear to auscultation A/L.   Cardiac: RRR S1 & S2 w/o rubs/murmurs/gallops.   Abdominal: Bowel sounds present x 4. No appreciable hepatosplenomegaly.  Skin: No jaundice, rashes, or visible lesions.  Neuro:  --GCS: E1 VT1 M6  --Mental Status:  Doesn't opens eyes, follows commands on the R side intermittently  --CN II-XII grossly intact.   --Pupils  Left pupil 3mm, R pupil 6 fixed   --Corneal reflex, gag, cough intact.  --LUE withdraws  --RUE spont  --LLE withdraws  --RLE spont    Medications:  Continuousdexmedetomidine (PRECEDEX) infusion, Last Rate: Stopped (08/31/20 0900)  dilTIAZem, Last Rate: 7.5 mg/hr (08/31/20 1100)    Scheduledalbuterol-ipratropium, 3 mL, Q6H  atorvastatin, 40 mg, Daily  ceFEPime (MAXIPIME) IVPB, 1 g, Q24H  digoxin, 250 mcg, Q6H  [START ON 9/2/2020] digoxin, 0.125 mg, Daily  pantoprozole (PROTONIX) IV, 40 mg, Q12H  valproic acid (as sodium salt), 250 mg, Q8H    PRNacetaminophen, 650 mg, Q6H PRN  dextrose 50%, 12.5 g, PRN  glucagon (human recombinant), 1 mg,  PRN  insulin aspart U-100, 1-10 Units, Q6H PRN  metoprolol, 5 mg, Q10 Min PRN  ondansetron, 4 mg, Q6H PRN      Today I personally reviewed pertinent medications, lines/drains/airways, imaging, cardiology results, laboratory results,     Diet  Diet NPO

## 2020-08-31 NOTE — PLAN OF CARE
POC reviewed with Pt @ 0500. Pt unable to verbalize understanding. Questions and concerns unable to be addressed. Pt is non verbal with no family @ BS. No acute events overnight. Pt progressing toward goals as tolerated. Will continue to monitor. See flowsheet for details.

## 2020-09-01 LAB
ALBUMIN SERPL BCP-MCNC: 2.4 G/DL (ref 3.5–5.2)
ALLENS TEST: ABNORMAL
ALP SERPL-CCNC: 79 U/L (ref 55–135)
ALT SERPL W/O P-5'-P-CCNC: 25 U/L (ref 10–44)
ANION GAP SERPL CALC-SCNC: 9 MMOL/L (ref 8–16)
APTT BLDCRRT: 23.3 SEC (ref 21–32)
APTT BLDCRRT: 29.3 SEC (ref 21–32)
APTT BLDCRRT: 45 SEC (ref 21–32)
AST SERPL-CCNC: 33 U/L (ref 10–40)
BASOPHILS # BLD AUTO: 0.02 K/UL (ref 0–0.2)
BASOPHILS # BLD AUTO: 0.03 K/UL (ref 0–0.2)
BASOPHILS NFR BLD: 0.2 % (ref 0–1.9)
BASOPHILS NFR BLD: 0.3 % (ref 0–1.9)
BILIRUB SERPL-MCNC: 0.5 MG/DL (ref 0.1–1)
BUN SERPL-MCNC: 47 MG/DL (ref 8–23)
CALCIUM SERPL-MCNC: 8.8 MG/DL (ref 8.7–10.5)
CHLORIDE SERPL-SCNC: 103 MMOL/L (ref 95–110)
CO2 SERPL-SCNC: 30 MMOL/L (ref 23–29)
CREAT SERPL-MCNC: 1.5 MG/DL (ref 0.5–1.4)
DELSYS: ABNORMAL
DIFFERENTIAL METHOD: ABNORMAL
DIFFERENTIAL METHOD: ABNORMAL
DIGOXIN SERPL-MCNC: 1.8 NG/ML (ref 0.8–2)
DOHLE BOD BLD QL SMEAR: PRESENT
EOSINOPHIL # BLD AUTO: 0.2 K/UL (ref 0–0.5)
EOSINOPHIL # BLD AUTO: 0.3 K/UL (ref 0–0.5)
EOSINOPHIL NFR BLD: 1.7 % (ref 0–8)
EOSINOPHIL NFR BLD: 2.2 % (ref 0–8)
ERYTHROCYTE [DISTWIDTH] IN BLOOD BY AUTOMATED COUNT: 14.4 % (ref 11.5–14.5)
ERYTHROCYTE [DISTWIDTH] IN BLOOD BY AUTOMATED COUNT: 14.5 % (ref 11.5–14.5)
ERYTHROCYTE [SEDIMENTATION RATE] IN BLOOD BY WESTERGREN METHOD: 15 MM/H
EST. GFR  (AFRICAN AMERICAN): 42.1 ML/MIN/1.73 M^2
EST. GFR  (NON AFRICAN AMERICAN): 36.6 ML/MIN/1.73 M^2
FIO2: 40
GLUCOSE SERPL-MCNC: 102 MG/DL (ref 70–110)
HCO3 UR-SCNC: 31.1 MMOL/L (ref 24–28)
HCT VFR BLD AUTO: 37.5 % (ref 37–48.5)
HCT VFR BLD AUTO: 39.2 % (ref 37–48.5)
HGB BLD-MCNC: 11.7 G/DL (ref 12–16)
HGB BLD-MCNC: 12.7 G/DL (ref 12–16)
IMM GRANULOCYTES # BLD AUTO: 0.07 K/UL (ref 0–0.04)
IMM GRANULOCYTES # BLD AUTO: 0.11 K/UL (ref 0–0.04)
IMM GRANULOCYTES NFR BLD AUTO: 0.6 % (ref 0–0.5)
IMM GRANULOCYTES NFR BLD AUTO: 0.9 % (ref 0–0.5)
INR PPP: 1 (ref 0.8–1.2)
LYMPHOCYTES # BLD AUTO: 0.4 K/UL (ref 1–4.8)
LYMPHOCYTES # BLD AUTO: 0.7 K/UL (ref 1–4.8)
LYMPHOCYTES NFR BLD: 3.5 % (ref 18–48)
LYMPHOCYTES NFR BLD: 6.1 % (ref 18–48)
MAGNESIUM SERPL-MCNC: 2 MG/DL (ref 1.6–2.6)
MCH RBC QN AUTO: 30.2 PG (ref 27–31)
MCH RBC QN AUTO: 30.6 PG (ref 27–31)
MCHC RBC AUTO-ENTMCNC: 31.2 G/DL (ref 32–36)
MCHC RBC AUTO-ENTMCNC: 32.4 G/DL (ref 32–36)
MCV RBC AUTO: 95 FL (ref 82–98)
MCV RBC AUTO: 97 FL (ref 82–98)
MIN VOL: 6.04
MODE: ABNORMAL
MONOCYTES # BLD AUTO: 1 K/UL (ref 0.3–1)
MONOCYTES # BLD AUTO: 1.2 K/UL (ref 0.3–1)
MONOCYTES NFR BLD: 8.6 % (ref 4–15)
MONOCYTES NFR BLD: 9.9 % (ref 4–15)
NEUTROPHILS # BLD AUTO: 10.1 K/UL (ref 1.8–7.7)
NEUTROPHILS # BLD AUTO: 9.7 K/UL (ref 1.8–7.7)
NEUTROPHILS NFR BLD: 81.9 % (ref 38–73)
NEUTROPHILS NFR BLD: 84.1 % (ref 38–73)
NRBC BLD-RTO: 0 /100 WBC
NRBC BLD-RTO: 0 /100 WBC
PCO2 BLDA: 44.6 MMHG (ref 35–45)
PEEP: 5
PH SMN: 7.45 [PH] (ref 7.35–7.45)
PHOSPHATE SERPL-MCNC: 2.4 MG/DL (ref 2.7–4.5)
PIP: 29
PLATELET # BLD AUTO: 356 K/UL (ref 150–350)
PLATELET # BLD AUTO: 408 K/UL (ref 150–350)
PLATELET BLD QL SMEAR: ABNORMAL
PMV BLD AUTO: 11.4 FL (ref 9.2–12.9)
PMV BLD AUTO: 11.4 FL (ref 9.2–12.9)
PO2 BLDA: 82 MMHG (ref 80–100)
POC BE: 7 MMOL/L
POC SATURATED O2: 96 % (ref 95–100)
POC TCO2: 32 MMOL/L (ref 23–27)
POCT GLUCOSE: 116 MG/DL (ref 70–110)
POCT GLUCOSE: 127 MG/DL (ref 70–110)
POCT GLUCOSE: 152 MG/DL (ref 70–110)
POTASSIUM SERPL-SCNC: 4.1 MMOL/L (ref 3.5–5.1)
PROT SERPL-MCNC: 6.1 G/DL (ref 6–8.4)
PROTHROMBIN TIME: 10.9 SEC (ref 9–12.5)
RBC # BLD AUTO: 3.87 M/UL (ref 4–5.4)
RBC # BLD AUTO: 4.15 M/UL (ref 4–5.4)
SAMPLE: ABNORMAL
SITE: ABNORMAL
SODIUM SERPL-SCNC: 142 MMOL/L (ref 136–145)
SP02: 95
TOXIC GRANULES BLD QL SMEAR: PRESENT
VT: 400
WBC # BLD AUTO: 11.87 K/UL (ref 3.9–12.7)
WBC # BLD AUTO: 11.96 K/UL (ref 3.9–12.7)

## 2020-09-01 PROCEDURE — 85025 COMPLETE CBC W/AUTO DIFF WBC: CPT | Mod: 91

## 2020-09-01 PROCEDURE — 80162 ASSAY OF DIGOXIN TOTAL: CPT

## 2020-09-01 PROCEDURE — 37799 UNLISTED PX VASCULAR SURGERY: CPT

## 2020-09-01 PROCEDURE — 99900026 HC AIRWAY MAINTENANCE (STAT)

## 2020-09-01 PROCEDURE — 99291 CRITICAL CARE FIRST HOUR: CPT | Mod: ,,, | Performed by: PSYCHIATRY & NEUROLOGY

## 2020-09-01 PROCEDURE — C9113 INJ PANTOPRAZOLE SODIUM, VIA: HCPCS | Performed by: PHYSICIAN ASSISTANT

## 2020-09-01 PROCEDURE — 25000242 PHARM REV CODE 250 ALT 637 W/ HCPCS: Performed by: NURSE PRACTITIONER

## 2020-09-01 PROCEDURE — 27200966 HC CLOSED SUCTION SYSTEM

## 2020-09-01 PROCEDURE — 20000000 HC ICU ROOM

## 2020-09-01 PROCEDURE — 97110 THERAPEUTIC EXERCISES: CPT

## 2020-09-01 PROCEDURE — 27000221 HC OXYGEN, UP TO 24 HOURS

## 2020-09-01 PROCEDURE — 25000003 PHARM REV CODE 250: Performed by: NURSE PRACTITIONER

## 2020-09-01 PROCEDURE — 99291 PR CRITICAL CARE, E/M 30-74 MINUTES: ICD-10-PCS | Mod: ,,, | Performed by: PSYCHIATRY & NEUROLOGY

## 2020-09-01 PROCEDURE — 83735 ASSAY OF MAGNESIUM: CPT

## 2020-09-01 PROCEDURE — 85730 THROMBOPLASTIN TIME PARTIAL: CPT | Mod: 91

## 2020-09-01 PROCEDURE — 99900035 HC TECH TIME PER 15 MIN (STAT)

## 2020-09-01 PROCEDURE — 80053 COMPREHEN METABOLIC PANEL: CPT

## 2020-09-01 PROCEDURE — 25000003 PHARM REV CODE 250: Performed by: STUDENT IN AN ORGANIZED HEALTH CARE EDUCATION/TRAINING PROGRAM

## 2020-09-01 PROCEDURE — 99233 PR SUBSEQUENT HOSPITAL CARE,LEVL III: ICD-10-PCS | Mod: ,,, | Performed by: PSYCHIATRY & NEUROLOGY

## 2020-09-01 PROCEDURE — 82803 BLOOD GASES ANY COMBINATION: CPT

## 2020-09-01 PROCEDURE — 85610 PROTHROMBIN TIME: CPT

## 2020-09-01 PROCEDURE — 63600175 PHARM REV CODE 636 W HCPCS: Performed by: NURSE PRACTITIONER

## 2020-09-01 PROCEDURE — 99233 SBSQ HOSP IP/OBS HIGH 50: CPT | Mod: ,,, | Performed by: PSYCHIATRY & NEUROLOGY

## 2020-09-01 PROCEDURE — 94003 VENT MGMT INPAT SUBQ DAY: CPT

## 2020-09-01 PROCEDURE — 25000003 PHARM REV CODE 250: Performed by: UROLOGY

## 2020-09-01 PROCEDURE — 63600175 PHARM REV CODE 636 W HCPCS: Performed by: PHYSICIAN ASSISTANT

## 2020-09-01 PROCEDURE — 63600175 PHARM REV CODE 636 W HCPCS

## 2020-09-01 PROCEDURE — 94640 AIRWAY INHALATION TREATMENT: CPT

## 2020-09-01 PROCEDURE — 63600175 PHARM REV CODE 636 W HCPCS: Performed by: PSYCHIATRY & NEUROLOGY

## 2020-09-01 PROCEDURE — 97167 OT EVAL HIGH COMPLEX 60 MIN: CPT

## 2020-09-01 PROCEDURE — 84100 ASSAY OF PHOSPHORUS: CPT

## 2020-09-01 PROCEDURE — 94761 N-INVAS EAR/PLS OXIMETRY MLT: CPT

## 2020-09-01 RX ORDER — AMOXICILLIN 250 MG
1 CAPSULE ORAL 2 TIMES DAILY
Status: DISCONTINUED | OUTPATIENT
Start: 2020-09-01 | End: 2020-09-15

## 2020-09-01 RX ORDER — HYDRALAZINE HYDROCHLORIDE 20 MG/ML
10 INJECTION INTRAMUSCULAR; INTRAVENOUS EVERY 6 HOURS PRN
Status: DISCONTINUED | OUTPATIENT
Start: 2020-09-01 | End: 2020-09-11

## 2020-09-01 RX ORDER — HEPARIN SODIUM,PORCINE/D5W 25000/250
12 INTRAVENOUS SOLUTION INTRAVENOUS CONTINUOUS
Status: DISCONTINUED | OUTPATIENT
Start: 2020-09-01 | End: 2020-09-10

## 2020-09-01 RX ORDER — INSULIN ASPART 100 [IU]/ML
1-10 INJECTION, SOLUTION INTRAVENOUS; SUBCUTANEOUS EVERY 4 HOURS PRN
Status: DISCONTINUED | OUTPATIENT
Start: 2020-09-01 | End: 2020-09-03

## 2020-09-01 RX ORDER — GLUCAGON 1 MG
1 KIT INJECTION
Status: DISCONTINUED | OUTPATIENT
Start: 2020-09-01 | End: 2020-09-03

## 2020-09-01 RX ORDER — FENTANYL CITRATE 50 UG/ML
50 INJECTION, SOLUTION INTRAMUSCULAR; INTRAVENOUS
Status: DISCONTINUED | OUTPATIENT
Start: 2020-09-01 | End: 2020-09-12

## 2020-09-01 RX ORDER — CEFEPIME HYDROCHLORIDE 1 G/1
1 INJECTION, POWDER, FOR SOLUTION INTRAMUSCULAR; INTRAVENOUS
Status: COMPLETED | OUTPATIENT
Start: 2020-09-01 | End: 2020-09-05

## 2020-09-01 RX ORDER — DEXTROSE MONOHYDRATE 100 MG/ML
INJECTION, SOLUTION INTRAVENOUS CONTINUOUS PRN
Status: DISCONTINUED | OUTPATIENT
Start: 2020-09-01 | End: 2020-09-03

## 2020-09-01 RX ORDER — FENTANYL CITRATE 50 UG/ML
INJECTION, SOLUTION INTRAMUSCULAR; INTRAVENOUS
Status: COMPLETED
Start: 2020-09-01 | End: 2020-09-01

## 2020-09-01 RX ADMIN — CEFEPIME 1 G: 1 INJECTION, POWDER, FOR SOLUTION INTRAMUSCULAR; INTRAVENOUS at 02:09

## 2020-09-01 RX ADMIN — INSULIN ASPART 2 UNITS: 100 INJECTION, SOLUTION INTRAVENOUS; SUBCUTANEOUS at 04:09

## 2020-09-01 RX ADMIN — IPRATROPIUM BROMIDE AND ALBUTEROL SULFATE 3 ML: .5; 2.5 SOLUTION RESPIRATORY (INHALATION) at 01:09

## 2020-09-01 RX ADMIN — FENTANYL CITRATE 50 MCG: 50 INJECTION INTRAMUSCULAR; INTRAVENOUS at 09:09

## 2020-09-01 RX ADMIN — VALPROIC ACID 250 MG: 250 SOLUTION ORAL at 06:09

## 2020-09-01 RX ADMIN — DILTIAZEM HYDROCHLORIDE 5 MG/HR: 5 INJECTION INTRAVENOUS at 06:09

## 2020-09-01 RX ADMIN — PANTOPRAZOLE SODIUM 40 MG: 40 INJECTION, POWDER, LYOPHILIZED, FOR SOLUTION INTRAVENOUS at 10:09

## 2020-09-01 RX ADMIN — DIGOXIN 250 MCG: 250 INJECTION, SOLUTION INTRAMUSCULAR; INTRAVENOUS; PARENTERAL at 12:09

## 2020-09-01 RX ADMIN — DEXMEDETOMIDINE HYDROCHLORIDE 1.4 MCG/KG/HR: 4 INJECTION, SOLUTION INTRAVENOUS at 06:09

## 2020-09-01 RX ADMIN — VALPROIC ACID 250 MG: 250 SOLUTION ORAL at 02:09

## 2020-09-01 RX ADMIN — DOCUSATE SODIUM 50MG AND SENNOSIDES 8.6MG 1 TABLET: 8.6; 5 TABLET, FILM COATED ORAL at 09:09

## 2020-09-01 RX ADMIN — IPRATROPIUM BROMIDE AND ALBUTEROL SULFATE 3 ML: .5; 2.5 SOLUTION RESPIRATORY (INHALATION) at 07:09

## 2020-09-01 RX ADMIN — IPRATROPIUM BROMIDE AND ALBUTEROL SULFATE 3 ML: .5; 2.5 SOLUTION RESPIRATORY (INHALATION) at 12:09

## 2020-09-01 RX ADMIN — ATORVASTATIN CALCIUM 40 MG: 20 TABLET, FILM COATED ORAL at 08:09

## 2020-09-01 RX ADMIN — DEXMEDETOMIDINE HYDROCHLORIDE 0.5 MCG/KG/HR: 4 INJECTION, SOLUTION INTRAVENOUS at 11:09

## 2020-09-01 RX ADMIN — HEPARIN SODIUM AND DEXTROSE 12 UNITS/KG/HR: 10000; 5 INJECTION INTRAVENOUS at 09:09

## 2020-09-01 RX ADMIN — PANTOPRAZOLE SODIUM 40 MG: 40 INJECTION, POWDER, LYOPHILIZED, FOR SOLUTION INTRAVENOUS at 08:09

## 2020-09-01 RX ADMIN — VALPROIC ACID 250 MG: 250 SOLUTION ORAL at 10:09

## 2020-09-01 RX ADMIN — DEXMEDETOMIDINE HYDROCHLORIDE 1.4 MCG/KG/HR: 4 INJECTION, SOLUTION INTRAVENOUS at 05:09

## 2020-09-01 RX ADMIN — DEXMEDETOMIDINE HYDROCHLORIDE 1.4 MCG/KG/HR: 4 INJECTION, SOLUTION INTRAVENOUS at 09:09

## 2020-09-01 RX ADMIN — IPRATROPIUM BROMIDE AND ALBUTEROL SULFATE 3 ML: .5; 2.5 SOLUTION RESPIRATORY (INHALATION) at 08:09

## 2020-09-01 RX ADMIN — DILTIAZEM HYDROCHLORIDE 7.5 MG/HR: 5 INJECTION INTRAVENOUS at 05:09

## 2020-09-01 RX ADMIN — DEXMEDETOMIDINE HYDROCHLORIDE 1.4 MCG/KG/HR: 4 INJECTION, SOLUTION INTRAVENOUS at 02:09
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
## 2020-09-01 NOTE — PROGRESS NOTES
[facility]  Vascular Neurology  Comprehensive Stroke Center  Progress Note    Assessment/Plan:     * Embolic stroke involving right middle cerebral artery  Donita Gonzalez is a 64 y.o. female with a significant medical history of COPD, HTN, smoker who presents to the hospital as a transfer from [facility] for further evaluation of R MCA stroke.  The patient did not receive tPA due to LKN 8/25/2020 and presenting to the OSH on 8/26/2020.      Antithrombotics for secondary stroke prevention: Anticoagulants: Patient was on full dose Lovenox that was dc'd 2/2 uncontrolled epistaxis. Heparin infusion started 9/1/20 for A/C.  Repeat CTH pending once therapeutic.    Statins for secondary stroke prevention and hyperlipidemia, if present:   Statins: Atorvastatin- 40 mg daily    Aggressive risk factor modification: HTN, Smoking, HLD, A-Fib     Rehab efforts: The patient has been evaluated by a stroke team provider and the therapy needs have been fully considered based off the presenting complaints and exam findings. The following therapy evaluations are needed: PT/OT/SLP evaluations when the patient is able to participate    Diagnostics ordered/pending: CT scan of head without contrast to asses brain parenchyma    VTE prophylaxis: Mechanical prophylaxis: Place SCDs and now heparin infusion     BP parameters: Infarct: No intervention, SBP <220        Cytotoxic cerebral edema  -Small area of cytotoxic cerebral edema identified when reviewing brain imaging in the territory of the R middle cerebral artery. There is no mass effect associated with it. We will continue to monitor the patients clinical exam for any worsening of symptoms which may indicate expansion of the stroke or the area of the edema resulting in the clinical change. The pattern is suggestive of cardioembolic etiology in the setting of new onset Afib w/RVR.    -Recommend Neurosurgery consult        Epistaxis  -Patient had  uncontrolled epistaxis that was attributed to NGT placement and AC.  -Lovenox --> heparin infusion 9/1/20   -ENT consulted; signed off 8/31; leave nasal packing in for 5 days per ENT note 8/31  -Managed by [facility]    Atrial fibrillation with rapid ventricular response  -Stroke risk factor.  The patient was on full dose lovenox --> heparin infusion now given epistasix  -Continue Diltiazem gtt; Digoxin loaded 9/1/20  -Recommend CTH prior to initiating AC    Chronic obstructive pulmonary disease  -Goal SpO2>88%  -Managed by [facility]    GOYO (acute kidney injury)  -Cr 2.1<2.3 on 8/28 --> 1.5 on 9/1/20  -Managed by [facility]    Acute on chronic respiratory failure with hypoxia and hypercapnia  -Patient is currently intubated and sedated.   -Managed by [facility]         8/31: Patient intubated. Rhinorockets in place; ENT sign off.   9/1: Patient remains intubated; heparin restarted; once therapeutic, CT head and plan for extubation. Dilt for AFib RVR    STROKE DOCUMENTATION        NIH Scale:  1a. Level of Consciousness: 2-->Not alert, requires repeated stimulation to attend, or is obtunded and requires strong or painful stimulation to make movements (not stereotyped)(sedated on Precedex)  1b. LOC Questions: 2-->Answers neither question correctly  1c. LOC Commands: 2-->Performs neither task correctly  2. Best Gaze: 1-->Partial gaze palsy, gaze is abnormal in one or both eyes, but forced deviation or total gaze paresis is not present  3. Visual: 0-->No visual loss  4. Facial Palsy: 0-->Normal symmetrical movements  5a. Motor Arm, Left: 4-->No movement  5b. Motor Arm, Right: 2-->Some effort against gravity, limb cannot get to or maintain (if cued) 90 (or 45) degrees, drifts down to bed, but has some effort against gravity  6a. Motor Leg, Left: 4-->No movement  6b. Motor Leg, Right: 3-->No effort against gravity, leg falls to bed immediately  7. Limb Ataxia: 0-->Absent  8. Sensory: 1-->Mild-to-moderate sensory loss, patient feels pinprick is less  sharp or is dull on the affected side, or there is a loss of superficial pain with pinprick, but patient is aware of being touched  9. Best Language: 3-->Mute, global aphasia, no usable speech or auditory comprehension  10. Dysarthria: 0-->Normal  11. Extinction and Inattention (formerly Neglect): 2-->Profound tracy-inattention/extinction more than 1 modality  Total (NIH Stroke Scale): 26       Modified [place] Score: 1  [place] Coma Scale:    ABCD2 Score:    [id number] Score:5  HAS -BLED Score:3  ICH Score:   Hunt & Bell Classification:      Hemorrhagic change of an Ischemic Stroke: Does this patient have an ischemic stroke with hemorrhagic changes? No     Neurologic Chief Complaint: right frontal and parietal lobe infarctions with no evidence of hemorrhage    Subjective:     Interval History: Patient is seen for follow-up neurological assessment and treatment recommendations: right frontal and parietal lobe infarctions with no evidence of hemorrhage. Remains intubated requiring Precedex for sedation. On Dilt infusion for AFib RVR. Heparin restarted for AFib RVR.     HPI, Past Medical, Family, and Social History remains the same as documented in the initial encounter.     Review of Systems   Unable to perform ROS: Intubated   HENT:        Tongue swelling   Respiratory:        Intubated   Gastrointestinal: Negative for anal bleeding and blood in stool.   Genitourinary: Negative for hematuria.   Psychiatric/Behavioral: Positive for agitation.        Precedex for agitation while on MV.      Scheduled Meds:   albuterol-ipratropium  3 mL Nebulization Q6H    atorvastatin  40 mg Per OG tube Daily    ceFEPime (MAXIPIME) IVPB  1 g Intravenous Q12H    [START ON 9/2/2020] digoxin  0.125 mg Per NG tube Daily    pantoprozole (PROTONIX) IV  40 mg Intravenous Q12H    senna-docusate 8.6-50 mg  1 tablet Per OG tube BID    valproic acid (as sodium salt)  250 mg Per OG tube Q8H     Continuous Infusions:   dexmedetomidine  (PRECEDEX) infusion 1.4 mcg/kg/hr (09/01/20 1407)    dextrose 10 % in water (D10W)      dilTIAZem 7.5 mg/hr (09/01/20 1405)    heparin (porcine) in D5W 14 Units/kg/hr (09/01/20 1505)     PRN Meds:acetaminophen, dextrose 10 % in water (D10W), dextrose 50%, fentaNYL, glucagon (human recombinant), insulin aspart U-100, metoprolol, ondansetron    Objective:     Vital Signs (Most Recent):  Temp: 98.3 °F (36.8 °C) (09/01/20 1105)  Pulse: 86 (09/01/20 1405)  Resp: 15 (09/01/20 1405)  BP: (!) 168/76 (09/01/20 1405)  SpO2: 96 % (09/01/20 1405)  BP Location: Right arm    Vital Signs Range (Last 24H):  Temp:  [97.8 °F (36.6 °C)-98.4 °F (36.9 °C)]   Pulse:  []   Resp:  [12-39]   BP: (126-176)/(60-95)   SpO2:  [88 %-99 %]   Arterial Line BP: (101-151)/()   BP Location: Right arm    Physical Exam  Constitutional:       General: She is not in acute distress.     Appearance: Normal appearance. She is not ill-appearing.   HENT:      Head: Normocephalic.      Right Ear: External ear normal. There is no impacted cerumen.      Left Ear: External ear normal. There is no impacted cerumen.      Nose: No congestion or rhinorrhea.      Mouth/Throat:      Mouth: Mucous membranes are dry.   Eyes:      General: No scleral icterus.        Right eye: No discharge.         Left eye: No discharge.      Comments: Left pupil 3mm, R pupil 6 fixed    Cardiovascular:      Rate and Rhythm: Normal rate.      Heart sounds: No murmur.   Pulmonary:      Comments: intubated  Abdominal:      General: Abdomen is flat. There is no distension.      Palpations: Abdomen is soft.      Tenderness: There is no abdominal tenderness.   Musculoskeletal:         General: No swelling, tenderness or deformity.   Skin:     General: Skin is warm and dry.      Coloration: Skin is not pale.      Findings: No erythema.   Neurological:      Mental Status: She is alert.      Comments: Limited 2/2/ sedation with Precedex         Neurological Exam: limited to  sedation  Motor: Arm left  Plegia 0/5  Leg left  Paresis: 2/5  Arm right  Paresis: 3/5  Leg right Paresis: 2/5    Laboratory:  CMP:   Recent Labs   Lab 09/01/20  0235   CALCIUM 8.8   ALBUMIN 2.4*   PROT 6.1      K 4.1   CO2 30*      BUN 47*   CREATININE 1.5*   ALKPHOS 79   ALT 25   AST 33   BILITOT 0.5     CBC:   Recent Labs   Lab 09/01/20  0904   WBC 11.87   RBC 4.15   HGB 12.7   HCT 39.2   *   MCV 95   MCH 30.6   MCHC 32.4     Hgb A1C:   Recent Labs   Lab 08/29/20  2335   HGBA1C 6.0*     TSH:   Recent Labs   Lab 08/29/20  2335   TSH 0.453       Diagnostic Results     Brain Imaging   MRI Brain 8/26/20  Ventricles and sulci are normal in size for age without evidence of hydrocephalus.  No extra-axial blood or fluid collections  Large area restricted diffusion right posterior frontal and parietal lobe consistent with right recent infarction.  No evidence of superimposed hemorrhage.  Patchy areas of increased T2/FLAIR signal of the supratentorial white matter consistent with chronic microvascular ischemic changes.  No evidence of mass.  Skull/extracranial contents (limited evaluation): Bone marrow signal intensity is normal.  Small amount of fluid in the right maxillary sinus.  Large area of restricted diffusion consistent with continued evolution of recent infarction involving the right posterior frontal and parietal lobes lobes.  No evidence of hemorrhage.  Chronic microvascular ischemic changes.    CT head 8/30/20  No extra-axial blood or fluid collections.  Continued evolution of areas infarction involving the right frontal and right parietal lobes.  No evidence of superimposed hemorrhage.  Associated edema with mild compression of the right lateral ventricle.  No hydrocephalus.  No evidence of mass.  Skull/extracranial contents (limited evaluation): No fracture. Mastoid air cells and paranasal sinuses are essentially clear.     Continued evolution of right frontal and parietal lobe infarctions  with no evidence of hemorrhage.  Associated edema is noted with no midline shift.  Mild mass effect on the right lateral ventricle.  No hydrocephalus.    Cardiac Imaging   TTE 9/1/20  · Moderate concentric left ventricular hypertrophy.  · Moderately to severely decreased left ventricular systolic function. The estimated ejection fraction is 25-30%.  · Left ventricular diastolic dysfunction.  · Moderately to severely reduced right ventricular systolic function.  · Moderate left atrial enlargement.  · Severe right atrial enlargement.  · Moderate mitral regurgitation.  · Moderate tricuspid regurgitation.  · Intermediate central venous pressure (8 mmHg).  · The estimated PA systolic pressure is 42 mmHg.  · Pulmonary hypertension present.  · Negative bubble study.      Teresa Marin MD  Comprehensive Stroke Center  Department of Vascular Neurology   [facility]

## 2020-09-01 NOTE — ASSESSMENT & PLAN NOTE
- persistent epistaxis in the setting of anticoagulation   - H/H stable at 12/39  - daily CBC   - ENT consulted;   rhino rockets can stay as long as 5 days if needed.

## 2020-09-01 NOTE — ASSESSMENT & PLAN NOTE
-Stroke risk factor.  The patient was on full dose lovenox --> heparin infusion now given epistasix  -Continue Diltiazem gtt; Digoxin loaded 9/1/20  -Recommend CTH prior to initiating AC

## 2020-09-01 NOTE — PLAN OF CARE
POC [place] with pt at [id number]. Pt unable to verbalize understanding. Questions and concerns addressed. Precedex and Dilt titrated as ordered. Plan is to try to extubated today. No acute events overnight. Pt progressing toward goals. Will continue to monitor. See flowsheets for full assessment and VS info

## 2020-09-01 NOTE — PROGRESS NOTES
[facility]  Neurocritical Care  Progress Note    Admit Date: 8/29/2020  Service Date: 09/01/2020  Length of Stay: 3    Subjective:     Chief Complaint: Embolic stroke involving right middle cerebral artery    History of Present Illness: 64 year old female with a PMH significant for COPD, HTN, and every day smoker who presented at OSH on 08/26/20 with acute left sided facial droop and left sided weakness (last known normal 08/25/20). Pt was not given TPA or endovascular intervention, and she was intubated on 08/26/20 for agitation. CTH on 08/26/20 showed right posterior frontal/parietal infarction with no intracranial hemorrhage. Hospital course complicated by Afib with RVR in which she was started on lovenox and then transitioned to heparin gtt for GOYO and HFrEF. Pt was transferred to NICU for higher level of care and persistent epistaxis. Upon arrival Pt was intubated and sedated, Rhinorocket  Bilaterally in place, propofol and diltiazem gtt. Pt was able to move the right side and moves her left side to noxious stimuli. A-line placed.       Hospital Course: 08/29/20: Pt admitted to NICU for higher level of care and ENT consult. Pt with a run of Vtach associated with hypotension.   8/31/2020: hold anticoagulation until tomorrow due to GI bleed/epistaxis (at other facility) GI consult, pt in Afib- load with digoxin, pending digoxin level tomorrow, continue diltiazem gtt today, start tube feeds, obtain PIV and d/c central line, continue cefepime for total 7 days  9/1/2020: initiated heparin gtt minimal intensity-when at goal obtain CT head, continue diltiazem gtt, start tube feeds, d/c A-line        Review of Systems   Unable to obtain a complete ROS due to level of consciousness.  Objective:     Vitals:  Temp: 97.8 °F (36.6 °C)  Pulse: 83  Rhythm: atrial rhythm  BP: 126/69  MAP (mmHg): 93  Resp: 15  SpO2: 95 %  Oxygen Concentration (%): 40  O2 Device (Oxygen Therapy): ventilator  Vent Mode:  A/C  Set Rate: 15 BPM  Vt Set: 400 mL  PEEP/CPAP: 5 cmH20  Peak Airway Pressure: 42 cmH2O  Mean Airway Pressure: 11 cmH20  Plateau Pressure: 12 cmH20    Temp  Min: 97.8 °F (36.6 °C)  Max: 98.6 °F (37 °C)  Pulse  Min: 19  Max: 109  BP  Min: 126/69  Max: 176/79  MAP (mmHg)  Min: 86  Max: 118  Resp  Min: 13  Max: 39  SpO2  Min: 88 %  Max: 99 %  Oxygen Concentration (%)  Min: 40  Max: 50    08/31 0701 - 09/01 0700  In: 427.5 [I.V.:427.5]  Out: 1310 [Urine:1310]   Unmeasured Output  Stool Occurrence: 0  Emesis Occurrence: 0  Pad Count: 0       Physical Exam  GA: comfortable, no acute distress.   HEENT: No scleral icterus or JVD.   Pulmonary: Diminished to auscultation A/L.   Cardiac: RRR S1 & S2 w/o rubs/murmurs/gallops.   Abdominal: Bowel sounds present x 4. No appreciable hepatosplenomegaly.  Skin: No jaundice, rashes, or visible lesions.  Neuro:  --GCS: E2 VT1 M6  --Mental Status:  Opens eyes to pain intermittently follows simple commands on the Right side  --CN II-XII grossly intact.   --Pupils l pupil 3 mm, R pupil 6mm fixed  --Corneal reflex, gag, cough intact.  --LUE withdraws  --RUE spont  --LLE withdraws  --RLE spont    Medications:  Continuousdexmedetomidine (PRECEDEX) infusion, Last Rate: 1.4 mcg/kg/hr (09/01/20 1005)  dextrose 10 % in water (D10W)  dilTIAZem, Last Rate: 7.5 mg/hr (09/01/20 1005)  heparin (porcine) in D5W, Last Rate: 12 Units/kg/hr (09/01/20 1005)    Scheduledalbuterol-ipratropium, 3 mL, Q6H  atorvastatin, 40 mg, Daily  ceFEPime (MAXIPIME) IVPB, 1 g, Q24H  [START ON 9/2/2020] digoxin, 0.125 mg, Daily  pantoprozole (PROTONIX) IV, 40 mg, Q12H  senna-docusate 8.6-50 mg, 1 tablet, BID  valproic acid (as sodium salt), 250 mg, Q8H    PRNacetaminophen, 650 mg, Q6H PRN  dextrose 10 % in water (D10W), , Continuous PRN  dextrose 50%, 12.5 g, PRN  fentaNYL, 50 mcg, Q2H PRN  glucagon (human recombinant), 1 mg, PRN  insulin aspart U-100, 1-10 Units, Q4H PRN  metoprolol, 5 mg, Q10 Min PRN  ondansetron, 4  mg, Q6H PRN      Today I personally reviewed pertinent medications, lines/drains/airways, imaging, cardiology results, laboratory results,    Diet  Diet NPO        Assessment/Plan:     Neuro  * Embolic stroke involving right middle cerebral artery  - Last seen normal on 08/25/20 with no intervention; intubated and sedated.    - Imaging shows large right ischemic stroke in the posterior frontal and parietal regions; no repeat imaging   - SBP < 160  - Euna   - CBC, CMP, coags, UA, ABG, CXR, anti XA, Echo, and lipid panel   - hold anticoagulation in the setting of epistaxis       ENT  Epistaxis  - persistent epistaxis in the setting of anticoagulation   - H/H stable at 12/39  - daily CBC   - ENT consulted;   rhino rockets can stay as long as 5 days if needed.        Pulmonary  Chronic obstructive pulmonary disease  - daily CXRs while intubated   - goal SpO2 is > 88%  - scheduled duo nebs q6h    Acute on chronic respiratory failure with hypoxia and hypercapnia  - Pt intubated and sedated. Daily CXRs and abg     Cardiac/Vascular  Atrial fibrillation with rapid ventricular response  - Repeat EKG   - daily Mg and Phos; replace as needed  - continue dilt gtt today  - loaded with digoxin, pending dig level today  - initiated heparin gtt minimal intensity- when aptt at goal obtain CT head    Essential hypertension  - SBP goal < 160    Renal/  GOYO (acute kidney injury)  - BUN/Cr: 47/ 1.5  - trending down    GI  GI bleed  - upper gi bleed at outside hospital  - hold starting anticoagulation due to recent upper GI bleed at outside facility and epistaxis (2 rhino rockets)  - consult GI -Continue PPI b.i.d. Trend hemoglobin, transfuse hemoglobin <7  -9/1/2020-start Tube feeds          The patient is being Prophylaxed for:  Venous Thromboembolism with: Chemical  Stress Ulcer with: PPI  Ventilator Pneumonia with: chlorhexidine oral care    Activity Orders          Diet NPO: NPO starting at 08/30 0250        Full Code    Lona  DIVYA Contreras  Neurocritical Care  [facility]    Critical care time > 31 min.

## 2020-09-01 NOTE — PROGRESS NOTES
Pharmacist Renal Dose Adjustment Note    Donita Gonzalez is a 64 y.o. female being treated with the medication cefepime    Recent Labs   Lab 08/30/20  0256 08/31/20  0207 09/01/20  0235   CREATININE 2.1* 1.7* 1.5*     Serum creatinine: 1.5 mg/dL (H) 09/01/20 0235  Estimated creatinine clearance: 34.1 mL/min (A)    Cefepime 1 gm Q24H will be renally adjusted to 1 gm Q12H for CrCl >30 mL/min. SCr improving since admission, good UOP.      Judi Lin, PharmD, [facility]  Neurocritical Care Pharmacist  [id number]

## 2020-09-01 NOTE — SUBJECTIVE & OBJECTIVE
Review of Systems   Unable to obtain a complete ROS due to level of consciousness.  Objective:     Vitals:  Temp: 97.8 °F (36.6 °C)  Pulse: 83  Rhythm: atrial rhythm  BP: 126/69  MAP (mmHg): 93  Resp: 15  SpO2: 95 %  Oxygen Concentration (%): 40  O2 Device (Oxygen Therapy): ventilator  Vent Mode: A/C  Set Rate: 15 BPM  Vt Set: 400 mL  PEEP/CPAP: 5 cmH20  Peak Airway Pressure: 42 cmH2O  Mean Airway Pressure: 11 cmH20  Plateau Pressure: 12 cmH20    Temp  Min: 97.8 °F (36.6 °C)  Max: 98.6 °F (37 °C)  Pulse  Min: 19  Max: 109  BP  Min: 126/69  Max: 176/79  MAP (mmHg)  Min: 86  Max: 118  Resp  Min: 13  Max: 39  SpO2  Min: 88 %  Max: 99 %  Oxygen Concentration (%)  Min: 40  Max: 50    08/31 0701 - 09/01 0700  In: 427.5 [I.V.:427.5]  Out: 1310 [Urine:1310]   Unmeasured Output  Stool Occurrence: 0  Emesis Occurrence: 0  Pad Count: 0       Physical Exam  GA: comfortable, no acute distress.   HEENT: No scleral icterus or JVD.   Pulmonary: Diminished to auscultation A/L.   Cardiac: RRR S1 & S2 w/o rubs/murmurs/gallops.   Abdominal: Bowel sounds present x 4. No appreciable hepatosplenomegaly.  Skin: No jaundice, rashes, or visible lesions.  Neuro:  --GCS: E2 VT1 M6  --Mental Status:  Opens eyes to pain intermittently follows simple commands on the Right side  --CN II-XII grossly intact.   --Pupils l pupil 3 mm, R pupil 6mm fixed  --Corneal reflex, gag, cough intact.  --LUE withdraws  --RUE spont  --LLE withdraws  --RLE spont    Medications:  Continuousdexmedetomidine (PRECEDEX) infusion, Last Rate: 1.4 mcg/kg/hr (09/01/20 1005)  dextrose 10 % in water (D10W)  dilTIAZem, Last Rate: 7.5 mg/hr (09/01/20 1005)  heparin (porcine) in D5W, Last Rate: 12 Units/kg/hr (09/01/20 1005)    Scheduledalbuterol-ipratropium, 3 mL, Q6H  atorvastatin, 40 mg, Daily  ceFEPime (MAXIPIME) IVPB, 1 g, Q24H  [START ON 9/2/2020] digoxin, 0.125 mg, Daily  pantoprozole (PROTONIX) IV, 40 mg, Q12H  senna-docusate 8.6-50 mg, 1 tablet, BID  valproic acid  (as sodium salt), 250 mg, Q8H    PRNacetaminophen, 650 mg, Q6H PRN  dextrose 10 % in water (D10W), , Continuous PRN  dextrose 50%, 12.5 g, PRN  fentaNYL, 50 mcg, Q2H PRN  glucagon (human recombinant), 1 mg, PRN  insulin aspart U-100, 1-10 Units, Q4H PRN  metoprolol, 5 mg, Q10 Min PRN  ondansetron, 4 mg, Q6H PRN      Today I personally reviewed pertinent medications, lines/drains/airways, imaging, cardiology results, laboratory results,    Diet  Diet NPO

## 2020-09-01 NOTE — ASSESSMENT & PLAN NOTE
- upper gi bleed at outside hospital  - hold starting anticoagulation due to recent upper GI bleed at outside facility and epistaxis (2 rhino rockets)  - consult GI -Continue PPI b.i.d. Trend hemoglobin, transfuse hemoglobin <7  -9/1/2020-start Tube feeds

## 2020-09-01 NOTE — PLAN OF CARE
[facility] Care Plan    POC reviewed with Donita Gonzalez and  and kids at 1600. Pt unable to verbalize understanding due to ETT, sedation, and LOC. Questions and concerns addressed. Pt  at bedside, verbalized understanding of POC. Pt neuro exam stable. Keeping pt sedated on precedex for agitation. Started heparin gtt today for Afib, once therapeutic plan is to get head CT. Depending on CT results, hoping to extubate then. Started TF today, increasing isosource to goal of 40ml/hr as tolerated, no issues with residuals, increasing per order. APTT q 6 following heparin gtt rate changes per nomogram. Saenz in place. Rosanne removed. Dilt, precedex, and heparin continued per order. Restraints in place. No acute events today. Pt progressing toward goals. Will continue to monitor. See below and flowsheets for full assessment and VS info.       Neuro:  [place] Coma Scale  Best Eye Response: 2-->(E2) to pain  Best Motor Response: 6-->(M6) obeys commands  Best Verbal Response: 1-->(V1) none  Manuel Coma Scale Score: 9  Assessment Qualifiers: patient intubated, no eye obstruction present  Pupil PERRLA: other (see comments)(cataract)     24 hr Temp:  [97.8 °F (36.6 °C)-98.4 °F (36.9 °C)]     CV:   Rhythm: atrial rhythm  BP goals:   SBP < 160  MAP > 65    Resp:   O2 Device (Oxygen Therapy): ventilator  Vent Mode: A/C  Set Rate: 15 BPM  Oxygen Concentration (%): 40  Vt Set: 400 mL  PEEP/CPAP: 5 cmH20    Plan: wean to extubate--holding off on extubation until CT scan    GI/:  ROXANE Total Score: 1  Diet/Nutrition Received: NPO, tube feeding  Last Bowel Movement: 08/26/20  Voiding Characteristics: urethral catheter (bladder)    Intake/Output Summary (Last 24 hours) at 9/1/2020 1753  Last data filed at 9/1/2020 1705  Gross per 24 hour   Intake 911.13 ml   Output 1580 ml   Net -668.87 ml     Unmeasured Output  Stool Occurrence: 0  Emesis Occurrence: 0  Pad Count: 0    Labs/Accuchecks:  Recent Labs   Lab 09/01/20  0904   WBC  11.87   RBC 4.15   HGB 12.7   HCT 39.2   *      Recent Labs   Lab 09/01/20  0235      K 4.1   CO2 30*      BUN 47*   CREATININE 1.5*   ALKPHOS 79   ALT 25   AST 33   BILITOT 0.5      Recent Labs   Lab 09/01/20  0904 09/01/20  1408   INR 1.0  --    APTT 23.3 29.3      Recent Labs   Lab 08/26/20  1832   TROPONINI 0.07*       Electrolytes: N/A - electrolytes WDL  Accuchecks: Q4H    Gtts:   dexmedetomidine (PRECEDEX) infusion 1.4 mcg/kg/hr (09/01/20 1705)    dextrose 10 % in water (D10W)      dilTIAZem 5 mg/hr (09/01/20 1705)    heparin (porcine) in D5W 14 Units/kg/hr (09/01/20 1705)       LDA/Wounds:  Lines/Drains/Airways       Drain                   NG/OG Tube 08/29/20 2215 orogastric Left mouth 2 days         Urethral Catheter 08/29/20 2215 2 days              Airway                   Airway - Non-Surgical 08/26/20 0931 Endotracheal Tube 6 days              Peripheral Intravenous Line                   Peripheral IV - Single Lumen 08/30/20 1200 18 G Left;Posterior Forearm 2 days         Peripheral IV - Single Lumen 08/31/20 1000 18 G Left Upper Arm 1 day                  Wounds: No  Wound care consulted: No

## 2020-09-01 NOTE — PLAN OF CARE
OT evaluation completed.  RORY Thomas  9/1/2020    Problem: Occupational Therapy Goal  Goal: Occupational Therapy Goal  Description: Goals set 9/1 to be addressed for 14 days with expiration date, 9/14:  Patient will increase functional independence with ADLs by performing:    Patient will demonstrate rolling to the right with mod assist.  Not met   Patient will demonstrate rolling to the left with mod assist.   Not met  Patient will demonstrate supine -sit with mod assist.   Not met  Patient will demonstrate stand pivot transfers with mod assist.   Not met  Patient will demonstrate grooming while seated with mod assist.   Not met  Patient will demonstrate upper body dressing with mod assist while seated EOB.   Not met  Patient will demonstrate lower body dressing with max assist while seated EOB.   Not met  Patient will demonstrate toileting with max assist.   Not met  Patient will demonstrate bathing while seated EOB with max assist.   Not met  Patient's family / caregiver will demonstrate independence and safety with assisting patient with self-care skills and functional mobility.     Not met  Patient's family / caregiver will demonstrate independence with providing ROM and changes in bed positioning.   Not met  Patient and/or patient's family will verbalize understanding of stroke prevention guidelines, personal risk factors and stroke warning signs via teachback method.  Not met             Outcome: Ongoing, Progressing

## 2020-09-01 NOTE — ASSESSMENT & PLAN NOTE
-Patient had uncontrolled epistaxis that was attributed to NGT placement and AC.  -Lovenox --> heparin infusion 9/1/20   -ENT consulted; signed off 8/31; leave nasal packing in for 5 days per ENT note 8/31  -Managed by [facility]

## 2020-09-01 NOTE — HOSPITAL COURSE
/9/418/31: Patient intubated. Rhinorockets in place; ENT sign off.   9/1: Patient remains intubated; heparin restarted; once therapeutic, CT head and plan for extubation. Dilt for AFib RVR  9/2: Patient remains intubated  9/3: Patient remains intubated; alert on Precedex. Transitioned to po dilt for Afib.   9/4: Patient remains intubated; alert on Precedex, rhinorockets removed  9/7: no change, intubated and sedated on Propofol  9/8: WBC increase, hypotensive on propofol --> Precedex. Pan cx. Gen surg consulted for PEG / trach  9/9: Patient agitated off sedation, spontaneously moving right UE and LE. Planning for trach / PEG on Friday   9/10: On Precedex 0.4 and agitated, still spontaneously moving right UE And LE

## 2020-09-01 NOTE — PT/OT/SLP EVAL
Occupational Therapy   Evaluation    Name: Donita Gonzalez  MRN: [id number]  Admitting Diagnosis:  Embolic stroke involving right middle cerebral artery      Recommendations:     Discharge Recommendations: (pending extubation)  Discharge Equipment Recommendations:  (pending extubation)  Barriers to discharge:  None    Assessment:     Donita Gonzalez is a 64 y.o. female with a medical diagnosis of Embolic stroke involving right middle cerebral artery.  She presents with performance deficits affecting function: weakness, impaired endurance, impaired cognition, decreased ROM, abnormal tone, impaired coordination, decreased coordination, impaired sensation, impaired self care skills, decreased upper extremity function, impaired fine motor, decreased lower extremity function, impaired functional mobilty, gait instability, decreased safety awareness, impaired balance, impaired cardiopulmonary response to activity.      Rehab Prognosis: Good; patient would benefit from acute skilled OT services to address these deficits and reach maximum level of function.       Plan:     Patient to be seen 3 x/week to address the above listed problems via self-care/home management, therapeutic activities, therapeutic exercises, neuromuscular re-education, cognitive retraining, sensory integration  · Plan of Care Expires: 09/27/20  · Plan of Care Reviewed with: patient    Subjective     Patient:  Nonverbal; intubated     Occupational Profile:  Per chart review:  Patient resides in [place] with  (who works) and son in one story home with no steps to enter.  PTA patient independent with ADLs.  Currently owns no DME.  Retired Nurse.    Pain/Comfort:  · Pain Rating 1: 0/10  · Pain Rating Post-Intervention 1: 0/10    Patients cultural, spiritual, Amish conflicts given the current situation: no    Objective:     Communicated with: Nurse prior to session.  Patient found supine with bed alarm, blood pressure cuff, barr catheter, peripheral IV,  pulse ox (continuous), restraints, telemetry, SCD, arterial line, ventilator upon OT entry to room.  Family not present.    General Precautions: Standard, aspiration, fall, NPO, seizure   Orthopedic Precautions:N/A   Braces: N/A     Occupational Performance:    Bed Mobility:    · Patient completed Rolling/Turning to Left with  total assistance  · Patient completed Rolling/Turning to Right with total assistance    Functional Mobility/Transfers:  · Dependent drawsheet transfers    Activities of Daily Living:  · Feeding:  NPO    · Grooming: total assistance while supine    Cognitive/Visual Perceptual:  Cognitive/Psychosocial Skills:     -       Oriented to: Daily orientation provided   -       Follows Commands/attention:unable to follow commands  -       Communication: nonverbal; intubated    Physical Exam:  Postural examination/scapula alignment:    -       Rounded shoulders  Skin integrity: Visible skin intact  Edema:  None noted    AMPAC 6 Click ADL:  AMPAC Total Score: 6    Treatment & Education:  Patient education provided on role of OT, ROM, positioning, daily orientation and need for continued OT upon discharge.  Daily orientation provided.   PROM performed left UE/LE and AAROM right UE/LEs one set x 10 rep in all planes of motion with stretches provided at end range; sustained stretch provided for ankle dorsiflexion.  Assistance and facilitation provided for upward rotation of the scapula during shoulder flexion and abduction to promote orientation of glenoid fossa of scapula to humeral head for prevention of post-stroke hemiplegic pain.  Patient kept eyes closed 100% of the session.  Patient unable to follow commands.  Provided multi-sensory stimulation to prevent sensory deprivation and improve clinical outcomes.  Positioning provided for midline orientation with bilateral UEs elevated and heels lifted off mattress; positioning provided to prevent flexor synergy pattern in UE (internal rotation and adduction)  to decrease risk of post-stroke hemiplegic pain.   Gentle cervical rotation provided.     Education:    Patient left supine with all lines intact, call button in reach, bed alarm on and restraints reapplied at end of session    GOALS:   Multidisciplinary Problems     Occupational Therapy Goals        Problem: Occupational Therapy Goal    Goal Priority Disciplines Outcome Interventions   Occupational Therapy Goal     OT, PT/OT Ongoing, Progressing    Description: Goals set 9/1 to be addressed for 14 days with expiration date, 9/14:  Patient will increase functional independence with ADLs by performing:    Patient will demonstrate rolling to the right with mod assist.  Not met   Patient will demonstrate rolling to the left with mod assist.   Not met  Patient will demonstrate supine -sit with mod assist.   Not met  Patient will demonstrate stand pivot transfers with mod assist.   Not met  Patient will demonstrate grooming while seated with mod assist.   Not met  Patient will demonstrate upper body dressing with mod assist while seated EOB.   Not met  Patient will demonstrate lower body dressing with max assist while seated EOB.   Not met  Patient will demonstrate toileting with max assist.   Not met  Patient will demonstrate bathing while seated EOB with max assist.   Not met  Patient's family / caregiver will demonstrate independence and safety with assisting patient with self-care skills and functional mobility.     Not met  Patient's family / caregiver will demonstrate independence with providing ROM and changes in bed positioning.   Not met  Patient and/or patient's family will verbalize understanding of stroke prevention guidelines, personal risk factors and stroke warning signs via teachback method.  Not met                              History:     Past Medical History:   Diagnosis Date    COPD (chronic obstructive pulmonary disease)     Hypertension        Past Surgical History:   Procedure Laterality Date     AUGMENTATION OF BREAST      HYSTERECTOMY         Time Tracking:     OT Date of Treatment: 09/01/20  OT Start Time: 0430  OT Stop Time: 0450  OT Total Time (min): 20 min    Billable Minutes:Evaluation 10  Therapeutic Exercise 10    RORY Thomas  9/1/2020

## 2020-09-01 NOTE — ASSESSMENT & PLAN NOTE
Donita Gonzalez is a 64 y.o. female with a significant medical history of COPD, HTN, smoker who presents to the hospital as a transfer from [facility] for further evaluation of R MCA stroke.  The patient did not receive tPA due to LKN 8/25/2020 and presenting to the OSH on 8/26/2020.      Antithrombotics for secondary stroke prevention: Anticoagulants: Patient was on full dose Lovenox that was dc'd 2/2 uncontrolled epistaxis. Heparin infusion started 9/1/20 for A/C.  Repeat CTH pending once therapeutic.    Statins for secondary stroke prevention and hyperlipidemia, if present:   Statins: Atorvastatin- 40 mg daily    Aggressive risk factor modification: HTN, Smoking, HLD, A-Fib     Rehab efforts: The patient has been evaluated by a stroke team provider and the therapy needs have been fully considered based off the presenting complaints and exam findings. The following therapy evaluations are needed: PT/OT/SLP evaluations when the patient is able to participate    Diagnostics ordered/pending: CT scan of head without contrast to asses brain parenchyma    VTE prophylaxis: Mechanical prophylaxis: Place SCDs and now heparin infusion     BP parameters: Infarct: No intervention, SBP <220

## 2020-09-01 NOTE — ASSESSMENT & PLAN NOTE
- Repeat EKG   - daily Mg and Phos; replace as needed  - continue dilt gtt today  - loaded with digoxin, pending dig level today  - initiated heparin gtt minimal intensity- when aptt at goal obtain CT head

## 2020-09-02 LAB
ALBUMIN SERPL BCP-MCNC: 2.3 G/DL (ref 3.5–5.2)
ALLENS TEST: ABNORMAL
ALP SERPL-CCNC: 77 U/L (ref 55–135)
ALT SERPL W/O P-5'-P-CCNC: 29 U/L (ref 10–44)
ANION GAP SERPL CALC-SCNC: 8 MMOL/L (ref 8–16)
APTT BLDCRRT: 45 SEC (ref 21–32)
AST SERPL-CCNC: 36 U/L (ref 10–40)
BACTERIA SPEC AEROBE CULT: ABNORMAL
BASOPHILS # BLD AUTO: 0.03 K/UL (ref 0–0.2)
BASOPHILS # BLD AUTO: 0.03 K/UL (ref 0–0.2)
BASOPHILS NFR BLD: 0.3 % (ref 0–1.9)
BASOPHILS NFR BLD: 0.3 % (ref 0–1.9)
BILIRUB SERPL-MCNC: 0.5 MG/DL (ref 0.1–1)
BUN SERPL-MCNC: 38 MG/DL (ref 8–23)
CALCIUM SERPL-MCNC: 8.9 MG/DL (ref 8.7–10.5)
CHLORIDE SERPL-SCNC: 103 MMOL/L (ref 95–110)
CO2 SERPL-SCNC: 32 MMOL/L (ref 23–29)
CREAT SERPL-MCNC: 1.2 MG/DL (ref 0.5–1.4)
DIFFERENTIAL METHOD: ABNORMAL
DIFFERENTIAL METHOD: ABNORMAL
EOSINOPHIL # BLD AUTO: 0.3 K/UL (ref 0–0.5)
EOSINOPHIL # BLD AUTO: 0.3 K/UL (ref 0–0.5)
EOSINOPHIL NFR BLD: 2.5 % (ref 0–8)
EOSINOPHIL NFR BLD: 2.5 % (ref 0–8)
ERYTHROCYTE [DISTWIDTH] IN BLOOD BY AUTOMATED COUNT: 14.1 % (ref 11.5–14.5)
ERYTHROCYTE [DISTWIDTH] IN BLOOD BY AUTOMATED COUNT: 14.1 % (ref 11.5–14.5)
EST. GFR  (AFRICAN AMERICAN): 55.2 ML/MIN/1.73 M^2
EST. GFR  (NON AFRICAN AMERICAN): 47.9 ML/MIN/1.73 M^2
GLUCOSE SERPL-MCNC: 128 MG/DL (ref 70–110)
GRAM STN SPEC: ABNORMAL
HCO3 UR-SCNC: 32.1 MMOL/L (ref 24–28)
HCT VFR BLD AUTO: 40.3 % (ref 37–48.5)
HCT VFR BLD AUTO: 40.3 % (ref 37–48.5)
HGB BLD-MCNC: 12.6 G/DL (ref 12–16)
HGB BLD-MCNC: 12.6 G/DL (ref 12–16)
IMM GRANULOCYTES # BLD AUTO: 0.18 K/UL (ref 0–0.04)
IMM GRANULOCYTES # BLD AUTO: 0.18 K/UL (ref 0–0.04)
IMM GRANULOCYTES NFR BLD AUTO: 1.5 % (ref 0–0.5)
IMM GRANULOCYTES NFR BLD AUTO: 1.5 % (ref 0–0.5)
LYMPHOCYTES # BLD AUTO: 0.4 K/UL (ref 1–4.8)
LYMPHOCYTES # BLD AUTO: 0.4 K/UL (ref 1–4.8)
LYMPHOCYTES NFR BLD: 3.3 % (ref 18–48)
LYMPHOCYTES NFR BLD: 3.3 % (ref 18–48)
MAGNESIUM SERPL-MCNC: 1.8 MG/DL (ref 1.6–2.6)
MCH RBC QN AUTO: 30.4 PG (ref 27–31)
MCH RBC QN AUTO: 30.4 PG (ref 27–31)
MCHC RBC AUTO-ENTMCNC: 31.3 G/DL (ref 32–36)
MCHC RBC AUTO-ENTMCNC: 31.3 G/DL (ref 32–36)
MCV RBC AUTO: 97 FL (ref 82–98)
MCV RBC AUTO: 97 FL (ref 82–98)
MONOCYTES # BLD AUTO: 1.5 K/UL (ref 0.3–1)
MONOCYTES # BLD AUTO: 1.5 K/UL (ref 0.3–1)
MONOCYTES NFR BLD: 12.6 % (ref 4–15)
MONOCYTES NFR BLD: 12.6 % (ref 4–15)
NEUTROPHILS # BLD AUTO: 9.3 K/UL (ref 1.8–7.7)
NEUTROPHILS # BLD AUTO: 9.3 K/UL (ref 1.8–7.7)
NEUTROPHILS NFR BLD: 79.8 % (ref 38–73)
NEUTROPHILS NFR BLD: 79.8 % (ref 38–73)
NRBC BLD-RTO: 0 /100 WBC
NRBC BLD-RTO: 0 /100 WBC
PCO2 BLDA: 43 MMHG (ref 35–45)
PH SMN: 7.48 [PH] (ref 7.35–7.45)
PHOSPHATE SERPL-MCNC: 2.7 MG/DL (ref 2.7–4.5)
PLATELET # BLD AUTO: 410 K/UL (ref 150–350)
PLATELET # BLD AUTO: 410 K/UL (ref 150–350)
PMV BLD AUTO: 11 FL (ref 9.2–12.9)
PMV BLD AUTO: 11 FL (ref 9.2–12.9)
PO2 BLDA: 73 MMHG (ref 80–100)
POC BE: 9 MMOL/L
POC SATURATED O2: 95 % (ref 95–100)
POC TCO2: 33 MMOL/L (ref 23–27)
POCT GLUCOSE: 110 MG/DL (ref 70–110)
POCT GLUCOSE: 134 MG/DL (ref 70–110)
POCT GLUCOSE: 135 MG/DL (ref 70–110)
POTASSIUM SERPL-SCNC: 3.8 MMOL/L (ref 3.5–5.1)
PROT SERPL-MCNC: 6.1 G/DL (ref 6–8.4)
RBC # BLD AUTO: 4.14 M/UL (ref 4–5.4)
RBC # BLD AUTO: 4.14 M/UL (ref 4–5.4)
SAMPLE: ABNORMAL
SITE: ABNORMAL
SODIUM SERPL-SCNC: 143 MMOL/L (ref 136–145)
WBC # BLD AUTO: 11.66 K/UL (ref 3.9–12.7)
WBC # BLD AUTO: 11.66 K/UL (ref 3.9–12.7)

## 2020-09-02 PROCEDURE — 25000003 PHARM REV CODE 250: Performed by: STUDENT IN AN ORGANIZED HEALTH CARE EDUCATION/TRAINING PROGRAM

## 2020-09-02 PROCEDURE — 97803 MED NUTRITION INDIV SUBSEQ: CPT

## 2020-09-02 PROCEDURE — 63600175 PHARM REV CODE 636 W HCPCS: Performed by: PSYCHIATRY & NEUROLOGY

## 2020-09-02 PROCEDURE — 63600175 PHARM REV CODE 636 W HCPCS

## 2020-09-02 PROCEDURE — 27000221 HC OXYGEN, UP TO 24 HOURS

## 2020-09-02 PROCEDURE — 99900026 HC AIRWAY MAINTENANCE (STAT)

## 2020-09-02 PROCEDURE — 94640 AIRWAY INHALATION TREATMENT: CPT

## 2020-09-02 PROCEDURE — 25000242 PHARM REV CODE 250 ALT 637 W/ HCPCS: Performed by: NURSE PRACTITIONER

## 2020-09-02 PROCEDURE — 84100 ASSAY OF PHOSPHORUS: CPT

## 2020-09-02 PROCEDURE — 25000003 PHARM REV CODE 250: Performed by: UROLOGY

## 2020-09-02 PROCEDURE — 63600175 PHARM REV CODE 636 W HCPCS: Performed by: PHYSICIAN ASSISTANT

## 2020-09-02 PROCEDURE — 63600175 PHARM REV CODE 636 W HCPCS: Performed by: NURSE PRACTITIONER

## 2020-09-02 PROCEDURE — C9113 INJ PANTOPRAZOLE SODIUM, VIA: HCPCS | Performed by: PHYSICIAN ASSISTANT

## 2020-09-02 PROCEDURE — 99900035 HC TECH TIME PER 15 MIN (STAT)

## 2020-09-02 PROCEDURE — 85730 THROMBOPLASTIN TIME PARTIAL: CPT

## 2020-09-02 PROCEDURE — 94003 VENT MGMT INPAT SUBQ DAY: CPT

## 2020-09-02 PROCEDURE — 94761 N-INVAS EAR/PLS OXIMETRY MLT: CPT

## 2020-09-02 PROCEDURE — 82803 BLOOD GASES ANY COMBINATION: CPT

## 2020-09-02 PROCEDURE — 99291 CRITICAL CARE FIRST HOUR: CPT | Mod: ,,, | Performed by: PSYCHIATRY & NEUROLOGY

## 2020-09-02 PROCEDURE — 97110 THERAPEUTIC EXERCISES: CPT

## 2020-09-02 PROCEDURE — 25000003 PHARM REV CODE 250: Performed by: PHYSICIAN ASSISTANT

## 2020-09-02 PROCEDURE — 99291 PR CRITICAL CARE, E/M 30-74 MINUTES: ICD-10-PCS | Mod: ,,, | Performed by: PSYCHIATRY & NEUROLOGY

## 2020-09-02 PROCEDURE — 20000000 HC ICU ROOM

## 2020-09-02 PROCEDURE — 27200966 HC CLOSED SUCTION SYSTEM

## 2020-09-02 PROCEDURE — 83735 ASSAY OF MAGNESIUM: CPT

## 2020-09-02 PROCEDURE — 80053 COMPREHEN METABOLIC PANEL: CPT

## 2020-09-02 PROCEDURE — 25000003 PHARM REV CODE 250: Performed by: NURSE PRACTITIONER

## 2020-09-02 PROCEDURE — 37799 UNLISTED PX VASCULAR SURGERY: CPT

## 2020-09-02 PROCEDURE — 82800 BLOOD PH: CPT

## 2020-09-02 PROCEDURE — 85025 COMPLETE CBC W/AUTO DIFF WBC: CPT

## 2020-09-02 RX ORDER — LANOLIN ALCOHOL/MO/W.PET/CERES
800 CREAM (GRAM) TOPICAL
Status: DISCONTINUED | OUTPATIENT
Start: 2020-09-02 | End: 2020-09-15

## 2020-09-02 RX ORDER — POTASSIUM CHLORIDE 1.5 G/1.58G
40 POWDER, FOR SOLUTION ORAL
Status: DISCONTINUED | OUTPATIENT
Start: 2020-09-02 | End: 2020-09-16 | Stop reason: HOSPADM

## 2020-09-02 RX ORDER — SODIUM,POTASSIUM PHOSPHATES 280-250MG
2 POWDER IN PACKET (EA) ORAL
Status: DISCONTINUED | OUTPATIENT
Start: 2020-09-02 | End: 2020-09-15

## 2020-09-02 RX ORDER — POLYETHYLENE GLYCOL 3350 17 G/17G
17 POWDER, FOR SOLUTION ORAL DAILY
Status: DISCONTINUED | OUTPATIENT
Start: 2020-09-02 | End: 2020-09-15

## 2020-09-02 RX ORDER — DILTIAZEM HYDROCHLORIDE 60 MG/1
60 TABLET, FILM COATED ORAL EVERY 8 HOURS
Status: DISCONTINUED | OUTPATIENT
Start: 2020-09-02 | End: 2020-09-03

## 2020-09-02 RX ORDER — MIDAZOLAM HYDROCHLORIDE 1 MG/ML
INJECTION INTRAMUSCULAR; INTRAVENOUS
Status: COMPLETED
Start: 2020-09-02 | End: 2020-09-02

## 2020-09-02 RX ORDER — POTASSIUM CHLORIDE 1.5 G/1.58G
40 POWDER, FOR SOLUTION ORAL
Status: DISCONTINUED | OUTPATIENT
Start: 2020-09-02 | End: 2020-09-15

## 2020-09-02 RX ORDER — POTASSIUM CHLORIDE 1.5 G/1.58G
60 POWDER, FOR SOLUTION ORAL
Status: DISCONTINUED | OUTPATIENT
Start: 2020-09-02 | End: 2020-09-15

## 2020-09-02 RX ORDER — FUROSEMIDE 10 MG/ML
40 INJECTION INTRAMUSCULAR; INTRAVENOUS ONCE
Status: COMPLETED | OUTPATIENT
Start: 2020-09-02 | End: 2020-09-02

## 2020-09-02 RX ADMIN — IPRATROPIUM BROMIDE AND ALBUTEROL SULFATE 3 ML: .5; 2.5 SOLUTION RESPIRATORY (INHALATION) at 12:09

## 2020-09-02 RX ADMIN — MAGNESIUM GLUCONATE 500 MG ORAL TABLET 800 MG: 500 TABLET ORAL at 10:09

## 2020-09-02 RX ADMIN — MIDAZOLAM 1 MG: 1 INJECTION INTRAMUSCULAR; INTRAVENOUS at 01:09

## 2020-09-02 RX ADMIN — DILTIAZEM HYDROCHLORIDE 60 MG: 60 TABLET, FILM COATED ORAL at 10:09

## 2020-09-02 RX ADMIN — DEXMEDETOMIDINE HYDROCHLORIDE 1.4 MCG/KG/HR: 4 INJECTION, SOLUTION INTRAVENOUS at 04:09

## 2020-09-02 RX ADMIN — ATORVASTATIN CALCIUM 40 MG: 20 TABLET, FILM COATED ORAL at 08:09

## 2020-09-02 RX ADMIN — POTASSIUM & SODIUM PHOSPHATES POWDER PACK 280-160-250 MG 2 PACKET: 280-160-250 PACK at 03:09

## 2020-09-02 RX ADMIN — IPRATROPIUM BROMIDE AND ALBUTEROL SULFATE 3 ML: .5; 2.5 SOLUTION RESPIRATORY (INHALATION) at 01:09

## 2020-09-02 RX ADMIN — DILTIAZEM HYDROCHLORIDE 60 MG: 60 TABLET, FILM COATED ORAL at 03:09

## 2020-09-02 RX ADMIN — CEFEPIME 1 G: 1 INJECTION, POWDER, FOR SOLUTION INTRAMUSCULAR; INTRAVENOUS at 02:09

## 2020-09-02 RX ADMIN — DEXMEDETOMIDINE HYDROCHLORIDE 1.4 MCG/KG/HR: 4 INJECTION, SOLUTION INTRAVENOUS at 08:09

## 2020-09-02 RX ADMIN — FENTANYL CITRATE 50 MCG: 50 INJECTION INTRAMUSCULAR; INTRAVENOUS at 01:09

## 2020-09-02 RX ADMIN — DOCUSATE SODIUM 50MG AND SENNOSIDES 8.6MG 1 TABLET: 8.6; 5 TABLET, FILM COATED ORAL at 08:09

## 2020-09-02 RX ADMIN — DEXMEDETOMIDINE HYDROCHLORIDE 0.5 MCG/KG/HR: 4 INJECTION, SOLUTION INTRAVENOUS at 10:09

## 2020-09-02 RX ADMIN — HEPARIN SODIUM AND DEXTROSE 14 UNITS/KG/HR: 10000; 5 INJECTION INTRAVENOUS at 03:09

## 2020-09-02 RX ADMIN — POLYETHYLENE GLYCOL 3350 17 G: 17 POWDER, FOR SOLUTION ORAL at 10:09

## 2020-09-02 RX ADMIN — DOCUSATE SODIUM 50MG AND SENNOSIDES 8.6MG 1 TABLET: 8.6; 5 TABLET, FILM COATED ORAL at 12:09

## 2020-09-02 RX ADMIN — POTASSIUM CHLORIDE 40 MEQ: 1.5 POWDER, FOR SOLUTION ORAL at 10:09

## 2020-09-02 RX ADMIN — POTASSIUM & SODIUM PHOSPHATES POWDER PACK 280-160-250 MG 2 PACKET: 280-160-250 PACK at 10:09

## 2020-09-02 RX ADMIN — HYDRALAZINE HYDROCHLORIDE 10 MG: 20 INJECTION INTRAMUSCULAR; INTRAVENOUS at 12:09

## 2020-09-02 RX ADMIN — VALPROIC ACID 250 MG: 250 SOLUTION ORAL at 09:09

## 2020-09-02 RX ADMIN — DIGOXIN 0.12 MG: 125 TABLET ORAL at 08:09

## 2020-09-02 RX ADMIN — MAGNESIUM GLUCONATE 500 MG ORAL TABLET 800 MG: 500 TABLET ORAL at 03:09

## 2020-09-02 RX ADMIN — VALPROIC ACID 250 MG: 250 SOLUTION ORAL at 06:09

## 2020-09-02 RX ADMIN — IPRATROPIUM BROMIDE AND ALBUTEROL SULFATE 3 ML: .5; 2.5 SOLUTION RESPIRATORY (INHALATION) at 07:09

## 2020-09-02 RX ADMIN — VALPROIC ACID 250 MG: 250 SOLUTION ORAL at 03:09

## 2020-09-02 RX ADMIN — PANTOPRAZOLE SODIUM 40 MG: 40 INJECTION, POWDER, LYOPHILIZED, FOR SOLUTION INTRAVENOUS at 09:09

## 2020-09-02 RX ADMIN — DILTIAZEM HYDROCHLORIDE 60 MG: 60 TABLET, FILM COATED ORAL at 09:09

## 2020-09-02 RX ADMIN — DOCUSATE SODIUM 50MG AND SENNOSIDES 8.6MG 1 TABLET: 8.6; 5 TABLET, FILM COATED ORAL at 09:09

## 2020-09-02 RX ADMIN — DEXMEDETOMIDINE HYDROCHLORIDE 1.4 MCG/KG/HR: 4 INJECTION, SOLUTION INTRAVENOUS at 06:09

## 2020-09-02 RX ADMIN — FUROSEMIDE 40 MG: 10 INJECTION, SOLUTION INTRAMUSCULAR; INTRAVENOUS at 10:09

## 2020-09-02 RX ADMIN — CEFEPIME 1 G: 1 INJECTION, POWDER, FOR SOLUTION INTRAMUSCULAR; INTRAVENOUS at 01:09

## 2020-09-02 RX ADMIN — PANTOPRAZOLE SODIUM 40 MG: 40 INJECTION, POWDER, LYOPHILIZED, FOR SOLUTION INTRAVENOUS at 08:09

## 2020-09-02 NOTE — PHYSICIAN QUERY
PT Name: Donita Gonzalez  MR #: [id number]    Atrial Fibrillation Specificity Clarification     CDS/: Karine Crockett RN, CDS              Contact information: [email or web address]   This form is a permanent document in the medical record.     Query Date: September 2, 2020    By submitting this query, we are merely seeking further clarification of documentation. Please utilize your independent clinical judgment when addressing the question(s) below.    The medical record contains the following:   Indicators Supporting Clinical Findings Location in Medical Record   x Atrial Fibrillation --Hospital course complicated by Afib with RVR in which she was started on lovenox and then transitioned to heparin gtt for GOYO and HFrEF  --Atrial fibrillation with rapid ventricular response  - Repeat EKG   - daily Mg and Phos; replace as needed  - dilt gtt d/c in the setting of hypotension   - metoprolol PRN     --Atrial fibrillation with rapid ventricular response  -Stroke risk factor.  The patient was on full dose lovenox --> heparin infusion now given epistasix  -Continue Diltiazem gtt; Digoxin loaded 9/1/20  -Recommend CTH prior to initiating AC   NCC H&P 8/30                  Vas neuro Note 9/1   x EKG results --Atrial fibrillation with rapid ventricular response with premature ventricular or aberrantly conducted complexes    EKG 8/26   x Medication -Continue Diltiazem gtt; Digoxin loaded 9/1/20   Vas neuro Note 9/1    Treatment      Other         Provider, please further specify the type of Atrial Fibrillation:    [ x ] Paroxysmal - defined as AF that terminates spontaneously or with intervention within seven days of onset, Episodes may recur with variable frequency     [  ] Other Persistent - defined as AF that fails to self-terminate within seven days, Episodes often require pharmacologic or electrical cardioversion to restore sinus rhythm     [  ] Unspecified Atrial Fibrillation     [  ] Other Cardiac diagnosis  (please specify): ____________   [  ] Clinically Undetermined         Please document in your progress notes daily for the duration of treatment until resolved, and include in your discharge summary.

## 2020-09-02 NOTE — PLAN OF CARE
POC reviewed with pt at 0500. Pt unable to verbalize understanding. Questions and concerns addressed. No acute events overnight. Pt taken for CT scan without incident. Dilt, precedex, and heparin titrated per orders. Plan is to extubate today if breathing trial is successful. Pt progressing toward goals. Will continue to monitor. See flowsheets for full assessment and VS info

## 2020-09-02 NOTE — PROGRESS NOTES
Patient's chart was reviewed by a stroke team provider.  Patient intubated and sedated.  There is no new imaging to review.  Pending diagnostics to follow up on include: none; CT head 9/2 unchanged.  For other recommendations please see our previous note completed on: 9/1.  Patient on SBT today; may extubate.     There are no new recommendations at this time. Will continue to follow. Discussed patient with staff. Please contact stroke team for any questions or concerns.     Teresa Marin MD, PGY-1 Internal Medicine  Vascular Neurology

## 2020-09-02 NOTE — PROGRESS NOTES
[facility]  Neurocritical Care  Progress Note    Admit Date: 8/29/2020  Service Date: 09/02/2020  Length of Stay: 4    Subjective:     Chief Complaint: Embolic stroke involving right middle cerebral artery    History of Present Illness: 64 year old female with a PMH significant for COPD, HTN, and every day smoker who presented at OSH on 08/26/20 with acute left sided facial droop and left sided weakness (last known normal 08/25/20). Pt was not given TPA or endovascular intervention, and she was intubated on 08/26/20 for agitation. CTH on 08/26/20 showed right posterior frontal/parietal infarction with no intracranial hemorrhage. Hospital course complicated by Afib with RVR in which she was started on lovenox and then transitioned to heparin gtt for GOYO and HFrEF. Pt was transferred to NICU for higher level of care and persistent epistaxis. Upon arrival Pt was intubated and sedated, Rhinorocket  Bilaterally in place, propofol and diltiazem gtt. Pt was able to move the right side and moves her left side to noxious stimuli. A-line placed.       Hospital Course: 08/29/20: Pt admitted to NICU for higher level of care and ENT consult. Pt with a run of Vtach associated with hypotension.   8/31/2020: hold anticoagulation until tomorrow due to GI bleed/epistaxis (at other facility) GI consult, pt in Afib- load with digoxin, pending digoxin level tomorrow, continue diltiazem gtt today, start tube feeds, obtain PIV and d/c central line, continue cefepime for total 7 days  9/1/2020: initiated heparin gtt minimal intensity-when at goal obtain CT head, continue diltiazem gtt, start tube feeds, d/c A-line9/2/2020: changed to PO diltiazem 60mg q8hr from gtt, lasix, miralax, plan for extubation      Interval History:  Changed the dilt gtt to PO diltiazem 60mg q8hr. Gave 40mg of lasix once. Restarted tube feeds. Added miralax for constipation. Plan for extubation tomorrow. Baseline needs O2 d/t PMH COPD so prepare  to provide mask. Leena le can stay for 5 days if needed per ENT.    Review of Systems  Unable to assess d/t intubation status.  Objective:     Vitals:  Temp: 98.2 °F (36.8 °C)  Pulse: 92  Rhythm: atrial rhythm  BP: 133/68  MAP (mmHg): 95  Resp: (!) 23  SpO2: 98 %  Oxygen Concentration (%): 40  O2 Device (Oxygen Therapy): ventilator  Vent Mode: SIMV  Set Rate: 15 BPM  Vt Set: 400 mL  Pressure Support: 10 cmH20  PEEP/CPAP: 5 cmH20  Peak Airway Pressure: 21 cmH2O  Mean Airway Pressure: 7.4 cmH20  Plateau Pressure: 12 cmH20    Temp  Min: 97.6 °F (36.4 °C)  Max: 98.7 °F (37.1 °C)  Pulse  Min: 75  Max: 99  BP  Min: 124/84  Max: 187/88  MAP (mmHg)  Min: 95  Max: 130  Resp  Min: 10  Max: 38  SpO2  Min: 90 %  Max: 100 %  Oxygen Concentration (%)  Min: 40  Max: 40    09/01 0701 - 09/02 0700  In: 1341.2 [I.V.:671.2]  Out: 905 [Urine:905]   Unmeasured Output  Stool Occurrence: 0  Emesis Occurrence: 0  Pad Count: 0       Physical Exam  HENT:      Head: Normocephalic and atraumatic.      Right Ear: External ear normal.      Left Ear: External ear normal.      Nose: Nose normal.      Mouth/Throat:      Mouth: Mucous membranes are moist.   Eyes:      General:         Right eye: No discharge.         Left eye: No discharge.      Conjunctiva/sclera: Conjunctivae normal.   Neck:      Musculoskeletal: Normal range of motion.   Cardiovascular:      Rate and Rhythm: Normal rate and regular rhythm.   Pulmonary:      Effort: Pulmonary effort is normal. No respiratory distress.      Breath sounds: Wheezing present.   Abdominal:      General: Abdomen is flat. There is no distension.      Palpations: Abdomen is soft. There is no mass.      Tenderness: There is no abdominal tenderness. There is no guarding.      Hernia: No hernia is present.   Musculoskeletal:         General: No deformity or signs of injury.   Skin:     General: Skin is warm and dry.   Neurological:      Mental Status: She is alert.      Comments: GCS  [id number]  Alert, intermittently follows commands on Rt side- showed 2 fingers today on Rt hand  Cough, gag intact, Lt pupil 3mm reactive, Rt pupil 6mm fixed  LUE and LLEwithdraws, RUE and RLE spont   Psychiatric:         Behavior: Behavior normal.           Medications:  Continuousdexmedetomidine (PRECEDEX) infusion, Last Rate: 1.4 mcg/kg/hr (09/02/20 1605)  dextrose 10 % in water (D10W)  heparin (porcine) in D5W, Last Rate: 14 Units/kg/hr (09/02/20 1605)    Scheduledalbuterol-ipratropium, 3 mL, Q6H  atorvastatin, 40 mg, Daily  ceFEPime (MAXIPIME) IVPB, 1 g, Q12H  digoxin, 0.125 mg, Daily  diltiaZEM, 60 mg, Q8H  pantoprozole (PROTONIX) IV, 40 mg, Q12H  polyethylene glycol, 17 g, Daily  senna-docusate 8.6-50 mg, 1 tablet, BID  valproic acid (as sodium salt), 250 mg, Q8H    PRNacetaminophen, 650 mg, Q6H PRN  dextrose 10 % in water (D10W), , Continuous PRN  dextrose 50%, 12.5 g, PRN  fentaNYL, 50 mcg, Q2H PRN  glucagon (human recombinant), 1 mg, PRN  hydrALAZINE, 10 mg, Q6H PRN  insulin aspart U-100, 1-10 Units, Q4H PRN  magnesium oxide, 800 mg, PRN  magnesium oxide, 800 mg, PRN  metoprolol, 5 mg, Q10 Min PRN  ondansetron, 4 mg, Q6H PRN  potassium chloride, 40 mEq, PRN  potassium chloride, 40 mEq, PRN  potassium chloride, 60 mEq, PRN  potassium, sodium phosphates, 2 packet, PRN  potassium, sodium phosphates, 2 packet, PRN  potassium, sodium phosphates, 2 packet, PRN      Today I personally reviewed pertinent medications, lines/drains/airways, imaging, cardiology results, laboratory results, microbiology results,     Diet  Diet NPO  Diet NPO        Assessment/Plan:     Neuro  * Embolic stroke involving right middle cerebral artery  - Last seen normal on 08/25/20 with no intervention; intubated and sedated.    - Imaging shows large right ischemic stroke in the posterior frontal and parietal regions  - SBP < 160  - Euna   - CBC, CMP, coags, UA, ABG, CXR, anti XA, Echo, and lipid panel   - hold anticoagulation in the  setting of epistaxis   - heparin gtt low intensity  - repeat CTH stable  - daily statin      ENT  Epistaxis  - persistent epistaxis in the setting of anticoagulation   - H/H stable at 12/39  - daily CBC   - ENT consulted;   rhino rockets can stay as long as 5 days if needed.        Pulmonary  Chronic obstructive pulmonary disease  - daily CXRs while intubated   - goal SpO2 is > 88%  - scheduled duo nebs q6h    Acute on chronic respiratory failure with hypoxia and hypercapnia  - Pt intubated and sedated   - Daily CXRs and abg   - Plan for extubation 9/3    Cardiac/Vascular  Atrial fibrillation with rapid ventricular response  - Repeat EKG   - daily Mg and Phos; replace as needed  - switched dilt to PO  -dig level 1.8  - continue heparin gtt minimal intensity   - CT head stable      Essential hypertension  - SBP goal < 160    Renal/  GOYO (acute kidney injury)  - BUN/Cr: 38/ 1.2  - trending down    GI  GI bleed  - upper gi bleed at outside hospital  - hold starting anticoagulation due to recent upper GI bleed at outside facility and epistaxis (2 rhino rockets)  - consult GI -Continue PPI b.i.d. Trend hemoglobin, transfuse hemoglobin <7  -restarted Tube feeds after being NPO for possible extubation  - plan for extubation tomorrow 9/3        The patient is being Prophylaxed for:  Venous Thromboembolism with: Chemical  Stress Ulcer with: PPI  Ventilator Pneumonia with: chlorhexidine oral care    Activity Orders          Diet NPO: NPO starting at 08/30 0250        Full Code     Critical Care time: 40 min    Marissa Hill PA-C  Neurocritical Care  [facility]

## 2020-09-02 NOTE — PT/OT/SLP PROGRESS
Occupational Therapy   Treatment    Name: Donita Gonzalez  MRN: [id number]  Admitting Diagnosis:  Embolic stroke involving right middle cerebral artery       Recommendations:     Discharge Recommendations: (pending extubation)  Discharge Equipment Recommendations:  (pending extubation)  Barriers to discharge:  None    Assessment:     Donita Gonzalez is a 64 y.o. female with a medical diagnosis of Embolic stroke involving right middle cerebral artery.  She presents with performance deficits affecting function are weakness, impaired cognition, impaired endurance, impaired sensation, impaired self care skills, impaired functional mobilty, gait instability, impaired balance, impaired cardiopulmonary response to activity, decreased safety awareness, decreased lower extremity function, decreased upper extremity function, decreased coordination, impaired coordination, impaired fine motor, decreased ROM, abnormal tone.     Rehab Prognosis:  Good; patient would benefit from acute skilled OT services to address these deficits and reach maximum level of function.       Plan:     Patient to be seen 3 x/week to address the above listed problems via self-care/home management, therapeutic activities, therapeutic exercises, neuromuscular re-education, cognitive retraining, sensory integration  · Plan of Care Expires: 09/27/20  · Plan of Care Reviewed with: patient    Subjective   Patient: Nonverbal; intubated  Pain/Comfort:  · Pain Rating 1: 0/10  · Pain Rating Post-Intervention 1: 0/10    Objective:     Communicated with: Nurse prior to session.  Patient found supine with bed alarm, blood pressure cuff, barr catheter, peripheral IV, pulse ox (continuous), restraints, SCD, telemetry, ventilator upon OT entry to room.    General Precautions: Standard, aspiration, fall, NPO, seizure   Orthopedic Precautions:N/A   Braces: N/A     Occupational Performance:     Bed Mobility:    · Patient completed Rolling/Turning to Left with  total  assistance  · Patient completed Rolling/Turning to Right with total assistance     Functional Mobility/Transfers:  · Dependent drawsheet transfers    Activities of Daily Living:  · Feeding:  NPO    · Grooming: total assistance while supine    [facility] 6 Click ADL: 6    Treatment & Education:  Patient education provided on role of OT, ROM, positioning, daily orientation and need for continued OT upon discharge.  Daily orientation provided.   PROM performed bilateral UE/LE one set x 10 rep in all planes of motion with stretches provided at end range; sustained stretch provided for ankle dorsiflexion.  Assistance and facilitation provided for upward rotation of the scapula during shoulder flexion and abduction to promote orientation of glenoid fossa of scapula to humeral head for prevention of post-stroke hemiplegic pain.  Patient kept eyes closed 100% of the session.  Patient kept head turned to the right; tongue protrusion noted.  Provided multi-sensory stimulation to prevent sensory deprivation and improve clinical outcomes.  Positioning provided for midline orientation with bilateral UEs elevated and heels lifted off mattress; positioning provided to prevent flexor synergy pattern in UE (internal rotation and adduction) to decrease risk of post-stroke hemiplegic pain.   Gentle cervical rotation provided.        Patient left supine with all lines intact, call button in reach, bed alarm on and restraints reapplied at end of sessionEducation:      GOALS:   Multidisciplinary Problems     Occupational Therapy Goals        Problem: Occupational Therapy Goal    Goal Priority Disciplines Outcome Interventions   Occupational Therapy Goal     OT, PT/OT Ongoing, Progressing    Description: Goals set 9/1 to be addressed for 14 days with expiration date, 9/14:  Patient will increase functional independence with ADLs by performing:    Patient will demonstrate rolling to the right with mod assist.  Not met   Patient will demonstrate  rolling to the left with mod assist.   Not met  Patient will demonstrate supine -sit with mod assist.   Not met  Patient will demonstrate stand pivot transfers with mod assist.   Not met  Patient will demonstrate grooming while seated with mod assist.   Not met  Patient will demonstrate upper body dressing with mod assist while seated EOB.   Not met  Patient will demonstrate lower body dressing with max assist while seated EOB.   Not met  Patient will demonstrate toileting with max assist.   Not met  Patient will demonstrate bathing while seated EOB with max assist.   Not met  Patient's family / caregiver will demonstrate independence and safety with assisting patient with self-care skills and functional mobility.     Not met  Patient's family / caregiver will demonstrate independence with providing ROM and changes in bed positioning.   Not met  Patient and/or patient's family will verbalize understanding of stroke prevention guidelines, personal risk factors and stroke warning signs via teachback method.  Not met                              Time Tracking:     OT Date of Treatment: 09/02/20  OT Start Time: 0345  OT Stop Time: 0408  OT Total Time (min): 23 min    Billable Minutes:Therapeutic Exercise 23    RORY Thomas  9/2/2020

## 2020-09-02 NOTE — PLAN OF CARE
Recommendations  1. Upgrade diet to Clear Liquids as medically able.   2. If unable to upgrade diet continue current TF order.  - Isosource 1.5 at 40mL/hr.   -Provides 1440kcal, 65g protein, and 733mL fluid.  Goals: Pt to receive nutrition by RD follow-up.   Nutrition Goal Status: goal met

## 2020-09-02 NOTE — PHYSICIAN QUERY
PT Name: Donita Gonzalez  MR #: [id number]     ACUITY OF CONDITION CLARIFICATION      CDS/: Karine Crockett RN, CDS               Contact information: [email or web address]  This form is a permanent document in the medical record.     Query Date: September 2, 2020    By submitting this query, we are merely seeking further clarification of documentation to reflect the severity of illness of your patient. Please utilize your independent clinical judgment when addressing the question(s) below.    The Medical record reflects the following:     Indicators   Supporting Clinical Findings Location in Medical Record   x Documentation of condition --A TTE w/bubble study was obtained and revealed new onset heart failure w/reduced EF.    --Hospital course complicated by AFib with RVR and heart failure with reduced ejection fraction   Vas neuro c/s 8/30   x Lab Value(s) BNP: 567->363   Labs 8/26->8/27   x Radiology Findings · --Moderate concentric left ventricular hypertrophy.  · Moderately to severely decreased left ventricular systolic function. The estimated ejection fraction is 25-30%.  · Left ventricular diastolic dysfunction.  · Moderately to severely reduced right ventricular systolic function.  · Moderate left atrial enlargement.  · Severe right atrial enlargement.  · Moderate mitral regurgitation.  · Moderate tricuspid regurgitation.  · Intermediate central venous pressure (8 mmHg).  · The estimated PA systolic pressure is 42 mmHg.  Pulmonary hypertension present.   TTE 8/26 (at OSH)   x Treatment/Medication --Diltiazem gtt --> Diltiazem 60mg PNGT q8h  --Digoxin 1/125mg PNGT QD (started 9/2)  --lasix 40mg IV x 1 (9/2)  --Metoprolol 5mg IV x 1 (8/30)   MAR   x Other --Atrial fibrillation with rapid ventricular response          -Cardioembolic stroke 2/2 afib,          -The patient was also noted to have new onset AFib w/RVR and was started on lovenox and diltiazem   Vas neuro c/s 8/30      Provider, please  specify the acuity/chronicity of __ heart failure with reduced ejection fraction________:    [ x  ] Acute     [   ]  Clinically Undetermined             Please document in your progress notes daily for the duration of treatment until resolved, and include in your discharge summary.

## 2020-09-02 NOTE — ASSESSMENT & PLAN NOTE
- upper gi bleed at outside hospital  - hold starting anticoagulation due to recent upper GI bleed at outside facility and epistaxis (2 rhino rockets)  - consult GI -Continue PPI b.i.d. Trend hemoglobin, transfuse hemoglobin <7  -restarted Tube feeds after being NPO for possible extubation  - plan for extubation tomorrow 9/3

## 2020-09-02 NOTE — PROGRESS NOTES
"[facility]  Adult Nutrition  Progress Note    SUMMARY       Recommendations  1. Upgrade diet to Clear Liquids as medically able.   2. If unable to upgrade diet continue current TF order.  - Isosource 1.5 at 40mL/hr.   -Provides 1440kcal, 65g protein, and 733mL fluid.  Goals: Pt to receive nutrition by RD follow-up.   Nutrition Goal Status: goal met  Communication of RD Recs: POC    Reason for Assessment  Reason For Assessment: follow up  Diagnosis: stroke/CVA  Relevant Medical History: COPD, HTN  General Information Comments: Pt remains intubated. TF on hold for possible extubation today if SBT successful. TF was running at goal rate of 40 mL/hr with no residuals. Wt continues to trend up. Pt unable to verbalize consent for NFPE. Pt appears nourished with some mild losses.   Nutrition Discharge Planning: Unclear at this time.     Nutrition Risk Screen  Nutrition Risk Screen: tube feeding or parenteral nutrition    Nutrition/Diet History  Spiritual, Cultural Beliefs, Gnosticism Practices, Values that Affect Care: no  Factors Affecting Nutritional Intake: NPO, on mechanical ventilation    Anthropometrics  Temp: 97.6 °F (36.4 °C)  Height Method: Estimated  Height: 5' 5" (165.1 cm)  Height (inches): 65 in  Weight Method: Bed Scale  Weight: 59.9 kg (132 lb 0.9 oz)  Weight (lb): 132.06 lb  Ideal Body Weight (IBW), Female: 125 lb  % Ideal Body Weight, Female (lb): 105.65 %  BMI (Calculated): 22  BMI Grade: 18.5-24.9 - normal    Lab/Procedures/Meds  Pertinent Labs Reviewed: reviewed  Pertinent Labs Comments: BUN 38, GFR 55, Glu 128, Alb 2.3  Pertinent Medications Reviewed: reviewed  Pertinent Medications Comments: precedex, famotidine, levo, heparin, pantoprazole    Physical Findings/Assessment  Flako score 14     Estimated/Assessed Needs  Weight Used For Calorie Calculations: 59.9 kg (132 lb 0.9 oz)  Energy Calorie Requirements (kcal): 1346  Energy Need Method: [facility]  Protein Requirements: 72-90g " (1-1.2g/kg)  Weight Used For Protein Calculations: 59.9 kg (132 lb 0.9 oz)  Fluid Requirements (mL): Per MD  RDA Method (mL): 1346     Nutrition Prescription Ordered  Current Diet Order: NPO    Evaluation of Received Nutrient/Fluid Intake  Comments: LBM 8/26-fecal incontinence  % Intake of Estimated Energy Needs: 75 - 100 %  % Meal Intake: NPO    Nutrition Risk  Level of Risk/Frequency of Follow-up: (2X/week)     Assessment and Plan  Nutrition Problem  Inadequate energy intake     Related to (etiology):   Decreased ability to consume adequate energy     Signs and Symptoms (as evidenced by):   NPO status, no form of nutrition at this time.      Interventions(treatment strategy):  Collaboration of nutrition care with other providers     Nutrition Diagnosis Status:   Resolved    Monitor and Evaluation  Food and Nutrient Intake: energy intake  Food and Nutrient Adminstration: enteral and parenteral nutrition administration  Anthropometric Measurements: weight, weight change  Biochemical Data, Medical Tests and Procedures: gastrointestinal profile  Nutrition-Focused Physical Findings: overall appearance     Nutrition Follow-Up  RD Follow-up?: Yes

## 2020-09-02 NOTE — SUBJECTIVE & OBJECTIVE
Interval History:  Changed the dilt gtt to PO diltiazem 60mg q8hr. Gave 40mg of lasix once. Restarted tube feeds. Added miralax for constipation. Plan for extubation tomorrow. Baseline needs O2 d/t PMH COPD so prepare to provide mask. Bilat rhino rockets can stay for 5 days if needed per ENT.    Review of Systems  Unable to assess d/t intubation status.  Objective:     Vitals:  Temp: 98.2 °F (36.8 °C)  Pulse: 92  Rhythm: atrial rhythm  BP: 133/68  MAP (mmHg): 95  Resp: (!) 23  SpO2: 98 %  Oxygen Concentration (%): 40  O2 Device (Oxygen Therapy): ventilator  Vent Mode: SIMV  Set Rate: 15 BPM  Vt Set: 400 mL  Pressure Support: 10 cmH20  PEEP/CPAP: 5 cmH20  Peak Airway Pressure: 21 cmH2O  Mean Airway Pressure: 7.4 cmH20  Plateau Pressure: 12 cmH20    Temp  Min: 97.6 °F (36.4 °C)  Max: 98.7 °F (37.1 °C)  Pulse  Min: 75  Max: 99  BP  Min: 124/84  Max: 187/88  MAP (mmHg)  Min: 95  Max: 130  Resp  Min: 10  Max: 38  SpO2  Min: 90 %  Max: 100 %  Oxygen Concentration (%)  Min: 40  Max: 40    09/01 0701 - 09/02 0700  In: 1341.2 [I.V.:671.2]  Out: 905 [Urine:905]   Unmeasured Output  Stool Occurrence: 0  Emesis Occurrence: 0  Pad Count: 0       Physical Exam  HENT:      Head: Normocephalic and atraumatic.      Right Ear: External ear normal.      Left Ear: External ear normal.      Nose: Nose normal.      Mouth/Throat:      Mouth: Mucous membranes are moist.   Eyes:      General:         Right eye: No discharge.         Left eye: No discharge.      Conjunctiva/sclera: Conjunctivae normal.   Neck:      Musculoskeletal: Normal range of motion.   Cardiovascular:      Rate and Rhythm: Normal rate and regular rhythm.   Pulmonary:      Effort: Pulmonary effort is normal. No respiratory distress.      Breath sounds: Wheezing present.   Abdominal:      General: Abdomen is flat. There is no distension.      Palpations: Abdomen is soft. There is no mass.      Tenderness: There is no abdominal tenderness. There is no guarding.       Hernia: No hernia is present.   Musculoskeletal:         General: No deformity or signs of injury.   Skin:     General: Skin is warm and dry.   Neurological:      Mental Status: She is alert.      Comments: GCS [id number]  Alert, intermittently follows commands on Rt side- showed 2 fingers today on Rt hand  Cough, gag intact, Lt pupil 3mm reactive, Rt pupil 6mm fixed  LUE and LLEwithdraws, RUE and RLE spont   Psychiatric:         Behavior: Behavior normal.           Medications:  Continuousdexmedetomidine (PRECEDEX) infusion, Last Rate: 1.4 mcg/kg/hr (09/02/20 1605)  dextrose 10 % in water (D10W)  heparin (porcine) in D5W, Last Rate: 14 Units/kg/hr (09/02/20 1605)    Scheduledalbuterol-ipratropium, 3 mL, Q6H  atorvastatin, 40 mg, Daily  ceFEPime (MAXIPIME) IVPB, 1 g, Q12H  digoxin, 0.125 mg, Daily  diltiaZEM, 60 mg, Q8H  pantoprozole (PROTONIX) IV, 40 mg, Q12H  polyethylene glycol, 17 g, Daily  senna-docusate 8.6-50 mg, 1 tablet, BID  valproic acid (as sodium salt), 250 mg, Q8H    PRNacetaminophen, 650 mg, Q6H PRN  dextrose 10 % in water (D10W), , Continuous PRN  dextrose 50%, 12.5 g, PRN  fentaNYL, 50 mcg, Q2H PRN  glucagon (human recombinant), 1 mg, PRN  hydrALAZINE, 10 mg, Q6H PRN  insulin aspart U-100, 1-10 Units, Q4H PRN  magnesium oxide, 800 mg, PRN  magnesium oxide, 800 mg, PRN  metoprolol, 5 mg, Q10 Min PRN  ondansetron, 4 mg, Q6H PRN  potassium chloride, 40 mEq, PRN  potassium chloride, 40 mEq, PRN  potassium chloride, 60 mEq, PRN  potassium, sodium phosphates, 2 packet, PRN  potassium, sodium phosphates, 2 packet, PRN  potassium, sodium phosphates, 2 packet, PRN      Today I personally reviewed pertinent medications, lines/drains/airways, imaging, cardiology results, laboratory results, microbiology results,     Diet  Diet NPO  Diet NPO

## 2020-09-02 NOTE — PROGRESS NOTES
Pt taken for CT scan. Pt experienced restlessness while in scanner. Verbal order for 1 mg midazolam prn given by DIVYA Arredondo before scan. Will continue to monitor.

## 2020-09-02 NOTE — PLAN OF CARE
[facility] Care Plan    POC reviewed with Donita Gonzalez and  at bedside and family via phone call at 1600. Pt unable to verbalize understanding due to ETT and sedation. Questions and concerns addressed. Pt neuro exam stable. Switched pt from dilt gtt to PO dilt per order. Bilateral nasal rockets remain in place. SBT attempted today, pt tolerated for a few hours, pt tachypnic and restless, weaning parameters attempted with no success. Fentanyl prn given and pt switched to SIMV, tolerating well. Precedex continued for agitation. OGT advanced, KUB confirmed new placement, TF restarted per order. 40 lasix given, barr in place. Heparin gtt continued per order. Electrolytes replaced. Bath given, sheets and gown changed. No acute events today. Pt progressing toward goals. Will continue to monitor. See below and flowsheets for full assessment and VS info.       Neuro:  Manuel Coma Scale  Best Eye Response: 2-->(E2) to pain  Best Motor Response: 6-->(M6) obeys commands  Best Verbal Response: 1-->(V1) none  Manuel Coma Scale Score: 9  Assessment Qualifiers: patient intubated, no eye obstruction present  Pupil PERRLA: other (see comments)(cataract)     24 hr Temp:  [97.6 °F (36.4 °C)-98.7 °F (37.1 °C)]     CV:   Rhythm: atrial rhythm  BP goals:   SBP < 160  MAP > 65    Resp:   O2 Device (Oxygen Therapy): ventilator  Vent Mode: SIMV  Set Rate: 15 BPM  Oxygen Concentration (%): 40  Vt Set: 400 mL  PEEP/CPAP: 5 cmH20  Pressure Support: 10 cmH20    Plan: wean to extubate--SBT failed, re-attempt tomorrow    GI/:  ROXANE Total Score: 1  Diet/Nutrition Received: NPO, tube feeding--restarted this afternoon  Last Bowel Movement: 08/26/20  Voiding Characteristics: urethral catheter (bladder)    Intake/Output Summary (Last 24 hours) at 9/2/2020 1758  Last data filed at 9/2/2020 1705  Gross per 24 hour   Intake 1284.74 ml   Output 2010 ml   Net -725.26 ml     Unmeasured Output  Stool Occurrence: 0  Emesis Occurrence: 0  Pad Count:  0    Labs/Accuchecks:  Recent Labs   Lab 09/02/20  0359   WBC 11.66  11.66   RBC 4.14  4.14   HGB 12.6  12.6   HCT 40.3  40.3   *  410*      Recent Labs   Lab 09/02/20  0359      K 3.8   CO2 32*      BUN 38*   CREATININE 1.2   ALKPHOS 77   ALT 29   AST 36   BILITOT 0.5      Recent Labs   Lab 09/01/20  0904  09/02/20  0359   INR 1.0  --   --    APTT 23.3   < > 45.0*    < > = values in this interval not displayed.      Recent Labs   Lab 08/26/20  1832   TROPONINI 0.07*       Electrolytes: Electrolytes replaced  Accuchecks: Q4H    Gtts:   dexmedetomidine (PRECEDEX) infusion 1.4 mcg/kg/hr (09/02/20 1705)    dextrose 10 % in water (D10W)      heparin (porcine) in D5W 14 Units/kg/hr (09/02/20 1705)       LDA/Wounds:  Lines/Drains/Airways       Drain                   NG/OG Tube 08/29/20 2215 orogastric Left mouth 3 days         Urethral Catheter 08/29/20 2215 3 days              Airway                   Airway - Non-Surgical 08/26/20 0931 Endotracheal Tube 7 days              Peripheral Intravenous Line                   Peripheral IV - Single Lumen 08/30/20 1200 18 G Left;Posterior Forearm 3 days         Peripheral IV - Single Lumen 08/31/20 1000 18 G Left Upper Arm 2 days                  Wounds: No  Wound care consulted: No

## 2020-09-02 NOTE — ASSESSMENT & PLAN NOTE
- Last seen normal on 08/25/20 with no intervention; intubated and sedated.    - Imaging shows large right ischemic stroke in the posterior frontal and parietal regions  - SBP < 160  - Euna   - CBC, CMP, coags, UA, ABG, CXR, anti XA, Echo, and lipid panel   - hold anticoagulation in the setting of epistaxis   - heparin gtt low intensity  - repeat CTH stable  - daily statin

## 2020-09-03 PROBLEM — E11.9 DIABETES MELLITUS: Status: ACTIVE | Noted: 2020-09-03

## 2020-09-03 PROBLEM — E44.0 MODERATE MALNUTRITION: Status: ACTIVE | Noted: 2020-09-03

## 2020-09-03 LAB
ALBUMIN SERPL BCP-MCNC: 2.2 G/DL (ref 3.5–5.2)
ALLENS TEST: ABNORMAL
ALP SERPL-CCNC: 73 U/L (ref 55–135)
ALT SERPL W/O P-5'-P-CCNC: 26 U/L (ref 10–44)
ANION GAP SERPL CALC-SCNC: 9 MMOL/L (ref 8–16)
APTT BLDCRRT: 51.5 SEC (ref 21–32)
APTT BLDCRRT: 57.7 SEC (ref 21–32)
AST SERPL-CCNC: 30 U/L (ref 10–40)
BASOPHILS # BLD AUTO: 0.05 K/UL (ref 0–0.2)
BASOPHILS NFR BLD: 0.4 % (ref 0–1.9)
BILIRUB SERPL-MCNC: 0.5 MG/DL (ref 0.1–1)
BUN SERPL-MCNC: 32 MG/DL (ref 8–23)
CALCIUM SERPL-MCNC: 8.7 MG/DL (ref 8.7–10.5)
CHLORIDE SERPL-SCNC: 103 MMOL/L (ref 95–110)
CO2 SERPL-SCNC: 32 MMOL/L (ref 23–29)
CREAT SERPL-MCNC: 1.1 MG/DL (ref 0.5–1.4)
DELSYS: ABNORMAL
DIFFERENTIAL METHOD: ABNORMAL
EOSINOPHIL # BLD AUTO: 0.2 K/UL (ref 0–0.5)
EOSINOPHIL NFR BLD: 1.7 % (ref 0–8)
ERYTHROCYTE [DISTWIDTH] IN BLOOD BY AUTOMATED COUNT: 14.1 % (ref 11.5–14.5)
ERYTHROCYTE [SEDIMENTATION RATE] IN BLOOD BY WESTERGREN METHOD: 15 MM/H
EST. GFR  (AFRICAN AMERICAN): >60 ML/MIN/1.73 M^2
EST. GFR  (NON AFRICAN AMERICAN): 53.2 ML/MIN/1.73 M^2
FIO2: 40
GLUCOSE SERPL-MCNC: 131 MG/DL (ref 70–110)
HCO3 UR-SCNC: 34.7 MMOL/L (ref 24–28)
HCT VFR BLD AUTO: 39.3 % (ref 37–48.5)
HGB BLD-MCNC: 12.3 G/DL (ref 12–16)
IMM GRANULOCYTES # BLD AUTO: 0.24 K/UL (ref 0–0.04)
IMM GRANULOCYTES NFR BLD AUTO: 2.1 % (ref 0–0.5)
LYMPHOCYTES # BLD AUTO: 0.5 K/UL (ref 1–4.8)
LYMPHOCYTES NFR BLD: 4.7 % (ref 18–48)
MAGNESIUM SERPL-MCNC: 1.6 MG/DL (ref 1.6–2.6)
MCH RBC QN AUTO: 30.4 PG (ref 27–31)
MCHC RBC AUTO-ENTMCNC: 31.3 G/DL (ref 32–36)
MCV RBC AUTO: 97 FL (ref 82–98)
MODE: ABNORMAL
MONOCYTES # BLD AUTO: 1.5 K/UL (ref 0.3–1)
MONOCYTES NFR BLD: 13.5 % (ref 4–15)
NEUTROPHILS # BLD AUTO: 8.8 K/UL (ref 1.8–7.7)
NEUTROPHILS NFR BLD: 77.6 % (ref 38–73)
NRBC BLD-RTO: 0 /100 WBC
PCO2 BLDA: 47.3 MMHG (ref 35–45)
PEEP: 5
PH SMN: 7.47 [PH] (ref 7.35–7.45)
PHOSPHATE SERPL-MCNC: 3.3 MG/DL (ref 2.7–4.5)
PLATELET # BLD AUTO: 428 K/UL (ref 150–350)
PMV BLD AUTO: 11.1 FL (ref 9.2–12.9)
PO2 BLDA: 73 MMHG (ref 80–100)
POC BE: 11 MMOL/L
POC SATURATED O2: 95 % (ref 95–100)
POC TCO2: 36 MMOL/L (ref 23–27)
POCT GLUCOSE: 119 MG/DL (ref 70–110)
POCT GLUCOSE: 78 MG/DL (ref 70–110)
POTASSIUM SERPL-SCNC: 3.9 MMOL/L (ref 3.5–5.1)
PROT SERPL-MCNC: 6 G/DL (ref 6–8.4)
PS: 10
RBC # BLD AUTO: 4.04 M/UL (ref 4–5.4)
SAMPLE: ABNORMAL
SITE: ABNORMAL
SODIUM SERPL-SCNC: 144 MMOL/L (ref 136–145)
SP02: 97
VT: 400
WBC # BLD AUTO: 11.4 K/UL (ref 3.9–12.7)

## 2020-09-03 PROCEDURE — 85730 THROMBOPLASTIN TIME PARTIAL: CPT

## 2020-09-03 PROCEDURE — 97802 MEDICAL NUTRITION INDIV IN: CPT

## 2020-09-03 PROCEDURE — 25000242 PHARM REV CODE 250 ALT 637 W/ HCPCS: Performed by: NURSE PRACTITIONER

## 2020-09-03 PROCEDURE — C9113 INJ PANTOPRAZOLE SODIUM, VIA: HCPCS | Performed by: PHYSICIAN ASSISTANT

## 2020-09-03 PROCEDURE — 94003 VENT MGMT INPAT SUBQ DAY: CPT

## 2020-09-03 PROCEDURE — 85025 COMPLETE CBC W/AUTO DIFF WBC: CPT

## 2020-09-03 PROCEDURE — 25000003 PHARM REV CODE 250: Performed by: PHYSICIAN ASSISTANT

## 2020-09-03 PROCEDURE — 25000003 PHARM REV CODE 250: Performed by: PSYCHIATRY & NEUROLOGY

## 2020-09-03 PROCEDURE — 63600175 PHARM REV CODE 636 W HCPCS: Performed by: PSYCHIATRY & NEUROLOGY

## 2020-09-03 PROCEDURE — 94761 N-INVAS EAR/PLS OXIMETRY MLT: CPT

## 2020-09-03 PROCEDURE — 93010 ELECTROCARDIOGRAM REPORT: CPT | Mod: ,,, | Performed by: INTERNAL MEDICINE

## 2020-09-03 PROCEDURE — 94640 AIRWAY INHALATION TREATMENT: CPT

## 2020-09-03 PROCEDURE — 93010 EKG 12-LEAD: ICD-10-PCS | Mod: ,,, | Performed by: INTERNAL MEDICINE

## 2020-09-03 PROCEDURE — 82803 BLOOD GASES ANY COMBINATION: CPT

## 2020-09-03 PROCEDURE — 25000003 PHARM REV CODE 250: Performed by: STUDENT IN AN ORGANIZED HEALTH CARE EDUCATION/TRAINING PROGRAM

## 2020-09-03 PROCEDURE — 27200966 HC CLOSED SUCTION SYSTEM

## 2020-09-03 PROCEDURE — 99291 CRITICAL CARE FIRST HOUR: CPT | Mod: ,,, | Performed by: PSYCHIATRY & NEUROLOGY

## 2020-09-03 PROCEDURE — 99291 PR CRITICAL CARE, E/M 30-74 MINUTES: ICD-10-PCS | Mod: ,,, | Performed by: PSYCHIATRY & NEUROLOGY

## 2020-09-03 PROCEDURE — 84100 ASSAY OF PHOSPHORUS: CPT

## 2020-09-03 PROCEDURE — 20000000 HC ICU ROOM

## 2020-09-03 PROCEDURE — 99900026 HC AIRWAY MAINTENANCE (STAT)

## 2020-09-03 PROCEDURE — 80053 COMPREHEN METABOLIC PANEL: CPT

## 2020-09-03 PROCEDURE — 36600 WITHDRAWAL OF ARTERIAL BLOOD: CPT

## 2020-09-03 PROCEDURE — 82800 BLOOD PH: CPT

## 2020-09-03 PROCEDURE — 99900035 HC TECH TIME PER 15 MIN (STAT)

## 2020-09-03 PROCEDURE — 25000003 PHARM REV CODE 250: Performed by: UROLOGY

## 2020-09-03 PROCEDURE — 93005 ELECTROCARDIOGRAM TRACING: CPT

## 2020-09-03 PROCEDURE — 63600175 PHARM REV CODE 636 W HCPCS: Performed by: PHYSICIAN ASSISTANT

## 2020-09-03 PROCEDURE — 25000003 PHARM REV CODE 250: Performed by: NURSE PRACTITIONER

## 2020-09-03 PROCEDURE — 85730 THROMBOPLASTIN TIME PARTIAL: CPT | Mod: 91

## 2020-09-03 PROCEDURE — 83735 ASSAY OF MAGNESIUM: CPT

## 2020-09-03 RX ORDER — DILTIAZEM HYDROCHLORIDE 60 MG/1
60 TABLET, FILM COATED ORAL EVERY 8 HOURS
Status: DISCONTINUED | OUTPATIENT
Start: 2020-09-03 | End: 2020-09-05

## 2020-09-03 RX ORDER — QUETIAPINE FUMARATE 25 MG/1
25 TABLET, FILM COATED ORAL 2 TIMES DAILY
Status: DISCONTINUED | OUTPATIENT
Start: 2020-09-03 | End: 2020-09-04

## 2020-09-03 RX ADMIN — IPRATROPIUM BROMIDE AND ALBUTEROL SULFATE 3 ML: .5; 2.5 SOLUTION RESPIRATORY (INHALATION) at 12:09

## 2020-09-03 RX ADMIN — IPRATROPIUM BROMIDE AND ALBUTEROL SULFATE 3 ML: .5; 2.5 SOLUTION RESPIRATORY (INHALATION) at 08:09

## 2020-09-03 RX ADMIN — IPRATROPIUM BROMIDE AND ALBUTEROL SULFATE 3 ML: .5; 2.5 SOLUTION RESPIRATORY (INHALATION) at 07:09

## 2020-09-03 RX ADMIN — VALPROIC ACID 250 MG: 250 SOLUTION ORAL at 06:09

## 2020-09-03 RX ADMIN — DOCUSATE SODIUM 50MG AND SENNOSIDES 8.6MG 1 TABLET: 8.6; 5 TABLET, FILM COATED ORAL at 09:09

## 2020-09-03 RX ADMIN — CEFEPIME 1 G: 1 INJECTION, POWDER, FOR SOLUTION INTRAMUSCULAR; INTRAVENOUS at 02:09

## 2020-09-03 RX ADMIN — VALPROIC ACID 250 MG: 250 SOLUTION ORAL at 09:09

## 2020-09-03 RX ADMIN — ATORVASTATIN CALCIUM 40 MG: 20 TABLET, FILM COATED ORAL at 09:09

## 2020-09-03 RX ADMIN — IPRATROPIUM BROMIDE AND ALBUTEROL SULFATE 3 ML: .5; 2.5 SOLUTION RESPIRATORY (INHALATION) at 01:09

## 2020-09-03 RX ADMIN — DEXMEDETOMIDINE HYDROCHLORIDE 1.3 MCG/KG/HR: 4 INJECTION, SOLUTION INTRAVENOUS at 02:09

## 2020-09-03 RX ADMIN — DILTIAZEM HYDROCHLORIDE 60 MG: 60 TABLET, FILM COATED ORAL at 02:09

## 2020-09-03 RX ADMIN — DEXMEDETOMIDINE HYDROCHLORIDE 1.2 MCG/KG/HR: 4 INJECTION, SOLUTION INTRAVENOUS at 07:09

## 2020-09-03 RX ADMIN — PANTOPRAZOLE SODIUM 40 MG: 40 INJECTION, POWDER, LYOPHILIZED, FOR SOLUTION INTRAVENOUS at 09:09

## 2020-09-03 RX ADMIN — CEFEPIME 1 G: 1 INJECTION, POWDER, FOR SOLUTION INTRAMUSCULAR; INTRAVENOUS at 01:09

## 2020-09-03 RX ADMIN — QUETIAPINE FUMARATE 25 MG: 25 TABLET ORAL at 09:09

## 2020-09-03 RX ADMIN — DILTIAZEM HYDROCHLORIDE 60 MG: 60 TABLET, FILM COATED ORAL at 09:09

## 2020-09-03 RX ADMIN — POLYETHYLENE GLYCOL 3350 17 G: 17 POWDER, FOR SOLUTION ORAL at 09:09

## 2020-09-03 RX ADMIN — QUETIAPINE FUMARATE 25 MG: 25 TABLET ORAL at 12:09

## 2020-09-03 RX ADMIN — DIGOXIN 0.12 MG: 125 TABLET ORAL at 09:09

## 2020-09-03 RX ADMIN — DEXMEDETOMIDINE HYDROCHLORIDE 1.4 MCG/KG/HR: 4 INJECTION, SOLUTION INTRAVENOUS at 03:09

## 2020-09-03 RX ADMIN — DILTIAZEM HYDROCHLORIDE 60 MG: 60 TABLET, FILM COATED ORAL at 06:09

## 2020-09-03 RX ADMIN — VALPROIC ACID 250 MG: 250 SOLUTION ORAL at 02:09

## 2020-09-03 NOTE — ASSESSMENT & PLAN NOTE
- Last seen normal on 08/25/20 with no intervention; intubated and sedated.    - Imaging shows large right ischemic stroke in the posterior frontal and parietal regions  - SBP < 160, MAP > 65  - EuNa   - CBC, CMP, coags, UA, ABG, CXR, anti XA, Echo, and lipid panel   - heparin gtt low intensity  - repeat CTH stable  - daily statin  - started Seroquel 25 BID 9/3

## 2020-09-03 NOTE — ASSESSMENT & PLAN NOTE
- upper gi bleed at outside hospital  - recent upper GI bleed at outside facility and epistaxis (2 rhino rockets, up to 5 days prn)  - consulted GI -Continue PPI b.i.d. Trend hemoglobin, transfuse hemoglobin <7  -restarted Tube feeds after being NPO for possible extubation  - plan for extubation tomorrow 9/4

## 2020-09-03 NOTE — PLAN OF CARE
POC reviewed with pt and family at 1600. Pt intubated and sedated, spouse at bedside verbalized understanding. Questions and concerns addressed. No acute events today. Pt progressing toward goals. Will continue to monitor. See flowsheets for full assessment and VS info. Neuro status stable. Weaning vent settings for possible extubation tomorrow. TF continued. R wrist restraint remains in place. Precedex and heparin gtt continued. EKG complete. Stroke booklet explained.

## 2020-09-03 NOTE — PLAN OF CARE
Patient intubated on vent.  Not medically stable for discharge.         09/03/20 1222   Discharge Reassessment   Assessment Type Discharge Planning Reassessment   Provided patient/caregiver education on the expected discharge date and the discharge plan Yes   Do you have any problems affording any of your prescribed medications? No   Discharge Plan A Long-term acute care facility (LTAC)   Discharge Plan B Skilled Nursing Facility;Rehab   DME Needed Upon Discharge  other (see comments)  (tbd)   Patient choice form signed by patient/caregiver N/A   Anticipated Discharge Disposition LTAC   Can the patient/caregiver answer the patient profile reliably? No, cognitively impaired   Describe the patient's ability to walk at the present time. Does not walk or unable to take any steps at all       Mai Gonzalez RN, CCRN-K, [facility]  Neuro-Critical Care   [phone]

## 2020-09-03 NOTE — PHYSICIAN QUERY
PT Name: Donita Gonzalez  MR #: [id number]     Medication-Correlation for Diagnosis      CDS/: Karine Crockett RN, CDS               Contact information: [email or web address]  This form is a permanent document in the medical record.     Query Date: September 3, 2020    Dear Provider,  By submitting this query, we are merely seeking further clarification of documentation.  Please utilize your independent clinical judgment when addressing the question(s) below.      The patient has an order for the following medication(s):    ceFEPIme injection 1 g : Intravenous : Every 12 hours (started 9/1)      Please provide the corresponding diagnosis or diagnoses that support(s) the use of the medication(s) mentioned above.        Provider Use Only      [   ] Diagnosis(es): _____pneumonia______________    [   ] Empirical administration    [  ]  Clinically undetermined  _______________________________________________________________________________________________    Present on admission (POA) status:   [   ] Yes (Y)                          [   ] Clinically Undetermined (W)   [   ] No (N)                            [   ] Documentation insufficient to determine if condition is POA (U)

## 2020-09-03 NOTE — PLAN OF CARE
Problem: Malnutrition  Goal: Improved Nutritional Intake  Outcome: Ongoing, Progressing   Recommendations     1. Continue Isosource 1.5 @40ml/hr (provides 1440 kcal, 65g pro, 733ml free water).   Goals: 1. Pt to meet % EEN and EPN by RD follow up.  Nutrition Goal Status: progressing towards goal  Communication of RD Recs: (POC)

## 2020-09-03 NOTE — PROGRESS NOTES
[facility]  Neurocritical Care  Progress Note    Admit Date: 8/29/2020  Service Date: 09/03/2020  Length of Stay: 5    Subjective:     Chief Complaint: Embolic stroke involving right middle cerebral artery    History of Present Illness: 64 year old female with a PMH significant for COPD, HTN, and every day smoker who presented at OSH on 08/26/20 with acute left sided facial droop and left sided weakness (last known normal 08/25/20). Pt was not given TPA or endovascular intervention, and she was intubated on 08/26/20 for agitation. CTH on 08/26/20 showed right posterior frontal/parietal infarction with no intracranial hemorrhage. Hospital course complicated by Afib with RVR in which she was started on lovenox and then transitioned to heparin gtt for GOYO and HFrEF. Pt was transferred to NICU for higher level of care and persistent epistaxis. Upon arrival Pt was intubated and sedated, Rhinorocket  Bilaterally in place, propofol and diltiazem gtt. Pt was able to move the right side and moves her left side to noxious stimuli. A-line placed.       Hospital Course: 08/29/20: Pt admitted to NICU for higher level of care and ENT consult. Pt with a run of Vtach associated with hypotension.   8/31/2020: hold anticoagulation until tomorrow due to GI bleed/epistaxis (at other facility) GI consult, pt in Afib- load with digoxin, pending digoxin level tomorrow, continue diltiazem gtt today, start tube feeds, obtain PIV and d/c central line, continue cefepime for total 7 days  9/1/2020: initiated heparin gtt minimal intensity-when at goal obtain CT head, continue diltiazem gtt, start tube feeds, d/c A-line  9/2/2020: changed to PO diltiazem 60mg q8hr from gtt, lasix, miralax, plan for extubation  9/3/2020: started seroquel 25BID    Interval History:  Started seroquel 25mg BID, QTc resulted as 378 on EKG. CXR returned as stable, ET and NG tubes in place. Continued SSI moderate for diabetes. Improving alkalosis on  ABG, SIMV mode. If continues to remain constipated for one more day, add mag citrate.    Review of Systems  Unable to assess d/t intubation status.  Objective:     Vitals:  Temp: 97 °F (36.1 °C)  Pulse: 103  Rhythm: atrial rhythm  BP: (!) 146/68  MAP (mmHg): 98  Resp: (!) 30  SpO2: 100 %  Oxygen Concentration (%): 40  O2 Device (Oxygen Therapy): ventilator  Vent Mode: SIMV  Set Rate: 15 BPM  Vt Set: 400 mL  Pressure Support: 5 cmH20  PEEP/CPAP: 5 cmH20  Peak Airway Pressure: 22 cmH2O  Mean Airway Pressure: 7.4 cmH20  Plateau Pressure: 13 cmH20    Temp  Min: 96.8 °F (36 °C)  Max: 98.1 °F (36.7 °C)  Pulse  Min: 87  Max: 211  BP  Min: 121/71  Max: 174/101  MAP (mmHg)  Min: 83  Max: 128  Resp  Min: 15  Max: 38  SpO2  Min: 89 %  Max: 100 %  Oxygen Concentration (%)  Min: 40  Max: 40    09/02 0701 - 09/03 0700  In: 1069.9 [I.V.:509.9]  Out: 2830 [Urine:2830]   Unmeasured Output  Stool Occurrence: 0  Emesis Occurrence: 0  Pad Count: 0       Physical Exam  HENT:      Head: Normocephalic and atraumatic.      Right Ear: External ear normal.      Left Ear: External ear normal.      Nose: Nose normal.      Mouth/Throat:      Mouth: Mucous membranes are moist.   Eyes:      General:         Right eye: No discharge.         Left eye: No discharge.      Conjunctiva/sclera: Conjunctivae normal.   Neck:      Musculoskeletal: Normal range of motion.   Cardiovascular:      Rate and Rhythm: Normal rate and regular rhythm.   Pulmonary:      Effort: Pulmonary effort is normal. No respiratory distress.   Abdominal:      General: Abdomen is flat. There is no distension.      Palpations: Abdomen is soft. There is no mass.      Tenderness: There is no abdominal tenderness. There is no guarding.      Hernia: No hernia is present.   Musculoskeletal:         General: No deformity or signs of injury.   Skin:     General: Skin is warm and dry.   Neurological:      Mental Status: She is alert.      Comments: GCS [id number]  Alert, intermittently  follows commands on Rt side  Cough, gag intact, Lt pupil 3mm reactive, Rt pupil 6mm fixed  LUE and LLEwithdraws, RUE and RLE spont   Psychiatric:         Behavior: Behavior normal.           Medications:  Continuousdexmedetomidine (PRECEDEX) infusion, Last Rate: 1.3 mcg/kg/hr (09/03/20 1402)  dextrose 10 % in water (D10W)  heparin (porcine) in D5W, Last Rate: 12 Units/kg/hr (09/03/20 1402)    Scheduledalbuterol-ipratropium, 3 mL, Q6H  atorvastatin, 40 mg, Daily  ceFEPime (MAXIPIME) IVPB, 1 g, Q12H  digoxin, 0.125 mg, Daily  diltiaZEM, 60 mg, Q8H  pantoprozole (PROTONIX) IV, 40 mg, Q12H  polyethylene glycol, 17 g, Daily  QUEtiapine, 25 mg, BID  senna-docusate 8.6-50 mg, 1 tablet, BID  valproic acid (as sodium salt), 250 mg, Q8H    PRNacetaminophen, 650 mg, Q6H PRN  dextrose 10 % in water (D10W), , Continuous PRN  dextrose 50%, 12.5 g, PRN  fentaNYL, 50 mcg, Q2H PRN  glucagon (human recombinant), 1 mg, PRN  hydrALAZINE, 10 mg, Q6H PRN  insulin aspart U-100, 1-10 Units, Q4H PRN  magnesium oxide, 800 mg, PRN  magnesium oxide, 800 mg, PRN  metoprolol, 5 mg, Q10 Min PRN  ondansetron, 4 mg, Q6H PRN  potassium chloride, 40 mEq, PRN  potassium chloride, 40 mEq, PRN  potassium chloride, 60 mEq, PRN  potassium, sodium phosphates, 2 packet, PRN  potassium, sodium phosphates, 2 packet, PRN  potassium, sodium phosphates, 2 packet, PRN      Today I personally reviewed pertinent medications, lines/drains/airways, imaging, cardiology results, laboratory results, microbiology results, CBC, CMP, EKG, CXR, ABG, POCT glu    Diet  Diet NPO  Diet NPO        Assessment/Plan:     Neuro  * Embolic stroke involving right middle cerebral artery  - Last seen normal on 08/25/20 with no intervention; intubated and sedated.    - Imaging shows large right ischemic stroke in the posterior frontal and parietal regions  - SBP < 160, MAP > 65  - EuNa   - CBC, CMP, coags, UA, ABG, CXR, anti XA, Echo, and lipid panel   - heparin gtt low intensity  -  repeat CTH stable  - daily statin  - started Seroquel 25 BID 9/3    ENT  Epistaxis  - persistent epistaxis in the setting of anticoagulation   - H/H stable at 12.3/39.3  - daily CBC   - ENT consulted;   rhino rockets can stay as long as 5 days if needed.        Pulmonary  Chronic obstructive pulmonary disease  - daily CXRs while intubated   - goal SpO2 is > 88%  - scheduled duo nebs q6h    Acute on chronic respiratory failure with hypoxia and hypercapnia  - Pt intubated and sedated   - Daily CXRs and abg       Cardiac/Vascular  Atrial fibrillation with rapid ventricular response  - Repeat EKG   - daily Mg and Phos; replace as needed  - dilt PO  -dig level 1.8  - continue heparin gtt minimal intensity   - CT head stable      Essential hypertension  - SBP goal < 160, MAP >65    Renal/  GOYO (acute kidney injury)  - BUN/Cr: 32/ 1.1  - trending down    Endocrine  Diabetes mellitus  -SSI moderate  -POCT glu monitoring    Moderate malnutrition  - tube feeds  - monitor electrolytes with daily CMP, Mg, phos    GI  GI bleed  - upper gi bleed at outside hospital  - recent upper GI bleed at outside facility and epistaxis (2 rhino rockets, up to 5 days prn)  - consulted GI -Continue PPI b.i.d. Trend hemoglobin, transfuse hemoglobin <7  -restarted Tube feeds after being NPO for possible extubation  - plan for extubation tomorrow 9/4        The patient is being Prophylaxed for:  Venous Thromboembolism with: Chemical  Stress Ulcer with: PPI  Ventilator Pneumonia with: chlorhexidine oral care    Activity Orders          Diet NPO: NPO starting at 08/30 0250        Full Code  Critical care time 35 min    Marissa Hill PA-C  Neurocritical Care  [facility]

## 2020-09-03 NOTE — PLAN OF CARE
POC reviewed with pt at 0500. Pt unable to verbalize understanding. Questions and concerns addressed. No acute events overnight. Pt progressing toward goals. Will continue to monitor. See flowsheets for full assessment and VS info

## 2020-09-03 NOTE — ASSESSMENT & PLAN NOTE
- persistent epistaxis in the setting of anticoagulation   - H/H stable at 12.3/39.3  - daily CBC   - ENT consulted;   rhino rockets can stay as long as 5 days if needed.

## 2020-09-03 NOTE — PROGRESS NOTES
Patient's chart was reviewed by a stroke team provider.  Patient intubated and sedated. Will give thumbs up on right hand.   There is no new imaging to review.  Pending diagnostics to follow up on include: none. CT head 9/2 unchanged.  For other recommendations please see our previous note completed on: 9/1/20.    There are no new recommendations at this time. Will continue to follow. Discussed patient with staff. Please contact stroke team for any questions or concerns.

## 2020-09-03 NOTE — ASSESSMENT & PLAN NOTE
- Repeat EKG   - daily Mg and Phos; replace as needed  - dilt PO  -dig level 1.8  - continue heparin gtt minimal intensity   - CT head stable

## 2020-09-03 NOTE — SUBJECTIVE & OBJECTIVE
Interval History:  Started seroquel 25mg BID, QTc resulted as 378 on EKG. CXR returned as stable, ET and NG tubes in place. Continued SSI moderate for diabetes. Improving alkalosis on ABG, SIMV mode. If continues to remain constipated for one more day, add mag citrate.    Review of Systems  Unable to assess d/t intubation status.  Objective:     Vitals:  Temp: 97 °F (36.1 °C)  Pulse: 103  Rhythm: atrial rhythm  BP: (!) 146/68  MAP (mmHg): 98  Resp: (!) 30  SpO2: 100 %  Oxygen Concentration (%): 40  O2 Device (Oxygen Therapy): ventilator  Vent Mode: SIMV  Set Rate: 15 BPM  Vt Set: 400 mL  Pressure Support: 5 cmH20  PEEP/CPAP: 5 cmH20  Peak Airway Pressure: 22 cmH2O  Mean Airway Pressure: 7.4 cmH20  Plateau Pressure: 13 cmH20    Temp  Min: 96.8 °F (36 °C)  Max: 98.1 °F (36.7 °C)  Pulse  Min: 87  Max: 211  BP  Min: 121/71  Max: 174/101  MAP (mmHg)  Min: 83  Max: 128  Resp  Min: 15  Max: 38  SpO2  Min: 89 %  Max: 100 %  Oxygen Concentration (%)  Min: 40  Max: 40    09/02 0701 - 09/03 0700  In: 1069.9 [I.V.:509.9]  Out: 2830 [Urine:2830]   Unmeasured Output  Stool Occurrence: 0  Emesis Occurrence: 0  Pad Count: 0       Physical Exam  HENT:      Head: Normocephalic and atraumatic.      Right Ear: External ear normal.      Left Ear: External ear normal.      Nose: Nose normal.      Mouth/Throat:      Mouth: Mucous membranes are moist.   Eyes:      General:         Right eye: No discharge.         Left eye: No discharge.      Conjunctiva/sclera: Conjunctivae normal.   Neck:      Musculoskeletal: Normal range of motion.   Cardiovascular:      Rate and Rhythm: Normal rate and regular rhythm.   Pulmonary:      Effort: Pulmonary effort is normal. No respiratory distress.   Abdominal:      General: Abdomen is flat. There is no distension.      Palpations: Abdomen is soft. There is no mass.      Tenderness: There is no abdominal tenderness. There is no guarding.      Hernia: No hernia is present.   Musculoskeletal:          General: No deformity or signs of injury.   Skin:     General: Skin is warm and dry.   Neurological:      Mental Status: She is alert.      Comments: GCS [id number]  Alert, intermittently follows commands on Rt side  Cough, gag intact, Lt pupil 3mm reactive, Rt pupil 6mm fixed  LUE and LLEwithdraws, RUE and RLE spont   Psychiatric:         Behavior: Behavior normal.           Medications:  Continuousdexmedetomidine (PRECEDEX) infusion, Last Rate: 1.3 mcg/kg/hr (09/03/20 1402)  dextrose 10 % in water (D10W)  heparin (porcine) in D5W, Last Rate: 12 Units/kg/hr (09/03/20 1402)    Scheduledalbuterol-ipratropium, 3 mL, Q6H  atorvastatin, 40 mg, Daily  ceFEPime (MAXIPIME) IVPB, 1 g, Q12H  digoxin, 0.125 mg, Daily  diltiaZEM, 60 mg, Q8H  pantoprozole (PROTONIX) IV, 40 mg, Q12H  polyethylene glycol, 17 g, Daily  QUEtiapine, 25 mg, BID  senna-docusate 8.6-50 mg, 1 tablet, BID  valproic acid (as sodium salt), 250 mg, Q8H    PRNacetaminophen, 650 mg, Q6H PRN  dextrose 10 % in water (D10W), , Continuous PRN  dextrose 50%, 12.5 g, PRN  fentaNYL, 50 mcg, Q2H PRN  glucagon (human recombinant), 1 mg, PRN  hydrALAZINE, 10 mg, Q6H PRN  insulin aspart U-100, 1-10 Units, Q4H PRN  magnesium oxide, 800 mg, PRN  magnesium oxide, 800 mg, PRN  metoprolol, 5 mg, Q10 Min PRN  ondansetron, 4 mg, Q6H PRN  potassium chloride, 40 mEq, PRN  potassium chloride, 40 mEq, PRN  potassium chloride, 60 mEq, PRN  potassium, sodium phosphates, 2 packet, PRN  potassium, sodium phosphates, 2 packet, PRN  potassium, sodium phosphates, 2 packet, PRN      Today I personally reviewed pertinent medications, lines/drains/airways, imaging, cardiology results, laboratory results, microbiology results, CBC, CMP, EKG, CXR, ABG, POCT glu    Diet  Diet NPO  Diet NPO

## 2020-09-03 NOTE — PROGRESS NOTES
"[facility]  Adult Nutrition  Progress Note    SUMMARY       Recommendations    1. Continue Isosource 1.5 @40ml/hr (provides 1440 kcal, 65g pro, 733ml free water).   Goals: 1. Pt to meet % EEN and EPN by RD follow up.  Nutrition Goal Status: progressing towards goal  Communication of RD Recs: (POC)    Reason for Assessment    Reason For Assessment: RD follow-up  Diagnosis: stroke/CVA  Relevant Medical History: COPD, HTN  Interdisciplinary Rounds: did not attend  General Information Comments: Pt continues intubated to vent, sedated, tolerating TF well. No wt history in chart, but previous RD charted poor po intake <75% x 7 days. Completed NFPE today 9/03/20: pt has moderate to severe muscle wasting and mild fat depletion and meets criteria for acute moderate malnutrition.  Nutrition Discharge Planning: Unable to assess at this time    Nutrition Risk Screen    Nutrition Risk Screen: tube feeding or parenteral nutrition    Nutrition/Diet History    Spiritual, Cultural Beliefs, Yazdanism Practices, Values that Affect Care: no  Factors Affecting Nutritional Intake: NPO, on mechanical ventilation    Anthropometrics    Temp: 96.8 °F (36 °C)  Height Method: Estimated  Height: 5' 5" (165.1 cm)  Height (inches): 65 in  Weight Method: Bed Scale  Weight: 59.9 kg (132 lb 0.9 oz)  Weight (lb): 132.06 lb  Ideal Body Weight (IBW), Female: 125 lb  % Ideal Body Weight, Female (lb): 105.65 %  BMI (Calculated): 22  BMI Grade: 18.5-24.9 - normal       Lab/Procedures/Meds    Pertinent Labs Reviewed: reviewed  Pertinent Labs Comments: A1c 6.2%, Glu 131, CO2 32, Alb 2.2  Pertinent Medications Reviewed: reviewed  Pertinent Medications Comments: pantoprazole, senna, precedex      Estimated/Assessed Needs    Weight Used For Calorie Calculations: 59.9 kg (132 lb 0.9 oz)  Energy Calorie Requirements (kcal): 1346  Energy Need Method: [facility]  Protein Requirements: 72-90g  Weight Used For Protein Calculations: 59.9 kg " (132 lb 0.9 oz)  Fluid Requirements (mL): Per MD     RDA Method (mL): 1346         Nutrition Prescription Ordered    Current Diet Order: NPO  Current Nutrition Support Formula Ordered: Isosource 1.5  Current Nutrition Support Rate Ordered: 40 (ml)  Current Nutrition Support Frequency Ordered: ml/hr    Evaluation of Received Nutrient/Fluid Intake    Enteral Calories (kcal): 1440  Enteral Protein (gm): 65  Enteral (Free Water) Fluid (mL): 733  % Kcal Needs: 100%(NA)  % Protein Needs: 90%  Total Fluid Intake (mL): (NA)  I/O: -3.2L since admission  Energy Calories Required: meeting needs  Protein Required: meeting needs  Fluid Required: meeting needs  Total Fluid Intake (mL/kg): (NA)  Comments: LBM 8/26  % Intake of Estimated Energy Needs: 75 - 100 %  % Meal Intake: NPO    Nutrition Risk    Level of Risk/Frequency of Follow-up: low     Assessment and Plan  Nutrition Problem:  Moderate Protein-Calorie Malnutrition  Malnutrition in the context of Acute Illness/Injury    Related to (etiology):  Poor intake in setting of stroke    Signs and Symptoms (as evidenced by):  Energy Intake:<75% of estimated energy requirement for 7 days  Body Fat Depletion: mild depletion of orbitals, triceps   Muscle Mass Depletion: moderate to severe depletion of temples, clavicles, lower extremities      Interventions(treatment strategy):  Collaboration of care with providers.  Enteral nutrition.    Nutrition Diagnosis Status:  New      Monitor and Evaluation    Food and Nutrient Intake: energy intake, enteral nutrition intake  Food and Nutrient Adminstration: enteral and parenteral nutrition administration  Anthropometric Measurements: weight, weight change, body mass index  Biochemical Data, Medical Tests and Procedures: electrolyte and renal panel, gastrointestinal profile, glucose/endocrine profile, inflammatory profile, lipid profile  Nutrition-Focused Physical Findings: overall appearance, extremities, muscles and bones, head and eyes,  skin     Malnutrition Assessment                 Orbital Region (Subcutaneous Fat Loss): mild depletion  Upper Arm Region (Subcutaneous Fat Loss): mild depletion  Thoracic and Lumbar Region: well nourished   Roby Region (Muscle Loss): moderate depletion  Clavicle Bone Region (Muscle Loss): severe depletion  Clavicle and Acromion Bone Region (Muscle Loss): mild depletion  Patellar Region (Muscle Loss): moderate depletion  Anterior Thigh Region (Muscle Loss): moderate depletion  Posterior Calf Region (Muscle Loss): moderate depletion                 Nutrition Follow-Up    RD Follow-up?: Yes

## 2020-09-04 DIAGNOSIS — E11.9 TYPE 2 DIABETES MELLITUS WITHOUT COMPLICATION: ICD-10-CM

## 2020-09-04 LAB
ALBUMIN SERPL BCP-MCNC: 2.1 G/DL (ref 3.5–5.2)
ALLENS TEST: ABNORMAL
ALP SERPL-CCNC: 75 U/L (ref 55–135)
ALT SERPL W/O P-5'-P-CCNC: 28 U/L (ref 10–44)
ANION GAP SERPL CALC-SCNC: 9 MMOL/L (ref 8–16)
ANISOCYTOSIS BLD QL SMEAR: SLIGHT
APTT BLDCRRT: 43.1 SEC (ref 21–32)
APTT BLDCRRT: 57 SEC (ref 21–32)
AST SERPL-CCNC: 36 U/L (ref 10–40)
BACTERIA BLD CULT: NORMAL
BACTERIA BLD CULT: NORMAL
BASOPHILS # BLD AUTO: 0.05 K/UL (ref 0–0.2)
BASOPHILS NFR BLD: 0.4 % (ref 0–1.9)
BILIRUB SERPL-MCNC: 0.4 MG/DL (ref 0.1–1)
BUN SERPL-MCNC: 31 MG/DL (ref 8–23)
CALCIUM SERPL-MCNC: 8.9 MG/DL (ref 8.7–10.5)
CHLORIDE SERPL-SCNC: 105 MMOL/L (ref 95–110)
CO2 SERPL-SCNC: 32 MMOL/L (ref 23–29)
CREAT SERPL-MCNC: 1.2 MG/DL (ref 0.5–1.4)
DELSYS: ABNORMAL
DIFFERENTIAL METHOD: ABNORMAL
EOSINOPHIL # BLD AUTO: 0.2 K/UL (ref 0–0.5)
EOSINOPHIL NFR BLD: 1.2 % (ref 0–8)
ERYTHROCYTE [DISTWIDTH] IN BLOOD BY AUTOMATED COUNT: 14.3 % (ref 11.5–14.5)
ERYTHROCYTE [SEDIMENTATION RATE] IN BLOOD BY WESTERGREN METHOD: 15 MM/H
ERYTHROCYTE [SEDIMENTATION RATE] IN BLOOD BY WESTERGREN METHOD: 22 MM/H
EST. GFR  (AFRICAN AMERICAN): 55.2 ML/MIN/1.73 M^2
EST. GFR  (NON AFRICAN AMERICAN): 47.9 ML/MIN/1.73 M^2
FIO2: 40
GLUCOSE SERPL-MCNC: 133 MG/DL (ref 70–110)
HCO3 UR-SCNC: 32.2 MMOL/L (ref 24–28)
HCO3 UR-SCNC: 33.8 MMOL/L (ref 24–28)
HCO3 UR-SCNC: 42.3 MMOL/L (ref 24–28)
HCT VFR BLD AUTO: 36.6 % (ref 37–48.5)
HGB BLD-MCNC: 11.5 G/DL (ref 12–16)
HYPOCHROMIA BLD QL SMEAR: ABNORMAL
IMM GRANULOCYTES # BLD AUTO: 0.39 K/UL (ref 0–0.04)
IMM GRANULOCYTES NFR BLD AUTO: 3.1 % (ref 0–0.5)
LYMPHOCYTES # BLD AUTO: 0.6 K/UL (ref 1–4.8)
LYMPHOCYTES NFR BLD: 4.5 % (ref 18–48)
MAGNESIUM SERPL-MCNC: 1.7 MG/DL (ref 1.6–2.6)
MCH RBC QN AUTO: 30.1 PG (ref 27–31)
MCHC RBC AUTO-ENTMCNC: 31.4 G/DL (ref 32–36)
MCV RBC AUTO: 96 FL (ref 82–98)
MIN VOL: 10.4
MODE: ABNORMAL
MONOCYTES # BLD AUTO: 1.7 K/UL (ref 0.3–1)
MONOCYTES NFR BLD: 12.9 % (ref 4–15)
NEUTROPHILS # BLD AUTO: 10 K/UL (ref 1.8–7.7)
NEUTROPHILS NFR BLD: 77.9 % (ref 38–73)
NRBC BLD-RTO: 0 /100 WBC
OVALOCYTES BLD QL SMEAR: ABNORMAL
PCO2 BLDA: 33.9 MMHG (ref 35–45)
PCO2 BLDA: 49 MMHG (ref 35–45)
PCO2 BLDA: 73.2 MMHG (ref 35–45)
PEEP: 5
PH SMN: 7.37 [PH] (ref 7.35–7.45)
PH SMN: 7.45 [PH] (ref 7.35–7.45)
PH SMN: 7.59 [PH] (ref 7.35–7.45)
PHOSPHATE SERPL-MCNC: 2.6 MG/DL (ref 2.7–4.5)
PLATELET # BLD AUTO: 407 K/UL (ref 150–350)
PLATELET BLD QL SMEAR: ABNORMAL
PMV BLD AUTO: 11.3 FL (ref 9.2–12.9)
PO2 BLDA: 48 MMHG (ref 80–100)
PO2 BLDA: 77 MMHG (ref 80–100)
PO2 BLDA: 81 MMHG (ref 80–100)
POC BE: 10 MMOL/L
POC BE: 10 MMOL/L
POC BE: 17 MMOL/L
POC SATURATED O2: 90 % (ref 95–100)
POC SATURATED O2: 94 % (ref 95–100)
POC SATURATED O2: 96 % (ref 95–100)
POC TCO2: 33 MMOL/L (ref 23–27)
POC TCO2: 35 MMOL/L (ref 23–27)
POC TCO2: 45 MMOL/L (ref 23–27)
POCT GLUCOSE: 140 MG/DL (ref 70–110)
POCT GLUCOSE: 141 MG/DL (ref 70–110)
POCT GLUCOSE: 89 MG/DL (ref 70–110)
POIKILOCYTOSIS BLD QL SMEAR: SLIGHT
POLYCHROMASIA BLD QL SMEAR: ABNORMAL
POTASSIUM SERPL-SCNC: 4.1 MMOL/L (ref 3.5–5.1)
PROT SERPL-MCNC: 5.8 G/DL (ref 6–8.4)
PS: 5
PS: 5
RBC # BLD AUTO: 3.82 M/UL (ref 4–5.4)
SAMPLE: ABNORMAL
SITE: ABNORMAL
SODIUM SERPL-SCNC: 146 MMOL/L (ref 136–145)
SP02: 100
SP02: 92
SPONT RATE: 30
VT: 400
VT: 450
WBC # BLD AUTO: 12.78 K/UL (ref 3.9–12.7)

## 2020-09-04 PROCEDURE — 93010 ELECTROCARDIOGRAM REPORT: CPT | Mod: ,,, | Performed by: INTERNAL MEDICINE

## 2020-09-04 PROCEDURE — 25000003 PHARM REV CODE 250: Performed by: PHYSICIAN ASSISTANT

## 2020-09-04 PROCEDURE — 25000003 PHARM REV CODE 250: Performed by: NURSE PRACTITIONER

## 2020-09-04 PROCEDURE — 84100 ASSAY OF PHOSPHORUS: CPT

## 2020-09-04 PROCEDURE — 85025 COMPLETE CBC W/AUTO DIFF WBC: CPT

## 2020-09-04 PROCEDURE — 25000003 PHARM REV CODE 250: Performed by: STUDENT IN AN ORGANIZED HEALTH CARE EDUCATION/TRAINING PROGRAM

## 2020-09-04 PROCEDURE — 99233 SBSQ HOSP IP/OBS HIGH 50: CPT | Mod: ,,, | Performed by: PSYCHIATRY & NEUROLOGY

## 2020-09-04 PROCEDURE — 94640 AIRWAY INHALATION TREATMENT: CPT

## 2020-09-04 PROCEDURE — 82800 BLOOD PH: CPT

## 2020-09-04 PROCEDURE — 25000003 PHARM REV CODE 250: Performed by: UROLOGY

## 2020-09-04 PROCEDURE — 99900026 HC AIRWAY MAINTENANCE (STAT)

## 2020-09-04 PROCEDURE — 94761 N-INVAS EAR/PLS OXIMETRY MLT: CPT

## 2020-09-04 PROCEDURE — 63600175 PHARM REV CODE 636 W HCPCS: Performed by: NURSE PRACTITIONER

## 2020-09-04 PROCEDURE — 25000242 PHARM REV CODE 250 ALT 637 W/ HCPCS: Performed by: NURSE PRACTITIONER

## 2020-09-04 PROCEDURE — 80053 COMPREHEN METABOLIC PANEL: CPT

## 2020-09-04 PROCEDURE — 99291 PR CRITICAL CARE, E/M 30-74 MINUTES: ICD-10-PCS | Mod: ,,, | Performed by: PSYCHIATRY & NEUROLOGY

## 2020-09-04 PROCEDURE — 93005 ELECTROCARDIOGRAM TRACING: CPT

## 2020-09-04 PROCEDURE — 97110 THERAPEUTIC EXERCISES: CPT

## 2020-09-04 PROCEDURE — 94003 VENT MGMT INPAT SUBQ DAY: CPT

## 2020-09-04 PROCEDURE — 99291 CRITICAL CARE FIRST HOUR: CPT | Mod: ,,, | Performed by: PSYCHIATRY & NEUROLOGY

## 2020-09-04 PROCEDURE — 36600 WITHDRAWAL OF ARTERIAL BLOOD: CPT

## 2020-09-04 PROCEDURE — 85730 THROMBOPLASTIN TIME PARTIAL: CPT

## 2020-09-04 PROCEDURE — 25000003 PHARM REV CODE 250: Performed by: PSYCHIATRY & NEUROLOGY

## 2020-09-04 PROCEDURE — 63600175 PHARM REV CODE 636 W HCPCS: Performed by: PHYSICIAN ASSISTANT

## 2020-09-04 PROCEDURE — 63600175 PHARM REV CODE 636 W HCPCS: Performed by: PSYCHIATRY & NEUROLOGY

## 2020-09-04 PROCEDURE — 99233 PR SUBSEQUENT HOSPITAL CARE,LEVL III: ICD-10-PCS | Mod: ,,, | Performed by: PSYCHIATRY & NEUROLOGY

## 2020-09-04 PROCEDURE — 83735 ASSAY OF MAGNESIUM: CPT

## 2020-09-04 PROCEDURE — 20000000 HC ICU ROOM

## 2020-09-04 PROCEDURE — 99900035 HC TECH TIME PER 15 MIN (STAT)

## 2020-09-04 PROCEDURE — 93010 EKG 12-LEAD: ICD-10-PCS | Mod: ,,, | Performed by: INTERNAL MEDICINE

## 2020-09-04 PROCEDURE — 27000221 HC OXYGEN, UP TO 24 HOURS

## 2020-09-04 PROCEDURE — 63600175 PHARM REV CODE 636 W HCPCS

## 2020-09-04 PROCEDURE — 80053 COMPREHEN METABOLIC PANEL: CPT | Mod: 91

## 2020-09-04 PROCEDURE — C9113 INJ PANTOPRAZOLE SODIUM, VIA: HCPCS | Performed by: PHYSICIAN ASSISTANT

## 2020-09-04 PROCEDURE — 85730 THROMBOPLASTIN TIME PARTIAL: CPT | Mod: 91

## 2020-09-04 PROCEDURE — 82803 BLOOD GASES ANY COMBINATION: CPT

## 2020-09-04 RX ORDER — SYRING-NEEDL,DISP,INSUL,0.3 ML 29 G X1/2"
296 SYRINGE, EMPTY DISPOSABLE MISCELLANEOUS ONCE
Status: COMPLETED | OUTPATIENT
Start: 2020-09-04 | End: 2020-09-04

## 2020-09-04 RX ORDER — METOPROLOL TARTRATE 1 MG/ML
INJECTION, SOLUTION INTRAVENOUS
Status: COMPLETED
Start: 2020-09-04 | End: 2020-09-05

## 2020-09-04 RX ORDER — QUETIAPINE FUMARATE 25 MG/1
25 TABLET, FILM COATED ORAL ONCE
Status: DISCONTINUED | OUTPATIENT
Start: 2020-09-04 | End: 2020-09-04

## 2020-09-04 RX ORDER — MAGNESIUM SULFATE HEPTAHYDRATE 40 MG/ML
2 INJECTION, SOLUTION INTRAVENOUS ONCE
Status: COMPLETED | OUTPATIENT
Start: 2020-09-04 | End: 2020-09-04

## 2020-09-04 RX ORDER — MIDAZOLAM HYDROCHLORIDE 1 MG/ML
4 INJECTION INTRAMUSCULAR; INTRAVENOUS ONCE
Status: COMPLETED | OUTPATIENT
Start: 2020-09-04 | End: 2020-09-04

## 2020-09-04 RX ORDER — QUETIAPINE FUMARATE 25 MG/1
50 TABLET, FILM COATED ORAL 2 TIMES DAILY
Status: DISCONTINUED | OUTPATIENT
Start: 2020-09-04 | End: 2020-09-07

## 2020-09-04 RX ORDER — AMIODARONE HYDROCHLORIDE 150 MG/3ML
INJECTION, SOLUTION INTRAVENOUS
Status: DISPENSED
Start: 2020-09-04 | End: 2020-09-05

## 2020-09-04 RX ORDER — MIDAZOLAM HYDROCHLORIDE 1 MG/ML
INJECTION INTRAMUSCULAR; INTRAVENOUS
Status: COMPLETED
Start: 2020-09-04 | End: 2020-09-04

## 2020-09-04 RX ORDER — PROPOFOL 10 MG/ML
5 INJECTION, EMULSION INTRAVENOUS CONTINUOUS
Status: DISCONTINUED | OUTPATIENT
Start: 2020-09-05 | End: 2020-09-07

## 2020-09-04 RX ADMIN — HEPARIN SODIUM AND DEXTROSE 12 UNITS/KG/HR: 10000; 5 INJECTION INTRAVENOUS at 12:09

## 2020-09-04 RX ADMIN — DOCUSATE SODIUM 50MG AND SENNOSIDES 8.6MG 1 TABLET: 8.6; 5 TABLET, FILM COATED ORAL at 08:09

## 2020-09-04 RX ADMIN — QUETIAPINE FUMARATE 50 MG: 25 TABLET ORAL at 10:09

## 2020-09-04 RX ADMIN — PANTOPRAZOLE SODIUM 40 MG: 40 INJECTION, POWDER, LYOPHILIZED, FOR SOLUTION INTRAVENOUS at 08:09

## 2020-09-04 RX ADMIN — IPRATROPIUM BROMIDE AND ALBUTEROL SULFATE 3 ML: .5; 2.5 SOLUTION RESPIRATORY (INHALATION) at 01:09

## 2020-09-04 RX ADMIN — DILTIAZEM HYDROCHLORIDE 60 MG: 60 TABLET, FILM COATED ORAL at 05:09

## 2020-09-04 RX ADMIN — QUETIAPINE FUMARATE 25 MG: 25 TABLET ORAL at 08:09

## 2020-09-04 RX ADMIN — ATORVASTATIN CALCIUM 40 MG: 20 TABLET, FILM COATED ORAL at 08:09

## 2020-09-04 RX ADMIN — DEXMEDETOMIDINE HYDROCHLORIDE 1.4 MCG/KG/HR: 4 INJECTION, SOLUTION INTRAVENOUS at 05:09

## 2020-09-04 RX ADMIN — DOCUSATE SODIUM 50MG AND SENNOSIDES 8.6MG 1 TABLET: 8.6; 5 TABLET, FILM COATED ORAL at 10:09

## 2020-09-04 RX ADMIN — PANTOPRAZOLE SODIUM 40 MG: 40 INJECTION, POWDER, LYOPHILIZED, FOR SOLUTION INTRAVENOUS at 10:09

## 2020-09-04 RX ADMIN — CEFEPIME 1 G: 1 INJECTION, POWDER, FOR SOLUTION INTRAMUSCULAR; INTRAVENOUS at 02:09

## 2020-09-04 RX ADMIN — IPRATROPIUM BROMIDE AND ALBUTEROL SULFATE 3 ML: .5; 2.5 SOLUTION RESPIRATORY (INHALATION) at 07:09

## 2020-09-04 RX ADMIN — DILTIAZEM HYDROCHLORIDE 60 MG: 60 TABLET, FILM COATED ORAL at 10:09

## 2020-09-04 RX ADMIN — DILTIAZEM HYDROCHLORIDE 60 MG: 60 TABLET, FILM COATED ORAL at 02:09

## 2020-09-04 RX ADMIN — MAGNESIUM CITRATE 296 ML: 1.75 LIQUID ORAL at 10:09

## 2020-09-04 RX ADMIN — DEXMEDETOMIDINE HYDROCHLORIDE 1.2 MCG/KG/HR: 4 INJECTION, SOLUTION INTRAVENOUS at 12:09

## 2020-09-04 RX ADMIN — HYDRALAZINE HYDROCHLORIDE 10 MG: 20 INJECTION INTRAMUSCULAR; INTRAVENOUS at 10:09

## 2020-09-04 RX ADMIN — POLYETHYLENE GLYCOL 3350 17 G: 17 POWDER, FOR SOLUTION ORAL at 08:09

## 2020-09-04 RX ADMIN — DIGOXIN 0.12 MG: 125 TABLET ORAL at 08:09

## 2020-09-04 RX ADMIN — FENTANYL CITRATE 50 MCG: 50 INJECTION INTRAMUSCULAR; INTRAVENOUS at 03:09

## 2020-09-04 RX ADMIN — MAGNESIUM SULFATE 2 G: 2 INJECTION INTRAVENOUS at 05:09

## 2020-09-04 RX ADMIN — MIDAZOLAM HYDROCHLORIDE 4 MG: 1 INJECTION INTRAMUSCULAR; INTRAVENOUS at 11:09

## 2020-09-04 RX ADMIN — DEXMEDETOMIDINE HYDROCHLORIDE 1.3 MCG/KG/HR: 4 INJECTION, SOLUTION INTRAVENOUS at 10:09

## 2020-09-04 RX ADMIN — VALPROIC ACID 250 MG: 250 SOLUTION ORAL at 10:09

## 2020-09-04 RX ADMIN — IPRATROPIUM BROMIDE AND ALBUTEROL SULFATE 3 ML: .5; 2.5 SOLUTION RESPIRATORY (INHALATION) at 12:09

## 2020-09-04 RX ADMIN — VALPROIC ACID 250 MG: 250 SOLUTION ORAL at 02:09

## 2020-09-04 RX ADMIN — MIDAZOLAM 4 MG: 1 INJECTION INTRAMUSCULAR; INTRAVENOUS at 11:09

## 2020-09-04 RX ADMIN — PROPOFOL 10 MCG/KG/MIN: 10 INJECTION, EMULSION INTRAVENOUS at 11:09

## 2020-09-04 RX ADMIN — VALPROIC ACID 250 MG: 250 SOLUTION ORAL at 05:09

## 2020-09-04 NOTE — ASSESSMENT & PLAN NOTE
Donita Gonzalez is a 64 y.o. female with a significant medical history of COPD, HTN, smoker who presents to the hospital as a transfer from [facility] for further evaluation of R MCA stroke.  The patient did not receive tPA due to LKN 8/25/2020 and presenting to the OSH on 8/26/2020.      Antithrombotics for secondary stroke prevention: Anticoagulants: Patient was on full dose Lovenox that was dc'd 2/2 uncontrolled epistaxis. Heparin infusion started 9/1/20 for A/C.  Repeat CTH pending once therapeutic.    Statins for secondary stroke prevention and hyperlipidemia, if present:   Statins: Atorvastatin- 40 mg daily    Aggressive risk factor modification: HTN, Smoking, HLD, A-Fib     Rehab efforts: The patient has been evaluated by a stroke team provider and the therapy needs have been fully considered based off the presenting complaints and exam findings. The following therapy evaluations are needed: PT/OT/SLP evaluations when the patient is able to participate    Diagnostics ordered/pending: CT scan of head without contrast to asses brain parenchyma  No changes 9/2/20 while on heparin drip therapeutic    VTE prophylaxis: Mechanical prophylaxis: Place SCDs and now heparin infusion     BP parameters: Infarct: No intervention, SBP <220

## 2020-09-04 NOTE — PLAN OF CARE
Problem: Adult Inpatient Plan of Care  Goal: Plan of Care Review  Outcome: Ongoing, Progressing  Flowsheets (Taken 9/4/2020 1646)  Plan of Care Reviewed With:   patient   spouse     Problem: Adult Inpatient Plan of Care  Goal: Patient-Specific Goal (Individualization)  Description: Admit Date: 8/29/20    Admit Dx: CVA    Past Medical History:  No date: COPD (chronic obstructive pulmonary disease)  No date: Hypertension    Past Surgical History:  No date: AUGMENTATION OF BREAST  No date: HYSTERECTOMY    Individualization:   1. Pt likes multiple blankets    Restraints: R wrist soft restraint          Outcome: Ongoing, Progressing  Flowsheets (Taken 9/4/2020 1646)  Individualized Care Needs: Does not like R leg covered, likes to move freely  Anxieties, Fears or Concerns: SHEREEN, pt intubated  Patient-Specific Goals (Include Timeframe): Wean to extubate by tomorrow       POC reviewed with pt and spouse at 1500. Pt intubated and sedated. Spouse at bedside verbalized understanding. Questions and concerns addressed. No acute events today. Pt progressing toward goals. Will continue to monitor. See flowsheets for full assessment and VS info. SBT trial today for possible extubation. TF on hold. Precedex and heparin gtt continued. R soft wrist restraint remains.

## 2020-09-04 NOTE — CARE UPDATE
ENT at bedside. Bilateral nasal rockets removed. Spouse at bedside and updated. Will continue to monitor closely. Verbal order to admin Afrin nasal spray if bleeding noted.

## 2020-09-04 NOTE — ASSESSMENT & PLAN NOTE
-Patient had uncontrolled epistaxis that was attributed to NGT placement and AC.  -Lovenox --> heparin infusion 9/1/20   -ENT consulted; signed off 8/31; leave nasal packing in for 5 days per ENT note 8/31 --> removed 9/4  -Managed by NCC

## 2020-09-04 NOTE — PT/OT/SLP PROGRESS
Occupational Therapy   Treatment    Name: Donita Gonzalez  MRN: [id number]  Admitting Diagnosis:  Embolic stroke involving right middle cerebral artery       Recommendations:     Discharge Recommendations: (pending extubation)  Discharge Equipment Recommendations:  (pending extubation)  Barriers to discharge:  None    Assessment:     Donita Gonzalez is a 64 y.o. female with a medical diagnosis of Embolic stroke involving right middle cerebral artery.  She presents with performance deficits affecting function are weakness, impaired cognition, impaired endurance, impaired sensation, decreased coordination, impaired self care skills, impaired functional mobilty, decreased lower extremity function, decreased safety awareness, gait instability, impaired balance, impaired cardiopulmonary response to activity, impaired fine motor, decreased upper extremity function, impaired coordination, decreased ROM, impaired joint extensibility, abnormal tone.     Rehab Prognosis:  Good; patient would benefit from acute skilled OT services to address these deficits and reach maximum level of function.       Plan:     Patient to be seen 3 x/week to address the above listed problems via self-care/home management, therapeutic activities, therapeutic exercises, neuromuscular re-education, cognitive retraining, sensory integration  · Plan of Care Expires: 09/27/20  · Plan of Care Reviewed with: patient    Subjective   Patient:  Nonverbal; intubated  Pain/Comfort:  · Pain Rating 1: 0/10  · Pain Rating Post-Intervention 1: 0/10    Objective:     Communicated with: Nurse and RT prior to session.  Patient found supine with bed alarm, blood pressure cuff, barr catheter, peripheral IV, pulse ox (continuous), restraints, telemetry, ventilator upon OT entry to room.  Family not present.    General Precautions: Standard, aspiration, fall, NPO, seizure   Orthopedic Precautions:N/A   Braces: N/A     Occupational Performance:     Bed Mobility:    · Patient  completed Rolling/Turning to Left with  total assistance  · Patient completed Rolling/Turning to Right with total assistance     Functional Mobility/Transfers:  · Dependent drawsheet transfers    Activities of Daily Living:  · Feeding:  NPO    · Grooming: total assistance while supine    [facility] 6 Click ADL: 6    Treatment & Education:  Patient education provided on role of OT, ROM, positioning, daily orientation and need for continued OT upon discharge.  Daily orientation provided.   PROM performed left UE/LE and AAROM right UE/LE one set x 10 rep in all planes of motion with stretches provided at end range; sustained stretch provided for ankle dorsiflexion.  Assistance and facilitation provided for upward rotation of the scapula during shoulder flexion and abduction to promote orientation of glenoid fossa of scapula to humeral head for prevention of post-stroke hemiplegic pain.  Patient kept eyes closed 100% of the session.  Patient kept head turned to the right; tongue protrusion noted. Towel roll used for midline positioning of head.   Provided multi-sensory stimulation to prevent sensory deprivation and improve clinical outcomes.  Positioning provided for midline orientation with bilateral UEs elevated and heels lifted off mattress; positioning provided to prevent flexor synergy pattern in UE (internal rotation and adduction) to decrease risk of post-stroke hemiplegic pain.   Gentle cervical rotation provided.       Patient left supine with all lines intact and call button in reachEducation:      GOALS:   Multidisciplinary Problems     Occupational Therapy Goals        Problem: Occupational Therapy Goal    Goal Priority Disciplines Outcome Interventions   Occupational Therapy Goal     OT, PT/OT Ongoing, Progressing    Description: Goals set 9/1 to be addressed for 14 days with expiration date, 9/14:  Patient will increase functional independence with ADLs by performing:    Patient will demonstrate rolling to the  right with mod assist.  Not met   Patient will demonstrate rolling to the left with mod assist.   Not met  Patient will demonstrate supine -sit with mod assist.   Not met  Patient will demonstrate stand pivot transfers with mod assist.   Not met  Patient will demonstrate grooming while seated with mod assist.   Not met  Patient will demonstrate upper body dressing with mod assist while seated EOB.   Not met  Patient will demonstrate lower body dressing with max assist while seated EOB.   Not met  Patient will demonstrate toileting with max assist.   Not met  Patient will demonstrate bathing while seated EOB with max assist.   Not met  Patient's family / caregiver will demonstrate independence and safety with assisting patient with self-care skills and functional mobility.     Not met  Patient's family / caregiver will demonstrate independence with providing ROM and changes in bed positioning.   Not met  Patient and/or patient's family will verbalize understanding of stroke prevention guidelines, personal risk factors and stroke warning signs via teachback method.  Not met                              Time Tracking:     OT Date of Treatment: 09/04/20  OT Start Time: 0408  OT Stop Time: 0431  OT Total Time (min): 23 min    Billable Minutes:Therapeutic Exercise 23    RORY Thomas  9/4/2020

## 2020-09-04 NOTE — SUBJECTIVE & OBJECTIVE
Neurologic Chief Complaint: R MCA 2/2 Afib.     Subjective:     Interval History: Patient is seen for follow-up neurological assessment and treatment recommendations: R MCA 2/2 Afib.     HPI, Past Medical, Family, and Social History remains the same as documented in the initial encounter.     Review of Systems   Unable to perform ROS: Intubated   Constitutional: Negative for fever.   HENT: Positive for nosebleeds (rhoni rocket).         Tongue swelling   Respiratory: Negative for chest tightness.         Intubated   Cardiovascular: Negative for chest pain.   Gastrointestinal: Negative for anal bleeding and blood in stool.   Genitourinary: Negative for hematuria.   Psychiatric/Behavioral: Positive for agitation.        Precedex for agitation while on MV.      Scheduled Meds:   albuterol-ipratropium  3 mL Nebulization Q6H    atorvastatin  40 mg Per OG tube Daily    ceFEPime (MAXIPIME) IVPB  1 g Intravenous Q12H    digoxin  0.125 mg Per NG tube Daily    diltiaZEM  60 mg Per OG tube Q8H    pantoprozole (PROTONIX) IV  40 mg Intravenous Q12H    polyethylene glycol  17 g Per NG tube Daily    QUEtiapine  50 mg Per NG tube BID    senna-docusate 8.6-50 mg  1 tablet Per OG tube BID    valproic acid (as sodium salt)  250 mg Per OG tube Q8H     Continuous Infusions:   dexmedetomidine (PRECEDEX) infusion 1.3 mcg/kg/hr (09/04/20 1602)    heparin (porcine) in D5W 9.966 Units/kg/hr (09/04/20 1602)     PRN Meds:acetaminophen, fentaNYL, hydrALAZINE, magnesium oxide, magnesium oxide, metoprolol, ondansetron, potassium chloride, potassium chloride, potassium chloride, potassium, sodium phosphates, potassium, sodium phosphates, potassium, sodium phosphates    Objective:     Vital Signs (Most Recent):  Temp: 97.8 °F (36.6 °C) (09/04/20 1502)  Pulse: 86 (09/04/20 1602)  Resp: 16 (09/04/20 1602)  BP: 135/63 (09/04/20 1602)  SpO2: 98 % (09/04/20 1602)  BP Location: Right arm    Vital Signs Range (Last 24H):  Temp:  [97.8 °F (36.6  °C)-99.2 °F (37.3 °C)]   Pulse:  []   Resp:  [12-53]   BP: (106-166)/(53-99)   SpO2:  [89 %-100 %]   BP Location: Right arm    Physical Exam  Constitutional:       General: She is not in acute distress.     Appearance: Normal appearance. She is not ill-appearing.   HENT:      Head: Normocephalic.      Right Ear: External ear normal. There is no impacted cerumen.      Left Ear: External ear normal. There is no impacted cerumen.      Nose: No congestion or rhinorrhea.      Mouth/Throat:      Mouth: Mucous membranes are dry.   Eyes:      General: No scleral icterus.        Right eye: No discharge.         Left eye: No discharge.      Comments: Left pupil 3mm, R pupil 6 fixed    Cardiovascular:      Rate and Rhythm: Normal rate.      Heart sounds: No murmur.   Pulmonary:      Comments: intubated  Abdominal:      General: Abdomen is flat. There is no distension.      Palpations: Abdomen is soft.      Tenderness: There is no abdominal tenderness.   Musculoskeletal:         General: No swelling, tenderness or deformity.   Skin:     General: Skin is warm and dry.      Coloration: Skin is not pale.      Findings: No erythema.   Neurological:      Mental Status: She is alert.      Comments: Limited 2/2/ sedation with Precedex         Neurological Exam:   LOC: alert and intubated  Motor: Arm left  Plegia 0/5  Leg left  Plegia 0/5  Arm right  Paresis: 3/5  Leg right Normal 5/5    Laboratory:  CMP:   Recent Labs   Lab 09/04/20 0227   CALCIUM 8.9   ALBUMIN 2.1*   PROT 5.8*   *   K 4.1   CO2 32*      BUN 31*   CREATININE 1.2   ALKPHOS 75   ALT 28   AST 36   BILITOT 0.4     CBC:   Recent Labs   Lab 09/04/20 0227   WBC 12.78*   RBC 3.82*   HGB 11.5*   HCT 36.6*   *   MCV 96   MCH 30.1   MCHC 31.4*     Hgb A1C:   Recent Labs   Lab 08/29/20 2335   HGBA1C 6.0*     TSH:   Recent Labs   Lab 08/29/20 2335   TSH 0.453       Diagnostic Results     Brain Imaging   MRI Brain 8/26/20  Ventricles and sulci are  normal in size for age without evidence of hydrocephalus.  No extra-axial blood or fluid collections  Large area restricted diffusion right posterior frontal and parietal lobe consistent with right recent infarction.  No evidence of superimposed hemorrhage.  Patchy areas of increased T2/FLAIR signal of the supratentorial white matter consistent with chronic microvascular ischemic changes.  No evidence of mass.  Skull/extracranial contents (limited evaluation): Bone marrow signal intensity is normal.  Small amount of fluid in the right maxillary sinus.  Large area of restricted diffusion consistent with continued evolution of recent infarction involving the right posterior frontal and parietal lobes lobes.  No evidence of hemorrhage.  Chronic microvascular ischemic changes.     CT head 8/30/20  No extra-axial blood or fluid collections.  Continued evolution of areas infarction involving the right frontal and right parietal lobes.  No evidence of superimposed hemorrhage.  Associated edema with mild compression of the right lateral ventricle.  No hydrocephalus.  No evidence of mass.  Skull/extracranial contents (limited evaluation): No fracture. Mastoid air cells and paranasal sinuses are essentially clear.     Continued evolution of right frontal and parietal lobe infarctions with no evidence of hemorrhage.  Associated edema is noted with no midline shift.  Mild mass effect on the right lateral ventricle.  No hydrocephalus.     Cardiac Imaging   TTE 9/1/20  · Moderate concentric left ventricular hypertrophy.  · Moderately to severely decreased left ventricular systolic function. The estimated ejection fraction is 25-30%.  · Left ventricular diastolic dysfunction.  · Moderately to severely reduced right ventricular systolic function.  · Moderate left atrial enlargement.  · Severe right atrial enlargement.  · Moderate mitral regurgitation.  · Moderate tricuspid regurgitation.  · Intermediate central venous pressure (8  mmHg).  · The estimated PA systolic pressure is 42 mmHg.  · Pulmonary hypertension present.  · Negative bubble study.

## 2020-09-04 NOTE — ASSESSMENT & PLAN NOTE
-Stroke risk factor.  The patient was on full dose lovenox --> heparin infusion now given epistasix  -Continue Diltiazem gtt; Digoxin loaded 9/1/20  -CT Head 9/2 without acute chagnes on therapeutic heparin

## 2020-09-04 NOTE — PROGRESS NOTES
[facility]  Vascular Neurology  Comprehensive Stroke Center  Progress Note    Assessment/Plan:     * Embolic stroke involving right middle cerebral artery  Donita Gonzalez is a 64 y.o. female with a significant medical history of COPD, HTN, smoker who presents to the hospital as a transfer from [facility] for further evaluation of R MCA stroke.  The patient did not receive tPA due to LKN 8/25/2020 and presenting to the OSH on 8/26/2020.      Antithrombotics for secondary stroke prevention: Anticoagulants: Patient was on full dose Lovenox that was dc'd 2/2 uncontrolled epistaxis. Heparin infusion started 9/1/20 for A/C.  Repeat CTH pending once therapeutic.    Statins for secondary stroke prevention and hyperlipidemia, if present:   Statins: Atorvastatin- 40 mg daily    Aggressive risk factor modification: HTN, Smoking, HLD, A-Fib     Rehab efforts: The patient has been evaluated by a stroke team provider and the therapy needs have been fully considered based off the presenting complaints and exam findings. The following therapy evaluations are needed: PT/OT/SLP evaluations when the patient is able to participate    Diagnostics ordered/pending: CT scan of head without contrast to asses brain parenchyma  No changes 9/2/20 while on heparin drip therapeutic    VTE prophylaxis: Mechanical prophylaxis: Place SCDs and now heparin infusion     BP parameters: Infarct: No intervention, SBP <220        Cytotoxic cerebral edema  -Small area of cytotoxic cerebral edema identified when reviewing brain imaging in the territory of the R middle cerebral artery. There is no mass effect associated with it. We will continue to monitor the patients clinical exam for any worsening of symptoms which may indicate expansion of the stroke or the area of the edema resulting in the clinical change. The pattern is suggestive of cardioembolic etiology in the setting of new onset Afib w/RVR.    -Recommend  Neurosurgery consult        Epistaxis  -Patient had uncontrolled epistaxis that was attributed to NGT placement and AC.  -Lovenox --> heparin infusion 9/1/20   -ENT consulted; signed off 8/31; leave nasal packing in for 5 days per ENT note 8/31 --> removed 9/4  -Managed by [facility]    Atrial fibrillation with rapid ventricular response  -Stroke risk factor.  The patient was on full dose lovenox --> heparin infusion now given epistasix  -Continue Diltiazem gtt; Digoxin loaded 9/1/20  -CT Head 9/2 without acute chagnes on therapeutic heparin    Chronic obstructive pulmonary disease  -Goal SpO2>88%  -Managed by [facility]    GOYO (acute kidney injury)  -Cr 2.1<2.3 on 8/28 --> 1.5 on 9/1/20  -Managed by [facility]    Acute on chronic respiratory failure with hypoxia and hypercapnia  -Patient is currently intubated and sedated.   -Managed by [facility]         8/31: Patient intubated. Rhinorockets in place; ENT sign off.   9/1: Patient remains intubated; heparin restarted; once therapeutic, CT head and plan for extubation. Dilt for AFib RVR  9/2: Patient remains intubated  9/3: Patient remains intubated; alert on Precedex. Transitioned to po dilt for Afib.   9/4: Patient remains intubated; alert on Precedex, rhinorockets removed    STROKE DOCUMENTATION        NIH Scale:  1a. Level of Consciousness: 1-->Not alert, but arousable by minor stimulation to obey, answer, or respond  1b. LOC Questions: 2-->Answers neither question correctly  1c. LOC Commands: 2-->Performs neither task correctly  2. Best Gaze: 0-->Normal  3. Visual: 0-->No visual loss  4. Facial Palsy: 0-->Normal symmetrical movements  5a. Motor Arm, Left: 4-->No movement  5b. Motor Arm, Right: 0-->No drift, limb holds 90 (or 45) degrees for full 10 secs  6a. Motor Leg, Left: 4-->No movement  6b. Motor Leg, Right: 3-->No effort against gravity, leg falls to bed immediately  7. Limb Ataxia: 0-->Absent  8. Sensory: 0-->Normal, no sensory loss  9. Best Language: 3-->Mute, global aphasia, no  usable speech or auditory comprehension  10. Dysarthria: 0-->Normal  11. Extinction and Inattention (formerly Neglect): 0-->No abnormality  Total (NIH Stroke Scale): 19       Modified Mentor Score: 1  [place] Coma Scale:    ABCD2 Score:    [id number] Score:5  HAS -BLED Score:3  ICH Score:   Hunt & Bell Classification:      Hemorrhagic change of an Ischemic Stroke: Does this patient have an ischemic stroke with hemorrhagic changes? No     Neurologic Chief Complaint: R MCA 2/2 Afib.     Subjective:     Interval History: Patient is seen for follow-up neurological assessment and treatment recommendations: R MCA 2/2 Afib.     HPI, Past Medical, Family, and Social History remains the same as documented in the initial encounter.     Review of Systems   Unable to perform ROS: Intubated   Constitutional: Negative for fever.   HENT: Positive for nosebleeds (rhoni rocket).         Tongue swelling   Respiratory: Negative for chest tightness.         Intubated   Cardiovascular: Negative for chest pain.   Gastrointestinal: Negative for anal bleeding and blood in stool.   Genitourinary: Negative for hematuria.   Psychiatric/Behavioral: Positive for agitation.        Precedex for agitation while on MV.      Scheduled Meds:   albuterol-ipratropium  3 mL Nebulization Q6H    atorvastatin  40 mg Per OG tube Daily    ceFEPime (MAXIPIME) IVPB  1 g Intravenous Q12H    digoxin  0.125 mg Per NG tube Daily    diltiaZEM  60 mg Per OG tube Q8H    pantoprozole (PROTONIX) IV  40 mg Intravenous Q12H    polyethylene glycol  17 g Per NG tube Daily    QUEtiapine  50 mg Per NG tube BID    senna-docusate 8.6-50 mg  1 tablet Per OG tube BID    valproic acid (as sodium salt)  250 mg Per OG tube Q8H     Continuous Infusions:   dexmedetomidine (PRECEDEX) infusion 1.3 mcg/kg/hr (09/04/20 1602)    heparin (porcine) in D5W 9.966 Units/kg/hr (09/04/20 1602)     PRN Meds:acetaminophen, fentaNYL, hydrALAZINE, magnesium oxide, magnesium oxide,  metoprolol, ondansetron, potassium chloride, potassium chloride, potassium chloride, potassium, sodium phosphates, potassium, sodium phosphates, potassium, sodium phosphates    Objective:     Vital Signs (Most Recent):  Temp: 97.8 °F (36.6 °C) (09/04/20 1502)  Pulse: 86 (09/04/20 1602)  Resp: 16 (09/04/20 1602)  BP: 135/63 (09/04/20 1602)  SpO2: 98 % (09/04/20 1602)  BP Location: Right arm    Vital Signs Range (Last 24H):  Temp:  [97.8 °F (36.6 °C)-99.2 °F (37.3 °C)]   Pulse:  []   Resp:  [12-53]   BP: (106-166)/(53-99)   SpO2:  [89 %-100 %]   BP Location: Right arm    Physical Exam  Constitutional:       General: She is not in acute distress.     Appearance: Normal appearance. She is not ill-appearing.   HENT:      Head: Normocephalic.      Right Ear: External ear normal. There is no impacted cerumen.      Left Ear: External ear normal. There is no impacted cerumen.      Nose: No congestion or rhinorrhea.      Mouth/Throat:      Mouth: Mucous membranes are dry.   Eyes:      General: No scleral icterus.        Right eye: No discharge.         Left eye: No discharge.      Comments: Left pupil 3mm, R pupil 6 fixed    Cardiovascular:      Rate and Rhythm: Normal rate.      Heart sounds: No murmur.   Pulmonary:      Comments: intubated  Abdominal:      General: Abdomen is flat. There is no distension.      Palpations: Abdomen is soft.      Tenderness: There is no abdominal tenderness.   Musculoskeletal:         General: No swelling, tenderness or deformity.   Skin:     General: Skin is warm and dry.      Coloration: Skin is not pale.      Findings: No erythema.   Neurological:      Mental Status: She is alert.      Comments: Limited 2/2/ sedation with Precedex         Neurological Exam:   LOC: alert and intubated  Motor: Arm left  Plegia 0/5  Leg left  Plegia 0/5  Arm right  Paresis: 3/5  Leg right Normal 5/5    Laboratory:  CMP:   Recent Labs   Lab 09/04/20  0227   CALCIUM 8.9   ALBUMIN 2.1*   PROT 5.8*   *    K 4.1   CO2 32*      BUN 31*   CREATININE 1.2   ALKPHOS 75   ALT 28   AST 36   BILITOT 0.4     CBC:   Recent Labs   Lab 09/04/20  0227   WBC 12.78*   RBC 3.82*   HGB 11.5*   HCT 36.6*   *   MCV 96   MCH 30.1   MCHC 31.4*     Hgb A1C:   Recent Labs   Lab 08/29/20  2335   HGBA1C 6.0*     TSH:   Recent Labs   Lab 08/29/20  2335   TSH 0.453       Diagnostic Results     Brain Imaging   MRI Brain 8/26/20  Ventricles and sulci are normal in size for age without evidence of hydrocephalus.  No extra-axial blood or fluid collections  Large area restricted diffusion right posterior frontal and parietal lobe consistent with right recent infarction.  No evidence of superimposed hemorrhage.  Patchy areas of increased T2/FLAIR signal of the supratentorial white matter consistent with chronic microvascular ischemic changes.  No evidence of mass.  Skull/extracranial contents (limited evaluation): Bone marrow signal intensity is normal.  Small amount of fluid in the right maxillary sinus.  Large area of restricted diffusion consistent with continued evolution of recent infarction involving the right posterior frontal and parietal lobes lobes.  No evidence of hemorrhage.  Chronic microvascular ischemic changes.     CT head 8/30/20  No extra-axial blood or fluid collections.  Continued evolution of areas infarction involving the right frontal and right parietal lobes.  No evidence of superimposed hemorrhage.  Associated edema with mild compression of the right lateral ventricle.  No hydrocephalus.  No evidence of mass.  Skull/extracranial contents (limited evaluation): No fracture. Mastoid air cells and paranasal sinuses are essentially clear.     Continued evolution of right frontal and parietal lobe infarctions with no evidence of hemorrhage.  Associated edema is noted with no midline shift.  Mild mass effect on the right lateral ventricle.  No hydrocephalus.     Cardiac Imaging   TTE 9/1/20  · Moderate concentric left  ventricular hypertrophy.  · Moderately to severely decreased left ventricular systolic function. The estimated ejection fraction is 25-30%.  · Left ventricular diastolic dysfunction.  · Moderately to severely reduced right ventricular systolic function.  · Moderate left atrial enlargement.  · Severe right atrial enlargement.  · Moderate mitral regurgitation.  · Moderate tricuspid regurgitation.  · Intermediate central venous pressure (8 mmHg).  · The estimated PA systolic pressure is 42 mmHg.  · Pulmonary hypertension present.  · Negative bubble study.      Teresa Marin MD  Comprehensive Stroke Center  Department of Vascular Neurology   [facility]

## 2020-09-04 NOTE — PLAN OF CARE
Goals remain appropriate.  RORY Thomas  9/4/2020    Problem: Occupational Therapy Goal  Goal: Occupational Therapy Goal  Description: Goals set 9/1 to be addressed for 14 days with expiration date, 9/14:  Patient will increase functional independence with ADLs by performing:    Patient will demonstrate rolling to the right with mod assist.  Not met   Patient will demonstrate rolling to the left with mod assist.   Not met  Patient will demonstrate supine -sit with mod assist.   Not met  Patient will demonstrate stand pivot transfers with mod assist.   Not met  Patient will demonstrate grooming while seated with mod assist.   Not met  Patient will demonstrate upper body dressing with mod assist while seated EOB.   Not met  Patient will demonstrate lower body dressing with max assist while seated EOB.   Not met  Patient will demonstrate toileting with max assist.   Not met  Patient will demonstrate bathing while seated EOB with max assist.   Not met  Patient's family / caregiver will demonstrate independence and safety with assisting patient with self-care skills and functional mobility.     Not met  Patient's family / caregiver will demonstrate independence with providing ROM and changes in bed positioning.   Not met  Patient and/or patient's family will verbalize understanding of stroke prevention guidelines, personal risk factors and stroke warning signs via teachback method.  Not met             Outcome: Ongoing, Progressing

## 2020-09-04 NOTE — PROGRESS NOTES
[facility]  Neurocritical Care  Progress Note    Admit Date: 8/29/2020  Service Date: 09/04/2020  Length of Stay: 6    Subjective:     Chief Complaint: Embolic stroke involving right middle cerebral artery    History of Present Illness: 64 year old female with a PMH significant for COPD, HTN, and every day smoker who presented at OSH on 08/26/20 with acute left sided facial droop and left sided weakness (last known normal 08/25/20). Pt was not given TPA or endovascular intervention, and she was intubated on 08/26/20 for agitation. CTH on 08/26/20 showed right posterior frontal/parietal infarction with no intracranial hemorrhage. Hospital course complicated by Afib with RVR in which she was started on lovenox and then transitioned to heparin gtt for GOYO and HFrEF. Pt was transferred to NICU for higher level of care and persistent epistaxis. Upon arrival Pt was intubated and sedated, Rhinorocket  Bilaterally in place, propofol and diltiazem gtt. Pt was able to move the right side and moves her left side to noxious stimuli. A-line placed.       Hospital Course: 08/29/20: Pt admitted to NICU for higher level of care and ENT consult. Pt with a run of Vtach associated with hypotension.   8/31/2020: hold anticoagulation until tomorrow due to GI bleed/epistaxis (at other facility) GI consult, pt in Afib- load with digoxin, pending digoxin level tomorrow, continue diltiazem gtt today, start tube feeds, obtain PIV and d/c central line, continue cefepime for total 7 days  9/1/2020: initiated heparin gtt minimal intensity-when at goal obtain CT head, continue diltiazem gtt, start tube feeds, d/c A-line  9/2/2020: changed to PO diltiazem 60mg q8hr from gtt, lasix, miralax, plan for extubation  9/3/2020: started seroquel 25BID  9/4/2020: increased seroquel to 25 mg BID, magnesium citrate, and try SBT        Review of Systems   Unable to obtain a complete ROS due to level of consciousness.  Objective:      Vitals:  Temp: 98.9 °F (37.2 °C)  Pulse: 103  Rhythm: atrial rhythm  BP: 133/68  MAP (mmHg): 94  Resp: 20  SpO2: (!) 94 %  Oxygen Concentration (%): 40  O2 Device (Oxygen Therapy): ventilator  Vent Mode: Spont  Set Rate: 16 BPM  Vt Set: 420 mL  Pressure Support: 5 cmH20  PEEP/CPAP: 5 cmH20  Peak Airway Pressure: 10 cmH2O  Mean Airway Pressure: 7 cmH20  Plateau Pressure: 12 cmH20    Temp  Min: 96.9 °F (36.1 °C)  Max: 99.2 °F (37.3 °C)  Pulse  Min: 83  Max: 116  BP  Min: 106/53  Max: 163/72  MAP (mmHg)  Min: 75  Max: 118  Resp  Min: 12  Max: 39  SpO2  Min: 91 %  Max: 100 %  Oxygen Concentration (%)  Min: 40  Max: 40    09/03 0701 - 09/04 0700  In: 1659.3 [I.V.:869.3]  Out: 785 [Urine:785]   Unmeasured Output  Stool Occurrence: 0  Emesis Occurrence: 0  Pad Count: 0       Physical Exam  GA: Alert, comfortable, no acute distress.   HEENT: No scleral icterus or JVD.   Pulmonary: Diminished to auscultation bibasilar A/L. No wheezing, crackles, or rhonchi.  Cardiac: RRR S1 & S2 w/o rubs/murmurs/gallops.   Abdominal: Bowel sounds present x 4. No appreciable hepatosplenomegaly.  Skin: No jaundice, rashes, or visible lesions.  Neuro:  --GCS: E1 V1 M5  --Mental Status:  doesnt open eyes,  intermittently follows commands on the R side  --CN II-XII grossly intact.   --Pupils: left 3mm brisk, L pupil 6mm round and fixed  --Corneal reflex, gag, cough intact.  --LUE withdraws  --RUE spont  --LLE withdraws  --RLE spont    Medications:  Continuousdexmedetomidine (PRECEDEX) infusion, Last Rate: 1.3 mcg/kg/hr (09/04/20 1102)  heparin (porcine) in D5W, Last Rate: 9.966 Units/kg/hr (09/04/20 1102)    Scheduledalbuterol-ipratropium, 3 mL, Q6H  atorvastatin, 40 mg, Daily  ceFEPime (MAXIPIME) IVPB, 1 g, Q12H  digoxin, 0.125 mg, Daily  diltiaZEM, 60 mg, Q8H  magnesium citrate, 296 mL, Once  pantoprozole (PROTONIX) IV, 40 mg, Q12H  polyethylene glycol, 17 g, Daily  QUEtiapine, 50 mg, BID  senna-docusate 8.6-50 mg, 1 tablet, BID  valproic  acid (as sodium salt), 250 mg, Q8H    PRNacetaminophen, 650 mg, Q6H PRN  fentaNYL, 50 mcg, Q2H PRN  hydrALAZINE, 10 mg, Q6H PRN  magnesium oxide, 800 mg, PRN  magnesium oxide, 800 mg, PRN  metoprolol, 5 mg, Q10 Min PRN  ondansetron, 4 mg, Q6H PRN  potassium chloride, 40 mEq, PRN  potassium chloride, 40 mEq, PRN  potassium chloride, 60 mEq, PRN  potassium, sodium phosphates, 2 packet, PRN  potassium, sodium phosphates, 2 packet, PRN  potassium, sodium phosphates, 2 packet, PRN      Today I personally reviewed pertinent medications, lines/drains/airways, imaging, cardiology results, laboratory results,     Diet  Diet NPO      Assessment/Plan:     Neuro  * Embolic stroke involving right middle cerebral artery  - Last seen normal on 08/25/20 with no intervention; intubated and sedated.    - Imaging shows large right ischemic stroke in the posterior frontal and parietal regions  - SBP < 160, MAP > 65  - EuNa   - CBC, CMP, coags, UA, ABG, CXR, anti XA, Echo, and lipid panel   - heparin gtt low intensity  - repeat CTH stable  - daily statin  - increased Seroquel to 50 mg BID, wean off precedex gtt    Pulmonary  Chronic obstructive pulmonary disease  - daily CXRs while intubated   - goal SpO2 is > 88%  - scheduled duo nebs q6h    Acute on chronic respiratory failure with hypoxia and hypercapnia  - Pt intubated and sedated   - Daily CXRs and abg   9/4/2020: SBT trial today and respiratory mechanics (NIF -11)      Cardiac/Vascular  Atrial fibrillation with rapid ventricular response  - Repeat EKG   - daily Mg and Phos; replace as needed  - dilt PO  -dig level 1.8  - continue heparin gtt minimal intensity   - CT head stable      Essential hypertension  - SBP goal < 160, MAP >65    Renal/  GOYO (acute kidney injury)  - BUN/Cr: 32/ 1.2      Endocrine  Diabetes mellitus  -SSI moderate  -POCT glu monitoring    Moderate malnutrition  - tube feeds  - monitor electrolytes with daily CMP, Mg, phos    GI  GI bleed  - upper gi bleed at  outside hospital  - recent upper GI bleed at outside facility and epistaxis (2 rhino rockets, up to 5 days prn)  - consulted GI -Continue PPI b.i.d. Trend hemoglobin, transfuse hemoglobin <7  -restarted Tube feeds after being NPO for possible extubation  - plan for extubation tomorrow 9/4 9/4/2020: unable to extubate today, failed resp mechanics          The patient is being Prophylaxed for:  Venous Thromboembolism with: Chemical  Stress Ulcer with: PPI  Ventilator Pneumonia with: chlorhexidine oral care    Activity Orders          Diet NPO: NPO starting at 08/30 0250        Full Code    Lona Contreras NP  Neurocritical Care  [facility]    Critical care time > 30 min.

## 2020-09-04 NOTE — SUBJECTIVE & OBJECTIVE
Review of Systems   Unable to obtain a complete ROS due to level of consciousness.  Objective:     Vitals:  Temp: 98.9 °F (37.2 °C)  Pulse: 103  Rhythm: atrial rhythm  BP: 133/68  MAP (mmHg): 94  Resp: 20  SpO2: (!) 94 %  Oxygen Concentration (%): 40  O2 Device (Oxygen Therapy): ventilator  Vent Mode: Spont  Set Rate: 16 BPM  Vt Set: 420 mL  Pressure Support: 5 cmH20  PEEP/CPAP: 5 cmH20  Peak Airway Pressure: 10 cmH2O  Mean Airway Pressure: 7 cmH20  Plateau Pressure: 12 cmH20    Temp  Min: 96.9 °F (36.1 °C)  Max: 99.2 °F (37.3 °C)  Pulse  Min: 83  Max: 116  BP  Min: 106/53  Max: 163/72  MAP (mmHg)  Min: 75  Max: 118  Resp  Min: 12  Max: 39  SpO2  Min: 91 %  Max: 100 %  Oxygen Concentration (%)  Min: 40  Max: 40    09/03 0701 - 09/04 0700  In: 1659.3 [I.V.:869.3]  Out: 785 [Urine:785]   Unmeasured Output  Stool Occurrence: 0  Emesis Occurrence: 0  Pad Count: 0       Physical Exam  GA: Alert, comfortable, no acute distress.   HEENT: No scleral icterus or JVD.   Pulmonary: Diminished to auscultation bibasilar A/L. No wheezing, crackles, or rhonchi.  Cardiac: RRR S1 & S2 w/o rubs/murmurs/gallops.   Abdominal: Bowel sounds present x 4. No appreciable hepatosplenomegaly.  Skin: No jaundice, rashes, or visible lesions.  Neuro:  --GCS: E1 V1 M5  --Mental Status:  doesnt open eyes,  intermittently follows commands on the R side  --CN II-XII grossly intact.   --Pupils: left 3mm brisk, L pupil 6mm round and fixed  --Corneal reflex, gag, cough intact.  --LUE withdraws  --RUE spont  --LLE withdraws  --RLE spont    Medications:  Continuousdexmedetomidine (PRECEDEX) infusion, Last Rate: 1.3 mcg/kg/hr (09/04/20 1102)  heparin (porcine) in D5W, Last Rate: 9.966 Units/kg/hr (09/04/20 1102)    Scheduledalbuterol-ipratropium, 3 mL, Q6H  atorvastatin, 40 mg, Daily  ceFEPime (MAXIPIME) IVPB, 1 g, Q12H  digoxin, 0.125 mg, Daily  diltiaZEM, 60 mg, Q8H  magnesium citrate, 296 mL, Once  pantoprozole (PROTONIX) IV, 40 mg,  Q12H  polyethylene glycol, 17 g, Daily  QUEtiapine, 50 mg, BID  senna-docusate 8.6-50 mg, 1 tablet, BID  valproic acid (as sodium salt), 250 mg, Q8H    PRNacetaminophen, 650 mg, Q6H PRN  fentaNYL, 50 mcg, Q2H PRN  hydrALAZINE, 10 mg, Q6H PRN  magnesium oxide, 800 mg, PRN  magnesium oxide, 800 mg, PRN  metoprolol, 5 mg, Q10 Min PRN  ondansetron, 4 mg, Q6H PRN  potassium chloride, 40 mEq, PRN  potassium chloride, 40 mEq, PRN  potassium chloride, 60 mEq, PRN  potassium, sodium phosphates, 2 packet, PRN  potassium, sodium phosphates, 2 packet, PRN  potassium, sodium phosphates, 2 packet, PRN      Today I personally reviewed pertinent medications, lines/drains/airways, imaging, cardiology results, laboratory results,     Diet  Diet NPO

## 2020-09-04 NOTE — ASSESSMENT & PLAN NOTE
- Pt intubated and sedated   - Daily CXRs and abg   9/4/2020: SBT trial today and respiratory mechanics (NIF -11)

## 2020-09-04 NOTE — ASSESSMENT & PLAN NOTE
- Last seen normal on 08/25/20 with no intervention; intubated and sedated.    - Imaging shows large right ischemic stroke in the posterior frontal and parietal regions  - SBP < 160, MAP > 65  - EuNa   - CBC, CMP, coags, UA, ABG, CXR, anti XA, Echo, and lipid panel   - heparin gtt low intensity  - repeat CTH stable  - daily statin  - increased Seroquel to 50 mg BID, wean off precedex gtt

## 2020-09-04 NOTE — PLAN OF CARE
POC reviewed with pt at 0500. Pt did not verbalized understanding. Pt remains on Heparin gtt and precedex. Mg replaced this shift. No bowel movement this shift. No acute events overnight. Pt progressing toward goals. Will continue to monitor. See flowsheets for full assessment and VS info        Problem: Adult Inpatient Plan of Care  Goal: Patient-Specific Goal (Individualization)  Description: Admit Date: 8/29/20    Admit Dx: CVA    Past Medical History:  No date: COPD (chronic obstructive pulmonary disease)  No date: Hypertension    Past Surgical History:  No date: AUGMENTATION OF BREAST  No date: HYSTERECTOMY    Individualization:   1. Pt likes multiple blankets    Restraints: R wrist soft restraint          Outcome: Ongoing, Progressing     Problem: Adult Inpatient Plan of Care  Goal: Plan of Care Review  Outcome: Ongoing, Progressing     Problem: Fluid Imbalance (Acute Kidney Injury/Impairment)  Goal: Optimal Fluid Balance  Outcome: Ongoing, Progressing

## 2020-09-04 NOTE — ASSESSMENT & PLAN NOTE
- upper gi bleed at outside hospital  - recent upper GI bleed at outside facility and epistaxis (2 rhino rockets, up to 5 days prn)  - consulted GI -Continue PPI b.i.d. Trend hemoglobin, transfuse hemoglobin <7  -restarted Tube feeds after being NPO for possible extubation  - plan for extubation tomorrow 9/4 9/4/2020: unable to extubate today, failed resp mechanics

## 2020-09-05 LAB
ALBUMIN SERPL BCP-MCNC: 2.1 G/DL (ref 3.5–5.2)
ALBUMIN SERPL BCP-MCNC: 2.1 G/DL (ref 3.5–5.2)
ALBUMIN SERPL BCP-MCNC: 2.3 G/DL (ref 3.5–5.2)
ALLENS TEST: ABNORMAL
ALP SERPL-CCNC: 71 U/L (ref 55–135)
ALP SERPL-CCNC: 72 U/L (ref 55–135)
ALP SERPL-CCNC: 74 U/L (ref 55–135)
ALT SERPL W/O P-5'-P-CCNC: 29 U/L (ref 10–44)
ALT SERPL W/O P-5'-P-CCNC: 31 U/L (ref 10–44)
ALT SERPL W/O P-5'-P-CCNC: 35 U/L (ref 10–44)
ANION GAP SERPL CALC-SCNC: 10 MMOL/L (ref 8–16)
ANION GAP SERPL CALC-SCNC: 17 MMOL/L (ref 8–16)
ANION GAP SERPL CALC-SCNC: 7 MMOL/L (ref 8–16)
APTT BLDCRRT: 45.7 SEC (ref 21–32)
AST SERPL-CCNC: 41 U/L (ref 10–40)
AST SERPL-CCNC: 42 U/L (ref 10–40)
AST SERPL-CCNC: 49 U/L (ref 10–40)
BASOPHILS # BLD AUTO: 0.05 K/UL (ref 0–0.2)
BASOPHILS NFR BLD: 0.3 % (ref 0–1.9)
BILIRUB SERPL-MCNC: 0.5 MG/DL (ref 0.1–1)
BUN SERPL-MCNC: 26 MG/DL (ref 8–23)
BUN SERPL-MCNC: 30 MG/DL (ref 8–23)
BUN SERPL-MCNC: 32 MG/DL (ref 8–23)
CALCIUM SERPL-MCNC: 8.5 MG/DL (ref 8.7–10.5)
CALCIUM SERPL-MCNC: 9.6 MG/DL (ref 8.7–10.5)
CALCIUM SERPL-MCNC: 9.6 MG/DL (ref 8.7–10.5)
CHLORIDE SERPL-SCNC: 100 MMOL/L (ref 95–110)
CHLORIDE SERPL-SCNC: 104 MMOL/L (ref 95–110)
CHLORIDE SERPL-SCNC: 105 MMOL/L (ref 95–110)
CO2 SERPL-SCNC: 25 MMOL/L (ref 23–29)
CO2 SERPL-SCNC: 30 MMOL/L (ref 23–29)
CO2 SERPL-SCNC: 36 MMOL/L (ref 23–29)
CREAT SERPL-MCNC: 1.1 MG/DL (ref 0.5–1.4)
CREAT SERPL-MCNC: 1.1 MG/DL (ref 0.5–1.4)
CREAT SERPL-MCNC: 1.2 MG/DL (ref 0.5–1.4)
DIFFERENTIAL METHOD: ABNORMAL
EOSINOPHIL # BLD AUTO: 0.1 K/UL (ref 0–0.5)
EOSINOPHIL NFR BLD: 0.6 % (ref 0–8)
ERYTHROCYTE [DISTWIDTH] IN BLOOD BY AUTOMATED COUNT: 14.4 % (ref 11.5–14.5)
EST. GFR  (AFRICAN AMERICAN): 55.2 ML/MIN/1.73 M^2
EST. GFR  (AFRICAN AMERICAN): >60 ML/MIN/1.73 M^2
EST. GFR  (AFRICAN AMERICAN): >60 ML/MIN/1.73 M^2
EST. GFR  (NON AFRICAN AMERICAN): 47.9 ML/MIN/1.73 M^2
EST. GFR  (NON AFRICAN AMERICAN): 53.2 ML/MIN/1.73 M^2
EST. GFR  (NON AFRICAN AMERICAN): 53.2 ML/MIN/1.73 M^2
GLUCOSE SERPL-MCNC: 253 MG/DL (ref 70–110)
GLUCOSE SERPL-MCNC: 82 MG/DL (ref 70–110)
GLUCOSE SERPL-MCNC: 95 MG/DL (ref 70–110)
HCO3 UR-SCNC: 33.7 MMOL/L (ref 24–28)
HCT VFR BLD AUTO: 38.2 % (ref 37–48.5)
HGB BLD-MCNC: 11.8 G/DL (ref 12–16)
IMM GRANULOCYTES # BLD AUTO: 0.63 K/UL (ref 0–0.04)
IMM GRANULOCYTES NFR BLD AUTO: 3.9 % (ref 0–0.5)
LYMPHOCYTES # BLD AUTO: 0.8 K/UL (ref 1–4.8)
LYMPHOCYTES NFR BLD: 4.7 % (ref 18–48)
MAGNESIUM SERPL-MCNC: 2.5 MG/DL (ref 1.6–2.6)
MCH RBC QN AUTO: 30.1 PG (ref 27–31)
MCHC RBC AUTO-ENTMCNC: 30.9 G/DL (ref 32–36)
MCV RBC AUTO: 97 FL (ref 82–98)
MONOCYTES # BLD AUTO: 1.7 K/UL (ref 0.3–1)
MONOCYTES NFR BLD: 10.3 % (ref 4–15)
NEUTROPHILS # BLD AUTO: 13 K/UL (ref 1.8–7.7)
NEUTROPHILS NFR BLD: 80.2 % (ref 38–73)
NRBC BLD-RTO: 0 /100 WBC
PCO2 BLDA: 41.6 MMHG (ref 35–45)
PH SMN: 7.52 [PH] (ref 7.35–7.45)
PHOSPHATE SERPL-MCNC: 2.5 MG/DL (ref 2.7–4.5)
PLATELET # BLD AUTO: 418 K/UL (ref 150–350)
PMV BLD AUTO: 11.4 FL (ref 9.2–12.9)
PO2 BLDA: 72 MMHG (ref 80–100)
POC BE: 11 MMOL/L
POC SATURATED O2: 96 % (ref 95–100)
POC TCO2: 35 MMOL/L (ref 23–27)
POCT GLUCOSE: 220 MG/DL (ref 70–110)
POCT GLUCOSE: 42 MG/DL (ref 70–110)
POCT GLUCOSE: 46 MG/DL (ref 70–110)
POCT GLUCOSE: 47 MG/DL (ref 70–110)
POCT GLUCOSE: 48 MG/DL (ref 70–110)
POCT GLUCOSE: 60 MG/DL (ref 70–110)
POCT GLUCOSE: 67 MG/DL (ref 70–110)
POCT GLUCOSE: 73 MG/DL (ref 70–110)
POCT GLUCOSE: 99 MG/DL (ref 70–110)
POCT GLUCOSE: >500 MG/DL (ref 70–110)
POTASSIUM SERPL-SCNC: 4.4 MMOL/L (ref 3.5–5.1)
POTASSIUM SERPL-SCNC: 4.5 MMOL/L (ref 3.5–5.1)
POTASSIUM SERPL-SCNC: 4.7 MMOL/L (ref 3.5–5.1)
PROT SERPL-MCNC: 6 G/DL (ref 6–8.4)
PROT SERPL-MCNC: 6.1 G/DL (ref 6–8.4)
PROT SERPL-MCNC: 6.2 G/DL (ref 6–8.4)
RBC # BLD AUTO: 3.92 M/UL (ref 4–5.4)
SAMPLE: ABNORMAL
SITE: ABNORMAL
SODIUM SERPL-SCNC: 140 MMOL/L (ref 136–145)
SODIUM SERPL-SCNC: 147 MMOL/L (ref 136–145)
SODIUM SERPL-SCNC: 147 MMOL/L (ref 136–145)
WBC # BLD AUTO: 16.24 K/UL (ref 3.9–12.7)

## 2020-09-05 PROCEDURE — 80053 COMPREHEN METABOLIC PANEL: CPT | Mod: 91

## 2020-09-05 PROCEDURE — 36620 INSERTION CATHETER ARTERY: CPT | Mod: ,,, | Performed by: PSYCHIATRY & NEUROLOGY

## 2020-09-05 PROCEDURE — 25000003 PHARM REV CODE 250: Performed by: STUDENT IN AN ORGANIZED HEALTH CARE EDUCATION/TRAINING PROGRAM

## 2020-09-05 PROCEDURE — 63600175 PHARM REV CODE 636 W HCPCS

## 2020-09-05 PROCEDURE — 94640 AIRWAY INHALATION TREATMENT: CPT

## 2020-09-05 PROCEDURE — 25000003 PHARM REV CODE 250

## 2020-09-05 PROCEDURE — 80053 COMPREHEN METABOLIC PANEL: CPT

## 2020-09-05 PROCEDURE — 85730 THROMBOPLASTIN TIME PARTIAL: CPT

## 2020-09-05 PROCEDURE — 63600175 PHARM REV CODE 636 W HCPCS: Performed by: PSYCHIATRY & NEUROLOGY

## 2020-09-05 PROCEDURE — 25000003 PHARM REV CODE 250: Performed by: PSYCHIATRY & NEUROLOGY

## 2020-09-05 PROCEDURE — 94003 VENT MGMT INPAT SUBQ DAY: CPT

## 2020-09-05 PROCEDURE — 25000003 PHARM REV CODE 250: Performed by: NURSE PRACTITIONER

## 2020-09-05 PROCEDURE — 82803 BLOOD GASES ANY COMBINATION: CPT

## 2020-09-05 PROCEDURE — 85025 COMPLETE CBC W/AUTO DIFF WBC: CPT

## 2020-09-05 PROCEDURE — 36620 INSERTION CATHETER ARTERY: CPT

## 2020-09-05 PROCEDURE — 36620 ARTERIAL LINE: ICD-10-PCS | Mod: ,,, | Performed by: PSYCHIATRY & NEUROLOGY

## 2020-09-05 PROCEDURE — 63600175 PHARM REV CODE 636 W HCPCS: Performed by: NURSE PRACTITIONER

## 2020-09-05 PROCEDURE — 94761 N-INVAS EAR/PLS OXIMETRY MLT: CPT

## 2020-09-05 PROCEDURE — 20000000 HC ICU ROOM

## 2020-09-05 PROCEDURE — 83735 ASSAY OF MAGNESIUM: CPT

## 2020-09-05 PROCEDURE — 99900026 HC AIRWAY MAINTENANCE (STAT)

## 2020-09-05 PROCEDURE — 99900035 HC TECH TIME PER 15 MIN (STAT)

## 2020-09-05 PROCEDURE — 99233 SBSQ HOSP IP/OBS HIGH 50: CPT | Mod: ,,, | Performed by: NURSE PRACTITIONER

## 2020-09-05 PROCEDURE — 84100 ASSAY OF PHOSPHORUS: CPT

## 2020-09-05 PROCEDURE — 94668 MNPJ CHEST WALL SBSQ: CPT

## 2020-09-05 PROCEDURE — 99233 PR SUBSEQUENT HOSPITAL CARE,LEVL III: ICD-10-PCS | Mod: ,,, | Performed by: NURSE PRACTITIONER

## 2020-09-05 PROCEDURE — 27000221 HC OXYGEN, UP TO 24 HOURS

## 2020-09-05 PROCEDURE — 63600175 PHARM REV CODE 636 W HCPCS: Performed by: PHYSICIAN ASSISTANT

## 2020-09-05 PROCEDURE — 36600 WITHDRAWAL OF ARTERIAL BLOOD: CPT

## 2020-09-05 PROCEDURE — 94667 MNPJ CHEST WALL 1ST: CPT

## 2020-09-05 PROCEDURE — C9113 INJ PANTOPRAZOLE SODIUM, VIA: HCPCS | Performed by: PHYSICIAN ASSISTANT

## 2020-09-05 PROCEDURE — 25000242 PHARM REV CODE 250 ALT 637 W/ HCPCS: Performed by: NURSE PRACTITIONER

## 2020-09-05 PROCEDURE — 25000003 PHARM REV CODE 250: Performed by: PHYSICIAN ASSISTANT

## 2020-09-05 RX ORDER — SYRING-NEEDL,DISP,INSUL,0.3 ML 29 G X1/2"
296 SYRINGE, EMPTY DISPOSABLE MISCELLANEOUS ONCE
Status: COMPLETED | OUTPATIENT
Start: 2020-09-05 | End: 2020-09-05

## 2020-09-05 RX ORDER — MIDAZOLAM HYDROCHLORIDE 1 MG/ML
INJECTION INTRAMUSCULAR; INTRAVENOUS
Status: COMPLETED
Start: 2020-09-05 | End: 2020-09-05

## 2020-09-05 RX ORDER — GLUCAGON 1 MG
1 KIT INJECTION
Status: DISCONTINUED | OUTPATIENT
Start: 2020-09-05 | End: 2020-09-16 | Stop reason: HOSPADM

## 2020-09-05 RX ORDER — MIDAZOLAM HYDROCHLORIDE 1 MG/ML
2 INJECTION INTRAMUSCULAR; INTRAVENOUS ONCE
Status: COMPLETED | OUTPATIENT
Start: 2020-09-05 | End: 2020-09-05

## 2020-09-05 RX ORDER — DEXTROSE MONOHYDRATE 100 MG/ML
INJECTION, SOLUTION INTRAVENOUS CONTINUOUS PRN
Status: DISCONTINUED | OUTPATIENT
Start: 2020-09-05 | End: 2020-09-16 | Stop reason: HOSPADM

## 2020-09-05 RX ORDER — METOPROLOL TARTRATE 1 MG/ML
5 INJECTION, SOLUTION INTRAVENOUS EVERY 5 MIN PRN
Status: COMPLETED | OUTPATIENT
Start: 2020-09-05 | End: 2020-09-05

## 2020-09-05 RX ADMIN — HYDRALAZINE HYDROCHLORIDE 10 MG: 20 INJECTION INTRAMUSCULAR; INTRAVENOUS at 07:09

## 2020-09-05 RX ADMIN — VALPROIC ACID 250 MG: 250 SOLUTION ORAL at 05:09

## 2020-09-05 RX ADMIN — IPRATROPIUM BROMIDE AND ALBUTEROL SULFATE 3 ML: .5; 2.5 SOLUTION RESPIRATORY (INHALATION) at 07:09

## 2020-09-05 RX ADMIN — DOCUSATE SODIUM 50MG AND SENNOSIDES 8.6MG 1 TABLET: 8.6; 5 TABLET, FILM COATED ORAL at 08:09

## 2020-09-05 RX ADMIN — DILTIAZEM HYDROCHLORIDE 90 MG: 60 TABLET, FILM COATED ORAL at 09:09

## 2020-09-05 RX ADMIN — PANTOPRAZOLE SODIUM 40 MG: 40 INJECTION, POWDER, LYOPHILIZED, FOR SOLUTION INTRAVENOUS at 08:09

## 2020-09-05 RX ADMIN — DIGOXIN 0.12 MG: 125 TABLET ORAL at 08:09

## 2020-09-05 RX ADMIN — IPRATROPIUM BROMIDE AND ALBUTEROL SULFATE 3 ML: .5; 2.5 SOLUTION RESPIRATORY (INHALATION) at 01:09

## 2020-09-05 RX ADMIN — QUETIAPINE FUMARATE 50 MG: 25 TABLET ORAL at 08:09

## 2020-09-05 RX ADMIN — MAGNESIUM CITRATE 296 ML: 1.75 LIQUID ORAL at 11:09

## 2020-09-05 RX ADMIN — MIDAZOLAM HYDROCHLORIDE 2 MG: 1 INJECTION INTRAMUSCULAR; INTRAVENOUS at 01:09

## 2020-09-05 RX ADMIN — IPRATROPIUM BROMIDE AND ALBUTEROL SULFATE 3 ML: .5; 2.5 SOLUTION RESPIRATORY (INHALATION) at 12:09

## 2020-09-05 RX ADMIN — VALPROIC ACID 250 MG: 250 SOLUTION ORAL at 01:09

## 2020-09-05 RX ADMIN — MIDAZOLAM 2 MG: 1 INJECTION INTRAMUSCULAR; INTRAVENOUS at 01:09

## 2020-09-05 RX ADMIN — VALPROIC ACID 250 MG: 250 SOLUTION ORAL at 09:09

## 2020-09-05 RX ADMIN — METOPROLOL TARTRATE 5 MG: 5 INJECTION INTRAVENOUS at 07:09

## 2020-09-05 RX ADMIN — DEXTROSE MONOHYDRATE 12.5 G: 25 INJECTION, SOLUTION INTRAVENOUS at 07:09

## 2020-09-05 RX ADMIN — METOPROLOL TARTRATE 5 MG: 5 INJECTION INTRAVENOUS at 09:09

## 2020-09-05 RX ADMIN — DILTIAZEM HYDROCHLORIDE 60 MG: 60 TABLET, FILM COATED ORAL at 05:09

## 2020-09-05 RX ADMIN — PROPOFOL 50 MCG/KG/MIN: 10 INJECTION, EMULSION INTRAVENOUS at 06:09

## 2020-09-05 RX ADMIN — CEFEPIME 1 G: 1 INJECTION, POWDER, FOR SOLUTION INTRAMUSCULAR; INTRAVENOUS at 01:09

## 2020-09-05 RX ADMIN — ATORVASTATIN CALCIUM 40 MG: 20 TABLET, FILM COATED ORAL at 08:09

## 2020-09-05 RX ADMIN — DILTIAZEM HYDROCHLORIDE 90 MG: 60 TABLET, FILM COATED ORAL at 01:09

## 2020-09-05 RX ADMIN — CEFEPIME 1 G: 1 INJECTION, POWDER, FOR SOLUTION INTRAMUSCULAR; INTRAVENOUS at 02:09

## 2020-09-05 RX ADMIN — PROPOFOL 40 MCG/KG/MIN: 10 INJECTION, EMULSION INTRAVENOUS at 08:09

## 2020-09-05 RX ADMIN — PROPOFOL 30 MCG/KG/MIN: 10 INJECTION, EMULSION INTRAVENOUS at 01:09

## 2020-09-05 RX ADMIN — DEXTROSE MONOHYDRATE 12.5 G: 25 INJECTION, SOLUTION INTRAVENOUS at 06:09

## 2020-09-05 RX ADMIN — METOPROLOL TARTRATE 5 MG: 5 INJECTION INTRAVENOUS at 02:09

## 2020-09-05 RX ADMIN — HEPARIN SODIUM AND DEXTROSE 10 UNITS/KG/HR: 10000; 5 INJECTION INTRAVENOUS at 08:09

## 2020-09-05 RX ADMIN — POLYETHYLENE GLYCOL 3350 17 G: 17 POWDER, FOR SOLUTION ORAL at 08:09

## 2020-09-05 RX ADMIN — FENTANYL CITRATE 50 MCG: 50 INJECTION INTRAMUSCULAR; INTRAVENOUS at 07:09

## 2020-09-05 RX ADMIN — METOROPROLOL TARTRATE 5 MG: 5 INJECTION, SOLUTION INTRAVENOUS at 02:09

## 2020-09-05 NOTE — NURSING
A-line sbp reading 20points higher than cuff, MAP correlating. A-line wave form underdampened. Per Cousins, MD use cuff pressure for systolic goals and a-line for trending MAP.     UOP dropped to 5 cc over the last 2hrs. Was previously 10-20cc/hr. Bladder scan reveals no residual volume in bladder. Per Cousins, MD notify by 1700 if UOP has not increased. WCTM

## 2020-09-05 NOTE — NURSING
POCT finger stick BG 42. Low BG has already been treated with 12.5 g D50% twice this AM. Per Lucretia, NP check BG from ear and call back with result. Ear BG= 220. Per lucretia, NP draw from PIV and call back with result, PIV sample drawn from IV on arm opposite of d50 administration BG =>500. STAT serum CMP sent.    0910 call from lab that blood sample hemolyzed, STAT serum CMP reordered and drawn via peripheral stick.   1010 second call from lab that most recent blood sample is hemolyzed. Notified MD Toni. A-line to be placed, but unable to reach  for consents. RN to send CMP via peripheral stick one more time until a-line can be placed. Once consent is obtained, MD to place a-line.

## 2020-09-05 NOTE — PLAN OF CARE
POC reviewed with pt at 0600.  Pt noted to be in A-fib with RVR with runs of vtach. DIVYA Moore aware and at bedside. Total of 6mg of versed given this shift. Precedex gtt stopped, propofol gtt started. Pt also given Lopressor for HR >120. Pt remains on Heparin gtt @ 10 units/hr. Pt noted to be hypoglycemic this am, CBG 48provider aware,  D50 given IV. CBG currently 60, will recheck in 10 mins. Pt's tongue remains swollen, bite block in place. Will continue to monitor. See flowsheets for full assessment and VS info

## 2020-09-05 NOTE — PROGRESS NOTES
Notified DIVYA Boykin that pt's HR sustaining at 135. Pt also agitated and coughing. Order placed to admin 2mg of versed. Will continue to monitor.

## 2020-09-05 NOTE — PLAN OF CARE
[facility] Care Plan    POC reviewed with Donita Gonzalez and family at 1400. Pt intubated unable to verbalize/demonstrate understanding.  questions and concerns addressed at bedside. No acute events today. Neuro exam stable, Propofol gtt titrating for RASS 0. SBP <160 with no PRN meds. Pt in a-fib RVR today, metoprolol 5mg given once IV. PO dilt dose increased. ETT in place, vent settings tolerated well. OGT in place, TF at goal, 300cc water flushes given Q6hr. LBM 8/26 mag citrate given today. Saenz in place, UOP low, team aware. POCT finger stick BG unreliable, serum BG checked @ noon. A-line placed today, POC BG checked from A-line. Heparin gtt therapeutic @ 10units/kg/hr. Pt progressing toward goals. Will continue to monitor. See below and flowsheets for full assessment and VS info.       Neuro:  Manuel Coma Scale  Best Eye Response: 2-->(E2) to pain  Best Motor Response: 5-->(M5) localizes pain  Best Verbal Response: 1-->(V1) none  Manuel Coma Scale Score: 8  Assessment Qualifiers: patient intubated, patient chemically sedated or paralyzed, no eye obstruction present  Pupil PERRLA: no     24 hr Temp:  [98.2 °F (36.8 °C)-99.1 °F (37.3 °C)]     CV:   Rhythm: atrial rhythm  BP goals:   SBP < 160  MAP > 65    Resp:   O2 Device (Oxygen Therapy): ventilator  Vent Mode: A/C  Set Rate: 16 BPM  Oxygen Concentration (%): 50  Vt Set: 420 mL  PEEP/CPAP: 5 cmH20  Pressure Support: 5 cmH20    Plan: wean to extubate    GI/:  ROXANE Total Score: 1  Diet/Nutrition Received: tube feeding  Last Bowel Movement: 08/26/20  Voiding Characteristics: urethral catheter (bladder)    Intake/Output Summary (Last 24 hours) at 9/5/2020 1616  Last data filed at 9/5/2020 1505  Gross per 24 hour   Intake 1880.38 ml   Output 695 ml   Net 1185.38 ml     Unmeasured Output  Stool Occurrence: 0  Emesis Occurrence: 0  Pad Count: 0    Labs/Accuchecks:  Recent Labs   Lab 09/05/20  0210   WBC 16.24*   RBC 3.92*   HGB 11.8*   HCT 38.2   *       Recent Labs   Lab 09/05/20  1058   *   K 4.7   CO2 25      BUN 32*   CREATININE 1.2   ALKPHOS 71   ALT 35   AST 49*   BILITOT 0.5      Recent Labs   Lab 09/01/20  0904  09/05/20  0210   INR 1.0  --   --    APTT 23.3   < > 45.7*    < > = values in this interval not displayed.    No results for input(s): CPK, CPKMB, TROPONINI, MB in the last 168 hours.    Electrolytes: N/A - electrolytes WDL  Accuchecks: Q4H    Gtts:   dextrose 10 % in water (D10W)      heparin (porcine) in D5W 9.966 Units/kg/hr (09/05/20 1505)    propofoL 35 mcg/kg/min (09/05/20 1505)       LDA/Wounds:  Lines/Drains/Airways       Drain                   NG/OG Tube 08/29/20 2215 orogastric Left mouth 6 days         Urethral Catheter 08/29/20 2215 6 days              Airway                   Airway - Non-Surgical 08/26/20 0931 Endotracheal Tube 10 days              Arterial Line              Arterial Line 09/05/20 1345 Left Radial less than 1 day              Peripheral Intravenous Line                   Peripheral IV - Single Lumen 08/31/20 1000 18 G Left Upper Arm 5 days         Peripheral IV - Single Lumen 09/04/20 1210 22 G Right Wrist 1 day         Peripheral IV - Single Lumen 09/04/20 1330 18 G Left Antecubital 1 day                  Wounds: No  Wound care consulted: No

## 2020-09-05 NOTE — ASSESSMENT & PLAN NOTE
- Repeat EKG   - daily Mg and Phos; replace as needed  - dilt PO  -dig level 1.8  - continue heparin gtt minimal intensity  -9/5/2020: increased PO diltiazem to 90 mg

## 2020-09-05 NOTE — ASSESSMENT & PLAN NOTE
- Last seen normal on 08/25/20 with no intervention; intubated and sedated.    - Imaging shows large right ischemic stroke in the posterior frontal and parietal regions  - SBP < 160, MAP > 65  - EuNa   - CBC, CMP, coags, UA, ABG, CXR, anti XA, Echo, and lipid panel   - heparin gtt low intensity  - repeat CTH stable  - daily statin  - Seroquel to 50 mg BID, wean off propofol gtt

## 2020-09-05 NOTE — ASSESSMENT & PLAN NOTE
- upper gi bleed at outside hospital  - recent upper GI bleed at outside facility and epistaxis (2 rhino rockets, up to 5 days prn)  - consulted GI -Continue PPI b.i.d. Trend hemoglobin, transfuse hemoglobin <7  -restarted Tube feeds after being NPO for possible extubation  - plan for extubation tomorrow 9/4 9/5/2020: unable to extubate today

## 2020-09-05 NOTE — NURSING
Pt -130s, BP stable 120s/70s. Team notified. Per MD Toni notify if HR sustains >140, or if BP drops with increase in HR. WCTM

## 2020-09-05 NOTE — SUBJECTIVE & OBJECTIVE
Review of Systems   Unable to obtain a complete ROS due to level of consciousness.  Objective:     Vitals:  Temp: 99.1 °F (37.3 °C)  Pulse: (!) 119  Rhythm: atrial rhythm  BP: 121/70  MAP (mmHg): 90  Resp: 17  SpO2: 99 %  Oxygen Concentration (%): 50  O2 Device (Oxygen Therapy): ventilator  Vent Mode: A/C  Set Rate: 16 BPM  Vt Set: 420 mL  PEEP/CPAP: 5 cmH20  Peak Airway Pressure: 36 cmH2O  Mean Airway Pressure: 10 cmH20  Plateau Pressure: 13 cmH20    Temp  Min: 97.8 °F (36.6 °C)  Max: 99.1 °F (37.3 °C)  Pulse  Min: 86  Max: 221  BP  Min: 97/56  Max: 186/84  MAP (mmHg)  Min: 72  Max: 133  Resp  Min: 1  Max: 53  SpO2  Min: 90 %  Max: 100 %  Oxygen Concentration (%)  Min: 40  Max: 50    09/04 0701 - 09/05 0700  In: 1162.8 [I.V.:402.8]  Out: 1000 [Urine:1000]   Unmeasured Output  Stool Occurrence: 0  Emesis Occurrence: 0  Pad Count: 0       Physical Exam  GA:  comfortable, no acute distress.   HEENT: No scleral icterus or JVD.   Pulmonary: Clear to auscultation A/L.   Cardiac: RRR S1 & S2 w/o rubs/murmurs/gallops.   Abdominal: Bowel sounds present x 4. No appreciable hepatosplenomegaly.  Skin: No jaundice, rashes, or visible lesions.  Neuro:  --GCS: E2 VT1 M5  --Mental Status:  Doesn't opens eyes, doesn't follow commands, grimace to pain  --CN II-XII grossly intact.   --Pupils left pupil 2mm brisk, R pupil 5 mm fixed  --Corneal reflex, gag, cough intact.  --LUE withdraws  --RUE spont  --LLE withdraws  --RLE spont  Medications:  Continuousdextrose 10 % in water (D10W)  heparin (porcine) in D5W, Last Rate: 9.966 Units/kg/hr (09/05/20 1405)  propofoL, Last Rate: 35 mcg/kg/min (09/05/20 1405)    Scheduledalbuterol-ipratropium, 3 mL, Q6H  atorvastatin, 40 mg, Daily  digoxin, 0.125 mg, Daily  diltiaZEM, 90 mg, Q8H  pantoprozole (PROTONIX) IV, 40 mg, Q12H  polyethylene glycol, 17 g, Daily  QUEtiapine, 50 mg, BID  senna-docusate 8.6-50 mg, 1 tablet, BID  valproic acid (as sodium salt), 250 mg, Q8H    PRNacetaminophen, 650  mg, Q6H PRN  dextrose 10 % in water (D10W), , Continuous PRN  dextrose 50%, 12.5 g, PRN  fentaNYL, 50 mcg, Q2H PRN  glucagon (human recombinant), 1 mg, PRN  hydrALAZINE, 10 mg, Q6H PRN  magnesium oxide, 800 mg, PRN  magnesium oxide, 800 mg, PRN  metoprolol, 5 mg, Q5 Min PRN  ondansetron, 4 mg, Q6H PRN  potassium chloride, 40 mEq, PRN  potassium chloride, 40 mEq, PRN  potassium chloride, 60 mEq, PRN  potassium, sodium phosphates, 2 packet, PRN  potassium, sodium phosphates, 2 packet, PRN  potassium, sodium phosphates, 2 packet, PRN      Today I personally reviewed pertinent medications, lines/drains/airways, imaging, cardiology results, laboratory results,     Diet  Diet NPO

## 2020-09-05 NOTE — PROGRESS NOTES
[facility]  Neurocritical Care  Progress Note    Admit Date: 8/29/2020  Service Date: 09/05/2020  Length of Stay: 7    Subjective:     Chief Complaint: Embolic stroke involving right middle cerebral artery    History of Present Illness: 64 year old female with a PMH significant for COPD, HTN, and every day smoker who presented at OSH on 08/26/20 with acute left sided facial droop and left sided weakness (last known normal 08/25/20). Pt was not given TPA or endovascular intervention, and she was intubated on 08/26/20 for agitation. CTH on 08/26/20 showed right posterior frontal/parietal infarction with no intracranial hemorrhage. Hospital course complicated by Afib with RVR in which she was started on lovenox and then transitioned to heparin gtt for GOYO and HFrEF. Pt was transferred to NICU for higher level of care and persistent epistaxis. Upon arrival Pt was intubated and sedated, Rhinorocket  Bilaterally in place, propofol and diltiazem gtt. Pt was able to move the right side and moves her left side to noxious stimuli. A-line placed.       Hospital Course: 08/29/20: Pt admitted to NICU for higher level of care and ENT consult. Pt with a run of Vtach associated with hypotension.   8/31/2020: hold anticoagulation until tomorrow due to GI bleed/epistaxis (at other facility) GI consult, pt in Afib- load with digoxin, pending digoxin level tomorrow, continue diltiazem gtt today, start tube feeds, obtain PIV and d/c central line, continue cefepime for total 7 days  9/1/2020: initiated heparin gtt minimal intensity-when at goal obtain CT head, continue diltiazem gtt, start tube feeds, d/c A-line  9/2/2020: changed to PO diltiazem 60mg q8hr from gtt, lasix, miralax, plan for extubation  9/3/2020: started seroquel 25BID  9/4/2020: increased seroquel to 25 mg BID, magnesium citrate, and try SBT  9/5/2020: place A-line, increased Po dilt, add chest PT, add water flushes and repeat magnesium  citrate        Review of Systems   Unable to obtain a complete ROS due to level of consciousness.  Objective:     Vitals:  Temp: 99.1 °F (37.3 °C)  Pulse: (!) 119  Rhythm: atrial rhythm  BP: 121/70  MAP (mmHg): 90  Resp: 17  SpO2: 99 %  Oxygen Concentration (%): 50  O2 Device (Oxygen Therapy): ventilator  Vent Mode: A/C  Set Rate: 16 BPM  Vt Set: 420 mL  PEEP/CPAP: 5 cmH20  Peak Airway Pressure: 36 cmH2O  Mean Airway Pressure: 10 cmH20  Plateau Pressure: 13 cmH20    Temp  Min: 97.8 °F (36.6 °C)  Max: 99.1 °F (37.3 °C)  Pulse  Min: 86  Max: 221  BP  Min: 97/56  Max: 186/84  MAP (mmHg)  Min: 72  Max: 133  Resp  Min: 1  Max: 53  SpO2  Min: 90 %  Max: 100 %  Oxygen Concentration (%)  Min: 40  Max: 50    09/04 0701 - 09/05 0700  In: 1162.8 [I.V.:402.8]  Out: 1000 [Urine:1000]   Unmeasured Output  Stool Occurrence: 0  Emesis Occurrence: 0  Pad Count: 0       Physical Exam  GA:  comfortable, no acute distress.   HEENT: No scleral icterus or JVD.   Pulmonary: Clear to auscultation A/L.   Cardiac: RRR S1 & S2 w/o rubs/murmurs/gallops.   Abdominal: Bowel sounds present x 4. No appreciable hepatosplenomegaly.  Skin: No jaundice, rashes, or visible lesions.  Neuro:  --GCS: E2 VT1 M5  --Mental Status:  Doesn't opens eyes, doesn't follow commands, grimace to pain  --CN II-XII grossly intact.   --Pupils left pupil 2mm brisk, R pupil 5 mm fixed  --Corneal reflex, gag, cough intact.  --LUE withdraws  --RUE spont  --LLE withdraws  --RLE spont  Medications:  Continuousdextrose 10 % in water (D10W)  heparin (porcine) in D5W, Last Rate: 9.966 Units/kg/hr (09/05/20 1405)  propofoL, Last Rate: 35 mcg/kg/min (09/05/20 1405)    Scheduledalbuterol-ipratropium, 3 mL, Q6H  atorvastatin, 40 mg, Daily  digoxin, 0.125 mg, Daily  diltiaZEM, 90 mg, Q8H  pantoprozole (PROTONIX) IV, 40 mg, Q12H  polyethylene glycol, 17 g, Daily  QUEtiapine, 50 mg, BID  senna-docusate 8.6-50 mg, 1 tablet, BID  valproic acid (as sodium salt), 250 mg,  Q8H    PRNacetaminophen, 650 mg, Q6H PRN  dextrose 10 % in water (D10W), , Continuous PRN  dextrose 50%, 12.5 g, PRN  fentaNYL, 50 mcg, Q2H PRN  glucagon (human recombinant), 1 mg, PRN  hydrALAZINE, 10 mg, Q6H PRN  magnesium oxide, 800 mg, PRN  magnesium oxide, 800 mg, PRN  metoprolol, 5 mg, Q5 Min PRN  ondansetron, 4 mg, Q6H PRN  potassium chloride, 40 mEq, PRN  potassium chloride, 40 mEq, PRN  potassium chloride, 60 mEq, PRN  potassium, sodium phosphates, 2 packet, PRN  potassium, sodium phosphates, 2 packet, PRN  potassium, sodium phosphates, 2 packet, PRN      Today I personally reviewed pertinent medications, lines/drains/airways, imaging, cardiology results, laboratory results,     Diet  Diet NPO      Assessment/Plan:     Neuro  * Embolic stroke involving right middle cerebral artery  - Last seen normal on 08/25/20 with no intervention; intubated and sedated.    - Imaging shows large right ischemic stroke in the posterior frontal and parietal regions  - SBP < 160, MAP > 65  - EuNa   - CBC, CMP, coags, UA, ABG, CXR, anti XA, Echo, and lipid panel   - heparin gtt low intensity  - repeat CTH stable  - daily statin  - Seroquel to 50 mg BID, wean off propofol gtt    Pulmonary  Chronic obstructive pulmonary disease  - daily CXRs while intubated   - goal SpO2 is > 88%  - scheduled duo nebs q6h    Acute on chronic respiratory failure with hypoxia and hypercapnia  - Pt intubated and sedated   - Daily CXRs and abg   9/4/2020: SBT trial today and respiratory mechanics (NIF -11)        Cardiac/Vascular  Atrial fibrillation with rapid ventricular response  - Repeat EKG   - daily Mg and Phos; replace as needed  - dilt PO  -dig level 1.8  - continue heparin gtt minimal intensity  -9/5/2020: increased PO diltiazem to 90 mg      Essential hypertension  - SBP goal < 160, MAP >65    Renal/  GOYO (acute kidney injury)  - BUN/Cr: 32/ 1.2      Endocrine  Diabetes mellitus  -SSI moderate  -POCT glu monitoring    Moderate  malnutrition  - tube feeds  - monitor electrolytes with daily CMP, Mg, phos    GI  GI bleed  - upper gi bleed at outside hospital  - recent upper GI bleed at outside facility and epistaxis (2 rhino rockets, up to 5 days prn)  - consulted GI -Continue PPI b.i.d. Trend hemoglobin, transfuse hemoglobin <7  -restarted Tube feeds after being NPO for possible extubation  - plan for extubation tomorrow 9/4 9/5/2020: unable to extubate today          The patient is being Prophylaxed for:  Venous Thromboembolism with: Mechanical  Stress Ulcer with: PPI  Ventilator Pneumonia with: chlorhexidine oral care    Activity Orders          Diet NPO: NPO starting at 08/30 0250        Full Code    Lona Contreras NP  Neurocritical Care  [facility]    Critical care time > 31 min.

## 2020-09-05 NOTE — RESPIRATORY THERAPY
RT tried to wean per prescribed order pt unable to tolerate and placed back on previous settings will continue to monitor and assess.

## 2020-09-05 NOTE — NURSING
Pt in a-fib with RVR, -140 with PVCs. BP stable 140s/90s. NCC notified. Per Cousins, MD give 5mg metoprolol IV once. After admin, HR , /64. WCTM

## 2020-09-05 NOTE — PROCEDURES
"Donita Gonzalez is a 64 y.o. female patient.    Temp: 99.1 °F (37.3 °C) (20 1105)  Pulse: (!) 129 (20 1326)  Resp: 16 (20 1326)  BP: (!) 142/71 (20 1205)  SpO2: 99 % (20 1326)  Weight: 61.2 kg (134 lb 14.7 oz) (20 0300)  Height: 5' 5" (165.1 cm) (20 1539)       Arterial Line    Date/Time: 2020 1:40 PM  Location procedure was performed: [facility] NEURO CRITICAL CARE  Performed by: Saurav Mahmood MD  Authorized by: Omaira Muñoz MD   Consent Done: Yes  Consent: Written consent obtained.  Risks and benefits: risks, benefits and alternatives were discussed  Consent given by: spouse  Patient identity confirmed: , MRN and name  Time out: Immediately prior to procedure a "time out" was called to verify the correct patient, procedure, equipment, support staff and site/side marked as required.  Preparation: Patient was prepped and draped in the usual sterile fashion.  Indications: multiple ABGs, respiratory failure and hemodynamic monitoring  Location: left radial  Anesthesia: see MAR for details  Needle gauge: 20  Seldinger technique: Seldinger technique used  Number of attempts: 1  Complications: No  Estimated blood loss (mL): 0  Post-procedure: dressing applied  Post-procedure CMS: normal  Patient tolerance: Patient tolerated the procedure well with no immediate complications          Saurav Mahmood  2020  "

## 2020-09-06 LAB
ALBUMIN SERPL BCP-MCNC: 2.2 G/DL (ref 3.5–5.2)
ALBUMIN SERPL BCP-MCNC: 2.2 G/DL (ref 3.5–5.2)
ALLENS TEST: ABNORMAL
ALP SERPL-CCNC: 73 U/L (ref 55–135)
ALP SERPL-CCNC: 75 U/L (ref 55–135)
ALT SERPL W/O P-5'-P-CCNC: 36 U/L (ref 10–44)
ALT SERPL W/O P-5'-P-CCNC: 41 U/L (ref 10–44)
ANION GAP SERPL CALC-SCNC: 7 MMOL/L (ref 8–16)
ANION GAP SERPL CALC-SCNC: 8 MMOL/L (ref 8–16)
ANISOCYTOSIS BLD QL SMEAR: SLIGHT
APTT BLDCRRT: 44.8 SEC (ref 21–32)
AST SERPL-CCNC: 47 U/L (ref 10–40)
AST SERPL-CCNC: 56 U/L (ref 10–40)
BASOPHILS # BLD AUTO: ABNORMAL K/UL (ref 0–0.2)
BASOPHILS NFR BLD: 0 % (ref 0–1.9)
BILIRUB SERPL-MCNC: 0.2 MG/DL (ref 0.1–1)
BILIRUB SERPL-MCNC: 0.3 MG/DL (ref 0.1–1)
BUN SERPL-MCNC: 38 MG/DL (ref 8–23)
BUN SERPL-MCNC: 38 MG/DL (ref 8–23)
CALCIUM SERPL-MCNC: 8.6 MG/DL (ref 8.7–10.5)
CALCIUM SERPL-MCNC: 9 MG/DL (ref 8.7–10.5)
CHLORIDE SERPL-SCNC: 99 MMOL/L (ref 95–110)
CHLORIDE SERPL-SCNC: 99 MMOL/L (ref 95–110)
CO2 SERPL-SCNC: 33 MMOL/L (ref 23–29)
CO2 SERPL-SCNC: 33 MMOL/L (ref 23–29)
CREAT SERPL-MCNC: 1.5 MG/DL (ref 0.5–1.4)
CREAT SERPL-MCNC: 1.7 MG/DL (ref 0.5–1.4)
DELSYS: ABNORMAL
DIFFERENTIAL METHOD: ABNORMAL
EOSINOPHIL # BLD AUTO: ABNORMAL K/UL (ref 0–0.5)
EOSINOPHIL NFR BLD: 3 % (ref 0–8)
ERYTHROCYTE [DISTWIDTH] IN BLOOD BY AUTOMATED COUNT: 14.4 % (ref 11.5–14.5)
ERYTHROCYTE [SEDIMENTATION RATE] IN BLOOD BY WESTERGREN METHOD: 16 MM/H
EST. GFR  (AFRICAN AMERICAN): 36.2 ML/MIN/1.73 M^2
EST. GFR  (AFRICAN AMERICAN): 42.1 ML/MIN/1.73 M^2
EST. GFR  (NON AFRICAN AMERICAN): 31.4 ML/MIN/1.73 M^2
EST. GFR  (NON AFRICAN AMERICAN): 36.6 ML/MIN/1.73 M^2
FIO2: 50
GLUCOSE SERPL-MCNC: 122 MG/DL (ref 70–110)
GLUCOSE SERPL-MCNC: 164 MG/DL (ref 70–110)
HCO3 UR-SCNC: 34.3 MMOL/L (ref 24–28)
HCT VFR BLD AUTO: 37.3 % (ref 37–48.5)
HGB BLD-MCNC: 11.9 G/DL (ref 12–16)
HYPOCHROMIA BLD QL SMEAR: ABNORMAL
IMM GRANULOCYTES # BLD AUTO: ABNORMAL K/UL (ref 0–0.04)
IMM GRANULOCYTES NFR BLD AUTO: ABNORMAL % (ref 0–0.5)
LYMPHOCYTES # BLD AUTO: ABNORMAL K/UL (ref 1–4.8)
LYMPHOCYTES NFR BLD: 9 % (ref 18–48)
MAGNESIUM SERPL-MCNC: 3 MG/DL (ref 1.6–2.6)
MCH RBC QN AUTO: 30.1 PG (ref 27–31)
MCHC RBC AUTO-ENTMCNC: 31.9 G/DL (ref 32–36)
MCV RBC AUTO: 94 FL (ref 82–98)
METAMYELOCYTES NFR BLD MANUAL: 1 %
MIN VOL: 6.99
MODE: ABNORMAL
MONOCYTES # BLD AUTO: ABNORMAL K/UL (ref 0.3–1)
MONOCYTES NFR BLD: 2 % (ref 4–15)
MYELOCYTES NFR BLD MANUAL: 2 %
NEUTROPHILS NFR BLD: 79 % (ref 38–73)
NEUTS BAND NFR BLD MANUAL: 4 %
NRBC BLD-RTO: 0 /100 WBC
OSMOLALITY UR: 410 MOSM/KG (ref 50–1200)
PCO2 BLDA: 50 MMHG (ref 35–45)
PEEP: 5
PH SMN: 7.45 [PH] (ref 7.35–7.45)
PHOSPHATE SERPL-MCNC: 2.2 MG/DL (ref 2.7–4.5)
PIP: 21
PLATELET # BLD AUTO: 472 K/UL (ref 150–350)
PLATELET BLD QL SMEAR: ABNORMAL
PMV BLD AUTO: 11.5 FL (ref 9.2–12.9)
PO2 BLDA: 123 MMHG (ref 80–100)
POC BE: 10 MMOL/L
POC SATURATED O2: 99 % (ref 95–100)
POC TCO2: 36 MMOL/L (ref 23–27)
POCT GLUCOSE: 129 MG/DL (ref 70–110)
POCT GLUCOSE: 147 MG/DL (ref 70–110)
POCT GLUCOSE: 149 MG/DL (ref 70–110)
POCT GLUCOSE: 68 MG/DL (ref 70–110)
POCT GLUCOSE: <20 MG/DL (ref 70–110)
POTASSIUM SERPL-SCNC: 4.1 MMOL/L (ref 3.5–5.1)
POTASSIUM SERPL-SCNC: 4.8 MMOL/L (ref 3.5–5.1)
PROT SERPL-MCNC: 6.1 G/DL (ref 6–8.4)
PROT SERPL-MCNC: 6.2 G/DL (ref 6–8.4)
RBC # BLD AUTO: 3.96 M/UL (ref 4–5.4)
SAMPLE: ABNORMAL
SITE: ABNORMAL
SODIUM SERPL-SCNC: 139 MMOL/L (ref 136–145)
SODIUM SERPL-SCNC: 140 MMOL/L (ref 136–145)
SODIUM UR-SCNC: <20 MMOL/L (ref 20–250)
SP02: 100
VT: 420
WBC # BLD AUTO: 17.53 K/UL (ref 3.9–12.7)

## 2020-09-06 PROCEDURE — 84100 ASSAY OF PHOSPHORUS: CPT

## 2020-09-06 PROCEDURE — 83935 ASSAY OF URINE OSMOLALITY: CPT

## 2020-09-06 PROCEDURE — 27000221 HC OXYGEN, UP TO 24 HOURS

## 2020-09-06 PROCEDURE — 85730 THROMBOPLASTIN TIME PARTIAL: CPT

## 2020-09-06 PROCEDURE — 93010 EKG 12-LEAD: ICD-10-PCS | Mod: ,,, | Performed by: INTERNAL MEDICINE

## 2020-09-06 PROCEDURE — 99900026 HC AIRWAY MAINTENANCE (STAT)

## 2020-09-06 PROCEDURE — 99900035 HC TECH TIME PER 15 MIN (STAT)

## 2020-09-06 PROCEDURE — 97112 NEUROMUSCULAR REEDUCATION: CPT

## 2020-09-06 PROCEDURE — 80053 COMPREHEN METABOLIC PANEL: CPT | Mod: 91

## 2020-09-06 PROCEDURE — 99233 PR SUBSEQUENT HOSPITAL CARE,LEVL III: ICD-10-PCS | Mod: ,,, | Performed by: NURSE PRACTITIONER

## 2020-09-06 PROCEDURE — 63600175 PHARM REV CODE 636 W HCPCS: Performed by: NURSE PRACTITIONER

## 2020-09-06 PROCEDURE — 80053 COMPREHEN METABOLIC PANEL: CPT

## 2020-09-06 PROCEDURE — 93010 ELECTROCARDIOGRAM REPORT: CPT | Mod: ,,, | Performed by: INTERNAL MEDICINE

## 2020-09-06 PROCEDURE — 85007 BL SMEAR W/DIFF WBC COUNT: CPT

## 2020-09-06 PROCEDURE — 63600175 PHARM REV CODE 636 W HCPCS: Performed by: PHYSICIAN ASSISTANT

## 2020-09-06 PROCEDURE — 25000003 PHARM REV CODE 250: Performed by: NURSE PRACTITIONER

## 2020-09-06 PROCEDURE — 84300 ASSAY OF URINE SODIUM: CPT

## 2020-09-06 PROCEDURE — 94668 MNPJ CHEST WALL SBSQ: CPT

## 2020-09-06 PROCEDURE — 25000242 PHARM REV CODE 250 ALT 637 W/ HCPCS: Performed by: NURSE PRACTITIONER

## 2020-09-06 PROCEDURE — 93005 ELECTROCARDIOGRAM TRACING: CPT

## 2020-09-06 PROCEDURE — 99233 SBSQ HOSP IP/OBS HIGH 50: CPT | Mod: ,,, | Performed by: NURSE PRACTITIONER

## 2020-09-06 PROCEDURE — 94761 N-INVAS EAR/PLS OXIMETRY MLT: CPT

## 2020-09-06 PROCEDURE — 94640 AIRWAY INHALATION TREATMENT: CPT

## 2020-09-06 PROCEDURE — 82803 BLOOD GASES ANY COMBINATION: CPT

## 2020-09-06 PROCEDURE — 27200966 HC CLOSED SUCTION SYSTEM

## 2020-09-06 PROCEDURE — 85027 COMPLETE CBC AUTOMATED: CPT

## 2020-09-06 PROCEDURE — C9113 INJ PANTOPRAZOLE SODIUM, VIA: HCPCS | Performed by: PHYSICIAN ASSISTANT

## 2020-09-06 PROCEDURE — 94003 VENT MGMT INPAT SUBQ DAY: CPT

## 2020-09-06 PROCEDURE — 25000003 PHARM REV CODE 250: Performed by: STUDENT IN AN ORGANIZED HEALTH CARE EDUCATION/TRAINING PROGRAM

## 2020-09-06 PROCEDURE — 20000000 HC ICU ROOM

## 2020-09-06 PROCEDURE — 37799 UNLISTED PX VASCULAR SURGERY: CPT

## 2020-09-06 PROCEDURE — 83735 ASSAY OF MAGNESIUM: CPT

## 2020-09-06 RX ORDER — CARVEDILOL 3.12 MG/1
3.12 TABLET ORAL 2 TIMES DAILY
Status: DISCONTINUED | OUTPATIENT
Start: 2020-09-07 | End: 2020-09-15

## 2020-09-06 RX ORDER — CARVEDILOL 6.25 MG/1
6.25 TABLET ORAL 2 TIMES DAILY
Status: DISCONTINUED | OUTPATIENT
Start: 2020-09-06 | End: 2020-09-06

## 2020-09-06 RX ORDER — FUROSEMIDE 40 MG/1
40 TABLET ORAL 2 TIMES DAILY
Status: DISCONTINUED | OUTPATIENT
Start: 2020-09-06 | End: 2020-09-07

## 2020-09-06 RX ADMIN — QUETIAPINE FUMARATE 50 MG: 25 TABLET ORAL at 08:09

## 2020-09-06 RX ADMIN — ATORVASTATIN CALCIUM 40 MG: 20 TABLET, FILM COATED ORAL at 08:09

## 2020-09-06 RX ADMIN — FENTANYL CITRATE 50 MCG: 50 INJECTION INTRAMUSCULAR; INTRAVENOUS at 07:09

## 2020-09-06 RX ADMIN — VALPROIC ACID 250 MG: 250 SOLUTION ORAL at 09:09

## 2020-09-06 RX ADMIN — IPRATROPIUM BROMIDE AND ALBUTEROL SULFATE 3 ML: .5; 2.5 SOLUTION RESPIRATORY (INHALATION) at 01:09

## 2020-09-06 RX ADMIN — FUROSEMIDE 40 MG: 40 TABLET ORAL at 06:09

## 2020-09-06 RX ADMIN — DIGOXIN 0.12 MG: 125 TABLET ORAL at 08:09

## 2020-09-06 RX ADMIN — VALPROIC ACID 250 MG: 250 SOLUTION ORAL at 05:09

## 2020-09-06 RX ADMIN — IPRATROPIUM BROMIDE AND ALBUTEROL SULFATE 3 ML: .5; 2.5 SOLUTION RESPIRATORY (INHALATION) at 07:09

## 2020-09-06 RX ADMIN — DILTIAZEM HYDROCHLORIDE 90 MG: 60 TABLET, FILM COATED ORAL at 01:09

## 2020-09-06 RX ADMIN — PROPOFOL 30 MCG/KG/MIN: 10 INJECTION, EMULSION INTRAVENOUS at 03:09

## 2020-09-06 RX ADMIN — CARVEDILOL 6.25 MG: 6.25 TABLET, FILM COATED ORAL at 11:09

## 2020-09-06 RX ADMIN — VALPROIC ACID 250 MG: 250 SOLUTION ORAL at 01:09

## 2020-09-06 RX ADMIN — FENTANYL CITRATE 50 MCG: 50 INJECTION INTRAMUSCULAR; INTRAVENOUS at 02:09

## 2020-09-06 RX ADMIN — PROPOFOL 5 MCG/KG/MIN: 10 INJECTION, EMULSION INTRAVENOUS at 02:09

## 2020-09-06 RX ADMIN — PANTOPRAZOLE SODIUM 40 MG: 40 INJECTION, POWDER, LYOPHILIZED, FOR SOLUTION INTRAVENOUS at 08:09

## 2020-09-06 RX ADMIN — IPRATROPIUM BROMIDE AND ALBUTEROL SULFATE 3 ML: .5; 2.5 SOLUTION RESPIRATORY (INHALATION) at 11:09

## 2020-09-06 RX ADMIN — DILTIAZEM HYDROCHLORIDE 90 MG: 60 TABLET, FILM COATED ORAL at 05:09

## 2020-09-06 RX ADMIN — DILTIAZEM HYDROCHLORIDE 90 MG: 60 TABLET, FILM COATED ORAL at 09:09

## 2020-09-06 RX ADMIN — PROPOFOL 30 MCG/KG/MIN: 10 INJECTION, EMULSION INTRAVENOUS at 08:09

## 2020-09-06 RX ADMIN — DOCUSATE SODIUM 50MG AND SENNOSIDES 8.6MG 1 TABLET: 8.6; 5 TABLET, FILM COATED ORAL at 08:09

## 2020-09-06 RX ADMIN — CARVEDILOL 6.25 MG: 6.25 TABLET, FILM COATED ORAL at 08:09

## 2020-09-06 RX ADMIN — FUROSEMIDE 40 MG: 40 TABLET ORAL at 11:09

## 2020-09-06 NOTE — PT/OT/SLP PROGRESS
Occupational Therapy   Treatment    Name: Donita Gonzalez  MRN: [id number]  Admitting Diagnosis:  Embolic stroke involving right middle cerebral artery       Recommendations:     Discharge Recommendations: (pending extubation)  Discharge Equipment Recommendations:  (pending extubation)  Barriers to discharge:  None    Assessment:     Donita Gonzalez is a 64 y.o. female with a medical diagnosis of Embolic stroke involving right middle cerebral artery.  She presents with performance deficits affecting function are weakness, abnormal tone, impaired cognition, impaired endurance, impaired sensation, decreased coordination, decreased upper extremity function, impaired self care skills, impaired functional mobilty, decreased lower extremity function, gait instability, impaired balance, impaired cardiopulmonary response to activity, impaired skin, impaired fine motor, impaired coordination, decreased ROM, impaired muscle length, impaired joint extensibility.     Rehab Prognosis:  Good; patient would benefit from acute skilled OT services to address these deficits and reach maximum level of function.       Plan:     Patient to be seen 3 x/week to address the above listed problems via self-care/home management, therapeutic activities, therapeutic exercises, cognitive retraining, neuromuscular re-education, sensory integration  · Plan of Care Expires: 09/27/20  · Plan of Care Reviewed with: patient    Subjective   Patient:  Nonverbal; intubated    Pain/Comfort:  · Pain Rating 1: 0/10  · Pain Rating Post-Intervention 1: 0/10    Objective:     Communicated with: Nurse prior to session.  Patient found supine with arterial line, bed alarm, blood pressure cuff, telemetry, SCD, bowel management system, barr catheter, pulse ox (continuous), pressure relief boots, restraints, peripheral IV, ventilator upon OT entry to room.  Family not present.  General Precautions: Standard, aspiration, fall, NPO, seizure   Orthopedic Precautions:N/A    Braces: N/A     Occupational Performance:     Bed Mobility:    · Patient completed Rolling/Turning to Left with  total assistance  · Patient completed Rolling/Turning to Right with total assistance     Functional Mobility/Transfers:  · Dependent drawsheet transfers    Activities of Daily Living:  · Feeding:  NPO    · Grooming: total assistance while supine    [facility] 6 Click ADL: 6    Treatment & Education:  Patient education provided on role of OT, ROM, positioning, daily orientation and need for continued OT upon discharge.  Daily orientation provided.   PROM performed left UE/LE and AAROM right UE/LE one set x 5 rep in all planes of motion with stretches provided at end range; sustained stretch provided for ankle dorsiflexion.  Assistance and facilitation provided for upward rotation of the scapula during shoulder flexion and abduction to promote orientation of glenoid fossa of scapula to humeral head for prevention of post-stroke hemiplegic pain.  Patient kept eyes closed 100% of the session.  Patient kept head turned to the right; tongue protrusion noted. Towel roll used for midline positioning of head.   Provided multi-sensory stimulation to prevent sensory deprivation and improve clinical outcomes.  Positioning provided for midline orientation with bilateral UEs elevated and heels lifted off mattress; positioning provided to prevent flexor synergy pattern in UE (internal rotation and adduction) to decrease risk of post-stroke hemiplegic pain.   Gentle cervical rotation provided.       Patient left supine with all lines intact, call button in reach, bed alarm on and restraints reapplied at end of sessionEducation:      GOALS:   Multidisciplinary Problems     Occupational Therapy Goals        Problem: Occupational Therapy Goal    Goal Priority Disciplines Outcome Interventions   Occupational Therapy Goal     OT, PT/OT Ongoing, Progressing    Description: Goals set 9/1 to be addressed for 14 days with expiration  date, 9/14:  Patient will increase functional independence with ADLs by performing:    Patient will demonstrate rolling to the right with mod assist.  Not met   Patient will demonstrate rolling to the left with mod assist.   Not met  Patient will demonstrate supine -sit with mod assist.   Not met  Patient will demonstrate stand pivot transfers with mod assist.   Not met  Patient will demonstrate grooming while seated with mod assist.   Not met  Patient will demonstrate upper body dressing with mod assist while seated EOB.   Not met  Patient will demonstrate lower body dressing with max assist while seated EOB.   Not met  Patient will demonstrate toileting with max assist.   Not met  Patient will demonstrate bathing while seated EOB with max assist.   Not met  Patient's family / caregiver will demonstrate independence and safety with assisting patient with self-care skills and functional mobility.     Not met  Patient's family / caregiver will demonstrate independence with providing ROM and changes in bed positioning.   Not met  Patient and/or patient's family will verbalize understanding of stroke prevention guidelines, personal risk factors and stroke warning signs via teachback method.  Not met                              Time Tracking:     OT Date of Treatment: 09/06/20  OT Start Time: 0408  OT Stop Time: 0418  OT Total Time (min): 10 min    Billable Minutes:Neuromuscular Re-education 10    RORY Thomas  9/6/2020

## 2020-09-06 NOTE — PLAN OF CARE
POC reviewed with pt and  at 1400. Pt unable to verbalize understanding due to vent but pt's  verbalized understanding. Questions and concerns addressed. No acute events today. Pt progressing toward goals. Heparin remains therapeutic @ 10U/kg/hr, propofol titrated for comfort. Will continue to monitor. See flowsheets for full assessment and VS info.

## 2020-09-06 NOTE — PLAN OF CARE
Goals remain appropriate.  RORY Thomas  9/6/2020    Problem: Occupational Therapy Goal  Goal: Occupational Therapy Goal  Description: Goals set 9/1 to be addressed for 14 days with expiration date, 9/14:  Patient will increase functional independence with ADLs by performing:    Patient will demonstrate rolling to the right with mod assist.  Not met   Patient will demonstrate rolling to the left with mod assist.   Not met  Patient will demonstrate supine -sit with mod assist.   Not met  Patient will demonstrate stand pivot transfers with mod assist.   Not met  Patient will demonstrate grooming while seated with mod assist.   Not met  Patient will demonstrate upper body dressing with mod assist while seated EOB.   Not met  Patient will demonstrate lower body dressing with max assist while seated EOB.   Not met  Patient will demonstrate toileting with max assist.   Not met  Patient will demonstrate bathing while seated EOB with max assist.   Not met  Patient's family / caregiver will demonstrate independence and safety with assisting patient with self-care skills and functional mobility.     Not met  Patient's family / caregiver will demonstrate independence with providing ROM and changes in bed positioning.   Not met  Patient and/or patient's family will verbalize understanding of stroke prevention guidelines, personal risk factors and stroke warning signs via teachback method.  Not met             Outcome: Ongoing, Progressing

## 2020-09-06 NOTE — ASSESSMENT & PLAN NOTE
- Repeat EKG   - daily Mg and Phos; replace as needed  - dilt PO  -dig level 1.8  - continue heparin gtt minimal intensity  -9/5/2020: increased PO diltiazem to 90 mg  -9/6/2020: d/c digoxin

## 2020-09-06 NOTE — ASSESSMENT & PLAN NOTE
- Pt intubated and sedated   - Daily CXRs and abg   9/4/2020: SBT trial today and respiratory mechanics (NIF -11)  Vent Mode: A/C  Oxygen Concentration (%):  [50] 50  Resp Rate Total:  [16 br/min-25 br/min] 16 br/min  Vt Set:  [420 mL] 420 mL  PEEP/CPAP:  [5 cmH20] 5 cmH20  Pressure Support:  [12 cmH20] 12 cmH20  Mean Airway Pressure:  [8 oyL13-71 cmH20] 8 cmH20

## 2020-09-06 NOTE — SUBJECTIVE & OBJECTIVE
Review of Systems  Unable to obtain a complete ROS due to level of consciousness.  Objective:     Vitals:  Temp: 98.2 °F (36.8 °C)  Pulse: 88  Rhythm: atrial rhythm  BP: 123/64  MAP (mmHg): 84  Resp: 16  SpO2: 100 %  Oxygen Concentration (%): 50  O2 Device (Oxygen Therapy): ventilator  Vent Mode: A/C  Set Rate: 16 BPM  Vt Set: 420 mL  PEEP/CPAP: 5 cmH20  Peak Airway Pressure: 20 cmH2O  Mean Airway Pressure: 8 cmH20  Plateau Pressure: 13 cmH20    Temp  Min: 97.5 °F (36.4 °C)  Max: 99.1 °F (37.3 °C)  Pulse  Min: 88  Max: 132  BP  Min: 87/54  Max: 177/87  MAP (mmHg)  Min: 65  Max: 123  Resp  Min: 16  Max: 68  SpO2  Min: 96 %  Max: 100 %  Oxygen Concentration (%)  Min: 50  Max: 50    09/05 0701 - 09/06 0700  In: 3130.1 [I.V.:454.1]  Out: 495 [Urine:495]   Unmeasured Output  Stool Occurrence: 1  Emesis Occurrence: 0  Pad Count: 0       Physical Exam  GA: comfortable, no acute distress.   HEENT: No scleral icterus or JVD.   Pulmonary: Clear to auscultation A/L.   Cardiac: RRR S1 & S2 w/o rubs/murmurs/gallops.   Abdominal: Bowel sounds present x 4. No appreciable hepatosplenomegaly.  Skin: No jaundice, rashes, or visible lesions.  Neuro:  --GCS: E2 VT1 M4  --Mental Status:  Doesn't opens eyes, doesn't follow commands  --CN II-XII grossly intact.   --Pupils left pupil 2 brisk, R pupil 6 fixed   --Corneal reflex, gag, cough intact.  --LUE withdraws  --RUE spont  --LLE withdraws  --RLE spont    Medications:  Continuousdextrose 10 % in water (D10W)  heparin (porcine) in D5W, Last Rate: 9.966 Units/kg/hr (09/06/20 1300)  propofoL, Last Rate: 30 mcg/kg/min (09/06/20 1300)    Scheduledalbuterol-ipratropium, 3 mL, Q6H  atorvastatin, 40 mg, Daily  carvediloL, 6.25 mg, BID  diltiaZEM, 90 mg, Q8H  furosemide, 40 mg, BID  pantoprozole (PROTONIX) IV, 40 mg, Q12H  polyethylene glycol, 17 g, Daily  QUEtiapine, 50 mg, BID  senna-docusate 8.6-50 mg, 1 tablet, BID  valproic acid (as sodium salt), 250 mg, Q8H    PRNacetaminophen, 650 mg,  Q6H PRN  dextrose 10 % in water (D10W), , Continuous PRN  dextrose 50%, 12.5 g, PRN  fentaNYL, 50 mcg, Q2H PRN  glucagon (human recombinant), 1 mg, PRN  hydrALAZINE, 10 mg, Q6H PRN  magnesium oxide, 800 mg, PRN  magnesium oxide, 800 mg, PRN  ondansetron, 4 mg, Q6H PRN  potassium chloride, 40 mEq, PRN  potassium chloride, 40 mEq, PRN  potassium chloride, 60 mEq, PRN  potassium, sodium phosphates, 2 packet, PRN  potassium, sodium phosphates, 2 packet, PRN  potassium, sodium phosphates, 2 packet, PRN      Today I personally reviewed pertinent medications, lines/drains/airways, imaging, cardiology results, laboratory results,     Diet  Diet NPO

## 2020-09-06 NOTE — PLAN OF CARE
[facility] Care Plan    POC reviewed with Donita Gonzalez and family at 0600. Pt unable to verbalized understanding due to cognition. Pt progressing toward goals. Will continue to monitor. See below and flowsheets for full assessment and VS info.       Neuro:  [place] Coma Scale  Best Eye Response: 2-->(E2) to pain  Best Motor Response: 5-->(M5) localizes pain  Best Verbal Response: 1-->(V1) none  Manuel Coma Scale Score: 8  Assessment Qualifiers: patient intubated, patient chemically sedated or paralyzed  Pupil PERRLA: no     24hr Temp:  [97.5 °F (36.4 °C)-99.1 °F (37.3 °C)]     CV:   Rhythm: atrial rhythm  BP goals:   SBP < 160  MAP > 65    Resp:   O2 Device (Oxygen Therapy): ventilator  Vent Mode: A/C  Set Rate: 16 BPM  Oxygen Concentration (%): 50  Vt Set: 420 mL  PEEP/CPAP: 5 cmH20  Pressure Support: 5 cmH20    Plan: wean to extubate    GI/:  ROXANE Total Score: 1  Diet/Nutrition Received: NPO, tube feeding  Last Bowel Movement: 09/06/20  Voiding Characteristics: urethral catheter (bladder)    Intake/Output Summary (Last 24 hours) at 9/6/2020 0628  Last data filed at 9/6/2020 0605  Gross per 24 hour   Intake 3130.11 ml   Output 495 ml   Net 2635.11 ml     Unmeasured Output  Stool Occurrence: 0  Emesis Occurrence: 0  Pad Count: 0    Labs/Accuchecks:  Recent Labs   Lab 09/06/20  0120   WBC 17.53*   RBC 3.96*   HGB 11.9*   HCT 37.3   *      Recent Labs   Lab 09/06/20  0120      K 4.1   CO2 33*   CL 99   BUN 38*   CREATININE 1.7*   ALKPHOS 73   ALT 41   AST 56*   BILITOT 0.3      Recent Labs   Lab 09/01/20  0904  09/06/20  0128   INR 1.0  --   --    APTT 23.3   < > 44.8*    < > = values in this interval not displayed.    No results for input(s): CPK, CPKMB, TROPONINI, MB in the last 168 hours.    Electrolytes: N/A - electrolytes WDL  Accuchecks: Q6H    Gtts:   dextrose 10 % in water (D10W)      heparin (porcine) in D5W 9.966 Units/kg/hr (09/06/20 0605)    propofoL 30 mcg/kg/min (09/06/20 0605)        LDA/Wounds:  Lines/Drains/Airways       Drain                   NG/OG Tube 08/29/20 2215 orogastric Left mouth 7 days         Urethral Catheter 08/29/20 2215 7 days         Rectal Tube 09/05/20 1852 rectal tube w/ balloon (indicate number of mLs) less than 1 day              Airway                   Airway - Non-Surgical 08/26/20 0931 Endotracheal Tube 10 days              Arterial Line              Arterial Line 09/05/20 1345 Left Radial less than 1 day              Peripheral Intravenous Line                   Peripheral IV - Single Lumen 09/04/20 1210 22 G Right Wrist 1 day         Peripheral IV - Single Lumen 09/04/20 1330 18 G Left Antecubital 1 day         Peripheral IV - Single Lumen 09/06/20 0300 20 G Right Antecubital less than 1 day                  Wounds: Yes  Wound care consulted: Yes

## 2020-09-06 NOTE — PROGRESS NOTES
[facility]  Neurocritical Care  Progress Note    Admit Date: 8/29/2020  Service Date: 09/06/2020  Length of Stay: 8    Subjective:     Chief Complaint: Embolic stroke involving right middle cerebral artery    History of Present Illness: 64 year old female with a PMH significant for COPD, HTN, and every day smoker who presented at OSH on 08/26/20 with acute left sided facial droop and left sided weakness (last known normal 08/25/20). Pt was not given TPA or endovascular intervention, and she was intubated on 08/26/20 for agitation. CTH on 08/26/20 showed right posterior frontal/parietal infarction with no intracranial hemorrhage. Hospital course complicated by Afib with RVR in which she was started on lovenox and then transitioned to heparin gtt for GOYO and HFrEF. Pt was transferred to NICU for higher level of care and persistent epistaxis. Upon arrival Pt was intubated and sedated, Rhinorocket  Bilaterally in place, propofol and diltiazem gtt. Pt was able to move the right side and moves her left side to noxious stimuli. A-line placed.       Hospital Course: 08/29/20: Pt admitted to NICU for higher level of care and ENT consult. Pt with a run of Vtach associated with hypotension.   8/31/2020: hold anticoagulation until tomorrow due to GI bleed/epistaxis (at other facility) GI consult, pt in Afib- load with digoxin, pending digoxin level tomorrow, continue diltiazem gtt today, start tube feeds, obtain PIV and d/c central line, continue cefepime for total 7 days  9/1/2020: initiated heparin gtt minimal intensity-when at goal obtain CT head, continue diltiazem gtt, start tube feeds, d/c A-line  9/2/2020: changed to PO diltiazem 60mg q8hr from gtt, lasix, miralax, plan for extubation  9/3/2020: started seroquel 25BID  9/4/2020: increased seroquel to 25 mg BID, magnesium citrate, and try SBT  9/5/2020: place A-line, increased Po dilt, add chest PT, add water flushes and repeat magnesium  citrate  9/6/2020: d/c digoxin, add coreg 6.25 BID, d/c water flushes, repeat CMP, add lasix PO 40 mg BID         Review of Systems  Unable to obtain a complete ROS due to level of consciousness.  Objective:     Vitals:  Temp: 98.2 °F (36.8 °C)  Pulse: 88  Rhythm: atrial rhythm  BP: 123/64  MAP (mmHg): 84  Resp: 16  SpO2: 100 %  Oxygen Concentration (%): 50  O2 Device (Oxygen Therapy): ventilator  Vent Mode: A/C  Set Rate: 16 BPM  Vt Set: 420 mL  PEEP/CPAP: 5 cmH20  Peak Airway Pressure: 20 cmH2O  Mean Airway Pressure: 8 cmH20  Plateau Pressure: 13 cmH20    Temp  Min: 97.5 °F (36.4 °C)  Max: 99.1 °F (37.3 °C)  Pulse  Min: 88  Max: 132  BP  Min: 87/54  Max: 177/87  MAP (mmHg)  Min: 65  Max: 123  Resp  Min: 16  Max: 68  SpO2  Min: 96 %  Max: 100 %  Oxygen Concentration (%)  Min: 50  Max: 50    09/05 0701 - 09/06 0700  In: 3130.1 [I.V.:454.1]  Out: 495 [Urine:495]   Unmeasured Output  Stool Occurrence: 1  Emesis Occurrence: 0  Pad Count: 0       Physical Exam  GA: comfortable, no acute distress.   HEENT: No scleral icterus or JVD.   Pulmonary: Clear to auscultation A/L.   Cardiac: RRR S1 & S2 w/o rubs/murmurs/gallops.   Abdominal: Bowel sounds present x 4. No appreciable hepatosplenomegaly.  Skin: No jaundice, rashes, or visible lesions.  Neuro:  --GCS: E2 VT1 M4  --Mental Status:  Doesn't opens eyes, doesn't follow commands  --CN II-XII grossly intact.   --Pupils left pupil 2 brisk, R pupil 6 fixed   --Corneal reflex, gag, cough intact.  --LUE withdraws  --RUE spont  --LLE withdraws  --RLE spont    Medications:  Continuousdextrose 10 % in water (D10W)  heparin (porcine) in D5W, Last Rate: 9.966 Units/kg/hr (09/06/20 1300)  propofoL, Last Rate: 30 mcg/kg/min (09/06/20 1300)    Scheduledalbuterol-ipratropium, 3 mL, Q6H  atorvastatin, 40 mg, Daily  carvediloL, 6.25 mg, BID  diltiaZEM, 90 mg, Q8H  furosemide, 40 mg, BID  pantoprozole (PROTONIX) IV, 40 mg, Q12H  polyethylene glycol, 17 g, Daily  QUEtiapine, 50 mg,  BID  senna-docusate 8.6-50 mg, 1 tablet, BID  valproic acid (as sodium salt), 250 mg, Q8H    PRNacetaminophen, 650 mg, Q6H PRN  dextrose 10 % in water (D10W), , Continuous PRN  dextrose 50%, 12.5 g, PRN  fentaNYL, 50 mcg, Q2H PRN  glucagon (human recombinant), 1 mg, PRN  hydrALAZINE, 10 mg, Q6H PRN  magnesium oxide, 800 mg, PRN  magnesium oxide, 800 mg, PRN  ondansetron, 4 mg, Q6H PRN  potassium chloride, 40 mEq, PRN  potassium chloride, 40 mEq, PRN  potassium chloride, 60 mEq, PRN  potassium, sodium phosphates, 2 packet, PRN  potassium, sodium phosphates, 2 packet, PRN  potassium, sodium phosphates, 2 packet, PRN      Today I personally reviewed pertinent medications, lines/drains/airways, imaging, cardiology results, laboratory results,     Diet  Diet NPO        Assessment/Plan:     Neuro  * Embolic stroke involving right middle cerebral artery  - Last seen normal on 08/25/20 with no intervention; intubated and sedated.    - Imaging shows large right ischemic stroke in the posterior frontal and parietal regions  - SBP < 160, MAP > 65  - EuNa   - CBC, CMP, coags, UA, ABG, CXR, anti XA, Echo, and lipid panel   - heparin gtt low intensity  - repeat CTH stable  - daily statin  - Seroquel to 50 mg BID, wean off propofol gtt    Pulmonary  Chronic obstructive pulmonary disease  - daily CXRs while intubated   - goal SpO2 is > 88%  - scheduled duo nebs q6h    Acute on chronic respiratory failure with hypoxia and hypercapnia  - Pt intubated and sedated   - Daily CXRs and abg   9/4/2020: SBT trial today and respiratory mechanics (NIF -11)  Vent Mode: A/C  Oxygen Concentration (%):  [50] 50  Resp Rate Total:  [16 br/min-25 br/min] 16 br/min  Vt Set:  [420 mL] 420 mL  PEEP/CPAP:  [5 cmH20] 5 cmH20  Pressure Support:  [12 cmH20] 12 cmH20  Mean Airway Pressure:  [8 diI75-16 cmH20] 8 cmH20          Cardiac/Vascular  Atrial fibrillation with rapid ventricular response  - Repeat EKG   - daily Mg and Phos; replace as needed  - dilt  PO  -dig level 1.8  - continue heparin gtt minimal intensity  -9/5/2020: increased PO diltiazem to 90 mg  -9/6/2020: d/c digoxin      Essential hypertension  - SBP goal < 160, MAP >65    Renal/  GOYO (acute kidney injury)  - BUN/Cr:1.5      Endocrine  Diabetes mellitus  -SSI moderate  -POCT glu monitoring    Moderate malnutrition  - tube feeds  - monitor electrolytes with daily CMP, Mg, phos          The patient is being Prophylaxed for:  Venous Thromboembolism with: Chemical  Stress Ulcer with: None  Ventilator Pneumonia with: chlorhexidine oral care    Activity Orders          Diet NPO: NPO starting at 08/30 0250        Full Code    Lona Contreras NP  Neurocritical Care  [facility]

## 2020-09-07 PROBLEM — Q38.2 MACROGLOSSIA: Status: ACTIVE | Noted: 2020-09-07

## 2020-09-07 LAB
ALBUMIN SERPL BCP-MCNC: 2.1 G/DL (ref 3.5–5.2)
ALLENS TEST: ABNORMAL
ALP SERPL-CCNC: 72 U/L (ref 55–135)
ALT SERPL W/O P-5'-P-CCNC: 32 U/L (ref 10–44)
ANION GAP SERPL CALC-SCNC: 8 MMOL/L (ref 8–16)
ANION GAP SERPL CALC-SCNC: 8 MMOL/L (ref 8–16)
ANISOCYTOSIS BLD QL SMEAR: SLIGHT
APTT BLDCRRT: 43.4 SEC (ref 21–32)
AST SERPL-CCNC: 45 U/L (ref 10–40)
BASOPHILS # BLD AUTO: ABNORMAL K/UL (ref 0–0.2)
BASOPHILS NFR BLD: 0 % (ref 0–1.9)
BILIRUB SERPL-MCNC: 0.3 MG/DL (ref 0.1–1)
BUN SERPL-MCNC: 38 MG/DL (ref 8–23)
BUN SERPL-MCNC: 39 MG/DL (ref 8–23)
BURR CELLS BLD QL SMEAR: ABNORMAL
CALCIUM SERPL-MCNC: 8.7 MG/DL (ref 8.7–10.5)
CALCIUM SERPL-MCNC: 8.9 MG/DL (ref 8.7–10.5)
CHLORIDE SERPL-SCNC: 98 MMOL/L (ref 95–110)
CHLORIDE SERPL-SCNC: 99 MMOL/L (ref 95–110)
CO2 SERPL-SCNC: 33 MMOL/L (ref 23–29)
CO2 SERPL-SCNC: 34 MMOL/L (ref 23–29)
CREAT SERPL-MCNC: 1.3 MG/DL (ref 0.5–1.4)
CREAT SERPL-MCNC: 1.5 MG/DL (ref 0.5–1.4)
DELSYS: ABNORMAL
DIFFERENTIAL METHOD: ABNORMAL
EOSINOPHIL # BLD AUTO: ABNORMAL K/UL (ref 0–0.5)
EOSINOPHIL NFR BLD: 4 % (ref 0–8)
ERYTHROCYTE [DISTWIDTH] IN BLOOD BY AUTOMATED COUNT: 14.5 % (ref 11.5–14.5)
ERYTHROCYTE [SEDIMENTATION RATE] IN BLOOD BY WESTERGREN METHOD: 16 MM/H
EST. GFR  (AFRICAN AMERICAN): 42.1 ML/MIN/1.73 M^2
EST. GFR  (AFRICAN AMERICAN): 50.1 ML/MIN/1.73 M^2
EST. GFR  (NON AFRICAN AMERICAN): 36.6 ML/MIN/1.73 M^2
EST. GFR  (NON AFRICAN AMERICAN): 43.5 ML/MIN/1.73 M^2
FIO2: 50
GLUCOSE SERPL-MCNC: 118 MG/DL (ref 70–110)
GLUCOSE SERPL-MCNC: 145 MG/DL (ref 70–110)
HCO3 UR-SCNC: 36 MMOL/L (ref 24–28)
HCT VFR BLD AUTO: 34.2 % (ref 37–48.5)
HGB BLD-MCNC: 10.9 G/DL (ref 12–16)
HYPOCHROMIA BLD QL SMEAR: ABNORMAL
IMM GRANULOCYTES # BLD AUTO: ABNORMAL K/UL (ref 0–0.04)
IMM GRANULOCYTES NFR BLD AUTO: ABNORMAL % (ref 0–0.5)
LYMPHOCYTES # BLD AUTO: ABNORMAL K/UL (ref 1–4.8)
LYMPHOCYTES NFR BLD: 2 % (ref 18–48)
MAGNESIUM SERPL-MCNC: 1.9 MG/DL (ref 1.6–2.6)
MAGNESIUM SERPL-MCNC: 2.4 MG/DL (ref 1.6–2.6)
MCH RBC QN AUTO: 30.3 PG (ref 27–31)
MCHC RBC AUTO-ENTMCNC: 31.9 G/DL (ref 32–36)
MCV RBC AUTO: 95 FL (ref 82–98)
METAMYELOCYTES NFR BLD MANUAL: 4 %
MODE: ABNORMAL
MONOCYTES # BLD AUTO: ABNORMAL K/UL (ref 0.3–1)
MONOCYTES NFR BLD: 4 % (ref 4–15)
MYELOCYTES NFR BLD MANUAL: 1 %
NEUTROPHILS NFR BLD: 84 % (ref 38–73)
NEUTS BAND NFR BLD MANUAL: 1 %
NRBC BLD-RTO: 0 /100 WBC
PCO2 BLDA: 51.5 MMHG (ref 35–45)
PEEP: 5
PH SMN: 7.45 [PH] (ref 7.35–7.45)
PHOSPHATE SERPL-MCNC: 2.3 MG/DL (ref 2.7–4.5)
PLATELET # BLD AUTO: 468 K/UL (ref 150–350)
PLATELET BLD QL SMEAR: ABNORMAL
PMV BLD AUTO: 11 FL (ref 9.2–12.9)
PO2 BLDA: 103 MMHG (ref 80–100)
POC BE: 12 MMOL/L
POC SATURATED O2: 98 % (ref 95–100)
POC TCO2: 38 MMOL/L (ref 23–27)
POCT GLUCOSE: 105 MG/DL (ref 70–110)
POCT GLUCOSE: 85 MG/DL (ref 70–110)
POCT GLUCOSE: 88 MG/DL (ref 70–110)
POCT GLUCOSE: 98 MG/DL (ref 70–110)
POIKILOCYTOSIS BLD QL SMEAR: SLIGHT
POTASSIUM SERPL-SCNC: 4.1 MMOL/L (ref 3.5–5.1)
POTASSIUM SERPL-SCNC: 4.3 MMOL/L (ref 3.5–5.1)
PROCALCITONIN SERPL IA-MCNC: 0.54 NG/ML
PROT SERPL-MCNC: 5.9 G/DL (ref 6–8.4)
RBC # BLD AUTO: 3.6 M/UL (ref 4–5.4)
SAMPLE: ABNORMAL
SITE: ABNORMAL
SODIUM SERPL-SCNC: 140 MMOL/L (ref 136–145)
SODIUM SERPL-SCNC: 140 MMOL/L (ref 136–145)
TROPONIN I SERPL DL<=0.01 NG/ML-MCNC: 0.08 NG/ML (ref 0–0.03)
VT: 420
WBC # BLD AUTO: 16.25 K/UL (ref 3.9–12.7)

## 2020-09-07 PROCEDURE — 37799 UNLISTED PX VASCULAR SURGERY: CPT

## 2020-09-07 PROCEDURE — 85007 BL SMEAR W/DIFF WBC COUNT: CPT

## 2020-09-07 PROCEDURE — 85027 COMPLETE CBC AUTOMATED: CPT

## 2020-09-07 PROCEDURE — 83735 ASSAY OF MAGNESIUM: CPT | Mod: 91

## 2020-09-07 PROCEDURE — 25000003 PHARM REV CODE 250: Performed by: NURSE PRACTITIONER

## 2020-09-07 PROCEDURE — 93010 ELECTROCARDIOGRAM REPORT: CPT | Mod: ,,, | Performed by: INTERNAL MEDICINE

## 2020-09-07 PROCEDURE — 93010 EKG 12-LEAD: ICD-10-PCS | Mod: 76,,, | Performed by: INTERNAL MEDICINE

## 2020-09-07 PROCEDURE — 80053 COMPREHEN METABOLIC PANEL: CPT

## 2020-09-07 PROCEDURE — 63600175 PHARM REV CODE 636 W HCPCS: Performed by: NURSE PRACTITIONER

## 2020-09-07 PROCEDURE — 84145 PROCALCITONIN (PCT): CPT

## 2020-09-07 PROCEDURE — 27200966 HC CLOSED SUCTION SYSTEM

## 2020-09-07 PROCEDURE — 99291 PR CRITICAL CARE, E/M 30-74 MINUTES: ICD-10-PCS | Mod: ,,, | Performed by: PSYCHIATRY & NEUROLOGY

## 2020-09-07 PROCEDURE — 94640 AIRWAY INHALATION TREATMENT: CPT

## 2020-09-07 PROCEDURE — 27000221 HC OXYGEN, UP TO 24 HOURS

## 2020-09-07 PROCEDURE — 25000003 PHARM REV CODE 250: Performed by: PHYSICIAN ASSISTANT

## 2020-09-07 PROCEDURE — C9113 INJ PANTOPRAZOLE SODIUM, VIA: HCPCS | Performed by: PHYSICIAN ASSISTANT

## 2020-09-07 PROCEDURE — 93010 ELECTROCARDIOGRAM REPORT: CPT | Mod: 76,,, | Performed by: INTERNAL MEDICINE

## 2020-09-07 PROCEDURE — 83735 ASSAY OF MAGNESIUM: CPT

## 2020-09-07 PROCEDURE — 84484 ASSAY OF TROPONIN QUANT: CPT

## 2020-09-07 PROCEDURE — 84100 ASSAY OF PHOSPHORUS: CPT

## 2020-09-07 PROCEDURE — 25000003 PHARM REV CODE 250: Performed by: STUDENT IN AN ORGANIZED HEALTH CARE EDUCATION/TRAINING PROGRAM

## 2020-09-07 PROCEDURE — 94003 VENT MGMT INPAT SUBQ DAY: CPT

## 2020-09-07 PROCEDURE — 93005 ELECTROCARDIOGRAM TRACING: CPT

## 2020-09-07 PROCEDURE — 20000000 HC ICU ROOM

## 2020-09-07 PROCEDURE — 63600175 PHARM REV CODE 636 W HCPCS: Performed by: PHYSICIAN ASSISTANT

## 2020-09-07 PROCEDURE — 80048 BASIC METABOLIC PNL TOTAL CA: CPT

## 2020-09-07 PROCEDURE — 99233 SBSQ HOSP IP/OBS HIGH 50: CPT | Mod: ,,, | Performed by: PSYCHIATRY & NEUROLOGY

## 2020-09-07 PROCEDURE — 99291 CRITICAL CARE FIRST HOUR: CPT | Mod: ,,, | Performed by: PSYCHIATRY & NEUROLOGY

## 2020-09-07 PROCEDURE — 99233 PR SUBSEQUENT HOSPITAL CARE,LEVL III: ICD-10-PCS | Mod: ,,, | Performed by: PSYCHIATRY & NEUROLOGY

## 2020-09-07 PROCEDURE — 94761 N-INVAS EAR/PLS OXIMETRY MLT: CPT

## 2020-09-07 PROCEDURE — 99900026 HC AIRWAY MAINTENANCE (STAT)

## 2020-09-07 PROCEDURE — 85730 THROMBOPLASTIN TIME PARTIAL: CPT

## 2020-09-07 PROCEDURE — 82803 BLOOD GASES ANY COMBINATION: CPT

## 2020-09-07 PROCEDURE — 99900035 HC TECH TIME PER 15 MIN (STAT)

## 2020-09-07 PROCEDURE — 25000242 PHARM REV CODE 250 ALT 637 W/ HCPCS: Performed by: NURSE PRACTITIONER

## 2020-09-07 PROCEDURE — 94668 MNPJ CHEST WALL SBSQ: CPT

## 2020-09-07 RX ORDER — FUROSEMIDE 40 MG/1
40 TABLET ORAL 2 TIMES DAILY
Status: DISCONTINUED | OUTPATIENT
Start: 2020-09-08 | End: 2020-09-08

## 2020-09-07 RX ORDER — PROPOFOL 10 MG/ML
5 INJECTION, EMULSION INTRAVENOUS CONTINUOUS
Status: DISCONTINUED | OUTPATIENT
Start: 2020-09-07 | End: 2020-09-08

## 2020-09-07 RX ORDER — DEXMEDETOMIDINE HYDROCHLORIDE 4 UG/ML
0.2 INJECTION, SOLUTION INTRAVENOUS CONTINUOUS
Status: DISCONTINUED | OUTPATIENT
Start: 2020-09-07 | End: 2020-09-12

## 2020-09-07 RX ORDER — QUETIAPINE FUMARATE 25 MG/1
50 TABLET, FILM COATED ORAL 2 TIMES DAILY
Status: DISCONTINUED | OUTPATIENT
Start: 2020-09-07 | End: 2020-09-15

## 2020-09-07 RX ORDER — FUROSEMIDE 40 MG/1
40 TABLET ORAL 2 TIMES DAILY
Status: DISCONTINUED | OUTPATIENT
Start: 2020-09-08 | End: 2020-09-07

## 2020-09-07 RX ADMIN — PANTOPRAZOLE SODIUM 40 MG: 40 INJECTION, POWDER, LYOPHILIZED, FOR SOLUTION INTRAVENOUS at 09:09

## 2020-09-07 RX ADMIN — DILTIAZEM HYDROCHLORIDE 90 MG: 60 TABLET, FILM COATED ORAL at 05:09

## 2020-09-07 RX ADMIN — ACETAZOLAMIDE SODIUM 500 MG: 500 INJECTION, POWDER, LYOPHILIZED, FOR SOLUTION INTRAVENOUS at 02:09

## 2020-09-07 RX ADMIN — IPRATROPIUM BROMIDE AND ALBUTEROL SULFATE 3 ML: .5; 2.5 SOLUTION RESPIRATORY (INHALATION) at 08:09

## 2020-09-07 RX ADMIN — FENTANYL CITRATE 50 MCG: 50 INJECTION INTRAMUSCULAR; INTRAVENOUS at 05:09

## 2020-09-07 RX ADMIN — DOCUSATE SODIUM 50MG AND SENNOSIDES 8.6MG 1 TABLET: 8.6; 5 TABLET, FILM COATED ORAL at 09:09

## 2020-09-07 RX ADMIN — IPRATROPIUM BROMIDE AND ALBUTEROL SULFATE 3 ML: .5; 2.5 SOLUTION RESPIRATORY (INHALATION) at 01:09

## 2020-09-07 RX ADMIN — PROPOFOL 10 MCG/KG/MIN: 10 INJECTION, EMULSION INTRAVENOUS at 09:09

## 2020-09-07 RX ADMIN — DEXMEDETOMIDINE HYDROCHLORIDE 0.2 MCG/KG/HR: 4 INJECTION, SOLUTION INTRAVENOUS at 10:09

## 2020-09-07 RX ADMIN — CARVEDILOL 3.12 MG: 3.12 TABLET, FILM COATED ORAL at 09:09

## 2020-09-07 RX ADMIN — VALPROIC ACID 250 MG: 250 SOLUTION ORAL at 02:09

## 2020-09-07 RX ADMIN — ACETAMINOPHEN 650 MG: 325 TABLET ORAL at 11:09

## 2020-09-07 RX ADMIN — FENTANYL CITRATE 50 MCG: 50 INJECTION INTRAMUSCULAR; INTRAVENOUS at 07:09

## 2020-09-07 RX ADMIN — POTASSIUM & SODIUM PHOSPHATES POWDER PACK 280-160-250 MG 2 PACKET: 280-160-250 PACK at 04:09

## 2020-09-07 RX ADMIN — POTASSIUM & SODIUM PHOSPHATES POWDER PACK 280-160-250 MG 2 PACKET: 280-160-250 PACK at 12:09

## 2020-09-07 RX ADMIN — FENTANYL CITRATE 50 MCG: 50 INJECTION INTRAMUSCULAR; INTRAVENOUS at 02:09

## 2020-09-07 RX ADMIN — ATORVASTATIN CALCIUM 40 MG: 20 TABLET, FILM COATED ORAL at 09:09

## 2020-09-07 RX ADMIN — DILTIAZEM HYDROCHLORIDE 90 MG: 60 TABLET, FILM COATED ORAL at 02:09

## 2020-09-07 RX ADMIN — FUROSEMIDE 40 MG: 40 TABLET ORAL at 09:09

## 2020-09-07 RX ADMIN — VALPROIC ACID 250 MG: 250 SOLUTION ORAL at 05:09

## 2020-09-07 RX ADMIN — IPRATROPIUM BROMIDE AND ALBUTEROL SULFATE 3 ML: .5; 2.5 SOLUTION RESPIRATORY (INHALATION) at 07:09

## 2020-09-07 RX ADMIN — DILTIAZEM HYDROCHLORIDE 90 MG: 60 TABLET, FILM COATED ORAL at 09:09

## 2020-09-07 RX ADMIN — VALPROIC ACID 250 MG: 250 SOLUTION ORAL at 09:09

## 2020-09-07 RX ADMIN — QUETIAPINE FUMARATE 50 MG: 25 TABLET ORAL at 09:09

## 2020-09-07 RX ADMIN — HEPARIN SODIUM AND DEXTROSE 10 UNITS/KG/HR: 10000; 5 INJECTION INTRAVENOUS at 06:09

## 2020-09-07 NOTE — PLAN OF CARE
On hep gtt  Rate controlled  Wean propofol  EKG for prolonged QTc  Hold seroquel depending on EKG  precedex  Wean FiO2  Single dose of acetazolamide for met alkalosis  Wean rectal tube as quickly as possible  Plan to pull barr tomorrow with new diuretic on board today  procalcitonin  Trach/PEG discussion today

## 2020-09-07 NOTE — PLAN OF CARE
[facility] Care Plan    POC reviewed with Donita Gonzalez at 0400. Pt unable to verbalized understanding due to cognition.No acute events overnight. Pt progressing toward goals. Will continue to monitor. See below and flowsheets for full assessment and VS info.       Neuro:  Manuel Coma Scale  Best Eye Response: 2-->(E2) to pain  Best Motor Response: 5-->(M5) localizes pain  Best Verbal Response: 1-->(V1) none  Manuel Coma Scale Score: 8  Assessment Qualifiers: patient intubated, patient chemically sedated or paralyzed  Pupil PERRLA: no     24hr Temp:  [97.5 °F (36.4 °C)-99.7 °F (37.6 °C)]     CV:   Rhythm: atrial rhythm  BP goals:   SBP < 160  MAP > 65    Resp:   O2 Device (Oxygen Therapy): ventilator  Vent Mode: A/C  Set Rate: 16 BPM  Oxygen Concentration (%): 50  Vt Set: 420 mL  PEEP/CPAP: 5 cmH20  Pressure Support: 12 cmH20    Plan:  wean and extubate pt; manage and prevent complications    GI/:  ROXANE Total Score: 1  Diet/Nutrition Received: NPO, tube feeding  Last Bowel Movement: 09/07/20  Voiding Characteristics: urethral catheter (bladder)    Intake/Output Summary (Last 24 hours) at 9/7/2020 0633  Last data filed at 9/7/2020 0605  Gross per 24 hour   Intake 2399.8 ml   Output 1280 ml   Net 1119.8 ml     Unmeasured Output  Stool Occurrence: 1  Emesis Occurrence: 0  Pad Count: 2    Labs/Accuchecks:  Recent Labs   Lab 09/07/20 0059   WBC 16.25*   RBC 3.60*   HGB 10.9*   HCT 34.2*   *      Recent Labs   Lab 09/07/20  0059      K 4.3   CO2 33*   CL 99   BUN 39*   CREATININE 1.5*   ALKPHOS 72   ALT 32   AST 45*   BILITOT 0.3      Recent Labs   Lab 09/01/20  0904  09/07/20  0059   INR 1.0  --   --    APTT 23.3   < > 43.4*    < > = values in this interval not displayed.    No results for input(s): CPK, CPKMB, TROPONINI, MB in the last 168 hours.    Electrolytes: N/A - electrolytes WDL  Accuchecks: Q6H    Gtts:   dextrose 10 % in water (D10W)      heparin (porcine) in D5W 9.966 Units/kg/hr (09/07/20 0605)     propofoL Stopped (09/06/20 2120)       LDA/Wounds:  Lines/Drains/Airways       Drain                   NG/OG Tube 08/29/20 2215 orogastric Left mouth 8 days         Urethral Catheter 08/29/20 2215 8 days         Rectal Tube 09/05/20 1852 rectal tube w/ balloon (indicate number of mLs) 1 day              Airway                   Airway - Non-Surgical 08/26/20 0931 Endotracheal Tube 11 days              Arterial Line              Arterial Line 09/05/20 1345 Left Radial 1 day              Peripheral Intravenous Line                   Peripheral IV - Single Lumen 09/04/20 1210 22 G Right Wrist 2 days         Peripheral IV - Single Lumen 09/04/20 1330 18 G Left Antecubital 2 days         Peripheral IV - Single Lumen 09/06/20 0300 20 G Right Antecubital 1 day         Peripheral IV - Single Lumen 09/07/20 0300 20 G Anterior;Left;Proximal Upper Arm less than 1 day                  Wounds: Yes  Wound care consulted: Yes

## 2020-09-07 NOTE — PROGRESS NOTES
[facility]  Neurocritical Care  Progress Note    Admit Date: 8/29/2020  Service Date: 09/07/2020  Length of Stay: 9    Subjective:     Chief Complaint: Embolic stroke involving right middle cerebral artery    History of Present Illness: 64 year old female with a PMH significant for COPD, HTN, and every day smoker who presented at OSH on 08/26/20 with acute left sided facial droop and left sided weakness (last known normal 08/25/20). Pt was not given TPA or endovascular intervention, and she was intubated on 08/26/20 for agitation. CTH on 08/26/20 showed right posterior frontal/parietal infarction with no intracranial hemorrhage. Hospital course complicated by Afib with RVR in which she was started on lovenox and then transitioned to heparin gtt for GOYO and HFrEF. Pt was transferred to NICU for higher level of care and persistent epistaxis. Upon arrival Pt was intubated and sedated, Rhinorocket  Bilaterally in place, propofol and diltiazem gtt. Pt was able to move the right side and moves her left side to noxious stimuli. A-line placed.       Hospital Course: 08/29/20: Pt admitted to NICU for higher level of care and ENT consult. Pt with a run of Vtach associated with hypotension.   8/31/2020: hold anticoagulation until tomorrow due to GI bleed/epistaxis (at other facility) GI consult, pt in Afib- load with digoxin, pending digoxin level tomorrow, continue diltiazem gtt today, start tube feeds, obtain PIV and d/c central line, continue cefepime for total 7 days  9/1/2020: initiated heparin gtt minimal intensity-when at goal obtain CT head, continue diltiazem gtt, start tube feeds, d/c A-line  9/2/2020: changed to PO diltiazem 60mg q8hr from gtt, lasix, miralax, plan for extubation  9/3/2020: started seroquel 25BID  9/4/2020: increased seroquel to 25 mg BID, magnesium citrate, and try SBT  9/5/2020: place A-line, increased Po dilt, add chest PT, add water flushes and repeat magnesium  citrate  9/6/2020: d/c digoxin, add coreg 6.25 BID, d/c water flushes, repeat CMP, add lasix PO 40 mg BID  09/07/2020 Plan to wean FiO2. As we are weaning propofol, precedex will be added. Patient is currently on heparin drip. Daily EKG for QTc monitoring. Patient given one dose of diamox with plans to restart furosemide for metabolic alkalosis. Will discontinue barr tomorrow. F/u with procalcitonin due to increased WBC count. Will speak to family member about Trach/PEG discussion        Interval History:   Plan to wean FiO2. As we are weaning propofol, precedex will be added. Patient is currently on heparin drip. Daily EKG for QTc monitoring. Patient given one dose of diamox with plans to restart furosemide for metabolic alkalosis. Will discontinue barr tomorrow. F/u with procalcitonin due to increased WBC count. Will speak to family member about Trach/PEG discussion     Review of Systems   Unable to perform ROS: Intubated       Objective:     Vitals:  Temp: 98.4 °F (36.9 °C)  Pulse: (!) 131  Rhythm: atrial rhythm  BP: 132/70  MAP (mmHg): 94  Resp: 18  SpO2: 98 %  Oxygen Concentration (%): 40  O2 Device (Oxygen Therapy): ventilator  Vent Mode: A/C  Set Rate: 16 BPM  Vt Set: 420 mL  PEEP/CPAP: 5 cmH20  Peak Airway Pressure: 23 cmH2O  Mean Airway Pressure: 8.5 cmH20  Plateau Pressure: 13 cmH20    Temp  Min: 97.9 °F (36.6 °C)  Max: 99.7 °F (37.6 °C)  Pulse  Min: 74  Max: 131  BP  Min: 102/54  Max: 181/88  MAP (mmHg)  Min: 73  Max: 102  Resp  Min: 14  Max: 41  SpO2  Min: 96 %  Max: 100 %  Oxygen Concentration (%)  Min: 40  Max: 50    09/06 0701 - 09/07 0700  In: 2400.1 [I.V.:280.1]  Out: 1280 [Urine:980]   Unmeasured Output  Stool Occurrence: 1  Emesis Occurrence: 0  Pad Count: 2       Physical Exam  Cardiovascular:      Pulses: Normal pulses.      Heart sounds: No murmur. No gallop.    Skin:     General: Skin is warm.   Neurological:      Comments: Patient intubated sedated   Cough, gag and corneal intact  Patient  not spontaneously moving extremities   Withdraws on the left extremities             Medications:  Continuousdexmedetomidine (PRECEDEX) infusion, Last Rate: Stopped (09/07/20 1330)  dextrose 10 % in water (D10W)  heparin (porcine) in D5W, Last Rate: 10 Units/kg/hr (09/07/20 1405)  propofoL, Last Rate: Stopped (09/07/20 1115)    Scheduledalbuterol-ipratropium, 3 mL, Q6H  atorvastatin, 40 mg, Daily  carvediloL, 3.125 mg, BID  diltiaZEM, 90 mg, Q8H  [START ON 9/8/2020] furosemide, 40 mg, BID  pantoprozole (PROTONIX) IV, 40 mg, Q12H  polyethylene glycol, 17 g, Daily  QUEtiapine, 50 mg, BID  senna-docusate 8.6-50 mg, 1 tablet, BID  valproic acid (as sodium salt), 250 mg, Q8H    PRNacetaminophen, 650 mg, Q6H PRN  dextrose 10 % in water (D10W), , Continuous PRN  dextrose 50%, 12.5 g, PRN  fentaNYL, 50 mcg, Q2H PRN  glucagon (human recombinant), 1 mg, PRN  hydrALAZINE, 10 mg, Q6H PRN  magnesium oxide, 800 mg, PRN  magnesium oxide, 800 mg, PRN  ondansetron, 4 mg, Q6H PRN  potassium chloride, 40 mEq, PRN  potassium chloride, 40 mEq, PRN  potassium chloride, 60 mEq, PRN  potassium, sodium phosphates, 2 packet, PRN  potassium, sodium phosphates, 2 packet, PRN  potassium, sodium phosphates, 2 packet, PRN      Today I personally reviewed pertinent medications, lines/drains/airways, imaging, cardiology results, laboratory results, microbiology results, notably:    Diet  Diet NPO  Diet NPO        Assessment/Plan:     Neuro  * Embolic stroke involving right middle cerebral artery  - Last seen normal on 08/25/20 with no intervention; intubated and sedated.    - Imaging shows large right ischemic stroke in the posterior frontal and parietal regions  - SBP < 160, MAP > 65  - EuNa   - CBC, CMP, coags daily   - heparin gtt low intensity  - repeat CTH stable  - daily statin  - Seroquel to 50 mg BID,started precedex to wean off of propofol drip     ENT  Epistaxis  - persistent epistaxis in the setting of anticoagulation   - H/H stable   -  daily CBC   - ENT consulted;   rhino rockets were removed     Pulmonary  Chronic obstructive pulmonary disease  - daily CXRs while intubated   - goal SpO2 is > 88%  - scheduled duo nebs q6h    Acute on chronic respiratory failure with hypoxia and hypercapnia  - Pt intubated and sedated   - Daily CXRs and abg   9/4/2020: SBT trial today and respiratory mechanics (NIF -11)  Vent Mode: A/C  Oxygen Concentration (%):  [40-50] 40  Resp Rate Total:  [16 br/min-17 br/min] 16 br/min  Vt Set:  [420 mL] 420 mL  PEEP/CPAP:  [5 cmH20] 5 cmH20  Mean Airway Pressure:  [8 cmH20-8.5 cmH20] 8.5 cmH20     Possible PEG and Trach discussion           Cardiac/Vascular  Atrial fibrillation with rapid ventricular response  - Repeat EKG   - daily Mg and Phos; replace as needed  - dilt PO  -dig level 1.8  - continue heparin gtt minimal intensity  -9/5/2020: increased PO diltiazem to 90 mg  9/6/20: d/c digoxin      Essential hypertension  - SBP goal < 160, MAP >65    Renal/  GOYO (acute kidney injury)  - BUN/Cr:39/1.5  -Avoid nephrotoxic drugs  -Monitor Daily I/O    Endocrine  Diabetes mellitus  -SSI moderate  -POCT glu monitoring    Moderate malnutrition  - tube feeds  - monitor electrolytes with daily CMP, Mg, phos    GI  GI bleed  - upper gi bleed at outside hospital  - recent upper GI bleed at outside facility and epistaxis (2 rhino rockets, up to 5 days prn)  - consulted GI -Continue PPI b.i.d. Trend hemoglobin, transfuse hemoglobin <7            The patient is being Prophylaxed for:  Venous Thromboembolism with: Chemical  Stress Ulcer with: PPI  Ventilator Pneumonia with: not applicable    Activity Orders          Diet NPO: NPO starting at 08/30 0250        Full Code    Finesse Lockwood MD  Neurocritical Care  [facility]

## 2020-09-07 NOTE — ASSESSMENT & PLAN NOTE
- Last seen normal on 08/25/20 with no intervention; intubated and sedated.    - Imaging shows large right ischemic stroke in the posterior frontal and parietal regions  - SBP < 160, MAP > 65  - EuNa   - CBC, CMP, coags daily   - heparin gtt low intensity  - repeat CTH stable  - daily statin  - Seroquel to 50 mg BID,started precedex to wean off of propofol drip

## 2020-09-07 NOTE — PLAN OF CARE
POC reviewed with pt and pts . Pts  verbalized understanding. Questions and concerns addressed. No acute events today. Pt progressing toward goals. Will continue to monitor. See flowsheets for full assessment and VS info.  EKG completed. Propofol off- transitioned to precedex

## 2020-09-07 NOTE — ASSESSMENT & PLAN NOTE
- Repeat EKG   - daily Mg and Phos; replace as needed  - dilt PO  -dig level 1.8  - continue heparin gtt minimal intensity  -9/5/2020: increased PO diltiazem to 90 mg  9/6/20: d/c digoxin

## 2020-09-07 NOTE — SUBJECTIVE & OBJECTIVE
Neurologic Chief Complaint: R MCA 2/2 Afib.     Subjective:     Interval History: Patient is seen for follow-up neurological assessment and treatment recommendations: R MCA 2/2 Afib on heparin, coreg, and diltiazem    HPI, Past Medical, Family, and Social History remains the same as documented in the initial encounter.     Review of Systems   Unable to perform ROS: Intubated   Constitutional: Negative for fever.   HENT: Negative for nosebleeds.         Tongue swelling   Respiratory: Negative for chest tightness.         Intubated   Cardiovascular: Negative for chest pain.   Gastrointestinal: Negative for anal bleeding and blood in stool.   Genitourinary: Negative for hematuria.   Psychiatric/Behavioral: Positive for agitation.        Precedex for agitation while on MV.      Scheduled Meds:   albuterol-ipratropium  3 mL Nebulization Q6H    atorvastatin  40 mg Per OG tube Daily    carvediloL  3.125 mg Per OG tube BID    diltiaZEM  90 mg Per OG tube Q8H    [START ON 9/8/2020] furosemide  40 mg Per OG tube BID    pantoprozole (PROTONIX) IV  40 mg Intravenous Q12H    polyethylene glycol  17 g Per NG tube Daily    QUEtiapine  50 mg Per OG tube BID    senna-docusate 8.6-50 mg  1 tablet Per OG tube BID    valproic acid (as sodium salt)  250 mg Per OG tube Q8H     Continuous Infusions:   dexmedetomidine (PRECEDEX) infusion Stopped (09/07/20 1330)    dextrose 10 % in water (D10W)      heparin (porcine) in D5W 10 Units/kg/hr (09/07/20 1505)    propofoL Stopped (09/07/20 1115)     PRN Meds:acetaminophen, dextrose 10 % in water (D10W), dextrose 50%, fentaNYL, glucagon (human recombinant), hydrALAZINE, magnesium oxide, magnesium oxide, ondansetron, potassium chloride, potassium chloride, potassium chloride, potassium, sodium phosphates, potassium, sodium phosphates, potassium, sodium phosphates    Objective:     Vital Signs (Most Recent):  Temp: 99 °F (37.2 °C) (09/07/20 1505)  Pulse: 89 (09/07/20 1529)  Resp: 16  (09/07/20 1505)  BP: (!) 101/52 (09/07/20 1505)  SpO2: 96 % (09/07/20 1529)  BP Location: Right arm    Vital Signs Range (Last 24H):  Temp:  [97.9 °F (36.6 °C)-99.7 °F (37.6 °C)]   Pulse:  []   Resp:  [14-41]   BP: (101-181)/(52-90)   SpO2:  [96 %-100 %]   Arterial Line BP: (100-172)/()   BP Location: Right arm    Physical Exam  Constitutional:       General: She is not in acute distress.     Appearance: Normal appearance. She is not ill-appearing.   HENT:      Head: Normocephalic.      Right Ear: External ear normal. There is no impacted cerumen.      Left Ear: External ear normal. There is no impacted cerumen.      Nose: No congestion or rhinorrhea.      Mouth/Throat:      Mouth: Mucous membranes are dry.   Eyes:      General: No scleral icterus.        Right eye: No discharge.         Left eye: No discharge.      Comments: Left pupil 3mm, R pupil 6 fixed    Cardiovascular:      Rate and Rhythm: Normal rate.      Heart sounds: No murmur.   Pulmonary:      Comments: intubated  Abdominal:      General: Abdomen is flat. There is no distension.      Palpations: Abdomen is soft.      Tenderness: There is no abdominal tenderness.   Musculoskeletal:         General: No swelling, tenderness or deformity.   Skin:     General: Skin is warm and dry.      Coloration: Skin is not pale.      Findings: No erythema.   Neurological:      Mental Status: She is alert.      Comments: Limited 2/2/ sedation with Precedex         Neurological Exam:   LOC: alert and obtunded  Motor: Arm left  Plegia 0/5  Leg left  Plegia 0/5  Arm right  Paresis: 3/5  Leg right Paresis: 4/5    Laboratory:  CMP:   Recent Labs   Lab 09/07/20  0059   CALCIUM 8.9   ALBUMIN 2.1*   PROT 5.9*      K 4.3   CO2 33*   CL 99   BUN 39*   CREATININE 1.5*   ALKPHOS 72   ALT 32   AST 45*   BILITOT 0.3     CBC:   Recent Labs   Lab 09/07/20  0059   WBC 16.25*   RBC 3.60*   HGB 10.9*   HCT 34.2*   *   MCV 95   MCH 30.3   MCHC 31.9*     Lipid Panel:  No results for input(s): CHOL, LDLCALC, HDL, TRIG in the last 168 hours.  Hgb A1C: No results for input(s): HGBA1C in the last 168 hours.  TSH: No results for input(s): TSH in the last 168 hours.    Diagnostic Results   MRI Brain 8/26/20  Ventricles and sulci are normal in size for age without evidence of hydrocephalus.  No extra-axial blood or fluid collections  Large area restricted diffusion right posterior frontal and parietal lobe consistent with right recent infarction.  No evidence of superimposed hemorrhage.  Patchy areas of increased T2/FLAIR signal of the supratentorial white matter consistent with chronic microvascular ischemic changes.  No evidence of mass.  Skull/extracranial contents (limited evaluation): Bone marrow signal intensity is normal.  Small amount of fluid in the right maxillary sinus.  Large area of restricted diffusion consistent with continued evolution of recent infarction involving the right posterior frontal and parietal lobes lobes.  No evidence of hemorrhage.  Chronic microvascular ischemic changes.     CT head 8/30/20  No extra-axial blood or fluid collections.  Continued evolution of areas infarction involving the right frontal and right parietal lobes.  No evidence of superimposed hemorrhage.  Associated edema with mild compression of the right lateral ventricle.  No hydrocephalus.  No evidence of mass.  Skull/extracranial contents (limited evaluation): No fracture. Mastoid air cells and paranasal sinuses are essentially clear.     Continued evolution of right frontal and parietal lobe infarctions with no evidence of hemorrhage.  Associated edema is noted with no midline shift.  Mild mass effect on the right lateral ventricle.  No hydrocephalus.     Cardiac Imaging   TTE 9/1/20  · Moderate concentric left ventricular hypertrophy.  · Moderately to severely decreased left ventricular systolic function. The estimated ejection fraction is 25-30%.  · Left ventricular diastolic  dysfunction.  · Moderately to severely reduced right ventricular systolic function.  · Moderate left atrial enlargement.  · Severe right atrial enlargement.  · Moderate mitral regurgitation.  · Moderate tricuspid regurgitation.  · Intermediate central venous pressure (8 mmHg).  · The estimated PA systolic pressure is 42 mmHg.  · Pulmonary hypertension present.  · Negative bubble study.

## 2020-09-07 NOTE — ASSESSMENT & PLAN NOTE
- persistent epistaxis in the setting of anticoagulation   - H/H stable   - daily CBC   - ENT consulted;   rhino rockets were removed

## 2020-09-07 NOTE — ASSESSMENT & PLAN NOTE
- Pt intubated and sedated   - Daily CXRs and abg   9/4/2020: SBT trial today and respiratory mechanics (NIF -11)  Vent Mode: A/C  Oxygen Concentration (%):  [40-50] 40  Resp Rate Total:  [16 br/min-17 br/min] 16 br/min  Vt Set:  [420 mL] 420 mL  PEEP/CPAP:  [5 cmH20] 5 cmH20  Mean Airway Pressure:  [8 cmH20-8.5 cmH20] 8.5 cmH20     Possible PEG and Trach discussion

## 2020-09-07 NOTE — ASSESSMENT & PLAN NOTE
Donita Gonzalez is a 64 y.o. female with a significant medical history of COPD, HTN, smoker who presents to the hospital as a transfer from [facility] for further evaluation of R MCA stroke.  The patient did not receive tPA due to LKN 8/25/2020 and presenting to the OSH on 8/26/2020.      Antithrombotics for secondary stroke prevention: Anticoagulants: Patient was on full dose Lovenox that was dc'd 2/2 uncontrolled epistaxis. Heparin infusion started 9/1/20 for A/C.  Repeat CTH 9/2 normal, continue heparin drip    Statins for secondary stroke prevention and hyperlipidemia, if present:   Statins: Atorvastatin- 40 mg daily    Aggressive risk factor modification: HTN, Smoking, HLD, A-Fib  (Coreg, dilt for AFib and HTN)     Rehab efforts: The patient has been evaluated by a stroke team provider and the therapy needs have been fully considered based off the presenting complaints and exam findings. The following therapy evaluations are needed: PT/OT/SLP evaluations when the patient is able to participate    Diagnostics ordered/pending: CT scan of head without contrast to asses brain parenchyma  No changes 9/2/20 while on heparin drip therapeutic    VTE prophylaxis: Mechanical prophylaxis: Place SCDs and now heparin infusion     BP parameters: Infarct: No intervention, SBP <220

## 2020-09-07 NOTE — SUBJECTIVE & OBJECTIVE
Interval History:   Plan to wean FiO2. As we are weaning propofol, precedex will be added. Patient is currently on heparin drip. Daily EKG for QTc monitoring. Patient given one dose of diamox with plans to restart furosemide for metabolic alkalosis. Will discontinue barr tomorrow. F/u with procalcitonin due to increased WBC count. Will speak to family member about Trach/PEG discussion     Review of Systems   Unable to perform ROS: Intubated       Objective:     Vitals:  Temp: 98.4 °F (36.9 °C)  Pulse: (!) 131  Rhythm: atrial rhythm  BP: 132/70  MAP (mmHg): 94  Resp: 18  SpO2: 98 %  Oxygen Concentration (%): 40  O2 Device (Oxygen Therapy): ventilator  Vent Mode: A/C  Set Rate: 16 BPM  Vt Set: 420 mL  PEEP/CPAP: 5 cmH20  Peak Airway Pressure: 23 cmH2O  Mean Airway Pressure: 8.5 cmH20  Plateau Pressure: 13 cmH20    Temp  Min: 97.9 °F (36.6 °C)  Max: 99.7 °F (37.6 °C)  Pulse  Min: 74  Max: 131  BP  Min: 102/54  Max: 181/88  MAP (mmHg)  Min: 73  Max: 102  Resp  Min: 14  Max: 41  SpO2  Min: 96 %  Max: 100 %  Oxygen Concentration (%)  Min: 40  Max: 50    09/06 0701 - 09/07 0700  In: 2400.1 [I.V.:280.1]  Out: 1280 [Urine:980]   Unmeasured Output  Stool Occurrence: 1  Emesis Occurrence: 0  Pad Count: 2       Physical Exam  Cardiovascular:      Pulses: Normal pulses.      Heart sounds: No murmur. No gallop.    Skin:     General: Skin is warm.   Neurological:      Comments: Patient intubated sedated   Cough, gag and corneal intact  Patient not spontaneously moving extremities   Withdraws on the left extremities             Medications:  Continuousdexmedetomidine (PRECEDEX) infusion, Last Rate: Stopped (09/07/20 1330)  dextrose 10 % in water (D10W)  heparin (porcine) in D5W, Last Rate: 10 Units/kg/hr (09/07/20 1405)  propofoL, Last Rate: Stopped (09/07/20 1115)    Scheduledalbuterol-ipratropium, 3 mL, Q6H  atorvastatin, 40 mg, Daily  carvediloL, 3.125 mg, BID  diltiaZEM, 90 mg, Q8H  [START ON 9/8/2020] furosemide, 40 mg,  BID  pantoprozole (PROTONIX) IV, 40 mg, Q12H  polyethylene glycol, 17 g, Daily  QUEtiapine, 50 mg, BID  senna-docusate 8.6-50 mg, 1 tablet, BID  valproic acid (as sodium salt), 250 mg, Q8H    PRNacetaminophen, 650 mg, Q6H PRN  dextrose 10 % in water (D10W), , Continuous PRN  dextrose 50%, 12.5 g, PRN  fentaNYL, 50 mcg, Q2H PRN  glucagon (human recombinant), 1 mg, PRN  hydrALAZINE, 10 mg, Q6H PRN  magnesium oxide, 800 mg, PRN  magnesium oxide, 800 mg, PRN  ondansetron, 4 mg, Q6H PRN  potassium chloride, 40 mEq, PRN  potassium chloride, 40 mEq, PRN  potassium chloride, 60 mEq, PRN  potassium, sodium phosphates, 2 packet, PRN  potassium, sodium phosphates, 2 packet, PRN  potassium, sodium phosphates, 2 packet, PRN      Today I personally reviewed pertinent medications, lines/drains/airways, imaging, cardiology results, laboratory results, microbiology results, notably:    Diet  Diet NPO  Diet NPO

## 2020-09-07 NOTE — ASSESSMENT & PLAN NOTE
- upper gi bleed at outside hospital  - recent upper GI bleed at outside facility and epistaxis (2 rhino rockets, up to 5 days prn)  - consulted GI -Continue PPI b.i.d. Trend hemoglobin, transfuse hemoglobin <7

## 2020-09-07 NOTE — PROGRESS NOTES
This patient's chart was reviewed by a stroke team provider.  Patient is intubated, sedated and with non-neurologic issues of greater urgency. Primary team continuing efforts toward HR control.     There is no new imaging to review.  Pending diagnostics to follow up on include: None.      There are no new recommendations at this time; for other recommendations please see our previous note completed on 9/4/20.   Will continue to follow. Discussed patient with staff. Please contact stroke team for any questions or concerns.        Agnes Retana PA-C  [facility] - [id number]  Department of Vascular Neurology   [facility]

## 2020-09-08 LAB
ALBUMIN SERPL BCP-MCNC: 2.1 G/DL (ref 3.5–5.2)
ALLENS TEST: ABNORMAL
ALP SERPL-CCNC: 75 U/L (ref 55–135)
ALT SERPL W/O P-5'-P-CCNC: 35 U/L (ref 10–44)
ANION GAP SERPL CALC-SCNC: 10 MMOL/L (ref 8–16)
ANISOCYTOSIS BLD QL SMEAR: SLIGHT
APTT BLDCRRT: 29.2 SEC (ref 21–32)
APTT BLDCRRT: 43.2 SEC (ref 21–32)
APTT BLDCRRT: 45.8 SEC (ref 21–32)
AST SERPL-CCNC: 58 U/L (ref 10–40)
BACTERIA #/AREA URNS AUTO: ABNORMAL /HPF
BASOPHILS # BLD AUTO: ABNORMAL K/UL (ref 0–0.2)
BASOPHILS NFR BLD: 0 % (ref 0–1.9)
BILIRUB SERPL-MCNC: 0.3 MG/DL (ref 0.1–1)
BILIRUB UR QL STRIP: NEGATIVE
BUN SERPL-MCNC: 36 MG/DL (ref 8–23)
CALCIUM SERPL-MCNC: 8.7 MG/DL (ref 8.7–10.5)
CHLORIDE SERPL-SCNC: 99 MMOL/L (ref 95–110)
CLARITY UR REFRACT.AUTO: CLEAR
CO2 SERPL-SCNC: 33 MMOL/L (ref 23–29)
COLOR UR AUTO: YELLOW
CREAT SERPL-MCNC: 1.3 MG/DL (ref 0.5–1.4)
DELSYS: ABNORMAL
DIFFERENTIAL METHOD: ABNORMAL
DOHLE BOD BLD QL SMEAR: PRESENT
EOSINOPHIL # BLD AUTO: ABNORMAL K/UL (ref 0–0.5)
EOSINOPHIL NFR BLD: 0 % (ref 0–8)
ERYTHROCYTE [DISTWIDTH] IN BLOOD BY AUTOMATED COUNT: 14.5 % (ref 11.5–14.5)
ERYTHROCYTE [SEDIMENTATION RATE] IN BLOOD BY WESTERGREN METHOD: 16 MM/H
EST. GFR  (AFRICAN AMERICAN): 50.1 ML/MIN/1.73 M^2
EST. GFR  (NON AFRICAN AMERICAN): 43.5 ML/MIN/1.73 M^2
FIO2: 40
GIANT PLATELETS BLD QL SMEAR: PRESENT
GLUCOSE SERPL-MCNC: 108 MG/DL (ref 70–110)
GLUCOSE UR QL STRIP: NEGATIVE
HCO3 UR-SCNC: 34.3 MMOL/L (ref 24–28)
HCT VFR BLD AUTO: 36.2 % (ref 37–48.5)
HGB BLD-MCNC: 11.3 G/DL (ref 12–16)
HGB UR QL STRIP: NEGATIVE
HYALINE CASTS UR QL AUTO: 0 /LPF
HYPOCHROMIA BLD QL SMEAR: ABNORMAL
IMM GRANULOCYTES # BLD AUTO: ABNORMAL K/UL (ref 0–0.04)
IMM GRANULOCYTES NFR BLD AUTO: ABNORMAL % (ref 0–0.5)
KETONES UR QL STRIP: NEGATIVE
LACTATE SERPL-SCNC: 0.7 MMOL/L (ref 0.5–2.2)
LEUKOCYTE ESTERASE UR QL STRIP: ABNORMAL
LYMPHOCYTES # BLD AUTO: ABNORMAL K/UL (ref 1–4.8)
LYMPHOCYTES NFR BLD: 3 % (ref 18–48)
MAGNESIUM SERPL-MCNC: 1.9 MG/DL (ref 1.6–2.6)
MCH RBC QN AUTO: 29.7 PG (ref 27–31)
MCHC RBC AUTO-ENTMCNC: 31.2 G/DL (ref 32–36)
MCV RBC AUTO: 95 FL (ref 82–98)
METAMYELOCYTES NFR BLD MANUAL: 1 %
MICROSCOPIC COMMENT: ABNORMAL
MIN VOL: 7.96
MODE: ABNORMAL
MONOCYTES # BLD AUTO: ABNORMAL K/UL (ref 0.3–1)
MONOCYTES NFR BLD: 6 % (ref 4–15)
MYELOCYTES NFR BLD MANUAL: 1 %
NEUTROPHILS NFR BLD: 84 % (ref 38–73)
NEUTS BAND NFR BLD MANUAL: 5 %
NITRITE UR QL STRIP: NEGATIVE
NRBC BLD-RTO: 0 /100 WBC
PCO2 BLDA: 49.6 MMHG (ref 35–45)
PH SMN: 7.45 [PH] (ref 7.35–7.45)
PH UR STRIP: 8 [PH] (ref 5–8)
PHOSPHATE SERPL-MCNC: 4.3 MG/DL (ref 2.7–4.5)
PIP: 22
PLATELET # BLD AUTO: 499 K/UL (ref 150–350)
PLATELET BLD QL SMEAR: ABNORMAL
PMV BLD AUTO: 12 FL (ref 9.2–12.9)
PO2 BLDA: 67 MMHG (ref 80–100)
POC BE: 10 MMOL/L
POC SATURATED O2: 93 % (ref 95–100)
POC TCO2: 36 MMOL/L (ref 23–27)
POCT GLUCOSE: 106 MG/DL (ref 70–110)
POCT GLUCOSE: 110 MG/DL (ref 70–110)
POCT GLUCOSE: 114 MG/DL (ref 70–110)
POCT GLUCOSE: 163 MG/DL (ref 70–110)
POIKILOCYTOSIS BLD QL SMEAR: SLIGHT
POLYCHROMASIA BLD QL SMEAR: ABNORMAL
POTASSIUM SERPL-SCNC: 4.4 MMOL/L (ref 3.5–5.1)
PROT SERPL-MCNC: 6.3 G/DL (ref 6–8.4)
PROT UR QL STRIP: ABNORMAL
RBC # BLD AUTO: 3.8 M/UL (ref 4–5.4)
RBC #/AREA URNS AUTO: 3 /HPF (ref 0–4)
SAMPLE: ABNORMAL
SITE: ABNORMAL
SODIUM SERPL-SCNC: 142 MMOL/L (ref 136–145)
SP GR UR STRIP: 1.01 (ref 1–1.03)
SP02: 100
SQUAMOUS #/AREA URNS AUTO: 1 /HPF
TOXIC GRANULES BLD QL SMEAR: PRESENT
URN SPEC COLLECT METH UR: ABNORMAL
VT: 420
WBC # BLD AUTO: 19.5 K/UL (ref 3.9–12.7)
WBC #/AREA URNS AUTO: 17 /HPF (ref 0–5)

## 2020-09-08 PROCEDURE — C9113 INJ PANTOPRAZOLE SODIUM, VIA: HCPCS | Performed by: PHYSICIAN ASSISTANT

## 2020-09-08 PROCEDURE — 63600175 PHARM REV CODE 636 W HCPCS: Performed by: NURSE PRACTITIONER

## 2020-09-08 PROCEDURE — 99900035 HC TECH TIME PER 15 MIN (STAT)

## 2020-09-08 PROCEDURE — 85730 THROMBOPLASTIN TIME PARTIAL: CPT | Mod: 91

## 2020-09-08 PROCEDURE — 87070 CULTURE OTHR SPECIMN AEROBIC: CPT

## 2020-09-08 PROCEDURE — 94640 AIRWAY INHALATION TREATMENT: CPT

## 2020-09-08 PROCEDURE — 20000000 HC ICU ROOM

## 2020-09-08 PROCEDURE — 83605 ASSAY OF LACTIC ACID: CPT

## 2020-09-08 PROCEDURE — 94003 VENT MGMT INPAT SUBQ DAY: CPT

## 2020-09-08 PROCEDURE — 84100 ASSAY OF PHOSPHORUS: CPT

## 2020-09-08 PROCEDURE — 80053 COMPREHEN METABOLIC PANEL: CPT

## 2020-09-08 PROCEDURE — 99900026 HC AIRWAY MAINTENANCE (STAT)

## 2020-09-08 PROCEDURE — 93010 ELECTROCARDIOGRAM REPORT: CPT | Mod: ,,, | Performed by: INTERNAL MEDICINE

## 2020-09-08 PROCEDURE — 63600175 PHARM REV CODE 636 W HCPCS: Performed by: PHYSICIAN ASSISTANT

## 2020-09-08 PROCEDURE — 83735 ASSAY OF MAGNESIUM: CPT

## 2020-09-08 PROCEDURE — 25000003 PHARM REV CODE 250: Performed by: PSYCHIATRY & NEUROLOGY

## 2020-09-08 PROCEDURE — 27000221 HC OXYGEN, UP TO 24 HOURS

## 2020-09-08 PROCEDURE — 93010 EKG 12-LEAD: ICD-10-PCS | Mod: ,,, | Performed by: INTERNAL MEDICINE

## 2020-09-08 PROCEDURE — 85007 BL SMEAR W/DIFF WBC COUNT: CPT

## 2020-09-08 PROCEDURE — 93005 ELECTROCARDIOGRAM TRACING: CPT

## 2020-09-08 PROCEDURE — 63600175 PHARM REV CODE 636 W HCPCS: Performed by: PSYCHIATRY & NEUROLOGY

## 2020-09-08 PROCEDURE — 25000003 PHARM REV CODE 250: Performed by: STUDENT IN AN ORGANIZED HEALTH CARE EDUCATION/TRAINING PROGRAM

## 2020-09-08 PROCEDURE — 94668 MNPJ CHEST WALL SBSQ: CPT

## 2020-09-08 PROCEDURE — 99291 PR CRITICAL CARE, E/M 30-74 MINUTES: ICD-10-PCS | Mod: ,,, | Performed by: PSYCHIATRY & NEUROLOGY

## 2020-09-08 PROCEDURE — 85730 THROMBOPLASTIN TIME PARTIAL: CPT

## 2020-09-08 PROCEDURE — 87040 BLOOD CULTURE FOR BACTERIA: CPT | Mod: 59

## 2020-09-08 PROCEDURE — 87186 SC STD MICRODIL/AGAR DIL: CPT

## 2020-09-08 PROCEDURE — 25000242 PHARM REV CODE 250 ALT 637 W/ HCPCS: Performed by: NURSE PRACTITIONER

## 2020-09-08 PROCEDURE — 27200966 HC CLOSED SUCTION SYSTEM

## 2020-09-08 PROCEDURE — 99233 PR SUBSEQUENT HOSPITAL CARE,LEVL III: ICD-10-PCS | Mod: ,,, | Performed by: PSYCHIATRY & NEUROLOGY

## 2020-09-08 PROCEDURE — 25000003 PHARM REV CODE 250: Performed by: NURSE PRACTITIONER

## 2020-09-08 PROCEDURE — 94761 N-INVAS EAR/PLS OXIMETRY MLT: CPT

## 2020-09-08 PROCEDURE — 99291 CRITICAL CARE FIRST HOUR: CPT | Mod: ,,, | Performed by: PSYCHIATRY & NEUROLOGY

## 2020-09-08 PROCEDURE — 81001 URINALYSIS AUTO W/SCOPE: CPT

## 2020-09-08 PROCEDURE — 87077 CULTURE AEROBIC IDENTIFY: CPT | Mod: 59

## 2020-09-08 PROCEDURE — 99233 SBSQ HOSP IP/OBS HIGH 50: CPT | Mod: ,,, | Performed by: PSYCHIATRY & NEUROLOGY

## 2020-09-08 PROCEDURE — 85027 COMPLETE CBC AUTOMATED: CPT

## 2020-09-08 PROCEDURE — 97112 NEUROMUSCULAR REEDUCATION: CPT

## 2020-09-08 PROCEDURE — 87205 SMEAR GRAM STAIN: CPT

## 2020-09-08 RX ORDER — FUROSEMIDE 40 MG/1
40 TABLET ORAL 2 TIMES DAILY
Status: DISCONTINUED | OUTPATIENT
Start: 2020-09-09 | End: 2020-09-15

## 2020-09-08 RX ORDER — CEFEPIME HYDROCHLORIDE 1 G/1
1 INJECTION, POWDER, FOR SOLUTION INTRAMUSCULAR; INTRAVENOUS
Status: DISCONTINUED | OUTPATIENT
Start: 2020-09-08 | End: 2020-09-16 | Stop reason: HOSPADM

## 2020-09-08 RX ORDER — VANCOMYCIN HCL IN 5 % DEXTROSE 1G/250ML
1000 PLASTIC BAG, INJECTION (ML) INTRAVENOUS
Status: DISCONTINUED | OUTPATIENT
Start: 2020-09-09 | End: 2020-09-11

## 2020-09-08 RX ADMIN — VALPROIC ACID 250 MG: 250 SOLUTION ORAL at 10:09

## 2020-09-08 RX ADMIN — FENTANYL CITRATE 50 MCG: 50 INJECTION INTRAMUSCULAR; INTRAVENOUS at 12:09

## 2020-09-08 RX ADMIN — IPRATROPIUM BROMIDE AND ALBUTEROL SULFATE 3 ML: .5; 2.5 SOLUTION RESPIRATORY (INHALATION) at 07:09

## 2020-09-08 RX ADMIN — CARVEDILOL 3.12 MG: 3.12 TABLET, FILM COATED ORAL at 08:09

## 2020-09-08 RX ADMIN — VANCOMYCIN HYDROCHLORIDE 1250 MG: 1.25 INJECTION, POWDER, LYOPHILIZED, FOR SOLUTION INTRAVENOUS at 11:09

## 2020-09-08 RX ADMIN — FENTANYL CITRATE 50 MCG: 50 INJECTION INTRAMUSCULAR; INTRAVENOUS at 05:09

## 2020-09-08 RX ADMIN — PANTOPRAZOLE SODIUM 40 MG: 40 INJECTION, POWDER, LYOPHILIZED, FOR SOLUTION INTRAVENOUS at 08:09

## 2020-09-08 RX ADMIN — QUETIAPINE FUMARATE 50 MG: 25 TABLET ORAL at 08:09

## 2020-09-08 RX ADMIN — CARVEDILOL 3.12 MG: 3.12 TABLET, FILM COATED ORAL at 09:09

## 2020-09-08 RX ADMIN — CEFEPIME 1 G: 1 INJECTION, POWDER, FOR SOLUTION INTRAMUSCULAR; INTRAVENOUS at 11:09

## 2020-09-08 RX ADMIN — ACETAZOLAMIDE SODIUM 500 MG: 500 INJECTION, POWDER, LYOPHILIZED, FOR SOLUTION INTRAVENOUS at 06:09

## 2020-09-08 RX ADMIN — CEFEPIME 1 G: 1 INJECTION, POWDER, FOR SOLUTION INTRAMUSCULAR; INTRAVENOUS at 09:09

## 2020-09-08 RX ADMIN — IPRATROPIUM BROMIDE AND ALBUTEROL SULFATE 3 ML: .5; 2.5 SOLUTION RESPIRATORY (INHALATION) at 12:09

## 2020-09-08 RX ADMIN — DILTIAZEM HYDROCHLORIDE 90 MG: 60 TABLET, FILM COATED ORAL at 10:09

## 2020-09-08 RX ADMIN — FENTANYL CITRATE 50 MCG: 50 INJECTION INTRAMUSCULAR; INTRAVENOUS at 02:09

## 2020-09-08 RX ADMIN — ATORVASTATIN CALCIUM 40 MG: 20 TABLET, FILM COATED ORAL at 09:09

## 2020-09-08 RX ADMIN — DILTIAZEM HYDROCHLORIDE 90 MG: 60 TABLET, FILM COATED ORAL at 05:09

## 2020-09-08 RX ADMIN — DEXMEDETOMIDINE HYDROCHLORIDE 0.4 MCG/KG/HR: 4 INJECTION, SOLUTION INTRAVENOUS at 05:09

## 2020-09-08 RX ADMIN — PANTOPRAZOLE SODIUM 40 MG: 40 INJECTION, POWDER, LYOPHILIZED, FOR SOLUTION INTRAVENOUS at 09:09

## 2020-09-08 RX ADMIN — VALPROIC ACID 250 MG: 250 SOLUTION ORAL at 05:09

## 2020-09-08 RX ADMIN — ACETAMINOPHEN 650 MG: 325 TABLET ORAL at 06:09

## 2020-09-08 RX ADMIN — ACETAZOLAMIDE SODIUM 500 MG: 500 INJECTION, POWDER, LYOPHILIZED, FOR SOLUTION INTRAVENOUS at 10:09

## 2020-09-08 RX ADMIN — QUETIAPINE FUMARATE 50 MG: 25 TABLET ORAL at 09:09

## 2020-09-08 RX ADMIN — DILTIAZEM HYDROCHLORIDE 90 MG: 60 TABLET, FILM COATED ORAL at 02:09

## 2020-09-08 RX ADMIN — IPRATROPIUM BROMIDE AND ALBUTEROL SULFATE 3 ML: .5; 2.5 SOLUTION RESPIRATORY (INHALATION) at 06:09

## 2020-09-08 RX ADMIN — DOCUSATE SODIUM 50MG AND SENNOSIDES 8.6MG 1 TABLET: 8.6; 5 TABLET, FILM COATED ORAL at 08:09

## 2020-09-08 RX ADMIN — VALPROIC ACID 250 MG: 250 SOLUTION ORAL at 02:09

## 2020-09-08 NOTE — PROGRESS NOTES
[facility]  Neurocritical Care  Progress Note    Admit Date: 8/29/2020  Service Date: 09/08/2020  Length of Stay: 10    Subjective:     Chief Complaint: Embolic stroke involving right middle cerebral artery    History of Present Illness: 64 year old female with a PMH significant for COPD, HTN, and every day smoker who presented at OSH on 08/26/20 with acute left sided facial droop and left sided weakness (last known normal 08/25/20). Pt was not given TPA or endovascular intervention, and she was intubated on 08/26/20 for agitation. CTH on 08/26/20 showed right posterior frontal/parietal infarction with no intracranial hemorrhage. Hospital course complicated by Afib with RVR in which she was started on lovenox and then transitioned to heparin gtt for GOYO and HFrEF. Pt was transferred to NICU for higher level of care and persistent epistaxis. Upon arrival Pt was intubated and sedated, Rhinorocket  Bilaterally in place, propofol and diltiazem gtt. Pt was able to move the right side and moves her left side to noxious stimuli. A-line placed.       Hospital Course: 08/29/20: Pt admitted to NICU for higher level of care and ENT consult. Pt with a run of Vtach associated with hypotension.   8/31/2020: hold anticoagulation until tomorrow due to GI bleed/epistaxis (at other facility) GI consult, pt in Afib- load with digoxin, pending digoxin level tomorrow, continue diltiazem gtt today, start tube feeds, obtain PIV and d/c central line, continue cefepime for total 7 days  9/1/2020: initiated heparin gtt minimal intensity-when at goal obtain CT head, continue diltiazem gtt, start tube feeds, d/c A-line  9/2/2020: changed to PO diltiazem 60mg q8hr from gtt, lasix, miralax, plan for extubation  9/3/2020: started seroquel 25BID  9/4/2020: increased seroquel to 25 mg BID, magnesium citrate, and try SBT  9/5/2020: place A-line, increased Po dilt, add chest PT, add water flushes and repeat magnesium  citrate  9/6/2020: d/c digoxin, add coreg 6.25 BID, d/c water flushes, repeat CMP, add lasix PO 40 mg BID  09/07/2020 Plan to wean FiO2. As we are weaning propofol, precedex will be added. Patient is currently on heparin drip. Daily EKG for QTc monitoring. Patient given one dose of diamox with plans to restart furosemide for metabolic alkalosis. Will discontinue barr tomorrow. F/u with procalcitonin due to increased WBC count. Will speak to family member about Trach/PEG discussion   09/08/2020 Patient remains on her heparin drip. Currently she is rate controlled. Patient is off of her propofol due to significnt hypotension. We are holding the next two doses of lasix and giving her diamox 500 mg for two doses for metabolic alkalosis. Dr. Zavala spoke to the [facility] FREEMAN who would like to proceed with a trach/PEG. Patient was pan cultured today and started on broad spectrum antibiotics. Barr was changed today     Interval History:  Patient remains on her heparin drip. Currently she is rate controlled. Patient is off of her propofol due to significnt hypotension. We are holding the next two doses of lasix and giving her diamox 500 mg for two doses for metabolic alkalosis. Dr. Zavala spoke to the POAGriffin who would like to proceed with a trach/PEG. Patient was pan cultured today and started on broad spectrum antibiotics. Barr was changed today     Review of Systems   Unable to perform ROS: Intubated     Objective:     Vitals:  Temp: 99.3 °F (37.4 °C)  Pulse: 103  Rhythm: atrial rhythm  BP: (!) 121/57  MAP (mmHg): 82  Resp: 16  SpO2: 100 %  Oxygen Concentration (%): 40  O2 Device (Oxygen Therapy): ventilator  Vent Mode: A/C  Set Rate: 16 BPM  Vt Set: 420 mL  PEEP/CPAP: 5 cmH20  Peak Airway Pressure: 22 cmH2O  Mean Airway Pressure: 8.3 cmH20  Plateau Pressure: 13 cmH20    Temp  Min: 98.6 °F (37 °C)  Max: 100 °F (37.8 °C)  Pulse  Min: 83  Max: 131  BP  Min: 101/52  Max: 163/79  MAP (mmHg)  Min: 72   Max: 102  Resp  Min: 13  Max: 32  SpO2  Min: 93 %  Max: 100 %  Oxygen Concentration (%)  Min: 40  Max: 40    09/07 0701 - 09/08 0700  In: 1339 [I.V.:219]  Out: 2810 [Urine:2610]   Unmeasured Output  Stool Occurrence: 1  Emesis Occurrence: 0  Pad Count: 2       Physical Exam  Unable to test orientation, language, memory, judgment, insight, fund of knowledge, hearing, shoulder shrug, tongue protrusion, coordination, gait due to level of consciousness.    GA: comfortable, no acute distress.   HEENT: No scleral icterus or JVD.   Pulmonary: Clear to auscultation A/L.   Cardiac: RRR S1 & S2 w/o rubs/murmurs/gallops.   Abdominal: Bowel sounds present x 4. No appreciable hepatosplenomegaly.  Skin: No jaundice, rashes, or visible lesions.  Neuro:  --GCS: E2 VT1 M4  --Mental Status:  Doesn't opens eyes, doesn't follow commands  --Pupils left pupil 2 brisk, R pupil 6 fixed   --Corneal reflex, gag, cough intact.  --LUE withdraws  --RUE spont  --LLE withdraws  --RLE spont    Medications:  Continuousdexmedetomidine (PRECEDEX) infusion, Last Rate: Stopped (09/08/20 0805)  dextrose 10 % in water (D10W)  heparin (porcine) in D5W, Last Rate: 12 Units/kg/hr (09/08/20 0805)    ScheduledacetaZOLAMIDE (DIAMOX) IVPB, 500 mg, Q8H  albuterol-ipratropium, 3 mL, Q6H  atorvastatin, 40 mg, Daily  carvediloL, 3.125 mg, BID  ceFEPime (MAXIPIME) IVPB, 1 g, Q12H  diltiaZEM, 90 mg, Q8H  [START ON 9/9/2020] furosemide, 40 mg, BID  pantoprozole (PROTONIX) IV, 40 mg, Q12H  polyethylene glycol, 17 g, Daily  QUEtiapine, 50 mg, BID  senna-docusate 8.6-50 mg, 1 tablet, BID  valproic acid (as sodium salt), 250 mg, Q8H  vancomycin (VANCOCIN) IVPB, 1,250 mg, Once    PRNacetaminophen, 650 mg, Q6H PRN  dextrose 10 % in water (D10W), , Continuous PRN  dextrose 50%, 12.5 g, PRN  fentaNYL, 50 mcg, Q2H PRN  glucagon (human recombinant), 1 mg, PRN  hydrALAZINE, 10 mg, Q6H PRN  magnesium oxide, 800 mg, PRN  magnesium oxide, 800 mg, PRN  ondansetron, 4 mg, Q6H  PRN  potassium chloride, 40 mEq, PRN  potassium chloride, 40 mEq, PRN  potassium chloride, 60 mEq, PRN  potassium, sodium phosphates, 2 packet, PRN  potassium, sodium phosphates, 2 packet, PRN  potassium, sodium phosphates, 2 packet, PRN  vancomycin - pharmacy to dose, , pharmacy to manage frequency      Today I personally reviewed pertinent medications, lines/drains/airways, imaging, cardiology results, laboratory results, microbiology results, notably:    Diet  Diet NPO  Diet NPO        Assessment/Plan:     Neuro  * Embolic stroke involving right middle cerebral artery  - Last seen normal on 08/25/20 with no intervention; intubated and sedated.    - Imaging shows large right ischemic stroke in the posterior frontal and parietal regions  - SBP < 160, MAP > 65  - EuNa   - CBC, CMP, coags daily   - heparin gtt low intensity  - repeat CTH stable  - daily statin  - Seroquel to 50 mg BID,started precedex   -Propofol drip discontinued due to hypotension  -General surgery consulted for PEG/Trach placement     ENT  Epistaxis  - persistent epistaxis in the setting of anticoagulation   - H/H stable   - daily CBC   - ENT consulted;   rhino rockets were removed     Pulmonary  Chronic obstructive pulmonary disease  - daily CXRs while intubated   - goal SpO2 is > 88%  - scheduled duo nebs q6h    Acute on chronic respiratory failure with hypoxia and hypercapnia  - Pt intubated and sedated   - Daily CXRs and abg   9/4/2020: SBT trial today and respiratory mechanics (NIF -11)  Vent Mode: A/C  Oxygen Concentration (%):  [40] 40  Resp Rate Total:  [16 br/min-22 br/min] 16 br/min  Vt Set:  [420 mL] 420 mL  PEEP/CPAP:  [5 cmH20] 5 cmH20  Mean Airway Pressure:  [8.3 cmH20-10 cmH20] 8.3 cmH20     Possible PEG and Trach discussion           Cardiac/Vascular  Atrial fibrillation with rapid ventricular response  - Repeat EKG   - daily Mg and Phos; replace as needed  - dilt PO  -dig level 1.8  - continue heparin gtt minimal  intensity  -9/5/2020: increased PO diltiazem to 90 mg  9/6/20: d/c digoxin      Essential hypertension  - SBP goal < 160, MAP >65    Renal/  GOYO (acute kidney injury)  -Avoid nephrotoxic drugs  -Monitor Daily I/O    Endocrine  Diabetes mellitus  -SSI moderate  -POCT glu monitoring    Moderate malnutrition  - tube feeds  - monitor electrolytes with daily CMP, Mg, phos    GI  GI bleed  - upper gi bleed at outside hospital  - recent upper GI bleed at outside facility and epistaxis (2 rhino rockets, up to 5 days prn)  - consulted GI -Continue PPI b.i.d. Trend hemoglobin, transfuse hemoglobin <7            The patient is being Prophylaxed for:  Venous Thromboembolism with: Chemical  Stress Ulcer with: PPI  Ventilator Pneumonia with: chlorhexidine oral care    Activity Orders          Diet NPO: NPO starting at 08/30 0250        Full Code    Finesse Lockwood MD  Neurocritical Care  [facility]

## 2020-09-08 NOTE — PT/OT/SLP PROGRESS
Occupational Therapy   Treatment    Name: Donita Gonzalez  MRN: [id number]  Admitting Diagnosis:  Embolic stroke involving right middle cerebral artery       Recommendations:     Discharge Recommendations: (pending extubation)  Discharge Equipment Recommendations:  (pending extubation)  Barriers to discharge:  None    Assessment:     Donita Gonzalez is a 64 y.o. female with a medical diagnosis of Embolic stroke involving right middle cerebral artery.  She presents with performance deficits affecting function are weakness, visual deficits, impaired cognition, decreased ROM, impaired coordination, decreased coordination, impaired sensation, impaired self care skills, decreased upper extremity function, impaired fine motor, decreased lower extremity function, impaired functional mobilty, gait instability, decreased safety awareness, impaired balance, impaired cardiopulmonary response to activity.     Rehab Prognosis:  Good; patient would benefit from acute skilled OT services to address these deficits and reach maximum level of function.       Plan:     Patient to be seen 3 x/week to address the above listed problems via self-care/home management, therapeutic activities, therapeutic exercises, neuromuscular re-education, cognitive retraining, sensory integration  · Plan of Care Expires: 09/27/20  · Plan of Care Reviewed with: patient    Subjective     Pain/Comfort:  · Pain Rating 1: 0/10  · Pain Rating Post-Intervention 1: 0/10    Objective:     Communicated with: Nurse and RT prior to session.  Patient found supine with arterial line, bed alarm, blood pressure cuff, telemetry, SCD, restraints, pulse ox (continuous), peripheral IV, barr catheter, ventilator, pressure relief boots upon OT entry to room.  Family not present.  General Precautions: Standard, aspiration, fall, NPO, seizure   Orthopedic Precautions:N/A   Braces: N/A     Occupational Performance:     Bed Mobility:    · Patient completed Rolling/Turning to Left with   total assistance  · Patient completed Rolling/Turning to Right with total assistance     Functional Mobility/Transfers:  · Dependent drawsheet transfers    Activities of Daily Living:  · Feeding:  NPO    · Grooming: total assistance while supine    [facility] 6 Click ADL: 6    Treatment & Education:  Patient education provided on role of OT, ROM, positioning, daily orientation and need for continued OT upon discharge.  Daily orientation provided.   PROM performed left UE/LE and AAROM right UE/LE one set x 5 rep in all planes of motion with stretches provided at end range; sustained stretch provided for ankle dorsiflexion.  Assistance and facilitation provided for upward rotation of the scapula during shoulder flexion and abduction to promote orientation of glenoid fossa of scapula to humeral head for prevention of post-stroke hemiplegic pain.  Patient kept eyes closed 100% of the session.  Patient kept head turned to the right; tongue protrusion noted. Towel roll used for midline positioning of head.   Provided multi-sensory stimulation to prevent sensory deprivation and improve clinical outcomes.  Positioning provided for midline orientation with bilateral UEs elevated and heels lifted off mattress; positioning provided to prevent flexor synergy pattern in UE (internal rotation and adduction) to decrease risk of post-stroke hemiplegic pain.   Gentle cervical rotation provided.       Patient left supine with all lines intact, call button in reach, bed alarm on and restraints reapplied at end of sessionEducation:      GOALS:   Multidisciplinary Problems     Occupational Therapy Goals        Problem: Occupational Therapy Goal    Goal Priority Disciplines Outcome Interventions   Occupational Therapy Goal     OT, PT/OT Ongoing, Progressing    Description: Goals set 9/1 to be addressed for 14 days with expiration date, 9/14:  Patient will increase functional independence with ADLs by performing:    Patient will demonstrate  rolling to the right with mod assist.  Not met   Patient will demonstrate rolling to the left with mod assist.   Not met  Patient will demonstrate supine -sit with mod assist.   Not met  Patient will demonstrate stand pivot transfers with mod assist.   Not met  Patient will demonstrate grooming while seated with mod assist.   Not met  Patient will demonstrate upper body dressing with mod assist while seated EOB.   Not met  Patient will demonstrate lower body dressing with max assist while seated EOB.   Not met  Patient will demonstrate toileting with max assist.   Not met  Patient will demonstrate bathing while seated EOB with max assist.   Not met  Patient's family / caregiver will demonstrate independence and safety with assisting patient with self-care skills and functional mobility.     Not met  Patient's family / caregiver will demonstrate independence with providing ROM and changes in bed positioning.   Not met  Patient and/or patient's family will verbalize understanding of stroke prevention guidelines, personal risk factors and stroke warning signs via teachback method.  Not met                              Time Tracking:     OT Date of Treatment: 09/08/20  OT Start Time: 0408  OT Stop Time: 0418  OT Total Time (min): 10 min    Billable Minutes:Neuromuscular Re-education 10    RORY Thomas  9/8/2020

## 2020-09-08 NOTE — PLAN OF CARE
POC reviewed with pt andpts . Pts  verbalized understanding. Questions and concerns addressed. No acute events today. Pt progressing toward goals. Will continue to monitor. See flowsheets for full assessment and VS info.    Pan cultured   Saenz exchanged   Wound care provided

## 2020-09-08 NOTE — ASSESSMENT & PLAN NOTE
- Pt intubated and sedated   - Daily CXRs and abg   9/4/2020: SBT trial today and respiratory mechanics (NIF -11)  Vent Mode: A/C  Oxygen Concentration (%):  [40] 40  Resp Rate Total:  [16 br/min-22 br/min] 16 br/min  Vt Set:  [420 mL] 420 mL  PEEP/CPAP:  [5 cmH20] 5 cmH20  Mean Airway Pressure:  [8.3 cmH20-10 cmH20] 8.3 cmH20     Possible PEG and Trach discussion

## 2020-09-08 NOTE — SUBJECTIVE & OBJECTIVE
Neurologic Chief Complaint: R MCA 2/2 Afib.     Subjective:     Interval History: Patient is seen for follow-up neurological assessment and treatment recommendations: R MCA 2/2 Afib on heparin, coreg, and diltiazem. WBC increasing. Hypotensive on propofol so transitioned to Precedex and pan cx. Family POA agreeable to trach /PEG.    HPI, Past Medical, Family, and Social History remains the same as documented in the initial encounter.     Review of Systems   Unable to perform ROS: Intubated   Constitutional: Negative for fever.   HENT: Negative for nosebleeds.         Tongue swelling   Respiratory: Negative for chest tightness.         Intubated   Cardiovascular: Negative for chest pain.   Gastrointestinal: Negative for anal bleeding and blood in stool.   Genitourinary: Negative for hematuria.   Psychiatric/Behavioral: Positive for agitation.        Precedex for agitation while on MV.      Scheduled Meds:   acetaZOLAMIDE (DIAMOX) IVPB  500 mg Intravenous Q8H    albuterol-ipratropium  3 mL Nebulization Q6H    atorvastatin  40 mg Per OG tube Daily    carvediloL  3.125 mg Per OG tube BID    ceFEPime (MAXIPIME) IVPB  1 g Intravenous Q12H    diltiaZEM  90 mg Per OG tube Q8H    [START ON 9/9/2020] furosemide  40 mg Per OG tube BID    pantoprozole (PROTONIX) IV  40 mg Intravenous Q12H    polyethylene glycol  17 g Per NG tube Daily    QUEtiapine  50 mg Per OG tube BID    senna-docusate 8.6-50 mg  1 tablet Per OG tube BID    valproic acid (as sodium salt)  250 mg Per OG tube Q8H    [START ON 9/9/2020] vancomycin (VANCOCIN) IVPB  1,000 mg Intravenous Q24H     Continuous Infusions:   dexmedetomidine (PRECEDEX) infusion Stopped (09/08/20 1305)    dextrose 10 % in water (D10W)      heparin (porcine) in D5W 12 Units/kg/hr (09/08/20 1405)     PRN Meds:acetaminophen, dextrose 10 % in water (D10W), dextrose 50%, fentaNYL, glucagon (human recombinant), hydrALAZINE, magnesium oxide, magnesium oxide, ondansetron,  potassium chloride, potassium chloride, potassium chloride, potassium, sodium phosphates, potassium, sodium phosphates, potassium, sodium phosphates, Pharmacy to dose Vancomycin consult **AND** vancomycin - pharmacy to dose    Objective:     Vital Signs (Most Recent):  Temp: 99 °F (37.2 °C) (09/08/20 1105)  Pulse: (!) 112 (09/08/20 1405)  Resp: (!) 24 (09/08/20 1405)  BP: 124/62 (09/08/20 1405)  SpO2: 98 % (09/08/20 1405)  BP Location: Right arm    Vital Signs Range (Last 24H):  Temp:  [98.6 °F (37 °C)-100 °F (37.8 °C)]   Pulse:  []   Resp:  [11-32]   BP: (104-163)/(53-86)   SpO2:  [93 %-100 %]   Arterial Line BP: ()/()   BP Location: Right arm    Physical Exam  Constitutional:       General: She is not in acute distress.     Appearance: Normal appearance. She is not ill-appearing.   HENT:      Head: Normocephalic.      Right Ear: External ear normal. There is no impacted cerumen.      Left Ear: External ear normal. There is no impacted cerumen.      Nose: No congestion or rhinorrhea.      Mouth/Throat:      Mouth: Mucous membranes are dry.   Eyes:      General: No scleral icterus.        Right eye: No discharge.         Left eye: No discharge.      Comments: Left pupil 3mm, R pupil 6 fixed    Cardiovascular:      Rate and Rhythm: Normal rate.      Heart sounds: No murmur.   Pulmonary:      Comments: intubated  Abdominal:      General: Abdomen is flat. There is no distension.      Palpations: Abdomen is soft.      Tenderness: There is no abdominal tenderness.   Musculoskeletal:         General: No swelling, tenderness or deformity.   Skin:     General: Skin is warm and dry.      Coloration: Skin is not pale.      Findings: No erythema.   Neurological:      Mental Status: She is alert.      Comments: Limited 2/2/ sedation with Precedex         Neurological Exam:   LOC: alert  Attention Span: poor  Language: No aphasia, Intubated  Articulation: Untestable due to intubation  Orientation: Untestable  due to intubation  Motor: Arm left  Plegia 0/5  Leg left  Plegia 0/5  Arm right  Paresis: 3/5 (with mitt)  Leg right Paresis: 3/5     Laboratory:  CMP:   Recent Labs   Lab 09/08/20  0110   CALCIUM 8.7   ALBUMIN 2.1*   PROT 6.3      K 4.4   CO2 33*   CL 99   BUN 36*   CREATININE 1.3   ALKPHOS 75   ALT 35   AST 58*   BILITOT 0.3     CBC:   Recent Labs   Lab 09/08/20  0110   WBC 19.50*   RBC 3.80*   HGB 11.3*   HCT 36.2*   *   MCV 95   MCH 29.7   MCHC 31.2*     Lipid Panel: No results for input(s): CHOL, LDLCALC, HDL, TRIG in the last 168 hours.  Hgb A1C: No results for input(s): HGBA1C in the last 168 hours.  TSH: No results for input(s): TSH in the last 168 hours.    Diagnostic Results   MRI Brain 8/26/20  Ventricles and sulci are normal in size for age without evidence of hydrocephalus.  No extra-axial blood or fluid collections  Large area restricted diffusion right posterior frontal and parietal lobe consistent with right recent infarction.  No evidence of superimposed hemorrhage.  Patchy areas of increased T2/FLAIR signal of the supratentorial white matter consistent with chronic microvascular ischemic changes.  No evidence of mass.  Skull/extracranial contents (limited evaluation): Bone marrow signal intensity is normal.  Small amount of fluid in the right maxillary sinus.  Large area of restricted diffusion consistent with continued evolution of recent infarction involving the right posterior frontal and parietal lobes lobes.  No evidence of hemorrhage.  Chronic microvascular ischemic changes.     CT head 8/30/20  No extra-axial blood or fluid collections.  Continued evolution of areas infarction involving the right frontal and right parietal lobes.  No evidence of superimposed hemorrhage.  Associated edema with mild compression of the right lateral ventricle.  No hydrocephalus.  No evidence of mass.  Skull/extracranial contents (limited evaluation): No fracture. Mastoid air cells and paranasal  sinuses are essentially clear.     Continued evolution of right frontal and parietal lobe infarctions with no evidence of hemorrhage.  Associated edema is noted with no midline shift.  Mild mass effect on the right lateral ventricle.  No hydrocephalus.     Cardiac Imaging   TTE 9/1/20  · Moderate concentric left ventricular hypertrophy.  · Moderately to severely decreased left ventricular systolic function. The estimated ejection fraction is 25-30%.  · Left ventricular diastolic dysfunction.  · Moderately to severely reduced right ventricular systolic function.  · Moderate left atrial enlargement.  · Severe right atrial enlargement.  · Moderate mitral regurgitation.  · Moderate tricuspid regurgitation.  · Intermediate central venous pressure (8 mmHg).  · The estimated PA systolic pressure is 42 mmHg.  · Pulmonary hypertension present.  · Negative bubble study.

## 2020-09-08 NOTE — ASSESSMENT & PLAN NOTE
Donita Gonzalez is a 64 y.o. female with a significant medical history of COPD, HTN, smoker who presents to the hospital as a transfer from [facility] for further evaluation of R MCA stroke.  The patient did not receive tPA due to LKN 8/25/2020 and presenting to the OSH on 8/26/2020.     9/8: Gen surg consulted for trach/ PEG. Pan cx for hypotension (propofol stopped) and leukocytosis     Antithrombotics for secondary stroke prevention: Anticoagulants: Patient was on full dose Lovenox that was dc'd 2/2 uncontrolled epistaxis. Heparin infusion started 9/1/20 for A/C.  Repeat CTH 9/2 normal, continue heparin drip    Statins for secondary stroke prevention and hyperlipidemia, if present:   Statins: Atorvastatin- 40 mg daily    Aggressive risk factor modification: HTN, Smoking, HLD, A-Fib  (Coreg, dilt for AFib and HTN)     Rehab efforts: The patient has been evaluated by a stroke team provider and the therapy needs have been fully considered based off the presenting complaints and exam findings. The following therapy evaluations are needed: PT/OT/SLP evaluations when the patient is able to participate    Diagnostics ordered/pending: CT scan of head without contrast to asses brain parenchyma  No changes 9/2/20 while on heparin drip therapeutic    VTE prophylaxis: Mechanical prophylaxis: Place SCDs and now heparin infusion     BP parameters: Infarct: No intervention, SBP <220

## 2020-09-08 NOTE — PLAN OF CARE
Remains on hep gtt  Rate controlled  Off of propofol  Significant hypOtension with propofol  Remains on 40% FiO2  Hold next 2 doses of lasix, give acetazolamide 500 x2 doses  General surgery consult for trach/PEG  Goals of care discussed with power of  and prognosis explained. The prognosis discussed and agreed upon by all treatment teams. The power of  with legal name Griffin Gonzalez and witness to conversation named as Marissa were present for the entirety of the conversation. This conversation took place at 9/7/20 1600. Verbal consent to proceed with consult for tracheostomy and percutaneous endoscopic gastrostomy was obtained. power of  stated clear understanding of prognosis and clearly stated that they would not be withdrawing life sustaining therapy in the near future.  power of  expressed understanding that this intervention is intended to facilitate respiratory support and or nutritional support during a broader Neurologic recovery period and is not intended for therapy of the underlying condition.  Broad spec abx and pan cx  Trend procal  Lactic  Change cortney

## 2020-09-08 NOTE — PLAN OF CARE
Goals remain appropriate.  RORY Thomas  9/8/2020    Problem: Occupational Therapy Goal  Goal: Occupational Therapy Goal  Description: Goals set 9/1 to be addressed for 14 days with expiration date, 9/14:  Patient will increase functional independence with ADLs by performing:    Patient will demonstrate rolling to the right with mod assist.  Not met   Patient will demonstrate rolling to the left with mod assist.   Not met  Patient will demonstrate supine -sit with mod assist.   Not met  Patient will demonstrate stand pivot transfers with mod assist.   Not met  Patient will demonstrate grooming while seated with mod assist.   Not met  Patient will demonstrate upper body dressing with mod assist while seated EOB.   Not met  Patient will demonstrate lower body dressing with max assist while seated EOB.   Not met  Patient will demonstrate toileting with max assist.   Not met  Patient will demonstrate bathing while seated EOB with max assist.   Not met  Patient's family / caregiver will demonstrate independence and safety with assisting patient with self-care skills and functional mobility.     Not met  Patient's family / caregiver will demonstrate independence with providing ROM and changes in bed positioning.   Not met  Patient and/or patient's family will verbalize understanding of stroke prevention guidelines, personal risk factors and stroke warning signs via teachback method.  Not met             Outcome: Ongoing, Progressing

## 2020-09-08 NOTE — PROGRESS NOTES
[facility]  Vascular Neurology  Comprehensive Stroke Center  Progress Note    Assessment/Plan:     * Embolic stroke involving right middle cerebral artery  Donita Gonzalez is a 64 y.o. female with a significant medical history of COPD, HTN, smoker who presents to the hospital as a transfer from [facility] for further evaluation of R MCA stroke.  The patient did not receive tPA due to LKN 8/25/2020 and presenting to the OSH on 8/26/2020.     9/8: Gen surg consulted for trach/ PEG. Pan cx for hypotension (propofol stopped) and leukocytosis     Antithrombotics for secondary stroke prevention: Anticoagulants: Patient was on full dose Lovenox that was dc'd 2/2 uncontrolled epistaxis. Heparin infusion started 9/1/20 for A/C.  Repeat CTH 9/2 normal, continue heparin drip    Statins for secondary stroke prevention and hyperlipidemia, if present:   Statins: Atorvastatin- 40 mg daily    Aggressive risk factor modification: HTN, Smoking, HLD, A-Fib  (Coreg, dilt for AFib and HTN)     Rehab efforts: The patient has been evaluated by a stroke team provider and the therapy needs have been fully considered based off the presenting complaints and exam findings. The following therapy evaluations are needed: PT/OT/SLP evaluations when the patient is able to participate    Diagnostics ordered/pending: CT scan of head without contrast to asses brain parenchyma  No changes 9/2/20 while on heparin drip therapeutic    VTE prophylaxis: Mechanical prophylaxis: Place SCDs and now heparin infusion     BP parameters: Infarct: No intervention, SBP <220        Cytotoxic cerebral edema  -Small area of cytotoxic cerebral edema identified when reviewing brain imaging in the territory of the R middle cerebral artery. There is no mass effect associated with it. We will continue to monitor the patients clinical exam for any worsening of symptoms which may indicate expansion of the stroke or the area of the edema  resulting in the clinical change. The pattern is suggestive of cardioembolic etiology in the setting of new onset Afib w/RVR.    -Recommend Neurosurgery consult        Epistaxis  -Patient had uncontrolled epistaxis that was attributed to NGT placement and AC.  -Lovenox --> heparin infusion 9/1/20   -ENT consulted; signed off 8/31; leave nasal packing in for 5 days per ENT note 8/31 --> removed 9/4  -Managed by [facility]    Atrial fibrillation with rapid ventricular response  -Stroke risk factor.  The patient was on full dose lovenox --> heparin infusion now given epistasix  -Continue Diltiazem gtt; Digoxin loaded 9/1/20  -CT Head 9/2 without acute chagnes on therapeutic heparin    Chronic obstructive pulmonary disease  -Goal SpO2>88%  -Managed by [facility]    GOYO (acute kidney injury)  -Cr 2.1<2.3 on 8/28 --> 1.3 on 9/8/20  -Managed by [facility]    Acute on chronic respiratory failure with hypoxia and hypercapnia  -Patient is currently intubated and sedated.   -Managed by [facility]         8/31: Patient intubated. Rhinorockets in place; ENT sign off.   9/1: Patient remains intubated; heparin restarted; once therapeutic, CT head and plan for extubation. Dilt for AFib RVR  9/2: Patient remains intubated  9/3: Patient remains intubated; alert on Precedex. Transitioned to po dilt for Afib.   9/4: Patient remains intubated; alert on Precedex, rhinorockets removed  9/7: no change, intubated and sedated on Propofol  9/8: WBC increase, hypotensive on propofol --> Precedex. Pan cx. Gen surg consulted for PEG / trach    STROKE DOCUMENTATION        NIH Scale:  1a. Level of Consciousness: 2-->Not alert, requires repeated stimulation to attend, or is obtunded and requires strong or painful stimulation to make movements (not stereotyped)  1b. LOC Questions: 2-->Answers neither question correctly  1c. LOC Commands: 2-->Performs neither task correctly  2. Best Gaze: 1-->Partial gaze palsy, gaze is abnormal in one or both eyes, but forced deviation or  total gaze paresis is not present  3. Visual: 2-->Complete hemianopia  4. Facial Palsy: 0-->Normal symmetrical movements  5a. Motor Arm, Left: 4-->No movement  5b. Motor Arm, Right: 3-->No effort against gravity, limb falls  6a. Motor Leg, Left: 4-->No movement  6b. Motor Leg, Right: 2-->Some effort against gravity, leg falls to bed by 5 secs, but has some effort against gravity  7. Limb Ataxia: 0-->Absent  8. Sensory: 2-->Severe to total sensory loss, patient is not aware of being touched in the face, arm, and leg  9. Best Language: 3-->Mute, global aphasia, no usable speech or auditory comprehension  10. Dysarthria: 0-->Normal  11. Extinction and Inattention (formerly Neglect): 1-->Visual, tactile, auditory, spatial, or personal inattention or extinction to bilateral simultaneous stimulation in one of the sensory modalities  Total (NIH Stroke Scale): 28       Modified [place] Score: 4  [place] Coma Scale:    ABCD2 Score:    [id number] Score:7  HAS -BLED Score:2  ICH Score:   Hunt & Bell Classification:      Hemorrhagic change of an Ischemic Stroke: Does this patient have an ischemic stroke with hemorrhagic changes? No     Neurologic Chief Complaint: R MCA 2/2 Afib.     Subjective:     Interval History: Patient is seen for follow-up neurological assessment and treatment recommendations: R MCA 2/2 Afib on heparin, coreg, and diltiazem. WBC increasing. Hypotensive on propofol so transitioned to Precedex and pan cx. Family POA agreeable to trach /PEG.    HPI, Past Medical, Family, and Social History remains the same as documented in the initial encounter.     Review of Systems   Unable to perform ROS: Intubated   Constitutional: Negative for fever.   HENT: Negative for nosebleeds.         Tongue swelling   Respiratory: Negative for chest tightness.         Intubated   Cardiovascular: Negative for chest pain.   Gastrointestinal: Negative for anal bleeding and blood in stool.   Genitourinary: Negative for hematuria.    Psychiatric/Behavioral: Positive for agitation.        Precedex for agitation while on MV.      Scheduled Meds:   acetaZOLAMIDE (DIAMOX) IVPB  500 mg Intravenous Q8H    albuterol-ipratropium  3 mL Nebulization Q6H    atorvastatin  40 mg Per OG tube Daily    carvediloL  3.125 mg Per OG tube BID    ceFEPime (MAXIPIME) IVPB  1 g Intravenous Q12H    diltiaZEM  90 mg Per OG tube Q8H    [START ON 9/9/2020] furosemide  40 mg Per OG tube BID    pantoprozole (PROTONIX) IV  40 mg Intravenous Q12H    polyethylene glycol  17 g Per NG tube Daily    QUEtiapine  50 mg Per OG tube BID    senna-docusate 8.6-50 mg  1 tablet Per OG tube BID    valproic acid (as sodium salt)  250 mg Per OG tube Q8H    [START ON 9/9/2020] vancomycin (VANCOCIN) IVPB  1,000 mg Intravenous Q24H     Continuous Infusions:   dexmedetomidine (PRECEDEX) infusion Stopped (09/08/20 1305)    dextrose 10 % in water (D10W)      heparin (porcine) in D5W 12 Units/kg/hr (09/08/20 1405)     PRN Meds:acetaminophen, dextrose 10 % in water (D10W), dextrose 50%, fentaNYL, glucagon (human recombinant), hydrALAZINE, magnesium oxide, magnesium oxide, ondansetron, potassium chloride, potassium chloride, potassium chloride, potassium, sodium phosphates, potassium, sodium phosphates, potassium, sodium phosphates, Pharmacy to dose Vancomycin consult **AND** vancomycin - pharmacy to dose    Objective:     Vital Signs (Most Recent):  Temp: 99 °F (37.2 °C) (09/08/20 1105)  Pulse: (!) 112 (09/08/20 1405)  Resp: (!) 24 (09/08/20 1405)  BP: 124/62 (09/08/20 1405)  SpO2: 98 % (09/08/20 1405)  BP Location: Right arm    Vital Signs Range (Last 24H):  Temp:  [98.6 °F (37 °C)-100 °F (37.8 °C)]   Pulse:  []   Resp:  [11-32]   BP: (104-163)/(53-86)   SpO2:  [93 %-100 %]   Arterial Line BP: ()/()   BP Location: Right arm    Physical Exam  Constitutional:       General: She is not in acute distress.     Appearance: Normal appearance. She is not ill-appearing.    HENT:      Head: Normocephalic.      Right Ear: External ear normal. There is no impacted cerumen.      Left Ear: External ear normal. There is no impacted cerumen.      Nose: No congestion or rhinorrhea.      Mouth/Throat:      Mouth: Mucous membranes are dry.   Eyes:      General: No scleral icterus.        Right eye: No discharge.         Left eye: No discharge.      Comments: Left pupil 3mm, R pupil 6 fixed    Cardiovascular:      Rate and Rhythm: Normal rate.      Heart sounds: No murmur.   Pulmonary:      Comments: intubated  Abdominal:      General: Abdomen is flat. There is no distension.      Palpations: Abdomen is soft.      Tenderness: There is no abdominal tenderness.   Musculoskeletal:         General: No swelling, tenderness or deformity.   Skin:     General: Skin is warm and dry.      Coloration: Skin is not pale.      Findings: No erythema.   Neurological:      Mental Status: She is alert.      Comments: Limited 2/2/ sedation with Precedex         Neurological Exam:   LOC: alert  Attention Span: poor  Language: No aphasia, Intubated  Articulation: Untestable due to intubation  Orientation: Untestable due to intubation  Motor: Arm left  Plegia 0/5  Leg left  Plegia 0/5  Arm right  Paresis: 3/5 (with mitt)  Leg right Paresis: 3/5     Laboratory:  CMP:   Recent Labs   Lab 09/08/20  0110   CALCIUM 8.7   ALBUMIN 2.1*   PROT 6.3      K 4.4   CO2 33*   CL 99   BUN 36*   CREATININE 1.3   ALKPHOS 75   ALT 35   AST 58*   BILITOT 0.3     CBC:   Recent Labs   Lab 09/08/20  0110   WBC 19.50*   RBC 3.80*   HGB 11.3*   HCT 36.2*   *   MCV 95   MCH 29.7   MCHC 31.2*     Lipid Panel: No results for input(s): CHOL, LDLCALC, HDL, TRIG in the last 168 hours.  Hgb A1C: No results for input(s): HGBA1C in the last 168 hours.  TSH: No results for input(s): TSH in the last 168 hours.    Diagnostic Results   MRI Brain 8/26/20  Ventricles and sulci are normal in size for age without evidence of hydrocephalus.   No extra-axial blood or fluid collections  Large area restricted diffusion right posterior frontal and parietal lobe consistent with right recent infarction.  No evidence of superimposed hemorrhage.  Patchy areas of increased T2/FLAIR signal of the supratentorial white matter consistent with chronic microvascular ischemic changes.  No evidence of mass.  Skull/extracranial contents (limited evaluation): Bone marrow signal intensity is normal.  Small amount of fluid in the right maxillary sinus.  Large area of restricted diffusion consistent with continued evolution of recent infarction involving the right posterior frontal and parietal lobes lobes.  No evidence of hemorrhage.  Chronic microvascular ischemic changes.     CT head 8/30/20  No extra-axial blood or fluid collections.  Continued evolution of areas infarction involving the right frontal and right parietal lobes.  No evidence of superimposed hemorrhage.  Associated edema with mild compression of the right lateral ventricle.  No hydrocephalus.  No evidence of mass.  Skull/extracranial contents (limited evaluation): No fracture. Mastoid air cells and paranasal sinuses are essentially clear.     Continued evolution of right frontal and parietal lobe infarctions with no evidence of hemorrhage.  Associated edema is noted with no midline shift.  Mild mass effect on the right lateral ventricle.  No hydrocephalus.     Cardiac Imaging   TTE 9/1/20  · Moderate concentric left ventricular hypertrophy.  · Moderately to severely decreased left ventricular systolic function. The estimated ejection fraction is 25-30%.  · Left ventricular diastolic dysfunction.  · Moderately to severely reduced right ventricular systolic function.  · Moderate left atrial enlargement.  · Severe right atrial enlargement.  · Moderate mitral regurgitation.  · Moderate tricuspid regurgitation.  · Intermediate central venous pressure (8 mmHg).  · The estimated PA systolic pressure is 42  mmHg.  · Pulmonary hypertension present.  · Negative bubble study.      Teresa Marin MD  [facility]  Department of Vascular Neurology   [facility]

## 2020-09-08 NOTE — SUBJECTIVE & OBJECTIVE
Interval History:  Patient remains on her heparin drip. Currently she is rate controlled. Patient is off of her propofol due to significnt hypotension. We are holding the next two doses of lasix and giving her diamox 500 mg for two doses for metabolic alkalosis. Dr. Zavala spoke to the POA, Griffin Gonzalez who would like to proceed with a trach/PEG. Patient was pan cultured today and started on broad spectrum antibiotics. Saenz was changed today     Review of Systems   Unable to perform ROS: Intubated     Objective:     Vitals:  Temp: 99.3 °F (37.4 °C)  Pulse: 103  Rhythm: atrial rhythm  BP: (!) 121/57  MAP (mmHg): 82  Resp: 16  SpO2: 100 %  Oxygen Concentration (%): 40  O2 Device (Oxygen Therapy): ventilator  Vent Mode: A/C  Set Rate: 16 BPM  Vt Set: 420 mL  PEEP/CPAP: 5 cmH20  Peak Airway Pressure: 22 cmH2O  Mean Airway Pressure: 8.3 cmH20  Plateau Pressure: 13 cmH20    Temp  Min: 98.6 °F (37 °C)  Max: 100 °F (37.8 °C)  Pulse  Min: 83  Max: 131  BP  Min: 101/52  Max: 163/79  MAP (mmHg)  Min: 72  Max: 102  Resp  Min: 13  Max: 32  SpO2  Min: 93 %  Max: 100 %  Oxygen Concentration (%)  Min: 40  Max: 40    09/07 0701 - 09/08 0700  In: 1339 [I.V.:219]  Out: 2810 [Urine:2610]   Unmeasured Output  Stool Occurrence: 1  Emesis Occurrence: 0  Pad Count: 2       Physical Exam  Unable to test orientation, language, memory, judgment, insight, fund of knowledge, hearing, shoulder shrug, tongue protrusion, coordination, gait due to level of consciousness.    GA: comfortable, no acute distress.   HEENT: No scleral icterus or JVD.   Pulmonary: Clear to auscultation A/L.   Cardiac: RRR S1 & S2 w/o rubs/murmurs/gallops.   Abdominal: Bowel sounds present x 4. No appreciable hepatosplenomegaly.  Skin: No jaundice, rashes, or visible lesions.  Neuro:  --GCS: E2 VT1 M4  --Mental Status:  Doesn't opens eyes, doesn't follow commands  --Pupils left pupil 2 brisk, R pupil 6 fixed   --Corneal reflex, gag, cough intact.  --LUE  withdraws  --RUE spont  --LLE withdraws  --RLE spont    Medications:  Continuousdexmedetomidine (PRECEDEX) infusion, Last Rate: Stopped (09/08/20 0805)  dextrose 10 % in water (D10W)  heparin (porcine) in D5W, Last Rate: 12 Units/kg/hr (09/08/20 0805)    ScheduledacetaZOLAMIDE (DIAMOX) IVPB, 500 mg, Q8H  albuterol-ipratropium, 3 mL, Q6H  atorvastatin, 40 mg, Daily  carvediloL, 3.125 mg, BID  ceFEPime (MAXIPIME) IVPB, 1 g, Q12H  diltiaZEM, 90 mg, Q8H  [START ON 9/9/2020] furosemide, 40 mg, BID  pantoprozole (PROTONIX) IV, 40 mg, Q12H  polyethylene glycol, 17 g, Daily  QUEtiapine, 50 mg, BID  senna-docusate 8.6-50 mg, 1 tablet, BID  valproic acid (as sodium salt), 250 mg, Q8H  vancomycin (VANCOCIN) IVPB, 1,250 mg, Once    PRNacetaminophen, 650 mg, Q6H PRN  dextrose 10 % in water (D10W), , Continuous PRN  dextrose 50%, 12.5 g, PRN  fentaNYL, 50 mcg, Q2H PRN  glucagon (human recombinant), 1 mg, PRN  hydrALAZINE, 10 mg, Q6H PRN  magnesium oxide, 800 mg, PRN  magnesium oxide, 800 mg, PRN  ondansetron, 4 mg, Q6H PRN  potassium chloride, 40 mEq, PRN  potassium chloride, 40 mEq, PRN  potassium chloride, 60 mEq, PRN  potassium, sodium phosphates, 2 packet, PRN  potassium, sodium phosphates, 2 packet, PRN  potassium, sodium phosphates, 2 packet, PRN  vancomycin - pharmacy to dose, , pharmacy to manage frequency      Today I personally reviewed pertinent medications, lines/drains/airways, imaging, cardiology results, laboratory results, microbiology results, notably:    Diet  Diet NPO  Diet NPO

## 2020-09-08 NOTE — CONSULTS
Wound care consult received from nursing for assessment of sacrum. Upon assessment to sacrum noted multiple areas of scattered full thickness skin breakdown related to moisture and dark discoloration to R buttocks not due to pressure. No odor. Scant serosanguinous drainage. Waffle overlay and heel boots in use.     Recommendations:   - Nursing to cleanse sacrum/ buttocks with cleansing cloths, then apply a thin layer of triad ointment BID/ PRN after incontinent episodes to provide a hydrophilic environment to promote healing of exposed tissue and prevent breakdown of intact skin. No cover dressing required.     -MOISES surface and nursing to continue with pressure injury prevention interventions      Nursing and MD team notified with recommendations.   Wound care to continue to follow pt PRN [id number]    Sacrum/ buttocks

## 2020-09-08 NOTE — PLAN OF CARE
Patient intubated on vent.  Not medically stable for discharge.  Per MD, son has agreed to peg and trach and General Surgery has been consulted.        09/08/20 1417   Discharge Reassessment   Assessment Type Discharge Planning Reassessment   Provided patient/caregiver education on the expected discharge date and the discharge plan Yes   Do you have any problems affording any of your prescribed medications? No   Discharge Plan A Long-term acute care facility (LTAC)   Discharge Plan B Skilled Nursing Facility   DME Needed Upon Discharge  other (see comments)  (tbd)   Patient choice form signed by patient/caregiver N/A   Anticipated Discharge Disposition LTAC   Can the patient/caregiver answer the patient profile reliably? No, cognitively impaired   Describe the patient's ability to walk at the present time. Does not walk or unable to take any steps at all   How often would a person be available to care for the patient? Whenever needed   Number of comorbid conditions (as recorded on the chart) Five or more       Mai Gonzalez RN, CCRN-K, [facility]  Neuro-Critical Care   [phone]

## 2020-09-08 NOTE — PLAN OF CARE
[facility] Care Plan    POC reviewed with Donita Gonzalez at 0600. Pt is unable to verbalized understanding due to cognition. No acute events overnight. Pt progressing toward goals. Heparin drip running at 12 units/kg/hour; Precedex drip titrated accordingly to maintain adequate sedation. Will continue to monitor. See below and flowsheets for full assessment and VS info.       Neuro:  [place] Coma Scale  Best Eye Response: 2-->(E2) to pain  Best Motor Response: 5-->(M5) localizes pain  Best Verbal Response: 1-->(V1) none  [place] Coma Scale Score: 8  Assessment Qualifiers: patient intubated, patient chemically sedated or paralyzed  Pupil PERRLA: no     24hr Temp:  [97.9 °F (36.6 °C)-100 °F (37.8 °C)]     CV:   Rhythm: atrial rhythm  BP goals:   SBP < 160  MAP > 65    Resp:   O2 Device (Oxygen Therapy): ventilator  Vent Mode: A/C  Set Rate: 16 BPM  Oxygen Concentration (%): 40  Vt Set: 420 mL  PEEP/CPAP: 5 cmH20  Pressure Support: 12 cmH20    Plan: wean to extubate and trach/PEG discussions    GI/:  ROXANE Total Score: 1  Diet/Nutrition Received: NPO, tube feeding  Last Bowel Movement: 09/08/20  Voiding Characteristics: urethral catheter (bladder)    Intake/Output Summary (Last 24 hours) at 9/8/2020 0632  Last data filed at 9/8/2020 0605  Gross per 24 hour   Intake 1334.82 ml   Output 2810 ml   Net -1475.18 ml     Unmeasured Output  Stool Occurrence: 1  Emesis Occurrence: 0  Pad Count: 2    Labs/Accuchecks:  Recent Labs   Lab 09/08/20  0110   WBC 19.50*   RBC 3.80*   HGB 11.3*   HCT 36.2*   *      Recent Labs   Lab 09/08/20  0110      K 4.4   CO2 33*   CL 99   BUN 36*   CREATININE 1.3   ALKPHOS 75   ALT 35   AST 58*   BILITOT 0.3      Recent Labs   Lab 09/01/20  0904  09/08/20  0110   INR 1.0  --   --    APTT 23.3   < > 29.2    < > = values in this interval not displayed.      Recent Labs   Lab 09/07/20  2226   TROPONINI 0.080*       Electrolytes: N/A - electrolytes WDL  Accuchecks: Q6H    Gtts:   dexmedetomidine  (PRECEDEX) infusion 0.4 mcg/kg/hr (09/08/20 0605)    dextrose 10 % in water (D10W)      heparin (porcine) in D5W 12 Units/kg/hr (09/08/20 0605)    propofoL Stopped (09/07/20 1115)       LDA/Wounds:  Lines/Drains/Airways       Drain                   NG/OG Tube 08/29/20 2215 orogastric Left mouth 9 days         Urethral Catheter 08/29/20 2215 9 days              Airway                   Airway - Non-Surgical 08/26/20 0931 Endotracheal Tube 12 days              Arterial Line              Arterial Line 09/05/20 1345 Left Radial 2 days              Peripheral Intravenous Line                   Peripheral IV - Single Lumen 09/04/20 1330 18 G Left Antecubital 3 days         Peripheral IV - Single Lumen 09/06/20 0300 20 G Right Antecubital 2 days         Peripheral IV - Single Lumen 09/07/20 0300 20 G Anterior;Left;Proximal Upper Arm 1 day                  Wounds: Yes  Wound care consulted: Yes

## 2020-09-08 NOTE — PROGRESS NOTES
Pharmacokinetic Initial Assessment: IV Vancomycin    Assessment/Plan:    - Initiate intravenous vancomycin with loading dose of 1250 mg once  - Maintenance dose 1000 mg Q24H  - Goal trough 10-20 mcg/mL empirically  - Draw trough prior to third dose on 9/10 at 11:00    Pharmacy will continue to follow and monitor vancomycin. Please contact pharmacy at extension [phone] with any questions regarding this assessment.     Thank you for the consult,   Judi Lin, PharmD, [facility]  Neurocritical Care Pharmacist  [id number]       Patient brief summary:  Donita Gonzalez is a 64 y.o. female initiated on antimicrobial therapy with IV Vancomycin for treatment of suspected bacteremia    Drug Allergies:   Review of patient's allergies indicates:  No Known Allergies    Actual Body Weight:   51 kg    Renal Function:   Estimated Creatinine Clearance: 35.2 mL/min (based on SCr of 1.3 mg/dL).,     Dialysis Method (if applicable):  N/A    CBC (last 72 hours):  Recent Labs   Lab Result Units 09/06/20  0120 09/07/20  0059 09/08/20  0110   WBC K/uL 17.53* 16.25* 19.50*   Hemoglobin g/dL 11.9* 10.9* 11.3*   Hematocrit % 37.3 34.2* 36.2*   Platelets K/uL 472* 468* 499*   Gran% % 79.0* 84.0* 84.0*   Lymph% % 9.0* 2.0* 3.0*   Mono% % 2.0* 4.0 6.0   Eosinophil% % 3.0 4.0 0.0   Basophil% % 0.0 0.0 0.0   Differential Method  Manual Manual Manual       Metabolic Panel (last 72 hours):  Recent Labs   Lab Result Units 09/06/20  0120 09/06/20  1113 09/07/20  0059 09/07/20  2226 09/08/20  0110 09/08/20  1124   Sodium mmol/L 139 140 140 140 142  --    Sodium Urine Random mmol/L  --  <20*  --   --   --   --    Potassium mmol/L 4.1 4.8 4.3 4.1 4.4  --    Chloride mmol/L 99 99 99 98 99  --    CO2 mmol/L 33* 33* 33* 34* 33*  --    Glucose mg/dL 122* 164* 118* 145* 108  --    Glucose, UA   --   --   --   --   --  Negative   BUN, Bld mg/dL 38* 38* 39* 38* 36*  --    Creatinine mg/dL 1.7* 1.5* 1.5* 1.3 1.3  --    Albumin g/dL 2.2* 2.2* 2.1*  --  2.1*  --    Total  Bilirubin mg/dL 0.3 0.2 0.3  --  0.3  --    Alkaline Phosphatase U/L 73 75 72  --  75  --    AST U/L 56* 47* 45*  --  58*  --    ALT U/L 41 36 32  --  35  --    Magnesium mg/dL 3.0*  --  2.4 1.9 1.9  --    Phosphorus mg/dL 2.2*  --  2.3*  --  4.3  --        Drug levels (last 3 results):  No results for input(s): VANCOMYCINRA, VANCOMYCINPE, VANCOMYCINTR in the last 72 hours.    Microbiologic Results:  Microbiology Results (last 7 days)     Procedure Component Value Units Date/Time    Culture, Respiratory with Gram Stain [[id number]] Collected: 09/08/20 1117    Order Status: Completed Specimen: Respiratory from Endotracheal Aspirate Updated: 09/08/20 1345     Gram Stain (Respiratory) <10 epithelial cells per low power field.     Gram Stain (Respiratory) Many WBC's     Gram Stain (Respiratory) Many Gram negative rods     Gram Stain (Respiratory) Moderate Gram positive cocci    Blood culture [[id number]] Collected: 09/08/20 1057    Order Status: Sent Specimen: Blood from Peripheral, Lower Arm, Right Updated: 09/08/20 1148    Blood culture [[id number]] Collected: 09/08/20 1045    Order Status: Sent Specimen: Blood from Peripheral, Lower Leg, Left Updated: 09/08/20 1147    Blood culture [[id number]] Collected: 08/30/20 1210    Order Status: Completed Specimen: Blood from Peripheral, Forearm, Left Updated: 09/04/20 1612     Blood Culture, Routine No growth after 5 days.    Blood culture [[id number]] Collected: 08/30/20 1220    Order Status: Completed Specimen: Blood from Peripheral, Antecubital, Right Updated: 09/04/20 1612     Blood Culture, Routine No growth after 5 days.    Culture, Respiratory with Gram Stain [[id number]]  (Abnormal)  (Susceptibility) Collected: 08/30/20 1638    Order Status: Completed Specimen: Respiratory from Endotracheal Aspirate Updated: 09/02/20 1137     Respiratory Culture No Pseudomonas isolated.      STAPHYLOCOCCUS AUREUS  Many        SERRATIA MARCESCENS  Many  Normal respiratory melissa also  present       Gram Stain (Respiratory) <10 epithelial cells per low power field.     Gram Stain (Respiratory) Rare WBC's     Gram Stain (Respiratory) Few Gram negative rods     Gram Stain (Respiratory) Rare Gram positive cocci

## 2020-09-08 NOTE — ASSESSMENT & PLAN NOTE
- Last seen normal on 08/25/20 with no intervention; intubated and sedated.    - Imaging shows large right ischemic stroke in the posterior frontal and parietal regions  - SBP < 160, MAP > 65  - EuNa   - CBC, CMP, coags daily   - heparin gtt low intensity  - repeat CTH stable  - daily statin  - Seroquel to 50 mg BID,started precedex   -Propofol drip discontinued due to hypotension  -General surgery consulted for PEG/Trach placement

## 2020-09-09 PROBLEM — D72.829 LEUKOCYTOSIS: Status: ACTIVE | Noted: 2020-09-09

## 2020-09-09 LAB
ALBUMIN SERPL BCP-MCNC: 2 G/DL (ref 3.5–5.2)
ALLENS TEST: ABNORMAL
ALP SERPL-CCNC: 84 U/L (ref 55–135)
ALT SERPL W/O P-5'-P-CCNC: 33 U/L (ref 10–44)
AMYLASE SERPL-CCNC: 37 U/L (ref 20–110)
ANION GAP SERPL CALC-SCNC: 8 MMOL/L (ref 8–16)
ANISOCYTOSIS BLD QL SMEAR: SLIGHT
APTT BLDCRRT: 40.6 SEC (ref 21–32)
AST SERPL-CCNC: 45 U/L (ref 10–40)
BASO STIPL BLD QL SMEAR: ABNORMAL
BASOPHILS # BLD AUTO: ABNORMAL K/UL (ref 0–0.2)
BASOPHILS NFR BLD: 0 % (ref 0–1.9)
BILIRUB SERPL-MCNC: 0.3 MG/DL (ref 0.1–1)
BUN SERPL-MCNC: 37 MG/DL (ref 8–23)
BURR CELLS BLD QL SMEAR: ABNORMAL
CALCIUM SERPL-MCNC: 8.8 MG/DL (ref 8.7–10.5)
CHLORIDE SERPL-SCNC: 100 MMOL/L (ref 95–110)
CO2 SERPL-SCNC: 32 MMOL/L (ref 23–29)
CREAT SERPL-MCNC: 1.3 MG/DL (ref 0.5–1.4)
DELSYS: ABNORMAL
DIFFERENTIAL METHOD: ABNORMAL
EOSINOPHIL # BLD AUTO: ABNORMAL K/UL (ref 0–0.5)
EOSINOPHIL NFR BLD: 0.5 % (ref 0–8)
ERYTHROCYTE [DISTWIDTH] IN BLOOD BY AUTOMATED COUNT: 14.5 % (ref 11.5–14.5)
ERYTHROCYTE [SEDIMENTATION RATE] IN BLOOD BY WESTERGREN METHOD: 16 MM/H
EST. GFR  (AFRICAN AMERICAN): 50.1 ML/MIN/1.73 M^2
EST. GFR  (NON AFRICAN AMERICAN): 43.5 ML/MIN/1.73 M^2
FIO2: 40
GIANT PLATELETS BLD QL SMEAR: PRESENT
GLUCOSE SERPL-MCNC: 141 MG/DL (ref 70–110)
HCO3 UR-SCNC: 31.9 MMOL/L (ref 24–28)
HCT VFR BLD AUTO: 34.8 % (ref 37–48.5)
HGB BLD-MCNC: 10.7 G/DL (ref 12–16)
IMM GRANULOCYTES # BLD AUTO: ABNORMAL K/UL (ref 0–0.04)
IMM GRANULOCYTES NFR BLD AUTO: ABNORMAL % (ref 0–0.5)
LIPASE SERPL-CCNC: 23 U/L (ref 4–60)
LYMPHOCYTES # BLD AUTO: ABNORMAL K/UL (ref 1–4.8)
LYMPHOCYTES NFR BLD: 1.5 % (ref 18–48)
MAGNESIUM SERPL-MCNC: 1.9 MG/DL (ref 1.6–2.6)
MCH RBC QN AUTO: 30.1 PG (ref 27–31)
MCHC RBC AUTO-ENTMCNC: 30.7 G/DL (ref 32–36)
MCV RBC AUTO: 98 FL (ref 82–98)
METAMYELOCYTES NFR BLD MANUAL: 0.5 %
MODE: ABNORMAL
MONOCYTES # BLD AUTO: ABNORMAL K/UL (ref 0.3–1)
MONOCYTES NFR BLD: 3.5 % (ref 4–15)
MYELOCYTES NFR BLD MANUAL: 0.5 %
NEUTROPHILS # BLD AUTO: ABNORMAL K/UL (ref 1.8–7.7)
NEUTROPHILS NFR BLD: 91 % (ref 38–73)
NEUTS BAND NFR BLD MANUAL: 2.5 %
NRBC BLD-RTO: 0 /100 WBC
OVALOCYTES BLD QL SMEAR: ABNORMAL
PCO2 BLDA: 48.6 MMHG (ref 35–45)
PEEP: 5
PH SMN: 7.42 [PH] (ref 7.35–7.45)
PHOSPHATE SERPL-MCNC: 3.8 MG/DL (ref 2.7–4.5)
PIP: 25
PLATELET # BLD AUTO: 486 K/UL (ref 150–350)
PLATELET BLD QL SMEAR: ABNORMAL
PMV BLD AUTO: 11.7 FL (ref 9.2–12.9)
PO2 BLDA: 78 MMHG (ref 80–100)
POC BE: 7 MMOL/L
POC SATURATED O2: 95 % (ref 95–100)
POC TCO2: 33 MMOL/L (ref 23–27)
POCT GLUCOSE: 101 MG/DL (ref 70–110)
POCT GLUCOSE: 124 MG/DL (ref 70–110)
POCT GLUCOSE: 144 MG/DL (ref 70–110)
POIKILOCYTOSIS BLD QL SMEAR: SLIGHT
POTASSIUM SERPL-SCNC: 3.5 MMOL/L (ref 3.5–5.1)
PROT SERPL-MCNC: 6.1 G/DL (ref 6–8.4)
RBC # BLD AUTO: 3.56 M/UL (ref 4–5.4)
SAMPLE: ABNORMAL
SARS-COV-2 RNA RESP QL NAA+PROBE: NOT DETECTED
SITE: ABNORMAL
SODIUM SERPL-SCNC: 140 MMOL/L (ref 136–145)
SP02: 98
TOXIC GRANULES BLD QL SMEAR: PRESENT
VT: 420
WBC # BLD AUTO: 24.82 K/UL (ref 3.9–12.7)

## 2020-09-09 PROCEDURE — 94668 MNPJ CHEST WALL SBSQ: CPT

## 2020-09-09 PROCEDURE — 83735 ASSAY OF MAGNESIUM: CPT

## 2020-09-09 PROCEDURE — 83690 ASSAY OF LIPASE: CPT

## 2020-09-09 PROCEDURE — 94640 AIRWAY INHALATION TREATMENT: CPT

## 2020-09-09 PROCEDURE — 82150 ASSAY OF AMYLASE: CPT

## 2020-09-09 PROCEDURE — 37799 UNLISTED PX VASCULAR SURGERY: CPT

## 2020-09-09 PROCEDURE — 63600175 PHARM REV CODE 636 W HCPCS: Performed by: PSYCHIATRY & NEUROLOGY

## 2020-09-09 PROCEDURE — 99291 CRITICAL CARE FIRST HOUR: CPT | Mod: ,,, | Performed by: PSYCHIATRY & NEUROLOGY

## 2020-09-09 PROCEDURE — 25000003 PHARM REV CODE 250: Performed by: STUDENT IN AN ORGANIZED HEALTH CARE EDUCATION/TRAINING PROGRAM

## 2020-09-09 PROCEDURE — 85007 BL SMEAR W/DIFF WBC COUNT: CPT

## 2020-09-09 PROCEDURE — 94003 VENT MGMT INPAT SUBQ DAY: CPT

## 2020-09-09 PROCEDURE — 84100 ASSAY OF PHOSPHORUS: CPT

## 2020-09-09 PROCEDURE — 82803 BLOOD GASES ANY COMBINATION: CPT

## 2020-09-09 PROCEDURE — 63600175 PHARM REV CODE 636 W HCPCS: Performed by: PHYSICIAN ASSISTANT

## 2020-09-09 PROCEDURE — 63600175 PHARM REV CODE 636 W HCPCS: Performed by: NURSE PRACTITIONER

## 2020-09-09 PROCEDURE — 27200966 HC CLOSED SUCTION SYSTEM

## 2020-09-09 PROCEDURE — 85027 COMPLETE CBC AUTOMATED: CPT

## 2020-09-09 PROCEDURE — 25000003 PHARM REV CODE 250: Performed by: PSYCHIATRY & NEUROLOGY

## 2020-09-09 PROCEDURE — U0003 INFECTIOUS AGENT DETECTION BY NUCLEIC ACID (DNA OR RNA); SEVERE ACUTE RESPIRATORY SYNDROME CORONAVIRUS 2 (SARS-COV-2) (CORONAVIRUS DISEASE [COVID-19]), AMPLIFIED PROBE TECHNIQUE, MAKING USE OF HIGH THROUGHPUT TECHNOLOGIES AS DESCRIBED BY CMS-2020-01-R: HCPCS

## 2020-09-09 PROCEDURE — 25000242 PHARM REV CODE 250 ALT 637 W/ HCPCS: Performed by: NURSE PRACTITIONER

## 2020-09-09 PROCEDURE — 85730 THROMBOPLASTIN TIME PARTIAL: CPT

## 2020-09-09 PROCEDURE — 25000003 PHARM REV CODE 250: Performed by: NURSE PRACTITIONER

## 2020-09-09 PROCEDURE — 99900035 HC TECH TIME PER 15 MIN (STAT)

## 2020-09-09 PROCEDURE — 27000221 HC OXYGEN, UP TO 24 HOURS

## 2020-09-09 PROCEDURE — 20000000 HC ICU ROOM

## 2020-09-09 PROCEDURE — 99291 PR CRITICAL CARE, E/M 30-74 MINUTES: ICD-10-PCS | Mod: ,,, | Performed by: PSYCHIATRY & NEUROLOGY

## 2020-09-09 PROCEDURE — 25000003 PHARM REV CODE 250: Performed by: PHYSICIAN ASSISTANT

## 2020-09-09 PROCEDURE — 80053 COMPREHEN METABOLIC PANEL: CPT

## 2020-09-09 PROCEDURE — 99900026 HC AIRWAY MAINTENANCE (STAT)

## 2020-09-09 PROCEDURE — C9113 INJ PANTOPRAZOLE SODIUM, VIA: HCPCS | Performed by: PHYSICIAN ASSISTANT

## 2020-09-09 PROCEDURE — 99233 SBSQ HOSP IP/OBS HIGH 50: CPT | Mod: ,,, | Performed by: PSYCHIATRY & NEUROLOGY

## 2020-09-09 PROCEDURE — 99233 PR SUBSEQUENT HOSPITAL CARE,LEVL III: ICD-10-PCS | Mod: ,,, | Performed by: PSYCHIATRY & NEUROLOGY

## 2020-09-09 PROCEDURE — 94761 N-INVAS EAR/PLS OXIMETRY MLT: CPT

## 2020-09-09 RX ADMIN — CEFEPIME 1 G: 1 INJECTION, POWDER, FOR SOLUTION INTRAMUSCULAR; INTRAVENOUS at 09:09

## 2020-09-09 RX ADMIN — FUROSEMIDE 40 MG: 40 TABLET ORAL at 09:09

## 2020-09-09 RX ADMIN — VALPROIC ACID 250 MG: 250 SOLUTION ORAL at 02:09

## 2020-09-09 RX ADMIN — VALPROIC ACID 250 MG: 250 SOLUTION ORAL at 06:09

## 2020-09-09 RX ADMIN — VANCOMYCIN HYDROCHLORIDE 1000 MG: 1 INJECTION, POWDER, LYOPHILIZED, FOR SOLUTION INTRAVENOUS at 11:09

## 2020-09-09 RX ADMIN — ATORVASTATIN CALCIUM 40 MG: 20 TABLET, FILM COATED ORAL at 09:09

## 2020-09-09 RX ADMIN — ACETAMINOPHEN 650 MG: 325 TABLET ORAL at 12:09

## 2020-09-09 RX ADMIN — DILTIAZEM HYDROCHLORIDE 90 MG: 60 TABLET, FILM COATED ORAL at 02:09

## 2020-09-09 RX ADMIN — IPRATROPIUM BROMIDE AND ALBUTEROL SULFATE 3 ML: .5; 2.5 SOLUTION RESPIRATORY (INHALATION) at 07:09

## 2020-09-09 RX ADMIN — PANTOPRAZOLE SODIUM 40 MG: 40 INJECTION, POWDER, LYOPHILIZED, FOR SOLUTION INTRAVENOUS at 08:09

## 2020-09-09 RX ADMIN — CARVEDILOL 3.12 MG: 3.12 TABLET, FILM COATED ORAL at 08:09

## 2020-09-09 RX ADMIN — POLYETHYLENE GLYCOL 3350 17 G: 17 POWDER, FOR SOLUTION ORAL at 09:09

## 2020-09-09 RX ADMIN — IPRATROPIUM BROMIDE AND ALBUTEROL SULFATE 3 ML: .5; 2.5 SOLUTION RESPIRATORY (INHALATION) at 12:09

## 2020-09-09 RX ADMIN — FUROSEMIDE 40 MG: 40 TABLET ORAL at 06:09

## 2020-09-09 RX ADMIN — DOCUSATE SODIUM 50MG AND SENNOSIDES 8.6MG 1 TABLET: 8.6; 5 TABLET, FILM COATED ORAL at 09:09

## 2020-09-09 RX ADMIN — POTASSIUM CHLORIDE 40 MEQ: 1.5 POWDER, FOR SOLUTION ORAL at 06:09

## 2020-09-09 RX ADMIN — VALPROIC ACID 250 MG: 250 SOLUTION ORAL at 09:09

## 2020-09-09 RX ADMIN — QUETIAPINE FUMARATE 50 MG: 25 TABLET ORAL at 08:09

## 2020-09-09 RX ADMIN — DILTIAZEM HYDROCHLORIDE 90 MG: 60 TABLET, FILM COATED ORAL at 06:09

## 2020-09-09 RX ADMIN — DOCUSATE SODIUM 50MG AND SENNOSIDES 8.6MG 1 TABLET: 8.6; 5 TABLET, FILM COATED ORAL at 08:09

## 2020-09-09 RX ADMIN — PANTOPRAZOLE SODIUM 40 MG: 40 INJECTION, POWDER, LYOPHILIZED, FOR SOLUTION INTRAVENOUS at 09:09

## 2020-09-09 RX ADMIN — QUETIAPINE FUMARATE 50 MG: 25 TABLET ORAL at 09:09

## 2020-09-09 RX ADMIN — DILTIAZEM HYDROCHLORIDE 90 MG: 60 TABLET, FILM COATED ORAL at 09:09

## 2020-09-09 NOTE — PROGRESS NOTES
[facility]  Neurocritical Care  Progress Note    Admit Date: 8/29/2020  Service Date: 09/09/2020  Length of Stay: 11    Subjective:     Chief Complaint: Embolic stroke involving right middle cerebral artery    History of Present Illness: 64 year old female with a PMH significant for COPD, HTN, and every day smoker who presented at OSH on 08/26/20 with acute left sided facial droop and left sided weakness (last known normal 08/25/20). Pt was not given TPA or endovascular intervention, and she was intubated on 08/26/20 for agitation. CTH on 08/26/20 showed right posterior frontal/parietal infarction with no intracranial hemorrhage. Hospital course complicated by Afib with RVR in which she was started on lovenox and then transitioned to heparin gtt for GOYO and HFrEF. Pt was transferred to NICU for higher level of care and persistent epistaxis. Upon arrival Pt was intubated and sedated, Rhinorocket  Bilaterally in place, propofol and diltiazem gtt. Pt was able to move the right side and moves her left side to noxious stimuli. A-line placed.       Hospital Course: 08/29/20: Pt admitted to NICU for higher level of care and ENT consult. Pt with a run of Vtach associated with hypotension.   8/31/2020: hold anticoagulation until tomorrow due to GI bleed/epistaxis (at other facility) GI consult, pt in Afib- load with digoxin, pending digoxin level tomorrow, continue diltiazem gtt today, start tube feeds, obtain PIV and d/c central line, continue cefepime for total 7 days  9/1/2020: initiated heparin gtt minimal intensity-when at goal obtain CT head, continue diltiazem gtt, start tube feeds, d/c A-line  9/2/2020: changed to PO diltiazem 60mg q8hr from gtt, lasix, miralax, plan for extubation  9/3/2020: started seroquel 25BID  9/4/2020: increased seroquel to 25 mg BID, magnesium citrate, and try SBT  9/5/2020: place A-line, increased Po dilt, add chest PT, add water flushes and repeat magnesium  citrate  9/6/2020: d/c digoxin, add coreg 6.25 BID, d/c water flushes, repeat CMP, add lasix PO 40 mg BID  09/07/2020 Plan to wean FiO2. As we are weaning propofol, precedex will be added. Patient is currently on heparin drip. Daily EKG for QTc monitoring. Patient given one dose of diamox with plans to restart furosemide for metabolic alkalosis. Will discontinue barr tomorrow. F/u with procalcitonin due to increased WBC count. Will speak to family member about Trach/PEG discussion   09/08/2020 Patient remains on her heparin drip. Currently she is rate controlled. Patient is off of her propofol due to significnt hypotension. We are holding the next two doses of lasix and giving her diamox 500 mg for two doses for metabolic alkalosis. Dr. Zavala spoke to the POA, Griffin Gonzalez who would like to proceed with a trach/PEG. Patient was pan cultured today and started on broad spectrum antibiotics. Barr was changed today   09/09/2020 Patient scheduled to get trach/PEG this Friday. Still on broad spectrum antibiotics as she has an elevated WBC. Working up for alternative causes of fever: US of the upper and lower extremities, rapid COVID, and amylase lipase.     Interval History:  Patient scheduled to get trach/PEG this Friday. Still on broad spectrum antibiotics as she has an elevated WBC. Working up for alternative causes of fever: US of the upper and lower extremities, rapid COVID, and amylase lipase.     Review of Systems   Unable to perform ROS: Intubated     Objective:     Vitals:  Temp: 99.7 °F (37.6 °C)  Pulse: 90  Rhythm: atrial rhythm  BP: (!) 168/81  MAP (mmHg): 113  Resp: 16  SpO2: 97 %  Oxygen Concentration (%): 40  O2 Device (Oxygen Therapy): ventilator  Vent Mode: A/C  Set Rate: 16 BPM  Vt Set: 420 mL  Pressure Support: 12 cmH20  PEEP/CPAP: 5 cmH20  Peak Airway Pressure: 22 cmH2O  Mean Airway Pressure: 10 cmH20  Plateau Pressure: 14 cmH20    Temp  Min: 98.8 °F (37.1 °C)  Max: 100.4 °F (38 °C)  Pulse  Min:  79  Max: 121  BP  Min: 97/52  Max: 168/81  MAP (mmHg)  Min: 67  Max: 115  Resp  Min: 16  Max: 40  SpO2  Min: 94 %  Max: 100 %  Oxygen Concentration (%)  Min: 40  Max: 40    09/08 0701 - 09/09 0700  In: 2055.8 [I.V.:245.8]  Out: 1350 [Urine:1350]   Unmeasured Output  Stool Occurrence: 0  Emesis Occurrence: 0  Pad Count: 2       Physical Exam  Unable to test orientation, language, memory, judgment, insight, fund of knowledge, hearing, shoulder shrug, tongue protrusion, coordination, gait due to level of consciousness.    GA: comfortable, no acute distress.   HEENT: No scleral icterus or JVD.   Pulmonary: Clear to auscultation A/L.   Cardiac: RRR S1 & S2 w/o rubs/murmurs/gallops.   Abdominal: Bowel sounds present x 4. No appreciable hepatosplenomegaly.  Skin: No jaundice, rashes, or visible lesions.  Neuro:  --GCS: E2 VT1 M4  --Mental Status:  Doesn't opens eyes, doesn't follow commands. Possibly due to sedation   --Pupils left pupil brisk, R pupil fixed   --Corneal reflex, gag, cough intact.  --LUE withdraws  --RUE spont  --LLE withdraws  --RLE spont    Medications:  Continuousdexmedetomidine (PRECEDEX) infusion, Last Rate: 0.6 mcg/kg/hr (09/09/20 0605)  dextrose 10 % in water (D10W)  heparin (porcine) in D5W, Last Rate: 12 Units/kg/hr (09/09/20 0605)    Scheduledalbuterol-ipratropium, 3 mL, Q6H  atorvastatin, 40 mg, Daily  carvediloL, 3.125 mg, BID  ceFEPime (MAXIPIME) IVPB, 1 g, Q12H  diltiaZEM, 90 mg, Q8H  furosemide, 40 mg, BID  pantoprozole (PROTONIX) IV, 40 mg, Q12H  polyethylene glycol, 17 g, Daily  QUEtiapine, 50 mg, BID  senna-docusate 8.6-50 mg, 1 tablet, BID  valproic acid (as sodium salt), 250 mg, Q8H  vancomycin (VANCOCIN) IVPB, 1,000 mg, Q24H    PRNacetaminophen, 650 mg, Q6H PRN  dextrose 10 % in water (D10W), , Continuous PRN  dextrose 50%, 12.5 g, PRN  fentaNYL, 50 mcg, Q2H PRN  glucagon (human recombinant), 1 mg, PRN  hydrALAZINE, 10 mg, Q6H PRN  magnesium oxide, 800 mg, PRN  magnesium oxide, 800 mg,  PRN  ondansetron, 4 mg, Q6H PRN  potassium chloride, 40 mEq, PRN  potassium chloride, 40 mEq, PRN  potassium chloride, 60 mEq, PRN  potassium, sodium phosphates, 2 packet, PRN  potassium, sodium phosphates, 2 packet, PRN  potassium, sodium phosphates, 2 packet, PRN  vancomycin - pharmacy to dose, , pharmacy to manage frequency      Today I personally reviewed pertinent medications, lines/drains/airways, imaging, cardiology results, laboratory results, microbiology results, notably:    Diet  Diet NPO  Diet NPO        Assessment/Plan:     Neuro  * Embolic stroke involving right middle cerebral artery  - Last seen normal on 08/25/20 with no intervention; intubated and sedated.    - Imaging shows large right ischemic stroke in the posterior frontal and parietal regions  - SBP < 160, MAP > 65  - EuNa   - CBC, CMP, coags daily   - heparin gtt low intensity  - repeat CTH stable  - daily statin  - Seroquel to 50 mg BID,started precedex   -Propofol drip discontinued due to hypotension  -General surgery consulted for PEG/Trach placement and they plan on proceeding with the surgery on 9/11/20     ENT  Epistaxis  - persistent epistaxis in the setting of anticoagulation   - H/H stable   - daily CBC   - ENT consulted;   rhino rockets were removed     Pulmonary  Chronic obstructive pulmonary disease  - daily CXRs while intubated   - goal SpO2 is > 88%  - scheduled duo nebs q6h    Acute on chronic respiratory failure with hypoxia and hypercapnia  - Pt intubated and sedated   - Daily CXRs and abg   9/4/2020: SBT trial today and respiratory mechanics (NIF -11)  Vent Mode: A/C  Oxygen Concentration (%):  [40] 40  Resp Rate Total:  [17 br/min-30 br/min] 24 br/min  Vt Set:  [420 mL] 420 mL  PEEP/CPAP:  [5 cmH20] 5 cmH20  Pressure Support:  [12 cmH20] 12 cmH20  Mean Airway Pressure:  [10 mrT80-74 cmH20] 10 cmH20     PEG and Trach scheduled for Friday. Will hold tube feeds and heparin drip midnight on 9/11            Cardiac/Vascular  Atrial fibrillation with rapid ventricular response  - Repeat EKG   - daily Mg and Phos; replace as needed  - dilt PO  -dig level 1.8  - continue heparin gtt minimal intensity  -9/5/2020: increased PO diltiazem to 90 mg  9/6/20: d/c digoxin    Plan to hold heparin drip on 9/11/20 at midnight      Essential hypertension  - SBP goal < 160, MAP >65    Renal/  GOYO (acute kidney injury)  -Avoid nephrotoxic drugs  -Monitor Daily I/O    Oncology  Leukocytosis  Increasing WBC count with a fever   Patient being appropriately covered for positive respiratory cultures   Will get      Endocrine  Diabetes mellitus  -SSI moderate  -POCT glu monitoring    Moderate malnutrition  - tube feeds  - monitor electrolytes with daily CMP, Mg, phos    GI  GI bleed  - upper gi bleed at outside hospital  - recent upper GI bleed at outside facility and epistaxis (2 rhino rockets, up to 5 days prn)  - consulted GI -Continue PPI b.i.d. Trend hemoglobin, transfuse hemoglobin <7            The patient is being Prophylaxed for:  Venous Thromboembolism with: Chemical  Stress Ulcer with: PPI  Ventilator Pneumonia with: chlorhexidine oral care    Activity Orders          Diet NPO: NPO starting at 08/30 0250        Full Code    Finesse Lockwood MD  Neurocritical Care  [facility]

## 2020-09-09 NOTE — SUBJECTIVE & OBJECTIVE
Interval History:  Patient scheduled to get trach/PEG this Friday. Still on broad spectrum antibiotics as she has an elevated WBC. Working up for alternative causes of fever: US of the upper and lower extremities, rapid COVID, and amylase lipase.     Review of Systems   Unable to perform ROS: Intubated     Objective:     Vitals:  Temp: 99.7 °F (37.6 °C)  Pulse: 90  Rhythm: atrial rhythm  BP: (!) 168/81  MAP (mmHg): 113  Resp: 16  SpO2: 97 %  Oxygen Concentration (%): 40  O2 Device (Oxygen Therapy): ventilator  Vent Mode: A/C  Set Rate: 16 BPM  Vt Set: 420 mL  Pressure Support: 12 cmH20  PEEP/CPAP: 5 cmH20  Peak Airway Pressure: 22 cmH2O  Mean Airway Pressure: 10 cmH20  Plateau Pressure: 14 cmH20    Temp  Min: 98.8 °F (37.1 °C)  Max: 100.4 °F (38 °C)  Pulse  Min: 79  Max: 121  BP  Min: 97/52  Max: 168/81  MAP (mmHg)  Min: 67  Max: 115  Resp  Min: 16  Max: 40  SpO2  Min: 94 %  Max: 100 %  Oxygen Concentration (%)  Min: 40  Max: 40    09/08 0701 - 09/09 0700  In: 2055.8 [I.V.:245.8]  Out: 1350 [Urine:1350]   Unmeasured Output  Stool Occurrence: 0  Emesis Occurrence: 0  Pad Count: 2       Physical Exam  Unable to test orientation, language, memory, judgment, insight, fund of knowledge, hearing, shoulder shrug, tongue protrusion, coordination, gait due to level of consciousness.    GA: comfortable, no acute distress.   HEENT: No scleral icterus or JVD.   Pulmonary: Clear to auscultation A/L.   Cardiac: RRR S1 & S2 w/o rubs/murmurs/gallops.   Abdominal: Bowel sounds present x 4. No appreciable hepatosplenomegaly.  Skin: No jaundice, rashes, or visible lesions.  Neuro:  --GCS: E2 VT1 M4  --Mental Status:  Doesn't opens eyes, doesn't follow commands. Possibly due to sedation   --Pupils left pupil brisk, R pupil fixed   --Corneal reflex, gag, cough intact.  --LUE withdraws  --RUE spont  --LLE withdraws  --RLE spont    Medications:  Continuousdexmedetomidine (PRECEDEX) infusion, Last Rate: 0.6 mcg/kg/hr (09/09/20  0605)  dextrose 10 % in water (D10W)  heparin (porcine) in D5W, Last Rate: 12 Units/kg/hr (09/09/20 0605)    Scheduledalbuterol-ipratropium, 3 mL, Q6H  atorvastatin, 40 mg, Daily  carvediloL, 3.125 mg, BID  ceFEPime (MAXIPIME) IVPB, 1 g, Q12H  diltiaZEM, 90 mg, Q8H  furosemide, 40 mg, BID  pantoprozole (PROTONIX) IV, 40 mg, Q12H  polyethylene glycol, 17 g, Daily  QUEtiapine, 50 mg, BID  senna-docusate 8.6-50 mg, 1 tablet, BID  valproic acid (as sodium salt), 250 mg, Q8H  vancomycin (VANCOCIN) IVPB, 1,000 mg, Q24H    PRNacetaminophen, 650 mg, Q6H PRN  dextrose 10 % in water (D10W), , Continuous PRN  dextrose 50%, 12.5 g, PRN  fentaNYL, 50 mcg, Q2H PRN  glucagon (human recombinant), 1 mg, PRN  hydrALAZINE, 10 mg, Q6H PRN  magnesium oxide, 800 mg, PRN  magnesium oxide, 800 mg, PRN  ondansetron, 4 mg, Q6H PRN  potassium chloride, 40 mEq, PRN  potassium chloride, 40 mEq, PRN  potassium chloride, 60 mEq, PRN  potassium, sodium phosphates, 2 packet, PRN  potassium, sodium phosphates, 2 packet, PRN  potassium, sodium phosphates, 2 packet, PRN  vancomycin - pharmacy to dose, , pharmacy to manage frequency      Today I personally reviewed pertinent medications, lines/drains/airways, imaging, cardiology results, laboratory results, microbiology results, notably:    Diet  Diet NPO  Diet NPO

## 2020-09-09 NOTE — ASSESSMENT & PLAN NOTE
- Repeat EKG   - daily Mg and Phos; replace as needed  - dilt PO  -dig level 1.8  - continue heparin gtt minimal intensity  -9/5/2020: increased PO diltiazem to 90 mg  9/6/20: d/c digoxin    Plan to hold heparin drip on 9/11/20 at midnight

## 2020-09-09 NOTE — ASSESSMENT & PLAN NOTE
-Patient had uncontrolled epistaxis that was attributed to NGT placement and AC.  -Lovenox --> heparin infusion 9/1/20 but now on Coreg  -ENT consulted; signed off 8/31; leave nasal packing in for 5 days per ENT note 8/31 --> removed 9/4  -Managed by [facility]

## 2020-09-09 NOTE — ASSESSMENT & PLAN NOTE
- Last seen normal on 08/25/20 with no intervention; intubated and sedated.    - Imaging shows large right ischemic stroke in the posterior frontal and parietal regions  - SBP < 160, MAP > 65  - EuNa   - CBC, CMP, coags daily   - heparin gtt low intensity  - repeat CTH stable  - daily statin  - Seroquel to 50 mg BID,started precedex   -Propofol drip discontinued due to hypotension  -General surgery consulted for PEG/Trach placement and they plan on proceeding with the surgery on 9/11/20

## 2020-09-09 NOTE — ASSESSMENT & PLAN NOTE
- Pt intubated and sedated   - Daily CXRs and abg   9/4/2020: SBT trial today and respiratory mechanics (NIF -11)  Vent Mode: A/C  Oxygen Concentration (%):  [40] 40  Resp Rate Total:  [17 br/min-30 br/min] 24 br/min  Vt Set:  [420 mL] 420 mL  PEEP/CPAP:  [5 cmH20] 5 cmH20  Pressure Support:  [12 cmH20] 12 cmH20  Mean Airway Pressure:  [10 xgU22-76 cmH20] 10 cmH20     PEG and Trach scheduled for Friday. Will hold tube feeds and heparin drip midnight on 9/11

## 2020-09-09 NOTE — PLAN OF CARE
[facility] Care Plan    POC reviewed with Donita Gonzalez and family at 1400. Pt's  verbalized understanding. Questions and concerns addressed. No acute events today. Pt progressing toward goals. Will continue to monitor. See below and flowsheets for full assessment and VS info. Trach/Peg planned for friday      Neuro:  [place] Coma Scale  Best Eye Response: 2-->(E2) to pain  Best Motor Response: 5-->(M5) localizes pain  Best Verbal Response: 1-->(V1) none  [place] Coma Scale Score: 8  Assessment Qualifiers: patient intubated, patient chemically sedated or paralyzed  Pupil PERRLA: no     24 hr Temp:  [99.1 °F (37.3 °C)-100.4 °F (38 °C)]     CV:   Rhythm: atrial rhythm  BP goals:   SBP < 160  MAP > 65    Resp:   O2 Device (Oxygen Therapy): ventilator  Vent Mode: A/C  Set Rate: 16 BPM  Oxygen Concentration (%): 40  Vt Set: 420 mL  PEEP/CPAP: 5 cmH20  Pressure Support: 12 cmH20    Plan: trach/PEG discussions    GI/:  ROXANE Total Score: 1  Diet/Nutrition Received: tube feeding  Last Bowel Movement: 09/08/20  Voiding Characteristics: ureteral catheter    Intake/Output Summary (Last 24 hours) at 9/9/2020 1756  Last data filed at 9/9/2020 1702  Gross per 24 hour   Intake 2029.99 ml   Output 1845 ml   Net 184.99 ml     Unmeasured Output  Stool Occurrence: 0  Emesis Occurrence: 0  Pad Count: 2    Labs/Accuchecks:  Recent Labs   Lab 09/09/20 0100   WBC 24.82*   RBC 3.56*   HGB 10.7*   HCT 34.8*   *      Recent Labs   Lab 09/09/20 0100      K 3.5   CO2 32*      BUN 37*   CREATININE 1.3   ALKPHOS 84   ALT 33   AST 45*   BILITOT 0.3      Recent Labs   Lab 09/09/20 0100   APTT 40.6*      Recent Labs   Lab 09/07/20 2226   TROPONINI 0.080*       Electrolytes: Electrolytes replaced  Accuchecks: Q6H    Gtts:   dexmedetomidine (PRECEDEX) infusion 0.1 mcg/kg/hr (09/09/20 1702)    dextrose 10 % in water (D10W)      heparin (porcine) in D5W 12 Units/kg/hr (09/09/20 1702)       LDA/Wounds:  Lines/Drains/Airways        Drain                   NG/OG Tube 08/29/20 2215 orogastric Left mouth 10 days         Urethral Catheter 09/08/20 1105 1 day              Airway                   Airway - Non-Surgical 08/26/20 0931 Endotracheal Tube 14 days              Arterial Line              Arterial Line 09/05/20 1345 Left Radial 4 days              Peripheral Intravenous Line                   Peripheral IV - Single Lumen 09/04/20 1330 18 G Left Antecubital 5 days         Peripheral IV - Single Lumen 09/07/20 0300 20 G Anterior;Left;Proximal Upper Arm 2 days         Peripheral IV - Single Lumen 09/08/20 0905 20 G Anterior;Right Forearm 1 day                  Wounds: Yes  Wound care consulted: Yes

## 2020-09-09 NOTE — SUBJECTIVE & OBJECTIVE
Neurologic Chief Complaint: R MCA 2/2 Afib.     Subjective:     Interval History: Patient is seen for follow-up neurological assessment and treatment recommendations: R MCA 2/2 Afib on heparin, coreg, and diltiazem. WBC increasing, respi cx with GNR and Staph Aureus. Hypotensive on propofol so transitioned to Precedex and pan cx. Family POA agreeable to trach /PEG.    HPI, Past Medical, Family, and Social History remains the same as documented in the initial encounter.     Review of Systems   Unable to perform ROS: Intubated   Constitutional: Negative for fever.   HENT: Negative for nosebleeds.         Tongue swelling   Respiratory: Negative for chest tightness.         Intubated   Cardiovascular: Negative for chest pain.   Gastrointestinal: Negative for anal bleeding and blood in stool.   Genitourinary: Negative for hematuria.   Neurological: Negative for facial asymmetry.   Psychiatric/Behavioral: Positive for agitation.        Precedex for agitation while on MV.      Scheduled Meds:   albuterol-ipratropium  3 mL Nebulization Q6H    atorvastatin  40 mg Per OG tube Daily    carvediloL  3.125 mg Per OG tube BID    ceFEPime (MAXIPIME) IVPB  1 g Intravenous Q12H    diltiaZEM  90 mg Per OG tube Q8H    furosemide  40 mg Per OG tube BID    pantoprozole (PROTONIX) IV  40 mg Intravenous Q12H    polyethylene glycol  17 g Per NG tube Daily    QUEtiapine  50 mg Per OG tube BID    senna-docusate 8.6-50 mg  1 tablet Per OG tube BID    valproic acid (as sodium salt)  250 mg Per OG tube Q8H    vancomycin (VANCOCIN) IVPB  1,000 mg Intravenous Q24H     Continuous Infusions:   dexmedetomidine (PRECEDEX) infusion 0.6 mcg/kg/hr (09/09/20 0605)    dextrose 10 % in water (D10W)      heparin (porcine) in D5W 12 Units/kg/hr (09/09/20 0605)     PRN Meds:acetaminophen, dextrose 10 % in water (D10W), dextrose 50%, fentaNYL, glucagon (human recombinant), hydrALAZINE, magnesium oxide, magnesium oxide, ondansetron, potassium  chloride, potassium chloride, potassium chloride, potassium, sodium phosphates, potassium, sodium phosphates, potassium, sodium phosphates, Pharmacy to dose Vancomycin consult **AND** vancomycin - pharmacy to dose    Objective:     Vital Signs (Most Recent):  Temp: 99.7 °F (37.6 °C) (09/09/20 0605)  Pulse: 90 (09/09/20 0727)  Resp: 16 (09/09/20 0727)  BP: (!) 168/81 (09/09/20 0605)  SpO2: 97 % (09/09/20 0727)  BP Location: Right arm    Vital Signs Range (Last 24H):  Temp:  [98.8 °F (37.1 °C)-100.4 °F (38 °C)]   Pulse:  []   Resp:  [16-40]   BP: ()/(52-88)   SpO2:  [94 %-100 %]   Arterial Line BP: ()/(55-85)   BP Location: Right arm    Physical Exam  Constitutional:       General: She is not in acute distress.     Appearance: Normal appearance. She is not ill-appearing.   HENT:      Head: Normocephalic.      Right Ear: External ear normal. There is no impacted cerumen.      Left Ear: External ear normal. There is no impacted cerumen.      Nose: No congestion or rhinorrhea.      Mouth/Throat:      Mouth: Mucous membranes are dry.   Eyes:      General: No scleral icterus.        Right eye: No discharge.         Left eye: No discharge.      Comments: Left pupil 3mm, R pupil 6 fixed    Cardiovascular:      Rate and Rhythm: Normal rate.      Heart sounds: No murmur.   Pulmonary:      Comments: intubated  Abdominal:      General: Abdomen is flat. There is no distension.      Palpations: Abdomen is soft.      Tenderness: There is no abdominal tenderness.   Musculoskeletal:         General: No swelling, tenderness or deformity.   Skin:     General: Skin is warm and dry.      Coloration: Skin is not pale.      Findings: No erythema.   Neurological:      Mental Status: She is alert.      Comments: Limited 2/2/ sedation with Precedex       Neurological Exam:   LOC: alert  Attention Span: poor  Language: No aphasia, Intubated  Articulation: Untestable due to intubation  Orientation: Untestable due to  intubation  Motor: Arm left  Plegia 0/5  Leg left  Plegia 0/5  Arm right  Paresis: 3/5 (with mitt)  Leg right Paresis: 3/5     Laboratory:  CMP:   Recent Labs   Lab 09/09/20  0100   CALCIUM 8.8   ALBUMIN 2.0*   PROT 6.1      K 3.5   CO2 32*      BUN 37*   CREATININE 1.3   ALKPHOS 84   ALT 33   AST 45*   BILITOT 0.3     CBC:   Recent Labs   Lab 09/09/20  0100   WBC 24.82*   RBC 3.56*   HGB 10.7*   HCT 34.8*   *   MCV 98   MCH 30.1   MCHC 30.7*     Lipid Panel: No results for input(s): CHOL, LDLCALC, HDL, TRIG in the last 168 hours.  Hgb A1C: No results for input(s): HGBA1C in the last 168 hours.  TSH: No results for input(s): TSH in the last 168 hours.    Diagnostic Results   MRI Brain 8/26/20  Ventricles and sulci are normal in size for age without evidence of hydrocephalus.  No extra-axial blood or fluid collections  Large area restricted diffusion right posterior frontal and parietal lobe consistent with right recent infarction.  No evidence of superimposed hemorrhage.  Patchy areas of increased T2/FLAIR signal of the supratentorial white matter consistent with chronic microvascular ischemic changes.  No evidence of mass.  Skull/extracranial contents (limited evaluation): Bone marrow signal intensity is normal.  Small amount of fluid in the right maxillary sinus.  Large area of restricted diffusion consistent with continued evolution of recent infarction involving the right posterior frontal and parietal lobes lobes.  No evidence of hemorrhage.  Chronic microvascular ischemic changes.     CT head 8/30/20  No extra-axial blood or fluid collections.  Continued evolution of areas infarction involving the right frontal and right parietal lobes.  No evidence of superimposed hemorrhage.  Associated edema with mild compression of the right lateral ventricle.  No hydrocephalus.  No evidence of mass.  Skull/extracranial contents (limited evaluation): No fracture. Mastoid air cells and paranasal sinuses  are essentially clear.     Continued evolution of right frontal and parietal lobe infarctions with no evidence of hemorrhage.  Associated edema is noted with no midline shift.  Mild mass effect on the right lateral ventricle.  No hydrocephalus.     Cardiac Imaging   TTE 9/1/20  · Moderate concentric left ventricular hypertrophy.  · Moderately to severely decreased left ventricular systolic function. The estimated ejection fraction is 25-30%.  · Left ventricular diastolic dysfunction.  · Moderately to severely reduced right ventricular systolic function.  · Moderate left atrial enlargement.  · Severe right atrial enlargement.  · Moderate mitral regurgitation.  · Moderate tricuspid regurgitation.  · Intermediate central venous pressure (8 mmHg).  · The estimated PA systolic pressure is 42 mmHg.  · Pulmonary hypertension present.  · Negative bubble study.

## 2020-09-09 NOTE — ASSESSMENT & PLAN NOTE
Increasing WBC count with a fever   Patient being appropriately covered for positive respiratory cultures   Will get

## 2020-09-09 NOTE — CONSULTS
[facility]  General Surgery  Consult Note    Inpatient consult to General Surgery  Consult performed by: Kaia Ramsey MD  Consult ordered by: Finesse Lockwood MD        Subjective:     History of Present Illness:   64 year old female with a PMH significant for COPD, HTN, and every day smoker who presented at OSH on 08/26/20 with acute left sided facial droop and left sided weakness (last known normal 08/25/20). Pt was not given TPA or endovascular intervention, and she was intubated on 08/26/20 for agitation. Unable to extubate patient. Surgery consulted for trach/peg.  Patient on minimal vent settings. On heparin gtt for afib.     No current facility-administered medications on file prior to encounter.      Current Outpatient Medications on File Prior to Encounter   Medication Sig    amLODIPine (NORVASC) 10 MG tablet Take 1 tablet (10 mg total) by mouth once daily.    aspirin 325 MG tablet Take 1 tablet (325 mg total) by mouth once daily.    atorvastatin (LIPITOR) 20 MG tablet Take 4 tablets (80 mg total) by mouth once daily.    cloNIDine (CATAPRES) 0.1 MG tablet Take 1 tablet (0.1 mg total) by mouth every 8 (eight) hours.    clopidogreL (PLAVIX) 75 mg tablet Take 1 tablet (75 mg total) by mouth once daily.    albuterol (PROVENTIL/VENTOLIN HFA) 90 mcg/actuation inhaler Inhale 2 puffs into the lungs every 4 (four) hours as needed for Wheezing or Shortness of Breath. Rescue    ammonium lactate (LAC-HYDRIN) 12 % lotion Apply topically as needed for Dry Skin.    divalproex ER (DEPAKOTE) 500 MG Tb24 Take 1 tablet (500 mg total) by mouth every evening. (Patient not taking: Reported on 12/16/2019)    losartan-hydrochlorothiazide 50-12.5 mg (HYZAAR) 50-12.5 mg per tablet Take 1 tablet by mouth once daily.    triamcinolone acetonide 0.5% (KENALOG) 0.5 % Crea Apply topically 2 (two) times daily.    umeclidinium-vilanterol (ANORO ELLIPTA) 62.5-25 mcg/actuation DsDv Inhale 1 puff into the  lungs once daily. Controller       Review of patient's allergies indicates:  No Known Allergies    Past Medical History:   Diagnosis Date    COPD (chronic obstructive pulmonary disease)     Hypertension      Past Surgical History:   Procedure Laterality Date    AUGMENTATION OF BREAST      HYSTERECTOMY       Family History     None        Tobacco Use    Smoking status: Current Every Day Smoker     Packs/day: 1.00     Types: Cigarettes     Start date: 4/16/1980    Smokeless tobacco: Never Used   Substance and Sexual Activity    Alcohol use: Never     Frequency: Never    Drug use: Never    Sexual activity: Not on file     Review of Systems   Unable to perform ROS: Intubated     Objective:     Vital Signs (Most Recent):  Temp: 99.3 °F (37.4 °C) (09/08/20 2005)  Pulse: 105 (09/08/20 2005)  Resp: 18 (09/08/20 2005)  BP: 124/61 (09/08/20 2005)  SpO2: 95 % (09/08/20 2005) Vital Signs (24h Range):  Temp:  [98.8 °F (37.1 °C)-100 °F (37.8 °C)] 99.3 °F (37.4 °C)  Pulse:  [] 105  Resp:  [11-32] 18  SpO2:  [93 %-100 %] 95 %  BP: ()/(53-88) 124/61  Arterial Line BP: ()/() 103/63     Weight: 51 kg (112 lb 7 oz)  Body mass index is 18.71 kg/m².      Intake/Output Summary (Last 24 hours) at 9/8/2020 2119  Last data filed at 9/8/2020 2005  Gross per 24 hour   Intake 1501.08 ml   Output 1540 ml   Net -38.92 ml       Physical Exam  Constitutional:       General: She is not in acute distress.  Cardiovascular:      Rate and Rhythm: Normal rate.   Pulmonary:      Effort: Pulmonary effort is normal.      Breath sounds: Normal breath sounds.   Abdominal:      General: There is no distension.      Palpations: Abdomen is soft.      Tenderness: There is no abdominal tenderness.   Skin:     General: Skin is warm and dry.         Significant Labs:  CBC:   Recent Labs   Lab 09/08/20  0110   WBC 19.50*   RBC 3.80*   HGB 11.3*   HCT 36.2*   *   MCV 95   MCH 29.7   MCHC 31.2*     CMP:   Recent Labs   Lab  09/08/20  0110      CALCIUM 8.7   ALBUMIN 2.1*   PROT 6.3      K 4.4   CO2 33*   CL 99   BUN 36*   CREATININE 1.3   ALKPHOS 75   ALT 35   AST 58*   BILITOT 0.3       Significant Diagnostics:  I have reviewed all pertinent imaging results/findings within the past 24 hours.    Assessment/Plan:     Active Diagnoses:    Diagnosis Date Noted POA    PRINCIPAL PROBLEM:  Embolic stroke involving right middle cerebral artery [I63.411] 08/29/2020 Yes    Macroglossia [Q38.2] 09/07/2020 Not Applicable    Moderate malnutrition [E44.0] 09/03/2020 Yes    Diabetes mellitus [E11.9] 09/03/2020 Yes    Cytotoxic cerebral edema [G93.6] 08/30/2020 Yes    Epistaxis [R04.0] 08/28/2020 Yes    GI bleed [K92.2] 08/28/2020 Yes    Atrial fibrillation with rapid ventricular response [I48.91] 08/26/2020 Yes    Chronic obstructive pulmonary disease [J44.9] 06/04/2019 Yes    Essential hypertension [I10] 04/19/2019 Yes    GOYO (acute kidney injury) [N17.9] 04/17/2019 Yes    Acute on chronic respiratory failure with hypoxia and hypercapnia [J96.21, J96.22] 04/16/2019 Yes      Problems Resolved During this Admission:     Plan for trach/peg on Friday, needs consent  Please hold tube feeds at midnight, hold heparin gtt at midnight if possible (if not, can hold 4 hours prior to procedure)  Please ensure patient has an 8.0 ETT    Kaia Ramsey MD  General Surgery  [facility]

## 2020-09-09 NOTE — ASSESSMENT & PLAN NOTE
Donita Gonzalez is a 64 y.o. female with a significant medical history of COPD, HTN, smoker who presents to the hospital as a transfer from [facility] for further evaluation of R MCA stroke.  The patient did not receive tPA due to LKN 8/25/2020 and presenting to the OSH on 8/26/2020.     9/8: Gen surg consulted for trach/ PEG. Pan cx for hypotension (propofol stopped) and leukocytosis   9/9: Agitated off precedex drip and moving right side extremities spontaneously. Resp cx with GNR, Staph aureus but blood cultures negative to date.     Antithrombotics for secondary stroke prevention: Anticoagulants: Patient was on full dose Lovenox that was dc'd 2/2 uncontrolled epistaxis. Heparin infusion started 9/1/20 for A/C.  Repeat CTH 9/2 normal, continue heparin drip    Statins for secondary stroke prevention and hyperlipidemia, if present:   Statins: Atorvastatin- 40 mg daily    Aggressive risk factor modification: HTN, Smoking, HLD, A-Fib  (Coreg, dilt for AFib and HTN)     Rehab efforts: The patient has been evaluated by a stroke team provider and the therapy needs have been fully considered based off the presenting complaints and exam findings. The following therapy evaluations are needed: PT/OT/SLP evaluations when the patient is able to participate    Diagnostics ordered/pending: CT scan of head without contrast to asses brain parenchyma  No changes 9/2/20 while on heparin drip therapeutic    VTE prophylaxis: Mechanical prophylaxis: Place SCDs and now heparin infusion     BP parameters: Infarct: No intervention, SBP <220

## 2020-09-09 NOTE — PROGRESS NOTES
[facility]  Vascular Neurology  Comprehensive Stroke Center  Progress Note    Assessment/Plan:     * Embolic stroke involving right middle cerebral artery  Donita Gonzalez is a 64 y.o. female with a significant medical history of COPD, HTN, smoker who presents to the hospital as a transfer from [facility] for further evaluation of R MCA stroke.  The patient did not receive tPA due to LKN 8/25/2020 and presenting to the OSH on 8/26/2020.     9/8: Gen surg consulted for trach/ PEG. Pan cx for hypotension (propofol stopped) and leukocytosis   9/9: Agitated off precedex drip and moving right side extremities spontaneously. Resp cx with GNR, Staph aureus but blood cultures negative to date.     Antithrombotics for secondary stroke prevention: Anticoagulants: Patient was on full dose Lovenox that was dc'd 2/2 uncontrolled epistaxis. Heparin infusion started 9/1/20 for A/C.  Repeat CTH 9/2 normal, continue heparin drip    Statins for secondary stroke prevention and hyperlipidemia, if present:   Statins: Atorvastatin- 40 mg daily    Aggressive risk factor modification: HTN, Smoking, HLD, A-Fib  (Coreg, dilt for AFib and HTN)     Rehab efforts: The patient has been evaluated by a stroke team provider and the therapy needs have been fully considered based off the presenting complaints and exam findings. The following therapy evaluations are needed: PT/OT/SLP evaluations when the patient is able to participate    Diagnostics ordered/pending: CT scan of head without contrast to asses brain parenchyma  No changes 9/2/20 while on heparin drip therapeutic    VTE prophylaxis: Mechanical prophylaxis: Place SCDs and now heparin infusion     BP parameters: Infarct: No intervention, SBP <220        Cytotoxic cerebral edema  -Small area of cytotoxic cerebral edema identified when reviewing brain imaging in the territory of the R middle cerebral artery. There is no mass effect associated with it. We will  continue to monitor the patients clinical exam for any worsening of symptoms which may indicate expansion of the stroke or the area of the edema resulting in the clinical change. The pattern is suggestive of cardioembolic etiology in the setting of new onset Afib w/RVR.    -Recommend Neurosurgery consult        Epistaxis  -Patient had uncontrolled epistaxis that was attributed to NGT placement and AC.  -Lovenox --> heparin infusion 9/1/20 but now on Coreg  -ENT consulted; signed off 8/31; leave nasal packing in for 5 days per ENT note 8/31 --> removed 9/4  -Managed by [facility]    Atrial fibrillation with rapid ventricular response  -Stroke risk factor.  The patient was on full dose lovenox --> heparin infusion now given epistasix  -Continue Diltiazem gtt; Digoxin loaded 9/1/20  -CT Head 9/2 without acute chagnes on therapeutic heparin    Chronic obstructive pulmonary disease  -Goal SpO2>88%  -Managed by [facility]    GOYO (acute kidney injury)  -Cr 2.1<2.3 on 8/28 --> 1.3 on 9/8/20  -Managed by [facility]    Acute on chronic respiratory failure with hypoxia and hypercapnia  -Patient is currently intubated and sedated.   -Managed by [facility]         8/31: Patient intubated. Rhinorockets in place; ENT sign off.   9/1: Patient remains intubated; heparin restarted; once therapeutic, CT head and plan for extubation. Dilt for AFib RVR  9/2: Patient remains intubated  9/3: Patient remains intubated; alert on Precedex. Transitioned to po dilt for Afib.   9/4: Patient remains intubated; alert on Precedex, rhinorockets removed  9/7: no change, intubated and sedated on Propofol  9/8: WBC increase, hypotensive on propofol --> Precedex. Pan cx. Gen surg consulted for PEG / trach  9/9: Patient agitated off sedation, spontaneously moving right UE and LE. Planning for trach / PEG on Friday 9/11    STROKE DOCUMENTATION        NIH Scale:  1a. Level of Consciousness: 2-->Not alert, requires repeated stimulation to attend, or is obtunded and requires strong  or painful stimulation to make movements (not stereotyped)  1b. LOC Questions: 2-->Answers neither question correctly  1c. LOC Commands: 2-->Performs neither task correctly  2. Best Gaze: 1-->Partial gaze palsy, gaze is abnormal in one or both eyes, but forced deviation or total gaze paresis is not present  3. Visual: 2-->Complete hemianopia  4. Facial Palsy: 0-->Normal symmetrical movements  5a. Motor Arm, Left: 4-->No movement  5b. Motor Arm, Right: 3-->No effort against gravity, limb falls  6a. Motor Leg, Left: 4-->No movement  6b. Motor Leg, Right: 2-->Some effort against gravity, leg falls to bed by 5 secs, but has some effort against gravity  7. Limb Ataxia: 0-->Absent  8. Sensory: 2-->Severe to total sensory loss, patient is not aware of being touched in the face, arm, and leg  9. Best Language: 3-->Mute, global aphasia, no usable speech or auditory comprehension  10. Dysarthria: 0-->Normal  11. Extinction and Inattention (formerly Neglect): 1-->Visual, tactile, auditory, spatial, or personal inattention or extinction to bilateral simultaneous stimulation in one of the sensory modalities  Total (NIH Stroke Scale): 28       Modified Jesus Score: 4  Manuel Coma Scale:8   ABCD2 Score:    [id number] Score:7  HAS -BLED Score:2  ICH Score:   Hunt & Bell Classification:      Hemorrhagic change of an Ischemic Stroke: Does this patient have an ischemic stroke with hemorrhagic changes? No     Neurologic Chief Complaint: R MCA 2/2 Afib.     Subjective:     Interval History: Patient is seen for follow-up neurological assessment and treatment recommendations: R MCA 2/2 Afib on heparin, coreg, and diltiazem. WBC increasing, respi cx with GNR and Staph Aureus. Hypotensive on propofol so transitioned to Precedex and pan cx. Family POA agreeable to trach /PEG.    HPI, Past Medical, Family, and Social History remains the same as documented in the initial encounter.     Review of Systems   Unable to perform ROS: Intubated    Constitutional: Negative for fever.   HENT: Negative for nosebleeds.         Tongue swelling   Respiratory: Negative for chest tightness.         Intubated   Cardiovascular: Negative for chest pain.   Gastrointestinal: Negative for anal bleeding and blood in stool.   Genitourinary: Negative for hematuria.   Neurological: Negative for facial asymmetry.   Psychiatric/Behavioral: Positive for agitation.        Precedex for agitation while on MV.      Scheduled Meds:   albuterol-ipratropium  3 mL Nebulization Q6H    atorvastatin  40 mg Per OG tube Daily    carvediloL  3.125 mg Per OG tube BID    ceFEPime (MAXIPIME) IVPB  1 g Intravenous Q12H    diltiaZEM  90 mg Per OG tube Q8H    furosemide  40 mg Per OG tube BID    pantoprozole (PROTONIX) IV  40 mg Intravenous Q12H    polyethylene glycol  17 g Per NG tube Daily    QUEtiapine  50 mg Per OG tube BID    senna-docusate 8.6-50 mg  1 tablet Per OG tube BID    valproic acid (as sodium salt)  250 mg Per OG tube Q8H    vancomycin (VANCOCIN) IVPB  1,000 mg Intravenous Q24H     Continuous Infusions:   dexmedetomidine (PRECEDEX) infusion 0.6 mcg/kg/hr (09/09/20 0605)    dextrose 10 % in water (D10W)      heparin (porcine) in D5W 12 Units/kg/hr (09/09/20 0605)     PRN Meds:acetaminophen, dextrose 10 % in water (D10W), dextrose 50%, fentaNYL, glucagon (human recombinant), hydrALAZINE, magnesium oxide, magnesium oxide, ondansetron, potassium chloride, potassium chloride, potassium chloride, potassium, sodium phosphates, potassium, sodium phosphates, potassium, sodium phosphates, Pharmacy to dose Vancomycin consult **AND** vancomycin - pharmacy to dose    Objective:     Vital Signs (Most Recent):  Temp: 99.7 °F (37.6 °C) (09/09/20 0605)  Pulse: 90 (09/09/20 0727)  Resp: 16 (09/09/20 0727)  BP: (!) 168/81 (09/09/20 0605)  SpO2: 97 % (09/09/20 0727)  BP Location: Right arm    Vital Signs Range (Last 24H):  Temp:  [98.8 °F (37.1 °C)-100.4 °F (38 °C)]   Pulse:  []    Resp:  [16-40]   BP: ()/(52-88)   SpO2:  [94 %-100 %]   Arterial Line BP: ()/(55-85)   BP Location: Right arm    Physical Exam  Constitutional:       General: She is not in acute distress.     Appearance: Normal appearance. She is not ill-appearing.   HENT:      Head: Normocephalic.      Right Ear: External ear normal. There is no impacted cerumen.      Left Ear: External ear normal. There is no impacted cerumen.      Nose: No congestion or rhinorrhea.      Mouth/Throat:      Mouth: Mucous membranes are dry.   Eyes:      General: No scleral icterus.        Right eye: No discharge.         Left eye: No discharge.      Comments: Left pupil 3mm, R pupil 6 fixed    Cardiovascular:      Rate and Rhythm: Normal rate.      Heart sounds: No murmur.   Pulmonary:      Comments: intubated  Abdominal:      General: Abdomen is flat. There is no distension.      Palpations: Abdomen is soft.      Tenderness: There is no abdominal tenderness.   Musculoskeletal:         General: No swelling, tenderness or deformity.   Skin:     General: Skin is warm and dry.      Coloration: Skin is not pale.      Findings: No erythema.   Neurological:      Mental Status: She is alert.      Comments: Limited 2/2/ sedation with Precedex       Neurological Exam:   LOC: alert  Attention Span: poor  Language: No aphasia, Intubated  Articulation: Untestable due to intubation  Orientation: Untestable due to intubation  Motor: Arm left  Plegia 0/5  Leg left  Plegia 0/5  Arm right  Paresis: 3/5 (with mitt)  Leg right Paresis: 3/5     Laboratory:  CMP:   Recent Labs   Lab 09/09/20  0100   CALCIUM 8.8   ALBUMIN 2.0*   PROT 6.1      K 3.5   CO2 32*      BUN 37*   CREATININE 1.3   ALKPHOS 84   ALT 33   AST 45*   BILITOT 0.3     CBC:   Recent Labs   Lab 09/09/20  0100   WBC 24.82*   RBC 3.56*   HGB 10.7*   HCT 34.8*   *   MCV 98   MCH 30.1   MCHC 30.7*     Lipid Panel: No results for input(s): CHOL, LDLCALC, HDL, TRIG in the last  168 hours.  Hgb A1C: No results for input(s): HGBA1C in the last 168 hours.  TSH: No results for input(s): TSH in the last 168 hours.    Diagnostic Results   MRI Brain 8/26/20  Ventricles and sulci are normal in size for age without evidence of hydrocephalus.  No extra-axial blood or fluid collections  Large area restricted diffusion right posterior frontal and parietal lobe consistent with right recent infarction.  No evidence of superimposed hemorrhage.  Patchy areas of increased T2/FLAIR signal of the supratentorial white matter consistent with chronic microvascular ischemic changes.  No evidence of mass.  Skull/extracranial contents (limited evaluation): Bone marrow signal intensity is normal.  Small amount of fluid in the right maxillary sinus.  Large area of restricted diffusion consistent with continued evolution of recent infarction involving the right posterior frontal and parietal lobes lobes.  No evidence of hemorrhage.  Chronic microvascular ischemic changes.     CT head 8/30/20  No extra-axial blood or fluid collections.  Continued evolution of areas infarction involving the right frontal and right parietal lobes.  No evidence of superimposed hemorrhage.  Associated edema with mild compression of the right lateral ventricle.  No hydrocephalus.  No evidence of mass.  Skull/extracranial contents (limited evaluation): No fracture. Mastoid air cells and paranasal sinuses are essentially clear.     Continued evolution of right frontal and parietal lobe infarctions with no evidence of hemorrhage.  Associated edema is noted with no midline shift.  Mild mass effect on the right lateral ventricle.  No hydrocephalus.     Cardiac Imaging   TTE 9/1/20  · Moderate concentric left ventricular hypertrophy.  · Moderately to severely decreased left ventricular systolic function. The estimated ejection fraction is 25-30%.  · Left ventricular diastolic dysfunction.  · Moderately to severely reduced right ventricular  systolic function.  · Moderate left atrial enlargement.  · Severe right atrial enlargement.  · Moderate mitral regurgitation.  · Moderate tricuspid regurgitation.  · Intermediate central venous pressure (8 mmHg).  · The estimated PA systolic pressure is 42 mmHg.  · Pulmonary hypertension present.  · Negative bubble study.      Teresa Marin MD  [facility]  Department of Vascular Neurology   [facility]

## 2020-09-09 NOTE — PLAN OF CARE
[facility] Care Plan    POC reviewed with Donita Gonzalez at 0600. Pt unable to verbalized understanding due to cognition. No acute events overnight. Pt progressing toward goals. Will continue to monitor. See below and flowsheets for full assessment and VS info.       Neuro:  Burke Coma Scale  Best Eye Response: 2-->(E2) to pain  Best Motor Response: 5-->(M5) localizes pain  Best Verbal Response: 1-->(V1) none  Burke Coma Scale Score: 8  Assessment Qualifiers: patient intubated, patient chemically sedated or paralyzed  Pupil PERRLA: no     24hr Temp:  [98.8 °F (37.1 °C)-100.4 °F (38 °C)]     CV:   Rhythm: atrial rhythm  BP goals:   SBP < 160  MAP > 65    Resp:   O2 Device (Oxygen Therapy): ventilator  Vent Mode: A/C  Set Rate: 16 BPM  Oxygen Concentration (%): 40  Vt Set: 420 mL  PEEP/CPAP: 5 cmH20  Pressure Support: 12 cmH20    Plan: wean to extubate and trach/PEG discussions    GI/:  ROXANE Total Score: 1  Diet/Nutrition Received: NPO, tube feeding  Last Bowel Movement: 09/08/20  Voiding Characteristics: urethral catheter (bladder)    Intake/Output Summary (Last 24 hours) at 9/9/2020 0813  Last data filed at 9/9/2020 0605  Gross per 24 hour   Intake 1953.2 ml   Output 1275 ml   Net 678.2 ml     Unmeasured Output  Stool Occurrence: 0  Emesis Occurrence: 0  Pad Count: 2    Labs/Accuchecks:  Recent Labs   Lab 09/09/20 0100   WBC 24.82*   RBC 3.56*   HGB 10.7*   HCT 34.8*   *      Recent Labs   Lab 09/09/20 0100      K 3.5   CO2 32*      BUN 37*   CREATININE 1.3   ALKPHOS 84   ALT 33   AST 45*   BILITOT 0.3      Recent Labs   Lab 09/09/20 0100   APTT 40.6*      Recent Labs   Lab 09/07/20  2226   TROPONINI 0.080*       Electrolytes: Electrolytes replaced  Accuchecks: Q6H    Gtts:   dexmedetomidine (PRECEDEX) infusion 0.6 mcg/kg/hr (09/09/20 0605)    dextrose 10 % in water (D10W)      heparin (porcine) in D5W 12 Units/kg/hr (09/09/20 0605)       LDA/Wounds:  Lines/Drains/Airways       Drain                    NG/OG Tube 08/29/20 2215 orogastric Left mouth 10 days         Urethral Catheter 09/08/20 1105 less than 1 day              Airway                   Airway - Non-Surgical 08/26/20 0931 Endotracheal Tube 13 days              Arterial Line              Arterial Line 09/05/20 1345 Left Radial 3 days              Peripheral Intravenous Line                   Peripheral IV - Single Lumen 09/04/20 1330 18 G Left Antecubital 4 days         Peripheral IV - Single Lumen 09/07/20 0300 20 G Anterior;Left;Proximal Upper Arm 2 days         Peripheral IV - Single Lumen 09/08/20 0905 20 G Anterior;Right Forearm less than 1 day                  Wounds: Yes  Wound care consulted: Yes

## 2020-09-10 ENCOUNTER — ANESTHESIA EVENT (OUTPATIENT)
Dept: SURGERY | Facility: HOSPITAL | Age: 65
DRG: 004 | End: 2020-09-10
Payer: COMMERCIAL

## 2020-09-10 LAB
ALBUMIN SERPL BCP-MCNC: 1.9 G/DL (ref 3.5–5.2)
ALLENS TEST: ABNORMAL
ALP SERPL-CCNC: 88 U/L (ref 55–135)
ALT SERPL W/O P-5'-P-CCNC: 28 U/L (ref 10–44)
ANION GAP SERPL CALC-SCNC: 11 MMOL/L (ref 8–16)
APTT BLDCRRT: 41.2 SEC (ref 21–32)
AST SERPL-CCNC: 40 U/L (ref 10–40)
BACTERIA SPEC AEROBE CULT: ABNORMAL
BASOPHILS # BLD AUTO: 0.12 K/UL (ref 0–0.2)
BASOPHILS NFR BLD: 0.6 % (ref 0–1.9)
BILIRUB SERPL-MCNC: 0.3 MG/DL (ref 0.1–1)
BUN SERPL-MCNC: 36 MG/DL (ref 8–23)
CALCIUM SERPL-MCNC: 8.8 MG/DL (ref 8.7–10.5)
CHLORIDE SERPL-SCNC: 101 MMOL/L (ref 95–110)
CO2 SERPL-SCNC: 28 MMOL/L (ref 23–29)
CREAT SERPL-MCNC: 1.2 MG/DL (ref 0.5–1.4)
DELSYS: ABNORMAL
DIFFERENTIAL METHOD: ABNORMAL
EOSINOPHIL # BLD AUTO: 0.1 K/UL (ref 0–0.5)
EOSINOPHIL NFR BLD: 0.7 % (ref 0–8)
ERYTHROCYTE [DISTWIDTH] IN BLOOD BY AUTOMATED COUNT: 14.5 % (ref 11.5–14.5)
EST. GFR  (AFRICAN AMERICAN): 55.2 ML/MIN/1.73 M^2
EST. GFR  (NON AFRICAN AMERICAN): 47.9 ML/MIN/1.73 M^2
GLUCOSE SERPL-MCNC: 115 MG/DL (ref 70–110)
GRAM STN SPEC: ABNORMAL
HCO3 UR-SCNC: 32.7 MMOL/L (ref 24–28)
HCT VFR BLD AUTO: 33 % (ref 37–48.5)
HGB BLD-MCNC: 10.3 G/DL (ref 12–16)
IMM GRANULOCYTES # BLD AUTO: 0.92 K/UL (ref 0–0.04)
IMM GRANULOCYTES NFR BLD AUTO: 4.3 % (ref 0–0.5)
LYMPHOCYTES # BLD AUTO: 1 K/UL (ref 1–4.8)
LYMPHOCYTES NFR BLD: 4.6 % (ref 18–48)
MAGNESIUM SERPL-MCNC: 1.7 MG/DL (ref 1.6–2.6)
MAGNESIUM SERPL-MCNC: 1.8 MG/DL (ref 1.6–2.6)
MCH RBC QN AUTO: 29.9 PG (ref 27–31)
MCHC RBC AUTO-ENTMCNC: 31.2 G/DL (ref 32–36)
MCV RBC AUTO: 96 FL (ref 82–98)
MONOCYTES # BLD AUTO: 1.8 K/UL (ref 0.3–1)
MONOCYTES NFR BLD: 8.5 % (ref 4–15)
NEUTROPHILS # BLD AUTO: 17.4 K/UL (ref 1.8–7.7)
NEUTROPHILS NFR BLD: 81.3 % (ref 38–73)
NRBC BLD-RTO: 0 /100 WBC
PCO2 BLDA: 48.5 MMHG (ref 35–45)
PH SMN: 7.44 [PH] (ref 7.35–7.45)
PHOSPHATE SERPL-MCNC: 3.3 MG/DL (ref 2.7–4.5)
PLATELET # BLD AUTO: 510 K/UL (ref 150–350)
PMV BLD AUTO: 11.2 FL (ref 9.2–12.9)
PO2 BLDA: 79 MMHG (ref 80–100)
POC BE: 9 MMOL/L
POC SATURATED O2: 96 % (ref 95–100)
POC TCO2: 34 MMOL/L (ref 23–27)
POCT GLUCOSE: 108 MG/DL (ref 70–110)
POCT GLUCOSE: 120 MG/DL (ref 70–110)
POCT GLUCOSE: 121 MG/DL (ref 70–110)
POCT GLUCOSE: 124 MG/DL (ref 70–110)
POCT GLUCOSE: 130 MG/DL (ref 70–110)
POTASSIUM SERPL-SCNC: 3.4 MMOL/L (ref 3.5–5.1)
POTASSIUM SERPL-SCNC: 4.7 MMOL/L (ref 3.5–5.1)
PROCALCITONIN SERPL IA-MCNC: 0.49 NG/ML
PROT SERPL-MCNC: 6.2 G/DL (ref 6–8.4)
RBC # BLD AUTO: 3.44 M/UL (ref 4–5.4)
SAMPLE: ABNORMAL
SITE: ABNORMAL
SODIUM SERPL-SCNC: 140 MMOL/L (ref 136–145)
VANCOMYCIN TROUGH SERPL-MCNC: 14.6 UG/ML (ref 10–22)
WBC # BLD AUTO: 21.4 K/UL (ref 3.9–12.7)

## 2020-09-10 PROCEDURE — 89220 SPUTUM SPECIMEN COLLECTION: CPT

## 2020-09-10 PROCEDURE — 80202 ASSAY OF VANCOMYCIN: CPT

## 2020-09-10 PROCEDURE — 63600175 PHARM REV CODE 636 W HCPCS: Performed by: PSYCHIATRY & NEUROLOGY

## 2020-09-10 PROCEDURE — 25000003 PHARM REV CODE 250: Performed by: PHYSICIAN ASSISTANT

## 2020-09-10 PROCEDURE — 63600175 PHARM REV CODE 636 W HCPCS: Performed by: PHYSICIAN ASSISTANT

## 2020-09-10 PROCEDURE — 25000003 PHARM REV CODE 250: Performed by: STUDENT IN AN ORGANIZED HEALTH CARE EDUCATION/TRAINING PROGRAM

## 2020-09-10 PROCEDURE — 37799 UNLISTED PX VASCULAR SURGERY: CPT

## 2020-09-10 PROCEDURE — 99233 SBSQ HOSP IP/OBS HIGH 50: CPT | Mod: ,,, | Performed by: PSYCHIATRY & NEUROLOGY

## 2020-09-10 PROCEDURE — C9113 INJ PANTOPRAZOLE SODIUM, VIA: HCPCS | Performed by: PHYSICIAN ASSISTANT

## 2020-09-10 PROCEDURE — 25000003 PHARM REV CODE 250: Performed by: NURSE PRACTITIONER

## 2020-09-10 PROCEDURE — 94003 VENT MGMT INPAT SUBQ DAY: CPT

## 2020-09-10 PROCEDURE — 82803 BLOOD GASES ANY COMBINATION: CPT

## 2020-09-10 PROCEDURE — 83735 ASSAY OF MAGNESIUM: CPT

## 2020-09-10 PROCEDURE — 25000242 PHARM REV CODE 250 ALT 637 W/ HCPCS: Performed by: NURSE PRACTITIONER

## 2020-09-10 PROCEDURE — 84132 ASSAY OF SERUM POTASSIUM: CPT

## 2020-09-10 PROCEDURE — 99291 CRITICAL CARE FIRST HOUR: CPT | Mod: ,,, | Performed by: PSYCHIATRY & NEUROLOGY

## 2020-09-10 PROCEDURE — 99291 PR CRITICAL CARE, E/M 30-74 MINUTES: ICD-10-PCS | Mod: ,,, | Performed by: PSYCHIATRY & NEUROLOGY

## 2020-09-10 PROCEDURE — 99900035 HC TECH TIME PER 15 MIN (STAT)

## 2020-09-10 PROCEDURE — 85025 COMPLETE CBC W/AUTO DIFF WBC: CPT

## 2020-09-10 PROCEDURE — 83735 ASSAY OF MAGNESIUM: CPT | Mod: 91

## 2020-09-10 PROCEDURE — 99233 PR SUBSEQUENT HOSPITAL CARE,LEVL III: ICD-10-PCS | Mod: ,,, | Performed by: PSYCHIATRY & NEUROLOGY

## 2020-09-10 PROCEDURE — 94761 N-INVAS EAR/PLS OXIMETRY MLT: CPT

## 2020-09-10 PROCEDURE — 25000003 PHARM REV CODE 250: Performed by: PSYCHIATRY & NEUROLOGY

## 2020-09-10 PROCEDURE — 63600175 PHARM REV CODE 636 W HCPCS: Performed by: NURSE PRACTITIONER

## 2020-09-10 PROCEDURE — 94640 AIRWAY INHALATION TREATMENT: CPT

## 2020-09-10 PROCEDURE — 85730 THROMBOPLASTIN TIME PARTIAL: CPT

## 2020-09-10 PROCEDURE — 20000000 HC ICU ROOM

## 2020-09-10 PROCEDURE — 94668 MNPJ CHEST WALL SBSQ: CPT

## 2020-09-10 PROCEDURE — 27000221 HC OXYGEN, UP TO 24 HOURS

## 2020-09-10 PROCEDURE — 99900026 HC AIRWAY MAINTENANCE (STAT)

## 2020-09-10 PROCEDURE — 84100 ASSAY OF PHOSPHORUS: CPT

## 2020-09-10 PROCEDURE — 80053 COMPREHEN METABOLIC PANEL: CPT

## 2020-09-10 PROCEDURE — 84145 PROCALCITONIN (PCT): CPT

## 2020-09-10 PROCEDURE — 97803 MED NUTRITION INDIV SUBSEQ: CPT

## 2020-09-10 RX ORDER — HEPARIN SODIUM,PORCINE/D5W 25000/250
12 INTRAVENOUS SOLUTION INTRAVENOUS CONTINUOUS
Status: ACTIVE | OUTPATIENT
Start: 2020-09-10 | End: 2020-09-11

## 2020-09-10 RX ADMIN — VALPROIC ACID 250 MG: 250 SOLUTION ORAL at 05:09

## 2020-09-10 RX ADMIN — VALPROIC ACID 250 MG: 250 SOLUTION ORAL at 03:09

## 2020-09-10 RX ADMIN — CARVEDILOL 3.12 MG: 3.12 TABLET, FILM COATED ORAL at 08:09

## 2020-09-10 RX ADMIN — MAGNESIUM GLUCONATE 500 MG ORAL TABLET 800 MG: 500 TABLET ORAL at 06:09

## 2020-09-10 RX ADMIN — DILTIAZEM HYDROCHLORIDE 90 MG: 60 TABLET, FILM COATED ORAL at 05:09

## 2020-09-10 RX ADMIN — CEFEPIME 1 G: 1 INJECTION, POWDER, FOR SOLUTION INTRAMUSCULAR; INTRAVENOUS at 09:09

## 2020-09-10 RX ADMIN — IPRATROPIUM BROMIDE AND ALBUTEROL SULFATE 3 ML: .5; 2.5 SOLUTION RESPIRATORY (INHALATION) at 07:09

## 2020-09-10 RX ADMIN — DOCUSATE SODIUM 50MG AND SENNOSIDES 8.6MG 1 TABLET: 8.6; 5 TABLET, FILM COATED ORAL at 09:09

## 2020-09-10 RX ADMIN — QUETIAPINE FUMARATE 50 MG: 25 TABLET ORAL at 09:09

## 2020-09-10 RX ADMIN — POTASSIUM CHLORIDE 40 MEQ: 1.5 POWDER, FOR SOLUTION ORAL at 05:09

## 2020-09-10 RX ADMIN — POLYETHYLENE GLYCOL 3350 17 G: 17 POWDER, FOR SOLUTION ORAL at 09:09

## 2020-09-10 RX ADMIN — FUROSEMIDE 40 MG: 40 TABLET ORAL at 05:09

## 2020-09-10 RX ADMIN — HEPARIN SODIUM AND DEXTROSE 12 UNITS/KG/HR: 10000; 5 INJECTION INTRAVENOUS at 09:09

## 2020-09-10 RX ADMIN — IPRATROPIUM BROMIDE AND ALBUTEROL SULFATE 3 ML: .5; 2.5 SOLUTION RESPIRATORY (INHALATION) at 12:09

## 2020-09-10 RX ADMIN — POTASSIUM CHLORIDE 40 MEQ: 1.5 POWDER, FOR SOLUTION ORAL at 03:09

## 2020-09-10 RX ADMIN — PANTOPRAZOLE SODIUM 40 MG: 40 INJECTION, POWDER, LYOPHILIZED, FOR SOLUTION INTRAVENOUS at 09:09

## 2020-09-10 RX ADMIN — ATORVASTATIN CALCIUM 40 MG: 20 TABLET, FILM COATED ORAL at 09:09

## 2020-09-10 RX ADMIN — VALPROIC ACID 250 MG: 250 SOLUTION ORAL at 09:09

## 2020-09-10 RX ADMIN — DEXMEDETOMIDINE HYDROCHLORIDE 0.2 MCG/KG/HR: 4 INJECTION, SOLUTION INTRAVENOUS at 05:09

## 2020-09-10 RX ADMIN — DILTIAZEM HYDROCHLORIDE 90 MG: 60 TABLET, FILM COATED ORAL at 03:09

## 2020-09-10 RX ADMIN — FUROSEMIDE 40 MG: 40 TABLET ORAL at 09:09

## 2020-09-10 RX ADMIN — CARVEDILOL 3.12 MG: 3.12 TABLET, FILM COATED ORAL at 09:09

## 2020-09-10 RX ADMIN — DILTIAZEM HYDROCHLORIDE 90 MG: 60 TABLET, FILM COATED ORAL at 09:09

## 2020-09-10 RX ADMIN — VANCOMYCIN HYDROCHLORIDE 1000 MG: 1 INJECTION, POWDER, LYOPHILIZED, FOR SOLUTION INTRAVENOUS at 12:09

## 2020-09-10 RX ADMIN — ACETAMINOPHEN 650 MG: 325 TABLET ORAL at 01:09

## 2020-09-10 RX ADMIN — MAGNESIUM GLUCONATE 500 MG ORAL TABLET 800 MG: 500 TABLET ORAL at 03:09

## 2020-09-10 NOTE — ANESTHESIA PREPROCEDURE EVALUATION
[facility]  Anesthesia Pre-Operative Evaluation         Patient Name: Donita Gonzalez  YOB: 1955  MRN: [id number]    SUBJECTIVE:     Pre-operative evaluation for Procedure(s) (LRB):  CREATION, TRACHEOSTOMY- percutaneous (N/A)  INSERTION, PEG TUBE (N/A)     09/10/2020    Donita Gonzalez is a 64 y.o. female w/ a significant PMHx of COPD, HTN, and every day smoker who presented at OSH on 08/26/20 with acute left sided facial droop and left sided weakness (last known normal 08/25/20). Pt was not given TPA or endovascular intervention, and she was intubated on 08/26/20 for agitation. CTH on 08/26/20 showed right posterior frontal/parietal infarction with no intracranial hemorrhage. Hospital course complicated by Afib with RVR in which she was started on lovenox and then transitioned to heparin gtt for GOYO and HFrEF. She has remained intubated and unable to pass SBTs prompting above procedure.    Patient now presents for the above procedure(s).      LDA:        Peripheral IV - Single Lumen 09/07/20 0300 20 G Anterior;Left;Proximal Upper Arm (Active)   Site Assessment Clean;Dry;Intact;No redness;No swelling 09/10/20 0705   Line Status Flushed;Saline locked 09/10/20 0705   Dressing Status Clean;Dry;Intact 09/10/20 0705   Dressing Intervention Integrity maintained 09/10/20 0705   Dressing Change Due 09/11/20 09/10/20 0705   Site Change Due 09/11/20 09/10/20 0705   Reason Not Rotated Not due 09/10/20 0705   Number of days: 3            Peripheral IV - Single Lumen 09/08/20 0905 20 G Anterior;Right Forearm (Active)   Site Assessment Clean;Dry;Intact;No redness;No swelling 09/10/20 0705   Line Status Saline locked 09/10/20 0705   Dressing Status Clean;Dry;Intact 09/10/20 0705   Dressing Intervention Integrity maintained 09/10/20 0705   Dressing Change Due 09/12/20 09/10/20 0705   Site Change Due 09/12/20 09/10/20 0705   Reason Not Rotated Not due 09/10/20 0705    Number of days: 2       Arterial Line 09/05/20 1345 Left Radial (Active)   Site Assessment Clean;Dry;Intact;No redness;No swelling 09/10/20 0705   Line Status Pulsatile blood flow;Positional 09/10/20 0705   Art Line Waveform Dampened;Square wave test performed 09/10/20 0705   Arterial Line Interventions Zeroed and calibrated;Leveled;Connections checked and tightened;Flushed per protocol;Line pulled back 09/10/20 0705   Color/Movement/Sensation Capillary refill less than 3 sec 09/10/20 0705   Dressing Type Biopatch in place;Central line dressing with pants 09/10/20 0705   Dressing Status Clean;Dry;Intact 09/10/20 0705   Dressing Intervention Integrity maintained 09/10/20 0705   Dressing Change Due 09/12/20 09/10/20 0705   Tubing Change Due 09/12/20 09/10/20 0705   Number of days: 4            NG/OG Tube 08/29/20 2215 orogastric Left mouth (Active)   Placement Check placement verified by aspirate characteristics;placement verified by distal tube length measurement;placement verified by x-ray 09/10/20 0705   Tube advanced (cm) 7 09/02/20 1505   Advancement advanced manually 09/09/20 0305   Tolerance no signs/symptoms of discomfort 09/10/20 0705   Securement secured to commercial device 09/10/20 0705   Clamp Status/Tolerance unclamped 09/10/20 0705   Suction Setting/Drainage Method suction at the bedside 09/10/20 0705   Insertion Site Appearance no redness, warmth, tenderness, skin breakdown, drainage 09/10/20 0705   Drainage None 09/10/20 0301   Flush/Irrigation flushed w/;water;no resistance met 09/10/20 0705   Feeding Type continuous 09/10/20 0705   Feeding Action feeding continued 09/10/20 0705   Current Rate (mL/hr) 40 mL/hr 09/10/20 0705   Goal Rate (mL/hr) 40 mL/hr 09/10/20 0705   Intake (mL) 120 mL 09/10/20 0921   Water Bolus (mL) 300 mL 09/07/20 0605   Tube Output(mL)(Include Discarded Residual) 150 mL 08/30/20 0105   Rate Formula Tube Feeding (mL/hr) 40 mL/hr 09/10/20 0705   Formula Name Isosource 09/10/20  "0705   Intake (mL) - Formula Tube Feeding 40 09/10/20 1005   Residual Amount (ml) 10 ml 09/10/20 0705   Number of days: 11            Urethral Catheter 09/08/20 1105 (Active)   Site Assessment Clean;Intact 09/10/20 0705   Collection Container Urimeter 09/10/20 0705   Securement Method secured to top of thigh w/ adhesive device 09/10/20 0705   Catheter Care Performed yes 09/10/20 0705   Reason for Continuing Urinary Catheterization Critically ill in ICU requiring intensive monitoring 09/10/20 0705   CAUTI Prevention Bundle StatLock in place w 1" slack;Intact seal between catheter & drainage tubing;Drainage bag/urimeter off the floor;Green sheeting clip in use;No dependent loops or kinks;Drainage bag/urimeter not overfilled (<2/3 full);Drainage bag/urimeter below bladder 09/10/20 0705   Output (mL) 75 mL 09/10/20 0905   Number of days: 2       Prev airway: Patient currently intubated with ETT    Vent settings:  SpO2: 97 %  Oxygen Concentration (%): 40  O2 Device (Oxygen Therapy): ventilator  Vent Mode: A/C  Set Rate: 16 BPM  Vt Set: 420 mL  PEEP/CPAP: 5 cmH20  Peak Airway Pressure: 24 cmH2O  Mean Airway Pressure: 11 cmH20  Plateau Pressure: 16 cmH20    Drips:    dexmedetomidine (PRECEDEX) infusion 0.4 mcg/kg/hr (09/10/20 1005)    dextrose 10 % in water (D10W)      heparin (porcine) in D5W 12 Units/kg/hr (09/10/20 1005)       Patient Active Problem List   Diagnosis    Acute on chronic respiratory failure with hypoxia and hypercapnia    Elevated troponin    Lactic acidosis    Tobacco abuse    Hallucinations    GOYO (acute kidney injury)    Chronic respiratory failure with hypoxia and hypercapnia    Essential hypertension    Chronic obstructive pulmonary disease    Hyperlipidemia    Thoracic aorta atherosclerosis    Atrial fibrillation with rapid ventricular response    Cerebrovascular accident (CVA)    Acute systolic heart failure    Epistaxis    Encounter for dietary consultation    GI bleed    " Embolic stroke involving right middle cerebral artery    Cytotoxic cerebral edema    Moderate malnutrition    Diabetes mellitus    Macroglossia    Leukocytosis       Review of patient's allergies indicates:  No Known Allergies    Current Inpatient Medications:   albuterol-ipratropium  3 mL Nebulization Q6H    atorvastatin  40 mg Per OG tube Daily    carvediloL  3.125 mg Per OG tube BID    ceFEPime (MAXIPIME) IVPB  1 g Intravenous Q12H    diltiaZEM  90 mg Per OG tube Q8H    furosemide  40 mg Per OG tube BID    pantoprozole (PROTONIX) IV  40 mg Intravenous Q12H    polyethylene glycol  17 g Per NG tube Daily    QUEtiapine  50 mg Per OG tube BID    senna-docusate 8.6-50 mg  1 tablet Per OG tube BID    valproic acid (as sodium salt)  250 mg Per OG tube Q8H    vancomycin (VANCOCIN) IVPB  1,000 mg Intravenous Q24H       No current facility-administered medications on file prior to encounter.      Current Outpatient Medications on File Prior to Encounter   Medication Sig Dispense Refill    amLODIPine (NORVASC) 10 MG tablet Take 1 tablet (10 mg total) by mouth once daily. 30 tablet 1    aspirin 325 MG tablet Take 1 tablet (325 mg total) by mouth once daily. 90 tablet 3    atorvastatin (LIPITOR) 20 MG tablet Take 4 tablets (80 mg total) by mouth once daily. 360 tablet 3    cloNIDine (CATAPRES) 0.1 MG tablet Take 1 tablet (0.1 mg total) by mouth every 8 (eight) hours. 270 tablet 3    clopidogreL (PLAVIX) 75 mg tablet Take 1 tablet (75 mg total) by mouth once daily. 30 tablet 0    albuterol (PROVENTIL/VENTOLIN HFA) 90 mcg/actuation inhaler Inhale 2 puffs into the lungs every 4 (four) hours as needed for Wheezing or Shortness of Breath. Rescue 1 Inhaler 5    ammonium lactate (LAC-HYDRIN) 12 % lotion Apply topically as needed for Dry Skin. 225 g 3    divalproex ER (DEPAKOTE) 500 MG Tb24 Take 1 tablet (500 mg total) by mouth every evening. (Patient not taking: Reported on 12/16/2019) 30 tablet 1     losartan-hydrochlorothiazide 50-12.5 mg (HYZAAR) 50-12.5 mg per tablet Take 1 tablet by mouth once daily. 90 tablet 3    triamcinolone acetonide 0.5% (KENALOG) 0.5 % Crea Apply topically 2 (two) times daily. 15 g 2    umeclidinium-vilanterol (ANORO ELLIPTA) 62.5-25 mcg/actuation DsDv Inhale 1 puff into the lungs once daily. Controller 60 each 3       Past Surgical History:   Procedure Laterality Date    AUGMENTATION OF BREAST      HYSTERECTOMY         Social History     Socioeconomic History    Marital status:      Spouse name: Not on file    Number of children: Not on file    Years of education: Not on file    Highest education level: Not on file   Occupational History    Not on file   Social Needs    Financial resource strain: Not on file    Food insecurity     Worry: Not on file     Inability: Not on file    Transportation needs     Medical: Not on file     Non-medical: Not on file   Tobacco Use    Smoking status: Current Every Day Smoker     Packs/day: 1.00     Types: Cigarettes     Start date: 4/16/1980    Smokeless tobacco: Never Used   Substance and Sexual Activity    Alcohol use: Never     Frequency: Never    Drug use: Never    Sexual activity: Not on file   Lifestyle    Physical activity     Days per week: Not on file     Minutes per session: Not on file    Stress: Not on file   Relationships    Social connections     Talks on phone: Not on file     Gets together: Not on file     Attends Evangelical service: Not on file     Active member of club or organization: Not on file     Attends meetings of clubs or organizations: Not on file     Relationship status: Not on file   Other Topics Concern    Not on file   Social History Narrative    Not on file       OBJECTIVE:     Vital Signs Range (Last 24H):  Temp:  [36.6 °C (97.9 °F)-37.6 °C (99.7 °F)]   Pulse:  []   Resp:  [16-31]   BP: ()/()   SpO2:  [95 %-98 %]   Arterial Line BP: ()/()       Significant  Labs:  Lab Results   Component Value Date    WBC 21.40 (H) 09/10/2020    HGB 10.3 (L) 09/10/2020    HCT 33.0 (L) 09/10/2020     (H) 09/10/2020    CHOL 139 08/29/2020    TRIG 99 08/29/2020    HDL 49 08/29/2020    ALT 28 09/10/2020    AST 40 09/10/2020     09/10/2020    K 3.4 (L) 09/10/2020     09/10/2020    CREATININE 1.2 09/10/2020    BUN 36 (H) 09/10/2020    CO2 28 09/10/2020    TSH 0.453 08/29/2020    INR 1.0 09/01/2020    GLUF 88 07/27/2005    HGBA1C 6.0 (H) 08/29/2020       Diagnostic Studies: No relevant studies.    EKG:   Results for orders placed or performed during the hospital encounter of 08/26/20   EKG 12-lead    Collection Time: 08/26/20  9:35 AM    Narrative    Test Reason : Z98.890,    Vent. Rate : 128 BPM     Atrial Rate : 326 BPM     P-R Int : 000 ms          QRS Dur : 102 ms      QT Int : 350 ms       P-R-T Axes : 000 071 242 degrees     QTc Int : 511 ms    Atrial fibrillation with rapid ventricular response with premature  ventricular or aberrantly conducted complexes  Voltage criteria for left ventricular hypertrophy  ST and T wave abnormality, consider inferolateral ischemia  Abnormal ECG  When compared with ECG of 26-AUG-2020 08:27,  No significant change was found  Confirmed by ISSAC HAYWOOD MD ([id number]) on 8/26/2020 1:39:54 PM    Referred By: [email or web address]   SELF           Confirmed By:ISSAC HAYWOOD MD       2D ECHO:  TTE:  Results for orders placed or performed during the hospital encounter of 08/26/20   Echo Color Flow Doppler? Yes; Bubble Contrast? Yes   Result Value Ref Range    AORTIC VALVE CUSP SEPERATION 2.06 cm    PV PEAK VELOCITY 1.03 cm/s    LVIDD 4.61 3.5 - 6.0 cm    IVS 1.33 (A) 0.6 - 1.1 cm    PW 1.52 (A) 0.6 - 1.1 cm    Ao root annulus 2.47 cm    LVIDS 3.82 2.1 - 4.0 cm    FS 17 28 - 44 %    LV mass 264.50 g    LA size 4.49 cm    RVDD 2.72 cm    Left Ventricle Relative Wall Thickness 0.66 cm    MV valve area p 1/2 method 4.32 cm2    LVOT diameter 2.09 cm    LVOT area 3.4  cm2    Ao peak feliciano 1.76 m/s    Mr max feliciano 0.05 m/s    AV peak gradient 12 mmHg    TR Max Feliciano 2.91 m/s    MV stenosis pressure 1/2 time 50.98 ms    LV Systolic Volume 62.85 mL    LV Diastolic Volume 97.96 mL    Triscuspid Valve Regurgitation Peak Gradient 34 mmHg    Right Atrial Pressure (from IVC) 8 mmHg    TV rest pulmonary artery pressure 42 mmHg    Narrative    · Moderate concentric left ventricular hypertrophy.  · Moderately to severely decreased left ventricular systolic function. The   estimated ejection fraction is 25-30%.  · Left ventricular diastolic dysfunction.  · Moderately to severely reduced right ventricular systolic function.  · Moderate left atrial enlargement.  · Severe right atrial enlargement.  · Moderate mitral regurgitation.  · Moderate tricuspid regurgitation.  · Intermediate central venous pressure (8 mmHg).  · The estimated PA systolic pressure is 42 mmHg.  · Pulmonary hypertension present.  · Negative bubble study.          DAVID:  No results found for this or any previous visit.    ASSESSMENT/PLAN:         Anesthesia Evaluation          Review of Systems  Anesthesia Hx:  No problems with previous Anesthesia  Denies Family Hx of Anesthesia complications.   Denies Personal Hx of Anesthesia complications.   Social:  Former Smoker    Cardiovascular:   Hypertension Denies CABG/stent.     Pulmonary:   COPD    Renal/:   Chronic Renal Disease    Neurological:   CVA    Endocrine:   Diabetes        Physical Exam  General:  Well nourished    Airway/Jaw/Neck:  Airway Findings: Mouth Opening: Normal Tongue: Large  General Airway Assessment: Adult  Unable to assess airway due to ETT in place and patient uncooperative with mouth opening.        Chest/Lungs:  Chest/Lungs Findings: Clear to auscultation     Heart/Vascular:  Heart Findings: Rate: Normal  Rhythm: Regular Rhythm        Mental Status:  Mental Status Findings:  Somnolent         Anesthesia Plan  Type of Anesthesia, risks & benefits  discussed:  Anesthesia Type:  general  Patient's Preference:   Intra-op Monitoring Plan: standard ASA monitors  Intra-op Monitoring Plan Comments:   Post Op Pain Control Plan: multimodal analgesia  Post Op Pain Control Plan Comments:   Induction:   IV  Beta Blocker:  Patient is on a Beta-Blocker and has received one dose within the past 24 hours (No further documentation required).       Informed Consent: Patient representative understands risks and agrees with Anesthesia plan.  Questions answered. Anesthesia consent signed with patient representative.  ASA Score: 4     Day of Surgery Review of History & Physical:    H&P update referred to the surgeon.         Ready For Surgery From Anesthesia Perspective.

## 2020-09-10 NOTE — ASSESSMENT & PLAN NOTE
WBC  Trending down. No fevers overnight   Patient being appropriately covered for positive respiratory cultures

## 2020-09-10 NOTE — PROGRESS NOTES
"[facility]  Adult Nutrition  Progress Note    SUMMARY       Recommendations    Recommendation:   1. Continue TF of Isosource 1.5 @ 40 mL/hr to provide pt with 1440 kcal, 65 g protein and 733 mL free water.  Additional water per MD. Hold for residuals >500 mL.  - If bolus feeds warranted, suggest TF of Isosource 1.5 4 cans/day to meet >85% of EEN/EPN.   2. RD to monitor and follow up    Goals: 1. Pt to meet % EEN and EPN by RD follow up.  Nutrition Goal Status: goal met  Communication of RD Recs: (POC)    Reason for Assessment    Reason For Assessment: RD follow-up  Diagnosis: stroke/CVA  Relevant Medical History: COPD, HTN  Interdisciplinary Rounds: attended  General Information Comments: Pt continue intuabted on vent. TF running, pt tolerating at goal rate at this time. Plan for Trach/PEG tmw. Note pt with wt loss from 131 lbs since admit, unsure of accuracy. NFPE completed on 9/13 pt meets criteria for moderate malnutrition at this time.  Nutrition Discharge Planning: adequate intake via TF    Nutrition Risk Screen    Nutrition Risk Screen: tube feeding or parenteral nutrition    Nutrition/Diet History    Spiritual, Cultural Beliefs, Yazidi Practices, Values that Affect Care: no  Factors Affecting Nutritional Intake: NPO, on mechanical ventilation    Anthropometrics    Temp: 98.2 °F (36.8 °C)  Height Method: Estimated  Height: 5' 5" (165.1 cm)  Height (inches): 65 in  Weight Method: Bed Scale  Weight: 51 kg (112 lb 7 oz)  Weight (lb): 112.44 lb  Ideal Body Weight (IBW), Female: 125 lb  % Ideal Body Weight, Female (lb): 105.65 %  BMI (Calculated): 18.7  BMI Grade: 18.5-24.9 - normal       Lab/Procedures/Meds    Pertinent Labs Reviewed: reviewed  Pertinent Labs Comments: K 3.4; BUN 36; Glucose 115  Pertinent Medications Reviewed: reviewed  Pertinent Medications Comments: Vancomycin; cefepime; statin; pantoprazole; senna-docusate     Estimated/Assessed Needs    Weight Used For Calorie " Calculations: 51 kg (112 lb 7 oz)  Energy Calorie Requirements (kcal): 1419 kcal/d  Energy Need Method: [facility]  Protein Requirements: 72-90g  Weight Used For Protein Calculations: 59.9 kg (132 lb 0.9 oz)  Fluid Requirements (mL): Per MD  RDA Method (mL): 1419    Nutrition Prescription Ordered    Current Diet Order: NPO  Current Nutrition Support Formula Ordered: Isosource 1.5  Current Nutrition Support Rate Ordered: 40 (ml)  Current Nutrition Support Frequency Ordered: ml/hr    Evaluation of Received Nutrient/Fluid Intake    Enteral Calories (kcal): 1440  Enteral Protein (gm): 65  Enteral (Free Water) Fluid (mL): 733  % Kcal Needs: 100%  % Protein Needs: 90%  I/O: -.1 L since admit  Energy Calories Required: meeting needs  Protein Required: meeting needs  Fluid Required: meeting needs  Comments: LBM 9/8  Tolerance: tolerating  % Intake of Estimated Energy Needs: 75 - 100 %  % Meal Intake: NPO    Nutrition Risk    Level of Risk/Frequency of Follow-up: low     Assessment and Plan  Nutrition Problem:  Moderate Protein-Calorie Malnutrition  Malnutrition in the context of Acute Illness/Injury     Related to (etiology):  Poor intake in setting of stroke     Signs and Symptoms (as evidenced by):  Energy Intake:<75% of estimated energy requirement for 7 days  Body Fat Depletion: mild depletion of orbitals, triceps   Muscle Mass Depletion: moderate to severe depletion of temples, clavicles, lower extremities        Interventions(treatment strategy):  Collaboration of care with providers.  Enteral nutrition     Nutrition Diagnosis Status:  Continues     Monitor and Evaluation    Food and Nutrient Intake: energy intake, enteral nutrition intake  Food and Nutrient Adminstration: enteral and parenteral nutrition administration  Anthropometric Measurements: weight, weight change, body mass index  Biochemical Data, Medical Tests and Procedures: electrolyte and renal panel, gastrointestinal profile, glucose/endocrine profile,  inflammatory profile, lipid profile  Nutrition-Focused Physical Findings: overall appearance, extremities, muscles and bones, head and eyes, skin     Malnutrition Assessment  Orbital Region (Subcutaneous Fat Loss): mild depletion  Upper Arm Region (Subcutaneous Fat Loss): mild depletion  Thoracic and Lumbar Region: well nourished   [place] Region (Muscle Loss): moderate depletion  Clavicle Bone Region (Muscle Loss): severe depletion  Clavicle and Acromion Bone Region (Muscle Loss): mild depletion  Patellar Region (Muscle Loss): moderate depletion  Anterior Thigh Region (Muscle Loss): moderate depletion  Posterior Calf Region (Muscle Loss): moderate depletion     Nutrition Follow-Up    RD Follow-up?: Yes

## 2020-09-10 NOTE — SUBJECTIVE & OBJECTIVE
Interval History:  Patient scheduled for trach/peg for tomorrow. We are holding the heparin drip and the tube feeds for this midnight. No DVT on upper and lower extremities. Amylase and lipase wnl. Continuing the broad spectrum antibiotics until the susceptibilities return. D/c a line as it is no longer working.     Review of Systems   Unable to perform ROS: Intubated   Constitutional: Negative for fever.   Cardiovascular: Negative for leg swelling.   Psychiatric/Behavioral: Negative for agitation.       Objective:     Vitals:  Temp: 98.6 °F (37 °C)  Pulse: 96  Rhythm: atrial rhythm  BP: (!) 140/69  MAP (mmHg): 92  Resp: (!) 31  SpO2: 97 %  Oxygen Concentration (%): 40  O2 Device (Oxygen Therapy): ventilator  Vent Mode: A/C  Set Rate: 16 BPM  Vt Set: 420 mL  PEEP/CPAP: 5 cmH20  Peak Airway Pressure: 24 cmH2O  Mean Airway Pressure: 11 cmH20  Plateau Pressure: 16 cmH20    Temp  Min: 97.9 °F (36.6 °C)  Max: 99.7 °F (37.6 °C)  Pulse  Min: 86  Max: 111  BP  Min: 94/52  Max: 185/78  MAP (mmHg)  Min: 67  Max: 118  Resp  Min: 16  Max: 31  SpO2  Min: 95 %  Max: 98 %  Oxygen Concentration (%)  Min: 40  Max: 40    09/09 0701 - 09/10 0700  In: 1569.4 [I.V.:239.4]  Out: 2540 [Urine:2540]   Unmeasured Output  Stool Occurrence: 0  Emesis Occurrence: 0  Pad Count: 2       Physical Exam  Unable to test orientation, language, memory, judgment, insight, fund of knowledge, hearing, shoulder shrug, tongue protrusion, coordination, gait due to level of consciousness.    --GCS: E2 VT1 M4  --Mental Status:  Will open eyes   --Pupils left pupil brisk, R pupil fixed   --Corneal reflex, gag, cough intact.  --Will move all extremities spontaneously   -Will follow commands on the right but not the left   Medications:  Continuousdexmedetomidine (PRECEDEX) infusion, Last Rate: 0.4 mcg/kg/hr (09/10/20 1005)  dextrose 10 % in water (D10W)  heparin (porcine) in D5W, Last Rate: 12 Units/kg/hr (09/10/20 1005)    Scheduledalbuterol-ipratropium, 3 mL,  Q6H  atorvastatin, 40 mg, Daily  carvediloL, 3.125 mg, BID  ceFEPime (MAXIPIME) IVPB, 1 g, Q12H  diltiaZEM, 90 mg, Q8H  furosemide, 40 mg, BID  pantoprozole (PROTONIX) IV, 40 mg, Q12H  polyethylene glycol, 17 g, Daily  QUEtiapine, 50 mg, BID  senna-docusate 8.6-50 mg, 1 tablet, BID  valproic acid (as sodium salt), 250 mg, Q8H  vancomycin (VANCOCIN) IVPB, 1,000 mg, Q24H    PRNacetaminophen, 650 mg, Q6H PRN  dextrose 10 % in water (D10W), , Continuous PRN  dextrose 50%, 12.5 g, PRN  fentaNYL, 50 mcg, Q2H PRN  glucagon (human recombinant), 1 mg, PRN  hydrALAZINE, 10 mg, Q6H PRN  magnesium oxide, 800 mg, PRN  magnesium oxide, 800 mg, PRN  ondansetron, 4 mg, Q6H PRN  potassium chloride, 40 mEq, PRN  potassium chloride, 40 mEq, PRN  potassium chloride, 60 mEq, PRN  potassium, sodium phosphates, 2 packet, PRN  potassium, sodium phosphates, 2 packet, PRN  potassium, sodium phosphates, 2 packet, PRN  vancomycin - pharmacy to dose, , pharmacy to manage frequency      Today I personally reviewed pertinent medications, lines/drains/airways, imaging, cardiology results, laboratory results, microbiology results, notably:    Diet  Diet NPO  Diet NPO  Diet NPO  Diet NPO

## 2020-09-10 NOTE — ASSESSMENT & PLAN NOTE
Donita Gonzalez is a 64 y.o. female with a significant medical history of COPD, HTN, smoker who presents to the hospital as a transfer from [facility] for further evaluation of R MCA stroke.  The patient did not receive tPA due to LKN 8/25/2020 and presenting to the OSH on 8/26/2020.     9/8: Gen surg consulted for trach/ PEG. Pan cx for hypotension (propofol stopped) and leukocytosis   9/9: Agitated off precedex drip and moving right side extremities spontaneously. Resp cx with GNR, Staph aureus but blood cultures negative to date.   9/10: Remains agitated, copious secretions, resp cx with serratia and MSSA. Plan for trach and PEG tomorrow.      Antithrombotics for secondary stroke prevention: Anticoagulants: Patient was on full dose Lovenox that was dc'd 2/2 uncontrolled epistaxis. Heparin infusion started 9/1/20 for A/C.  Repeat CTH 9/2 normal, continue heparin drip    Statins for secondary stroke prevention and hyperlipidemia, if present:   Statins: Atorvastatin- 40 mg daily    Aggressive risk factor modification: HTN, Smoking, HLD, A-Fib  (Coreg, dilt for AFib and HTN)     Rehab efforts: The patient has been evaluated by a stroke team provider and the therapy needs have been fully considered based off the presenting complaints and exam findings. The following therapy evaluations are needed: PT/OT/SLP evaluations when the patient is able to participate    Diagnostics ordered/pending: CT scan of head without contrast to asses brain parenchyma  No changes 9/2/20 while on heparin drip therapeutic    VTE prophylaxis: Mechanical prophylaxis: Place SCDs and now heparin infusion     BP parameters: Infarct: No intervention, SBP <220

## 2020-09-10 NOTE — PROGRESS NOTES
[facility]  Vascular Neurology  Comprehensive Stroke Center  Progress Note    Assessment/Plan:     * Embolic stroke involving right middle cerebral artery  Donita Gonzalez is a 64 y.o. female with a significant medical history of COPD, HTN, smoker who presents to the hospital as a transfer from [facility] for further evaluation of R MCA stroke.  The patient did not receive tPA due to LKN 8/25/2020 and presenting to the OSH on 8/26/2020.     9/8: Gen surg consulted for trach/ PEG. Pan cx for hypotension (propofol stopped) and leukocytosis   9/9: Agitated off precedex drip and moving right side extremities spontaneously. Resp cx with GNR, Staph aureus but blood cultures negative to date.   9/10: Remains agitated, copious secretions, resp cx with serratia and MSSA. Plan for trach and PEG tomorrow.      Antithrombotics for secondary stroke prevention: Anticoagulants: Patient was on full dose Lovenox that was dc'd 2/2 uncontrolled epistaxis. Heparin infusion started 9/1/20 for A/C.  Repeat CTH 9/2 normal, continue heparin drip    Statins for secondary stroke prevention and hyperlipidemia, if present:   Statins: Atorvastatin- 40 mg daily    Aggressive risk factor modification: HTN, Smoking, HLD, A-Fib  (Coreg, dilt for AFib and HTN)     Rehab efforts: The patient has been evaluated by a stroke team provider and the therapy needs have been fully considered based off the presenting complaints and exam findings. The following therapy evaluations are needed: PT/OT/SLP evaluations when the patient is able to participate    Diagnostics ordered/pending: CT scan of head without contrast to asses brain parenchyma  No changes 9/2/20 while on heparin drip therapeutic    VTE prophylaxis: Mechanical prophylaxis: Place SCDs and now heparin infusion     BP parameters: Infarct: No intervention, SBP <220        Cytotoxic cerebral edema  -Small area of cytotoxic cerebral edema identified when reviewing  brain imaging in the territory of the R middle cerebral artery. There is no mass effect associated with it. We will continue to monitor the patients clinical exam for any worsening of symptoms which may indicate expansion of the stroke or the area of the edema resulting in the clinical change. The pattern is suggestive of cardioembolic etiology in the setting of new onset Afib w/RVR.    -Recommend Neurosurgery consult        Epistaxis  -Patient had uncontrolled epistaxis that was attributed to NGT placement and AC.  -Lovenox --> heparin infusion 9/1/20 but now on Coreg  -ENT consulted; signed off 8/31; leave nasal packing in for 5 days per ENT note 8/31 --> removed 9/4  -Managed by [facility]    Atrial fibrillation with rapid ventricular response  -Stroke risk factor.  The patient was on full dose lovenox --> heparin infusion now given epistasix  -Continue Diltiazem gtt; Digoxin loaded 9/1/20  -CT Head 9/2 without acute chagnes on therapeutic heparin    Chronic obstructive pulmonary disease  -Goal SpO2>88%  -Managed by [facility]    GOYO (acute kidney injury)  -Cr 2.1<2.3 on 8/28 --> 1.2 on 9/10/20  -Managed by [facility]    Acute on chronic respiratory failure with hypoxia and hypercapnia  -Patient is currently intubated and sedated.   -Managed by [facility]         /9/418/31: Patient intubated. Rhinorockets in place; ENT sign off.   9/1: Patient remains intubated; heparin restarted; once therapeutic, CT head and plan for extubation. Dilt for AFib RVR  9/2: Patient remains intubated  9/3: Patient remains intubated; alert on Precedex. Transitioned to po dilt for Afib.   9/4: Patient remains intubated; alert on Precedex, rhinorockets removed  9/7: no change, intubated and sedated on Propofol  9/8: WBC increase, hypotensive on propofol --> Precedex. Pan cx. Gen surg consulted for PEG / trach  9/9: Patient agitated off sedation, spontaneously moving right UE and LE. Planning for trach / PEG on Friday   9/10: On Precedex 0.4 and agitated, still  spontaneously moving right UE And LE      STROKE DOCUMENTATION        NIH Scale:  1a. Level of Consciousness: 2-->Not alert, requires repeated stimulation to attend, or is obtunded and requires strong or painful stimulation to make movements (not stereotyped)  1b. LOC Questions: 2-->Answers neither question correctly  1c. LOC Commands: 2-->Performs neither task correctly  2. Best Gaze: 1-->Partial gaze palsy, gaze is abnormal in one or both eyes, but forced deviation or total gaze paresis is not present  3. Visual: 2-->Complete hemianopia  4. Facial Palsy: 0-->Normal symmetrical movements  5a. Motor Arm, Left: 4-->No movement  5b. Motor Arm, Right: 2-->Some effort against gravity, limb cannot get to or maintain (if cued) 90 (or 45) degrees, drifts down to bed, but has some effort against gravity  6a. Motor Leg, Left: 4-->No movement  6b. Motor Leg, Right: 2-->Some effort against gravity, leg falls to bed by 5 secs, but has some effort against gravity  7. Limb Ataxia: 0-->Absent  8. Sensory: 2-->Severe to total sensory loss, patient is not aware of being touched in the face, arm, and leg  9. Best Language: 3-->Mute, global aphasia, no usable speech or auditory comprehension  10. Dysarthria: (UN) Intubated or other physical barrier  11. Extinction and Inattention (formerly Neglect): 1-->Visual, tactile, auditory, spatial, or personal inattention or extinction to bilateral simultaneous stimulation in one of the sensory modalities  Total (NIH Stroke Scale): 27       Modified [place] Score: 4  Manuel Coma Scale:8   ABCD2 Score:    [id number] Score:7  HAS -BLED Score:2  ICH Score:   Hunt & Bell Classification:      Hemorrhagic change of an Ischemic Stroke: Does this patient have an ischemic stroke with hemorrhagic changes? No     Neurologic Chief Complaint: R MCA 2/2 Afib.     Subjective:     Interval History: Patient is seen for follow-up neurological assessment and treatment recommendations: R MCA 2/2 Afib on heparin,  coreg, and diltiazem. WBC increasing, respi cx w/ Serratia and Staph Aureus. Hypotensive on propofol so transitioned to Precedex and pan cx. Family POA agreeable to trach /PEG.    HPI, Past Medical, Family, and Social History remains the same as documented in the initial encounter.     Review of Systems   Unable to perform ROS: Intubated   Constitutional: Negative for fever.   HENT: Positive for drooling (copious secretions). Negative for nosebleeds.         Tongue swelling   Eyes: Negative for visual disturbance.   Respiratory: Negative for chest tightness.         Intubated   Cardiovascular: Negative for chest pain.   Gastrointestinal: Negative for anal bleeding and blood in stool.   Genitourinary: Negative for hematuria.   Neurological: Negative for facial asymmetry.   Psychiatric/Behavioral: Positive for agitation.        Precedex for agitation while on MV.      Scheduled Meds:   albuterol-ipratropium  3 mL Nebulization Q6H    atorvastatin  40 mg Per OG tube Daily    carvediloL  3.125 mg Per OG tube BID    ceFEPime (MAXIPIME) IVPB  1 g Intravenous Q12H    diltiaZEM  90 mg Per OG tube Q8H    furosemide  40 mg Per OG tube BID    pantoprozole (PROTONIX) IV  40 mg Intravenous Q12H    polyethylene glycol  17 g Per NG tube Daily    QUEtiapine  50 mg Per OG tube BID    senna-docusate 8.6-50 mg  1 tablet Per OG tube BID    valproic acid (as sodium salt)  250 mg Per OG tube Q8H    vancomycin (VANCOCIN) IVPB  1,000 mg Intravenous Q24H     Continuous Infusions:   dexmedetomidine (PRECEDEX) infusion 0.2 mcg/kg/hr (09/10/20 1205)    dextrose 10 % in water (D10W)      heparin (porcine) in D5W 12 Units/kg/hr (09/10/20 1205)     PRN Meds:acetaminophen, dextrose 10 % in water (D10W), dextrose 50%, fentaNYL, glucagon (human recombinant), hydrALAZINE, magnesium oxide, magnesium oxide, ondansetron, potassium chloride, potassium chloride, potassium chloride, potassium, sodium phosphates, potassium, sodium phosphates,  potassium, sodium phosphates, Pharmacy to dose Vancomycin consult **AND** vancomycin - pharmacy to dose    Objective:     Vital Signs (Most Recent):  Temp: 99 °F (37.2 °C) (09/10/20 1226)  Pulse: 94 (09/10/20 1226)  Resp: 18 (09/10/20 1226)  BP: (!) 149/80 (09/10/20 1205)  SpO2: 98 % (09/10/20 1226)  BP Location: Right arm    Vital Signs Range (Last 24H):  Temp:  [97.9 °F (36.6 °C)-99.7 °F (37.6 °C)]   Pulse:  []   Resp:  [16-31]   BP: ()/()   SpO2:  [95 %-98 %]   Arterial Line BP: ()/()   BP Location: Right arm    Physical Exam  Constitutional:       General: She is not in acute distress.     Appearance: Normal appearance. She is not ill-appearing.   HENT:      Head: Normocephalic.      Right Ear: External ear normal. There is no impacted cerumen.      Left Ear: External ear normal. There is no impacted cerumen.      Nose: No congestion or rhinorrhea.      Mouth/Throat:      Mouth: Mucous membranes are dry.   Eyes:      General: No scleral icterus.        Right eye: No discharge.         Left eye: No discharge.      Comments: Left pupil 3mm, R pupil 6 fixed    Cardiovascular:      Rate and Rhythm: Normal rate.      Heart sounds: No murmur.   Pulmonary:      Comments: intubated  Abdominal:      General: Abdomen is flat. There is no distension.      Palpations: Abdomen is soft.      Tenderness: There is no abdominal tenderness.   Musculoskeletal:         General: No swelling, tenderness or deformity.   Skin:     General: Skin is warm and dry.      Coloration: Skin is not pale.      Findings: No erythema.   Neurological:      Mental Status: She is alert.      Comments: Limited 2/2/ sedation with Precedex       Neurological Exam:   LOC: alert  Attention Span: poor  Language: No aphasia, Intubated  Articulation: Untestable due to intubation  Orientation: Untestable due to intubation  Motor: Arm left  Plegia 0/5  Leg left  Plegia 0/5  Arm right  Paresis: 3/5 (with mitt)  Leg right Paresis:  3/5     Laboratory:  CMP:   Recent Labs   Lab 09/10/20  0121   CALCIUM 8.8   ALBUMIN 1.9*   PROT 6.2      K 3.4*   CO2 28      BUN 36*   CREATININE 1.2   ALKPHOS 88   ALT 28   AST 40   BILITOT 0.3     CBC:   Recent Labs   Lab 09/10/20  0121   WBC 21.40*   RBC 3.44*   HGB 10.3*   HCT 33.0*   *   MCV 96   MCH 29.9   MCHC 31.2*     Lipid Panel: No results for input(s): CHOL, LDLCALC, HDL, TRIG in the last 168 hours.  Hgb A1C: No results for input(s): HGBA1C in the last 168 hours.  TSH: No results for input(s): TSH in the last 168 hours.    Diagnostic Results   MRI Brain 8/26/20  Ventricles and sulci are normal in size for age without evidence of hydrocephalus.  No extra-axial blood or fluid collections  Large area restricted diffusion right posterior frontal and parietal lobe consistent with right recent infarction.  No evidence of superimposed hemorrhage.  Patchy areas of increased T2/FLAIR signal of the supratentorial white matter consistent with chronic microvascular ischemic changes.  No evidence of mass.  Skull/extracranial contents (limited evaluation): Bone marrow signal intensity is normal.  Small amount of fluid in the right maxillary sinus.  Large area of restricted diffusion consistent with continued evolution of recent infarction involving the right posterior frontal and parietal lobes lobes.  No evidence of hemorrhage.  Chronic microvascular ischemic changes.     CT head 8/30/20  No extra-axial blood or fluid collections.  Continued evolution of areas infarction involving the right frontal and right parietal lobes.  No evidence of superimposed hemorrhage.  Associated edema with mild compression of the right lateral ventricle.  No hydrocephalus.  No evidence of mass.  Skull/extracranial contents (limited evaluation): No fracture. Mastoid air cells and paranasal sinuses are essentially clear.     Continued evolution of right frontal and parietal lobe infarctions with no evidence of  hemorrhage.  Associated edema is noted with no midline shift.  Mild mass effect on the right lateral ventricle.  No hydrocephalus.     Cardiac Imaging   TTE 9/1/20  · Moderate concentric left ventricular hypertrophy.  · Moderately to severely decreased left ventricular systolic function. The estimated ejection fraction is 25-30%.  · Left ventricular diastolic dysfunction.  · Moderately to severely reduced right ventricular systolic function.  · Moderate left atrial enlargement.  · Severe right atrial enlargement.  · Moderate mitral regurgitation.  · Moderate tricuspid regurgitation.  · Intermediate central venous pressure (8 mmHg).  · The estimated PA systolic pressure is 42 mmHg.  · Pulmonary hypertension present.  · Negative bubble study.      Teresa Marin MD  Comprehensive Stroke Center  Department of Vascular Neurology   [facility]

## 2020-09-10 NOTE — PLAN OF CARE
[facility] Care Plan    POC reviewed with Donita Gonzalez at 0500. Pt unable to verbalize understanding d/t ETT and neuro status. No acute events overnight. Heparin therapeutic @ 12u. Precedex for agitation. Replacing K and Mg. T-max 99.9, Tylenol given. Pt progressing toward goals. Will continue to monitor. See below and flowsheets for full assessment and VS info.       Neuro:  Manuel Coma Scale  Best Eye Response: 2-->(E2) to pain  Best Motor Response: 5-->(M5) localizes pain  Best Verbal Response: 1-->(V1) none  Manuel Coma Scale Score: 8  Assessment Qualifiers: patient intubated, patient chemically sedated or paralyzed  Pupil PERRLA: no     24 hr Temp:  [97.9 °F (36.6 °C)-99.9 °F (37.7 °C)]     CV:   Rhythm: atrial rhythm  BP goals:   SBP < 160  MAP > 65    Resp:   O2 Device (Oxygen Therapy): ventilator  Vent Mode: A/C  Set Rate: 16 BPM  Oxygen Concentration (%): 40  Vt Set: 420 mL  PEEP/CPAP: 5 cmH20  Pressure Support: 12 cmH20    Plan: Trach and peg procedures planned for Friday    GI/:  ROXANE Total Score: 1  Diet/Nutrition Received: tube feeding  Last Bowel Movement: 09/08/20  Voiding Characteristics: urethral catheter (bladder)    Intake/Output Summary (Last 24 hours) at 9/10/2020 0601  Last data filed at 9/10/2020 0500  Gross per 24 hour   Intake 1775.49 ml   Output 2330 ml   Net -554.51 ml     Unmeasured Output  Stool Occurrence: 0  Emesis Occurrence: 0  Pad Count: 2    Labs/Accuchecks:  Recent Labs   Lab 09/10/20  0121   WBC 21.40*   RBC 3.44*   HGB 10.3*   HCT 33.0*   *      Recent Labs   Lab 09/10/20  0121      K 3.4*   CO2 28      BUN 36*   CREATININE 1.2   ALKPHOS 88   ALT 28   AST 40   BILITOT 0.3      Recent Labs   Lab 09/10/20  0121   APTT 41.2*      Recent Labs   Lab 09/07/20  2226   TROPONINI 0.080*       Gtts:   dexmedetomidine (PRECEDEX) infusion 0.2 mcg/kg/hr (09/10/20 0500)    dextrose 10 % in water (D10W)      heparin (porcine) in D5W 12 Units/kg/hr (09/10/20 0500)        LDA/Wounds:  Lines/Drains/Airways       Drain                   NG/OG Tube 08/29/20 2215 orogastric Left mouth 11 days         Urethral Catheter 09/08/20 1105 1 day              Airway                   Airway - Non-Surgical 08/26/20 0931 Endotracheal Tube 14 days              Arterial Line              Arterial Line 09/05/20 1345 Left Radial 4 days              Peripheral Intravenous Line                   Peripheral IV - Single Lumen 09/07/20 0300 20 G Anterior;Left;Proximal Upper Arm 3 days         Peripheral IV - Single Lumen 09/08/20 0905 20 G Anterior;Right Forearm 1 day

## 2020-09-10 NOTE — PLAN OF CARE
OR tomorrow  Hold hep gtt and tf @ [facility]  Superficial thrombus in LUE  Staph and serratia in sputum - f/u susceptibilities  DC A-line, no longer working  Continue broad spec abx for the time

## 2020-09-10 NOTE — PLAN OF CARE
09/10/20 0726   Post-Acute Status   Post-Acute Authorization Placement   Post-Acute Placement Status Referrals Sent  ([facility])     RONY observed that the Pt is due to receive a trach/peg on Friday. RONY met with Pt  at bedside. Discussed [facility] as possible option and provided list. Discussed need to send referrals to obtain the accepting options from the list provided. Pt  agreeable and will review list for preferences asap. Currently, his preference is [facility].    RONY sent referrals via  to [facility], [facility], and [facility].    Zofia Pantoja, JOHN  Neurocritical Care   [facility]  [phone]

## 2020-09-10 NOTE — PROGRESS NOTES
Pharmacokinetic Follow-Up Assessment: IV Vancomycin    Assessment/Plan:    - Vanc trough prior to third dose is 14.6 mcg/mL  - Therapeutic, anticipate accumulation closer to 15 mcg/mL with ongoing dosing  - Continue current vancomycin regimen of 1000 mg Q24H as long as vanc is indicated  - Consider de-escalating, given staph in resp cx is MSSA  - Will schedule trough if therapy is extended beyond 5-7 days    Pharmacy will continue to follow and monitor vancomycin. Please contact pharmacy at extension [phone] with any questions regarding this assessment.     Thank you for the consult,   Judi Lin, PharmD, [facility]  Neurocritical Care Pharmacist  [phone]       Patient brief summary:  Donita Gonzalez is a 64 y.o. female initiated on antimicrobial therapy with IV Vancomycin for treatment of suspected bacteremia    Drug Allergies:   Review of patient's allergies indicates:  No Known Allergies    Actual Body Weight:   51 kg    Renal Function:   Estimated Creatinine Clearance: 38.1 mL/min (based on SCr of 1.2 mg/dL).,     Dialysis Method (if applicable):  N/A    CBC (last 72 hours):  Recent Labs   Lab Result Units 09/08/20  0110 09/09/20  0100 09/10/20  0121   WBC K/uL 19.50* 24.82* 21.40*   Hemoglobin g/dL 11.3* 10.7* 10.3*   Hematocrit % 36.2* 34.8* 33.0*   Platelets K/uL 499* 486* 510*   Gran% % 84.0* 91.0* 81.3*   Lymph% % 3.0* 1.5* 4.6*   Mono% % 6.0 3.5* 8.5   Eosinophil% % 0.0 0.5 0.7   Basophil% % 0.0 0.0 0.6   Differential Method  Manual Manual Automated       Metabolic Panel (last 72 hours):  Recent Labs   Lab Result Units 09/07/20  2226 09/08/20  0110 09/08/20  1124 09/09/20  0100 09/10/20  0121   Sodium mmol/L 140 142  --  140 140   Potassium mmol/L 4.1 4.4  --  3.5 3.4*   Chloride mmol/L 98 99  --  100 101   CO2 mmol/L 34* 33*  --  32* 28   Glucose mg/dL 145* 108  --  141* 115*   Glucose, UA   --   --  Negative  --   --    BUN, Bld mg/dL 38* 36*  --  37* 36*   Creatinine mg/dL 1.3 1.3  --  1.3 1.2   Albumin g/dL   --  2.1*  --  2.0* 1.9*   Total Bilirubin mg/dL  --  0.3  --  0.3 0.3   Alkaline Phosphatase U/L  --  75  --  84 88   AST U/L  --  58*  --  45* 40   ALT U/L  --  35  --  33 28   Magnesium mg/dL 1.9 1.9  --  1.9 1.7   Phosphorus mg/dL  --  4.3  --  3.8 3.3       Drug levels (last 3 results):  Recent Labs   Lab Result Units 09/10/20  1130   Vancomycin-Trough ug/mL 14.6       Microbiologic Results:  Microbiology Results (last 7 days)     Procedure Component Value Units Date/Time    Blood culture [[id number]] Collected: 09/08/20 1045    Order Status: Completed Specimen: Blood from Peripheral, Lower Leg, Left Updated: 09/10/20 1212     Blood Culture, Routine No Growth to date      No Growth to date      No Growth to date    Narrative:      Blood cultures x 2 different sites. 4 bottles total. Please  draw cultures before administering antibiotics.    Blood culture [[id number]] Collected: 09/08/20 1057    Order Status: Completed Specimen: Blood from Peripheral, Lower Arm, Right Updated: 09/10/20 1212     Blood Culture, Routine No Growth to date      No Growth to date      No Growth to date    Narrative:      Blood cultures from 2 different sites. 4 bottles total.  Please draw before starting antibiotics.    Culture, Respiratory with Gram Stain [[id number]]  (Abnormal)  (Susceptibility) Collected: 09/08/20 1117    Order Status: Completed Specimen: Respiratory from Endotracheal Aspirate Updated: 09/10/20 1025     Respiratory Culture No Pseudomonas isolated.      STAPHYLOCOCCUS AUREUS  Moderate        SERRATIA MARCESCENS  Few       Gram Stain (Respiratory) <10 epithelial cells per low power field.     Gram Stain (Respiratory) Many WBC's     Gram Stain (Respiratory) Many Gram negative rods     Gram Stain (Respiratory) Moderate Gram positive cocci    Blood culture [[id number]] Collected: 08/30/20 1210    Order Status: Completed Specimen: Blood from Peripheral, Forearm, Left Updated: 09/04/20 1612     Blood Culture, Routine No  growth after 5 days.    Blood culture [[id number]] Collected: 08/30/20 1220    Order Status: Completed Specimen: Blood from Peripheral, Antecubital, Right Updated: 09/04/20 1612     Blood Culture, Routine No growth after 5 days.

## 2020-09-10 NOTE — PLAN OF CARE
[facility] Care Plan    POC reviewed with Donita Gonzalez and spouse at 1600. Pt unable to verbalize understanding. Questions and concerns addressed with spouse. No acute events today. NPO post MN for trach/ PEG placement tomorrow. Rosanne and barr catheter DC'd. Pt progressing toward goals. Will continue to monitor. See below and flowsheets for full assessment and VS info.       Neuro:  [place] Coma Scale  Best Eye Response: 1-->(E1) none  Best Motor Response: 6-->(M6) obeys commands  Best Verbal Response: 1-->(V1) none  [place] Coma Scale Score: 8  Assessment Qualifiers: patient intubated  Pupil PERRLA: no     24 hr Temp:  [97.9 °F (36.6 °C)-99.7 °F (37.6 °C)]     CV:   Rhythm: atrial rhythm  BP goals:   SBP < 160  MAP > 65    Resp:   O2 Device (Oxygen Therapy): ventilator  Vent Mode: A/C  Set Rate: 16 BPM  Oxygen Concentration (%): 40  Vt Set: 420 mL  PEEP/CPAP: 5 cmH20  Pressure Support: 12 cmH20    Plan:  trach placement tomorrow    GI/:  ROXANE Total Score: 1  Diet/Nutrition Received: tube feeding  Last Bowel Movement: 09/08/20  Voiding Characteristics: due to void, external catheter    Intake/Output Summary (Last 24 hours) at 9/10/2020 1805  Last data filed at 9/10/2020 1720  Gross per 24 hour   Intake 1559.65 ml   Output 1805 ml   Net -245.35 ml         Labs/Accuchecks:  Recent Labs   Lab 09/10/20  0121   WBC 21.40*   RBC 3.44*   HGB 10.3*   HCT 33.0*   *      Recent Labs   Lab 09/10/20  0121 09/10/20  1602     --    K 3.4* 4.7   CO2 28  --      --    BUN 36*  --    CREATININE 1.2  --    ALKPHOS 88  --    ALT 28  --    AST 40  --    BILITOT 0.3  --       Recent Labs   Lab 09/10/20  0121   APTT 41.2*      Recent Labs   Lab 09/07/20  2226   TROPONINI 0.080*       Electrolytes: Electrolytes replaced  Accuchecks: Q6H    Gtts:   dexmedetomidine (PRECEDEX) infusion 0.2 mcg/kg/hr (09/10/20 1729)    dextrose 10 % in water (D10W)      heparin (porcine) in D5W 12 Units/kg/hr (09/10/20 1709)        LDA/Wounds:  Lines/Drains/Airways       Drain                   NG/OG Tube 08/29/20 2215 orogastric Left mouth 11 days    Female External Urinary Catheter 09/10/20 1720 less than 1 day              Airway                   Airway - Non-Surgical 08/26/20 0931 Endotracheal Tube 15 days              Peripheral Intravenous Line                   Peripheral IV - Single Lumen 09/07/20 0300 20 G Anterior;Left;Proximal Upper Arm 3 days         Peripheral IV - Single Lumen 09/08/20 0905 20 G Anterior;Right Forearm 2 days         Peripheral IV - Single Lumen 09/10/20 1602 20 G Left Hand less than 1 day                  Wounds: Yes  Wound care consulted: No

## 2020-09-10 NOTE — PLAN OF CARE
Per MD: 09/10/2020 Patient scheduled for trach/peg for tomorrow  Not medically ready for discharge.     SW sent referrals to [facility], [facility] and [facility],         09/10/20 1052   Discharge Reassessment   Assessment Type Discharge Planning Reassessment   Provided patient/caregiver education on the expected discharge date and the discharge plan Yes   Do you have any problems affording any of your prescribed medications? No   Discharge Plan A Long-term acute care facility (LTAC)   Discharge Plan B Long-term acute care facility (LTAC)   DME Needed Upon Discharge  other (see comments)  (tbd)   Anticipated Discharge Disposition LTAC   Can the patient/caregiver answer the patient profile reliably? No, cognitively impaired   Describe the patient's ability to walk at the present time. Does not walk or unable to take any steps at all   How often would a person be available to care for the patient? Whenever needed   Number of comorbid conditions (as recorded on the chart) Five or more       Mai Gonzalez RN, CCRN-K, [facility]  Neuro-Critical Care   [phone]

## 2020-09-10 NOTE — PROGRESS NOTES
[facility]  Neurocritical Care  Progress Note    Admit Date: 8/29/2020  Service Date: 09/10/2020  Length of Stay: 12    Subjective:     Chief Complaint: Embolic stroke involving right middle cerebral artery    History of Present Illness: 64 year old female with a PMH significant for COPD, HTN, and every day smoker who presented at OSH on 08/26/20 with acute left sided facial droop and left sided weakness (last known normal 08/25/20). Pt was not given TPA or endovascular intervention, and she was intubated on 08/26/20 for agitation. CTH on 08/26/20 showed right posterior frontal/parietal infarction with no intracranial hemorrhage. Hospital course complicated by Afib with RVR in which she was started on lovenox and then transitioned to heparin gtt for GOYO and HFrEF. Pt was transferred to NICU for higher level of care and persistent epistaxis. Upon arrival Pt was intubated and sedated, Rhinorocket  Bilaterally in place, propofol and diltiazem gtt. Pt was able to move the right side and moves her left side to noxious stimuli. A-line placed.       Hospital Course: 08/29/20: Pt admitted to NICU for higher level of care and ENT consult. Pt with a run of Vtach associated with hypotension.   8/31/2020: hold anticoagulation until tomorrow due to GI bleed/epistaxis (at other facility) GI consult, pt in Afib- load with digoxin, pending digoxin level tomorrow, continue diltiazem gtt today, start tube feeds, obtain PIV and d/c central line, continue cefepime for total 7 days  9/1/2020: initiated heparin gtt minimal intensity-when at goal obtain CT head, continue diltiazem gtt, start tube feeds, d/c A-line  9/2/2020: changed to PO diltiazem 60mg q8hr from gtt, lasix, miralax, plan for extubation  9/3/2020: started seroquel 25BID  9/4/2020: increased seroquel to 25 mg BID, magnesium citrate, and try SBT  9/5/2020: place A-line, increased Po dilt, add chest PT, add water flushes and repeat magnesium  citrate  9/6/2020: d/c digoxin, add coreg 6.25 BID, d/c water flushes, repeat CMP, add lasix PO 40 mg BID  09/07/2020 Plan to wean FiO2. As we are weaning propofol, precedex will be added. Patient is currently on heparin drip. Daily EKG for QTc monitoring. Patient given one dose of diamox with plans to restart furosemide for metabolic alkalosis. Will discontinue barr tomorrow. F/u with procalcitonin due to increased WBC count. Will speak to family member about Trach/PEG discussion   09/08/2020 Patient remains on her heparin drip. Currently she is rate controlled. Patient is off of her propofol due to significnt hypotension. We are holding the next two doses of lasix and giving her diamox 500 mg for two doses for metabolic alkalosis. Dr. Zavala spoke to the POA, Griffin Gonzalez who would like to proceed with a trach/PEG. Patient was pan cultured today and started on broad spectrum antibiotics. Barr was changed today   09/09/2020 Patient scheduled to get trach/PEG this Friday. Still on broad spectrum antibiotics as she has an elevated WBC. Working up for alternative causes of fever: US of the upper and lower extremities, rapid COVID, and amylase lipase.   09/10/2020 Patient scheduled for trach/peg for tomorrow. We are holding the heparin drip and the tube feeds for this midnight. No DVT on upper and lower extremities. Continuing the broad spectrum antibiotics until the susceptibilities return. D/c a line as it is no longer working.       Interval History:  Patient scheduled for trach/peg for tomorrow. We are holding the heparin drip and the tube feeds for this midnight. No DVT on upper and lower extremities. Amylase and lipase wnl. Continuing the broad spectrum antibiotics until the susceptibilities return. D/c a line as it is no longer working.     Review of Systems   Unable to perform ROS: Intubated   Constitutional: Negative for fever.   Cardiovascular: Negative for leg swelling.   Psychiatric/Behavioral:  Negative for agitation.       Objective:     Vitals:  Temp: 98.6 °F (37 °C)  Pulse: 96  Rhythm: atrial rhythm  BP: (!) 140/69  MAP (mmHg): 92  Resp: (!) 31  SpO2: 97 %  Oxygen Concentration (%): 40  O2 Device (Oxygen Therapy): ventilator  Vent Mode: A/C  Set Rate: 16 BPM  Vt Set: 420 mL  PEEP/CPAP: 5 cmH20  Peak Airway Pressure: 24 cmH2O  Mean Airway Pressure: 11 cmH20  Plateau Pressure: 16 cmH20    Temp  Min: 97.9 °F (36.6 °C)  Max: 99.7 °F (37.6 °C)  Pulse  Min: 86  Max: 111  BP  Min: 94/52  Max: 185/78  MAP (mmHg)  Min: 67  Max: 118  Resp  Min: 16  Max: 31  SpO2  Min: 95 %  Max: 98 %  Oxygen Concentration (%)  Min: 40  Max: 40    09/09 0701 - 09/10 0700  In: 1569.4 [I.V.:239.4]  Out: 2540 [Urine:2540]   Unmeasured Output  Stool Occurrence: 0  Emesis Occurrence: 0  Pad Count: 2       Physical Exam  Unable to test orientation, language, memory, judgment, insight, fund of knowledge, hearing, shoulder shrug, tongue protrusion, coordination, gait due to level of consciousness.    --GCS: E2 VT1 M4  --Mental Status:  Will open eyes   --Pupils left pupil brisk, R pupil fixed   --Corneal reflex, gag, cough intact.  --Will move all extremities spontaneously   -Will follow commands on the right but not the left   Medications:  Continuousdexmedetomidine (PRECEDEX) infusion, Last Rate: 0.4 mcg/kg/hr (09/10/20 1005)  dextrose 10 % in water (D10W)  heparin (porcine) in D5W, Last Rate: 12 Units/kg/hr (09/10/20 1005)    Scheduledalbuterol-ipratropium, 3 mL, Q6H  atorvastatin, 40 mg, Daily  carvediloL, 3.125 mg, BID  ceFEPime (MAXIPIME) IVPB, 1 g, Q12H  diltiaZEM, 90 mg, Q8H  furosemide, 40 mg, BID  pantoprozole (PROTONIX) IV, 40 mg, Q12H  polyethylene glycol, 17 g, Daily  QUEtiapine, 50 mg, BID  senna-docusate 8.6-50 mg, 1 tablet, BID  valproic acid (as sodium salt), 250 mg, Q8H  vancomycin (VANCOCIN) IVPB, 1,000 mg, Q24H    PRNacetaminophen, 650 mg, Q6H PRN  dextrose 10 % in water (D10W), , Continuous PRN  dextrose 50%, 12.5 g,  PRN  fentaNYL, 50 mcg, Q2H PRN  glucagon (human recombinant), 1 mg, PRN  hydrALAZINE, 10 mg, Q6H PRN  magnesium oxide, 800 mg, PRN  magnesium oxide, 800 mg, PRN  ondansetron, 4 mg, Q6H PRN  potassium chloride, 40 mEq, PRN  potassium chloride, 40 mEq, PRN  potassium chloride, 60 mEq, PRN  potassium, sodium phosphates, 2 packet, PRN  potassium, sodium phosphates, 2 packet, PRN  potassium, sodium phosphates, 2 packet, PRN  vancomycin - pharmacy to dose, , pharmacy to manage frequency      Today I personally reviewed pertinent medications, lines/drains/airways, imaging, cardiology results, laboratory results, microbiology results, notably:    Diet  Diet NPO  Diet NPO  Diet NPO  Diet NPO        Assessment/Plan:     Neuro  * Embolic stroke involving right middle cerebral artery  - Last seen normal on 08/25/20 with no intervention; intubated and sedated.    - Imaging shows large right ischemic stroke in the posterior frontal and parietal regions  - SBP < 160, MAP > 65  - EuNa   - CBC, CMP, coags daily   - heparin gtt low intensity  - repeat CTH stable  - daily statin  - Seroquel to 50 mg BID,started precedex   -Propofol drip discontinued due to hypotension  -General surgery consulted for PEG/Trach placement and they plan on proceeding with the surgery on 9/11/20     ENT  Epistaxis  - persistent epistaxis in the setting of anticoagulation   - H/H stable   - daily CBC   - ENT consulted;   rhino rockets were removed     Pulmonary  Chronic obstructive pulmonary disease  - daily CXRs while intubated   - goal SpO2 is > 88%  - scheduled duo nebs q6h    Acute on chronic respiratory failure with hypoxia and hypercapnia  - Pt intubated and sedated   - Daily CXRs and abg   9/4/2020: SBT trial today and respiratory mechanics (NIF -11)  Vent Mode: A/C  Oxygen Concentration (%):  [40] 40  Resp Rate Total:  [16 br/min-26 br/min] 17 br/min  Vt Set:  [420 mL] 420 mL  PEEP/CPAP:  [5 cmH20] 5 cmH20  Mean Airway Pressure:  [9.1 byL67-65  cmH20] 11 cmH20     PEG and Trach scheduled for Friday. Will hold tube feeds and heparin drip midnight on 9/11           Cardiac/Vascular  Atrial fibrillation with rapid ventricular response  - Repeat EKG   - daily Mg and Phos; replace as needed  - dilt PO  -dig level 1.8  - continue heparin gtt minimal intensity  -9/5/2020: increased PO diltiazem to 90 mg  9/6/20: d/c digoxin    Plan to hold heparin drip on 9/11/20 at midnight      Essential hypertension  - SBP goal < 160, MAP >65    Renal/  GOYO (acute kidney injury)  -Avoid nephrotoxic drugs  -Monitor Daily I/O    Oncology  Leukocytosis  WBC  Trending down. No fevers overnight   Patient being appropriately covered for positive respiratory cultures       Endocrine  Diabetes mellitus  -SSI moderate  -POCT glu monitoring    Moderate malnutrition  - tube feeds  - monitor electrolytes with daily CMP, Mg, phos    GI  GI bleed  - upper gi bleed at outside hospital  - recent upper GI bleed at outside facility and epistaxis (2 rhino rockets, up to 5 days prn)  - consulted GI -Continue PPI b.i.d. Trend hemoglobin, transfuse hemoglobin <7            The patient is being Prophylaxed for:  Venous Thromboembolism with: Chemical  Stress Ulcer with: H2B  Ventilator Pneumonia with: chlorhexidine oral care    Activity Orders          Diet NPO: NPO starting at 09/11 0001    Diet NPO: NPO starting at 08/30 0250        Full Code    Finesse Lockwood MD  Neurocritical Care  [facility]

## 2020-09-10 NOTE — PLAN OF CARE
Problem: Malnutrition  Goal: Improved Nutritional Intake  Outcome: Ongoing, Progressing   Recommendations    Recommendation:   1. Continue TF of Isosource 1.5 @ 40 mL/hr to provide pt with 1440 kcal, 65 g protein and 733 mL free water.  Additional water per MD. Hold for residuals >500 mL.  - If bolus feeds warranted, suggest TF of Isosource 1.5 4 cans/day to meet >85% of EEN/EPN.   2. RD to monitor and follow up    Goals: 1. Pt to meet % EEN and EPN by RD follow up.  Nutrition Goal Status: goal met  Communication of RD Recs: (POC)

## 2020-09-10 NOTE — SUBJECTIVE & OBJECTIVE
Neurologic Chief Complaint: R MCA 2/2 Afib.     Subjective:     Interval History: Patient is seen for follow-up neurological assessment and treatment recommendations: R MCA 2/2 Afib on heparin, coreg, and diltiazem. WBC increasing, respi cx w/ Serratia and Staph Aureus. Hypotensive on propofol so transitioned to Precedex and pan cx. Family POA agreeable to trach /PEG.    HPI, Past Medical, Family, and Social History remains the same as documented in the initial encounter.     Review of Systems   Unable to perform ROS: Intubated   Constitutional: Negative for fever.   HENT: Positive for drooling (copious secretions). Negative for nosebleeds.         Tongue swelling   Eyes: Negative for visual disturbance.   Respiratory: Negative for chest tightness.         Intubated   Cardiovascular: Negative for chest pain.   Gastrointestinal: Negative for anal bleeding and blood in stool.   Genitourinary: Negative for hematuria.   Neurological: Negative for facial asymmetry.   Psychiatric/Behavioral: Positive for agitation.        Precedex for agitation while on MV.      Scheduled Meds:   albuterol-ipratropium  3 mL Nebulization Q6H    atorvastatin  40 mg Per OG tube Daily    carvediloL  3.125 mg Per OG tube BID    ceFEPime (MAXIPIME) IVPB  1 g Intravenous Q12H    diltiaZEM  90 mg Per OG tube Q8H    furosemide  40 mg Per OG tube BID    pantoprozole (PROTONIX) IV  40 mg Intravenous Q12H    polyethylene glycol  17 g Per NG tube Daily    QUEtiapine  50 mg Per OG tube BID    senna-docusate 8.6-50 mg  1 tablet Per OG tube BID    valproic acid (as sodium salt)  250 mg Per OG tube Q8H    vancomycin (VANCOCIN) IVPB  1,000 mg Intravenous Q24H     Continuous Infusions:   dexmedetomidine (PRECEDEX) infusion 0.2 mcg/kg/hr (09/10/20 1205)    dextrose 10 % in water (D10W)      heparin (porcine) in D5W 12 Units/kg/hr (09/10/20 1205)     PRN Meds:acetaminophen, dextrose 10 % in water (D10W), dextrose 50%, fentaNYL, glucagon (human  recombinant), hydrALAZINE, magnesium oxide, magnesium oxide, ondansetron, potassium chloride, potassium chloride, potassium chloride, potassium, sodium phosphates, potassium, sodium phosphates, potassium, sodium phosphates, Pharmacy to dose Vancomycin consult **AND** vancomycin - pharmacy to dose    Objective:     Vital Signs (Most Recent):  Temp: 99 °F (37.2 °C) (09/10/20 1226)  Pulse: 94 (09/10/20 1226)  Resp: 18 (09/10/20 1226)  BP: (!) 149/80 (09/10/20 1205)  SpO2: 98 % (09/10/20 1226)  BP Location: Right arm    Vital Signs Range (Last 24H):  Temp:  [97.9 °F (36.6 °C)-99.7 °F (37.6 °C)]   Pulse:  []   Resp:  [16-31]   BP: ()/()   SpO2:  [95 %-98 %]   Arterial Line BP: ()/()   BP Location: Right arm    Physical Exam  Constitutional:       General: She is not in acute distress.     Appearance: Normal appearance. She is not ill-appearing.   HENT:      Head: Normocephalic.      Right Ear: External ear normal. There is no impacted cerumen.      Left Ear: External ear normal. There is no impacted cerumen.      Nose: No congestion or rhinorrhea.      Mouth/Throat:      Mouth: Mucous membranes are dry.   Eyes:      General: No scleral icterus.        Right eye: No discharge.         Left eye: No discharge.      Comments: Left pupil 3mm, R pupil 6 fixed    Cardiovascular:      Rate and Rhythm: Normal rate.      Heart sounds: No murmur.   Pulmonary:      Comments: intubated  Abdominal:      General: Abdomen is flat. There is no distension.      Palpations: Abdomen is soft.      Tenderness: There is no abdominal tenderness.   Musculoskeletal:         General: No swelling, tenderness or deformity.   Skin:     General: Skin is warm and dry.      Coloration: Skin is not pale.      Findings: No erythema.   Neurological:      Mental Status: She is alert.      Comments: Limited 2/2/ sedation with Precedex       Neurological Exam:   LOC: alert  Attention Span: poor  Language: No aphasia,  Intubated  Articulation: Untestable due to intubation  Orientation: Untestable due to intubation  Motor: Arm left  Plegia 0/5  Leg left  Plegia 0/5  Arm right  Paresis: 3/5 (with mitt)  Leg right Paresis: 3/5     Laboratory:  CMP:   Recent Labs   Lab 09/10/20  0121   CALCIUM 8.8   ALBUMIN 1.9*   PROT 6.2      K 3.4*   CO2 28      BUN 36*   CREATININE 1.2   ALKPHOS 88   ALT 28   AST 40   BILITOT 0.3     CBC:   Recent Labs   Lab 09/10/20  0121   WBC 21.40*   RBC 3.44*   HGB 10.3*   HCT 33.0*   *   MCV 96   MCH 29.9   MCHC 31.2*     Lipid Panel: No results for input(s): CHOL, LDLCALC, HDL, TRIG in the last 168 hours.  Hgb A1C: No results for input(s): HGBA1C in the last 168 hours.  TSH: No results for input(s): TSH in the last 168 hours.    Diagnostic Results   MRI Brain 8/26/20  Ventricles and sulci are normal in size for age without evidence of hydrocephalus.  No extra-axial blood or fluid collections  Large area restricted diffusion right posterior frontal and parietal lobe consistent with right recent infarction.  No evidence of superimposed hemorrhage.  Patchy areas of increased T2/FLAIR signal of the supratentorial white matter consistent with chronic microvascular ischemic changes.  No evidence of mass.  Skull/extracranial contents (limited evaluation): Bone marrow signal intensity is normal.  Small amount of fluid in the right maxillary sinus.  Large area of restricted diffusion consistent with continued evolution of recent infarction involving the right posterior frontal and parietal lobes lobes.  No evidence of hemorrhage.  Chronic microvascular ischemic changes.     CT head 8/30/20  No extra-axial blood or fluid collections.  Continued evolution of areas infarction involving the right frontal and right parietal lobes.  No evidence of superimposed hemorrhage.  Associated edema with mild compression of the right lateral ventricle.  No hydrocephalus.  No evidence of mass.  Skull/extracranial  contents (limited evaluation): No fracture. Mastoid air cells and paranasal sinuses are essentially clear.     Continued evolution of right frontal and parietal lobe infarctions with no evidence of hemorrhage.  Associated edema is noted with no midline shift.  Mild mass effect on the right lateral ventricle.  No hydrocephalus.     Cardiac Imaging   TTE 9/1/20  · Moderate concentric left ventricular hypertrophy.  · Moderately to severely decreased left ventricular systolic function. The estimated ejection fraction is 25-30%.  · Left ventricular diastolic dysfunction.  · Moderately to severely reduced right ventricular systolic function.  · Moderate left atrial enlargement.  · Severe right atrial enlargement.  · Moderate mitral regurgitation.  · Moderate tricuspid regurgitation.  · Intermediate central venous pressure (8 mmHg).  · The estimated PA systolic pressure is 42 mmHg.  · Pulmonary hypertension present.  · Negative bubble study.

## 2020-09-10 NOTE — ASSESSMENT & PLAN NOTE
- Pt intubated and sedated   - Daily CXRs and abg   9/4/2020: SBT trial today and respiratory mechanics (NIF -11)  Vent Mode: A/C  Oxygen Concentration (%):  [40] 40  Resp Rate Total:  [16 br/min-26 br/min] 17 br/min  Vt Set:  [420 mL] 420 mL  PEEP/CPAP:  [5 cmH20] 5 cmH20  Mean Airway Pressure:  [9.1 ddQ83-30 cmH20] 11 cmH20     PEG and Trach scheduled for Friday. Will hold tube feeds and heparin drip midnight on 9/11

## 2020-09-11 ENCOUNTER — ANESTHESIA (OUTPATIENT)
Dept: SURGERY | Facility: HOSPITAL | Age: 65
DRG: 004 | End: 2020-09-11
Payer: COMMERCIAL

## 2020-09-11 PROBLEM — J18.9 PNA (PNEUMONIA): Status: ACTIVE | Noted: 2020-09-11

## 2020-09-11 LAB
ALBUMIN SERPL BCP-MCNC: 1.8 G/DL (ref 3.5–5.2)
ALLENS TEST: ABNORMAL
ALP SERPL-CCNC: 81 U/L (ref 55–135)
ALT SERPL W/O P-5'-P-CCNC: 35 U/L (ref 10–44)
ANION GAP SERPL CALC-SCNC: 9 MMOL/L (ref 8–16)
ANISOCYTOSIS BLD QL SMEAR: SLIGHT
APTT BLDCRRT: 33.9 SEC (ref 21–32)
AST SERPL-CCNC: 50 U/L (ref 10–40)
BASOPHILS # BLD AUTO: ABNORMAL K/UL (ref 0–0.2)
BASOPHILS NFR BLD: 0 % (ref 0–1.9)
BILIRUB SERPL-MCNC: 0.2 MG/DL (ref 0.1–1)
BUN SERPL-MCNC: 38 MG/DL (ref 8–23)
CALCIUM SERPL-MCNC: 8.9 MG/DL (ref 8.7–10.5)
CHLORIDE SERPL-SCNC: 102 MMOL/L (ref 95–110)
CO2 SERPL-SCNC: 28 MMOL/L (ref 23–29)
CREAT SERPL-MCNC: 1.2 MG/DL (ref 0.5–1.4)
DACRYOCYTES BLD QL SMEAR: ABNORMAL
DELSYS: ABNORMAL
DIFFERENTIAL METHOD: ABNORMAL
EOSINOPHIL # BLD AUTO: ABNORMAL K/UL (ref 0–0.5)
EOSINOPHIL NFR BLD: 0.5 % (ref 0–8)
ERYTHROCYTE [DISTWIDTH] IN BLOOD BY AUTOMATED COUNT: 14.5 % (ref 11.5–14.5)
ERYTHROCYTE [SEDIMENTATION RATE] IN BLOOD BY WESTERGREN METHOD: 16 MM/H
EST. GFR  (AFRICAN AMERICAN): 55.2 ML/MIN/1.73 M^2
EST. GFR  (NON AFRICAN AMERICAN): 47.9 ML/MIN/1.73 M^2
FIO2: 40
GLUCOSE SERPL-MCNC: 110 MG/DL (ref 70–110)
HCO3 UR-SCNC: 31.2 MMOL/L (ref 24–28)
HCT VFR BLD AUTO: 32.8 % (ref 37–48.5)
HGB BLD-MCNC: 10.2 G/DL (ref 12–16)
IMM GRANULOCYTES # BLD AUTO: ABNORMAL K/UL (ref 0–0.04)
IMM GRANULOCYTES NFR BLD AUTO: ABNORMAL % (ref 0–0.5)
LYMPHOCYTES # BLD AUTO: ABNORMAL K/UL (ref 1–4.8)
LYMPHOCYTES NFR BLD: 2.5 % (ref 18–48)
MAGNESIUM SERPL-MCNC: 1.8 MG/DL (ref 1.6–2.6)
MCH RBC QN AUTO: 30.1 PG (ref 27–31)
MCHC RBC AUTO-ENTMCNC: 31.1 G/DL (ref 32–36)
MCV RBC AUTO: 97 FL (ref 82–98)
METAMYELOCYTES NFR BLD MANUAL: 1.5 %
MODE: ABNORMAL
MONOCYTES # BLD AUTO: ABNORMAL K/UL (ref 0.3–1)
MONOCYTES NFR BLD: 12.5 % (ref 4–15)
MYELOCYTES NFR BLD MANUAL: 2.5 %
NEUTROPHILS NFR BLD: 77.5 % (ref 38–73)
NEUTS BAND NFR BLD MANUAL: 2.5 %
NRBC BLD-RTO: 0 /100 WBC
PCO2 BLDA: 46.9 MMHG (ref 35–45)
PEEP: 5
PH SMN: 7.43 [PH] (ref 7.35–7.45)
PHOSPHATE SERPL-MCNC: 3 MG/DL (ref 2.7–4.5)
PLATELET # BLD AUTO: 518 K/UL (ref 150–350)
PLATELET BLD QL SMEAR: ABNORMAL
PMV BLD AUTO: 11.1 FL (ref 9.2–12.9)
PO2 BLDA: 82 MMHG (ref 80–100)
POC BE: 7 MMOL/L
POC SATURATED O2: 96 % (ref 95–100)
POC TCO2: 33 MMOL/L (ref 23–27)
POCT GLUCOSE: 119 MG/DL (ref 70–110)
POIKILOCYTOSIS BLD QL SMEAR: SLIGHT
POTASSIUM SERPL-SCNC: 4.2 MMOL/L (ref 3.5–5.1)
PROMYELOCYTES NFR BLD MANUAL: 0.5 %
PROT SERPL-MCNC: 6.2 G/DL (ref 6–8.4)
RBC # BLD AUTO: 3.39 M/UL (ref 4–5.4)
SAMPLE: ABNORMAL
SITE: ABNORMAL
SODIUM SERPL-SCNC: 139 MMOL/L (ref 136–145)
SP02: 95
VT: 420
WBC # BLD AUTO: 15.2 K/UL (ref 3.9–12.7)

## 2020-09-11 PROCEDURE — 25000003 PHARM REV CODE 250: Performed by: NURSE PRACTITIONER

## 2020-09-11 PROCEDURE — 27200966 HC CLOSED SUCTION SYSTEM

## 2020-09-11 PROCEDURE — 27201423 OPTIME MED/SURG SUP & DEVICES STERILE SUPPLY: Performed by: SURGERY

## 2020-09-11 PROCEDURE — 63600175 PHARM REV CODE 636 W HCPCS: Performed by: NURSE PRACTITIONER

## 2020-09-11 PROCEDURE — 99291 PR CRITICAL CARE, E/M 30-74 MINUTES: ICD-10-PCS | Mod: ,,, | Performed by: PSYCHIATRY & NEUROLOGY

## 2020-09-11 PROCEDURE — 94668 MNPJ CHEST WALL SBSQ: CPT

## 2020-09-11 PROCEDURE — 85007 BL SMEAR W/DIFF WBC COUNT: CPT

## 2020-09-11 PROCEDURE — 99900026 HC AIRWAY MAINTENANCE (STAT)

## 2020-09-11 PROCEDURE — 80053 COMPREHEN METABOLIC PANEL: CPT

## 2020-09-11 PROCEDURE — 83735 ASSAY OF MAGNESIUM: CPT

## 2020-09-11 PROCEDURE — 36600 WITHDRAWAL OF ARTERIAL BLOOD: CPT

## 2020-09-11 PROCEDURE — 27800903 OPTIME MED/SURG SUP & DEVICES OTHER IMPLANTS: Performed by: SURGERY

## 2020-09-11 PROCEDURE — 99900035 HC TECH TIME PER 15 MIN (STAT)

## 2020-09-11 PROCEDURE — 84100 ASSAY OF PHOSPHORUS: CPT

## 2020-09-11 PROCEDURE — 20000000 HC ICU ROOM

## 2020-09-11 PROCEDURE — 43246 PR EGD, FLEX, W/PLCMT, GASTROSTOMY TUBE: ICD-10-PCS | Mod: 51,,, | Performed by: SURGERY

## 2020-09-11 PROCEDURE — 25000003 PHARM REV CODE 250: Performed by: STUDENT IN AN ORGANIZED HEALTH CARE EDUCATION/TRAINING PROGRAM

## 2020-09-11 PROCEDURE — 85027 COMPLETE CBC AUTOMATED: CPT

## 2020-09-11 PROCEDURE — D9220A PRA ANESTHESIA: Mod: ANES,,, | Performed by: ANESTHESIOLOGY

## 2020-09-11 PROCEDURE — 25000003 PHARM REV CODE 250: Performed by: NURSE ANESTHETIST, CERTIFIED REGISTERED

## 2020-09-11 PROCEDURE — 63600175 PHARM REV CODE 636 W HCPCS: Performed by: PHYSICIAN ASSISTANT

## 2020-09-11 PROCEDURE — 36000707: Performed by: SURGERY

## 2020-09-11 PROCEDURE — 97112 NEUROMUSCULAR REEDUCATION: CPT

## 2020-09-11 PROCEDURE — 37000009 HC ANESTHESIA EA ADD 15 MINS: Performed by: SURGERY

## 2020-09-11 PROCEDURE — 82800 BLOOD PH: CPT

## 2020-09-11 PROCEDURE — 25000242 PHARM REV CODE 250 ALT 637 W/ HCPCS: Performed by: NURSE PRACTITIONER

## 2020-09-11 PROCEDURE — 94003 VENT MGMT INPAT SUBQ DAY: CPT

## 2020-09-11 PROCEDURE — 94640 AIRWAY INHALATION TREATMENT: CPT

## 2020-09-11 PROCEDURE — C9113 INJ PANTOPRAZOLE SODIUM, VIA: HCPCS | Performed by: PHYSICIAN ASSISTANT

## 2020-09-11 PROCEDURE — 94761 N-INVAS EAR/PLS OXIMETRY MLT: CPT

## 2020-09-11 PROCEDURE — 31600 PR TRACHEOSTOMY, PLANNED: ICD-10-PCS | Mod: ,,, | Performed by: SURGERY

## 2020-09-11 PROCEDURE — 43246 EGD PLACE GASTROSTOMY TUBE: CPT | Mod: 51,,, | Performed by: SURGERY

## 2020-09-11 PROCEDURE — D9220A PRA ANESTHESIA: ICD-10-PCS | Mod: ANES,,, | Performed by: ANESTHESIOLOGY

## 2020-09-11 PROCEDURE — 99291 CRITICAL CARE FIRST HOUR: CPT | Mod: ,,, | Performed by: PSYCHIATRY & NEUROLOGY

## 2020-09-11 PROCEDURE — C1894 INTRO/SHEATH, NON-LASER: HCPCS | Performed by: SURGERY

## 2020-09-11 PROCEDURE — 36000706: Performed by: SURGERY

## 2020-09-11 PROCEDURE — 85730 THROMBOPLASTIN TIME PARTIAL: CPT

## 2020-09-11 PROCEDURE — D9220A PRA ANESTHESIA: Mod: CRNA,,, | Performed by: NURSE ANESTHETIST, CERTIFIED REGISTERED

## 2020-09-11 PROCEDURE — 63600175 PHARM REV CODE 636 W HCPCS: Performed by: STUDENT IN AN ORGANIZED HEALTH CARE EDUCATION/TRAINING PROGRAM

## 2020-09-11 PROCEDURE — D9220A PRA ANESTHESIA: ICD-10-PCS | Mod: CRNA,,, | Performed by: NURSE ANESTHETIST, CERTIFIED REGISTERED

## 2020-09-11 PROCEDURE — 31600 PLANNED TRACHEOSTOMY: CPT | Mod: ,,, | Performed by: SURGERY

## 2020-09-11 PROCEDURE — 37000008 HC ANESTHESIA 1ST 15 MINUTES: Performed by: SURGERY

## 2020-09-11 PROCEDURE — 63600175 PHARM REV CODE 636 W HCPCS: Performed by: NURSE ANESTHETIST, CERTIFIED REGISTERED

## 2020-09-11 PROCEDURE — 25000003 PHARM REV CODE 250: Performed by: PHYSICIAN ASSISTANT

## 2020-09-11 PROCEDURE — 27000221 HC OXYGEN, UP TO 24 HOURS

## 2020-09-11 PROCEDURE — 63600175 PHARM REV CODE 636 W HCPCS: Performed by: PSYCHIATRY & NEUROLOGY

## 2020-09-11 PROCEDURE — 82803 BLOOD GASES ANY COMBINATION: CPT

## 2020-09-11 RX ORDER — MIDAZOLAM HYDROCHLORIDE 1 MG/ML
INJECTION, SOLUTION INTRAMUSCULAR; INTRAVENOUS
Status: DISCONTINUED | OUTPATIENT
Start: 2020-09-11 | End: 2020-09-11

## 2020-09-11 RX ORDER — HEPARIN SODIUM,PORCINE/D5W 25000/250
12 INTRAVENOUS SOLUTION INTRAVENOUS CONTINUOUS
Status: ACTIVE | OUTPATIENT
Start: 2020-09-11 | End: 2020-09-12

## 2020-09-11 RX ORDER — ONDANSETRON 2 MG/ML
INJECTION INTRAMUSCULAR; INTRAVENOUS
Status: DISCONTINUED | OUTPATIENT
Start: 2020-09-11 | End: 2020-09-11

## 2020-09-11 RX ORDER — DILTIAZEM HCL 1 MG/ML
5 INJECTION, SOLUTION INTRAVENOUS CONTINUOUS
Status: DISCONTINUED | OUTPATIENT
Start: 2020-09-11 | End: 2020-09-11

## 2020-09-11 RX ORDER — DILTIAZEM HYDROCHLORIDE 5 MG/ML
15 INJECTION INTRAVENOUS ONCE
Status: COMPLETED | OUTPATIENT
Start: 2020-09-11 | End: 2020-09-11

## 2020-09-11 RX ORDER — DEXAMETHASONE SODIUM PHOSPHATE 4 MG/ML
INJECTION, SOLUTION INTRA-ARTICULAR; INTRALESIONAL; INTRAMUSCULAR; INTRAVENOUS; SOFT TISSUE
Status: DISCONTINUED | OUTPATIENT
Start: 2020-09-11 | End: 2020-09-11

## 2020-09-11 RX ORDER — KETAMINE HCL IN 0.9 % NACL 50 MG/5 ML
SYRINGE (ML) INTRAVENOUS
Status: DISCONTINUED | OUTPATIENT
Start: 2020-09-11 | End: 2020-09-11

## 2020-09-11 RX ORDER — METOPROLOL TARTRATE 1 MG/ML
INJECTION, SOLUTION INTRAVENOUS
Status: DISPENSED
Start: 2020-09-11 | End: 2020-09-11

## 2020-09-11 RX ORDER — LABETALOL HCL 20 MG/4 ML
10 SYRINGE (ML) INTRAVENOUS EVERY 4 HOURS PRN
Status: DISCONTINUED | OUTPATIENT
Start: 2020-09-11 | End: 2020-09-16 | Stop reason: HOSPADM

## 2020-09-11 RX ORDER — FENTANYL CITRATE 50 UG/ML
25 INJECTION, SOLUTION INTRAMUSCULAR; INTRAVENOUS ONCE
Status: COMPLETED | OUTPATIENT
Start: 2020-09-11 | End: 2020-09-11

## 2020-09-11 RX ORDER — PROPOFOL 10 MG/ML
VIAL (ML) INTRAVENOUS
Status: DISCONTINUED | OUTPATIENT
Start: 2020-09-11 | End: 2020-09-11

## 2020-09-11 RX ADMIN — PANTOPRAZOLE SODIUM 40 MG: 40 INJECTION, POWDER, LYOPHILIZED, FOR SOLUTION INTRAVENOUS at 09:09

## 2020-09-11 RX ADMIN — CEFEPIME 1 G: 1 INJECTION, POWDER, FOR SOLUTION INTRAMUSCULAR; INTRAVENOUS at 10:09

## 2020-09-11 RX ADMIN — QUETIAPINE FUMARATE 50 MG: 25 TABLET ORAL at 09:09

## 2020-09-11 RX ADMIN — VALPROIC ACID 250 MG: 250 SOLUTION ORAL at 05:09

## 2020-09-11 RX ADMIN — FENTANYL CITRATE 50 MCG: 50 INJECTION INTRAMUSCULAR; INTRAVENOUS at 04:09

## 2020-09-11 RX ADMIN — Medication 30 MG: at 12:09

## 2020-09-11 RX ADMIN — CARVEDILOL 3.12 MG: 3.12 TABLET, FILM COATED ORAL at 09:09

## 2020-09-11 RX ADMIN — IPRATROPIUM BROMIDE AND ALBUTEROL SULFATE 3 ML: .5; 2.5 SOLUTION RESPIRATORY (INHALATION) at 07:09

## 2020-09-11 RX ADMIN — ATORVASTATIN CALCIUM 40 MG: 20 TABLET, FILM COATED ORAL at 09:09

## 2020-09-11 RX ADMIN — VALPROIC ACID 250 MG: 250 SOLUTION ORAL at 09:09

## 2020-09-11 RX ADMIN — ONDANSETRON 4 MG: 2 INJECTION, SOLUTION INTRAMUSCULAR; INTRAVENOUS at 12:09

## 2020-09-11 RX ADMIN — VALPROIC ACID 250 MG: 250 SOLUTION ORAL at 06:09

## 2020-09-11 RX ADMIN — QUETIAPINE FUMARATE 50 MG: 25 TABLET ORAL at 08:09

## 2020-09-11 RX ADMIN — PROPOFOL 20 MG: 10 INJECTION, EMULSION INTRAVENOUS at 12:09

## 2020-09-11 RX ADMIN — FUROSEMIDE 40 MG: 40 TABLET ORAL at 09:09

## 2020-09-11 RX ADMIN — DILTIAZEM HYDROCHLORIDE 90 MG: 60 TABLET, FILM COATED ORAL at 05:09

## 2020-09-11 RX ADMIN — FENTANYL CITRATE 25 MCG: 50 INJECTION INTRAMUSCULAR; INTRAVENOUS at 12:09

## 2020-09-11 RX ADMIN — MIDAZOLAM HYDROCHLORIDE 2 MG: 1 INJECTION, SOLUTION INTRAMUSCULAR; INTRAVENOUS at 12:09

## 2020-09-11 RX ADMIN — CEFEPIME 1 G: 1 INJECTION, POWDER, FOR SOLUTION INTRAMUSCULAR; INTRAVENOUS at 09:09

## 2020-09-11 RX ADMIN — CARVEDILOL 3.12 MG: 3.12 TABLET, FILM COATED ORAL at 08:09

## 2020-09-11 RX ADMIN — IPRATROPIUM BROMIDE AND ALBUTEROL SULFATE 3 ML: .5; 2.5 SOLUTION RESPIRATORY (INHALATION) at 12:09

## 2020-09-11 RX ADMIN — DILTIAZEM HYDROCHLORIDE 90 MG: 60 TABLET, FILM COATED ORAL at 09:09

## 2020-09-11 RX ADMIN — DOCUSATE SODIUM 50MG AND SENNOSIDES 8.6MG 1 TABLET: 8.6; 5 TABLET, FILM COATED ORAL at 08:09

## 2020-09-11 RX ADMIN — DILTIAZEM HYDROCHLORIDE 90 MG: 60 TABLET, FILM COATED ORAL at 06:09

## 2020-09-11 RX ADMIN — DILTIAZEM HYDROCHLORIDE 15 MG: 5 INJECTION INTRAVENOUS at 02:09

## 2020-09-11 RX ADMIN — PANTOPRAZOLE SODIUM 40 MG: 40 INJECTION, POWDER, LYOPHILIZED, FOR SOLUTION INTRAVENOUS at 08:09

## 2020-09-11 RX ADMIN — POLYETHYLENE GLYCOL 3350 17 G: 17 POWDER, FOR SOLUTION ORAL at 09:09

## 2020-09-11 RX ADMIN — DOCUSATE SODIUM 50MG AND SENNOSIDES 8.6MG 1 TABLET: 8.6; 5 TABLET, FILM COATED ORAL at 09:09

## 2020-09-11 RX ADMIN — FENTANYL CITRATE 25 MCG: 50 INJECTION INTRAMUSCULAR; INTRAVENOUS at 05:09

## 2020-09-11 RX ADMIN — SODIUM CHLORIDE, SODIUM GLUCONATE, SODIUM ACETATE, POTASSIUM CHLORIDE, MAGNESIUM CHLORIDE, SODIUM PHOSPHATE, DIBASIC, AND POTASSIUM PHOSPHATE: .53; .5; .37; .037; .03; .012; .00082 INJECTION, SOLUTION INTRAVENOUS at 12:09

## 2020-09-11 RX ADMIN — HEPARIN SODIUM AND DEXTROSE 12 UNITS/KG/HR: 10000; 5 INJECTION INTRAVENOUS at 08:09

## 2020-09-11 RX ADMIN — FUROSEMIDE 40 MG: 40 TABLET ORAL at 06:09

## 2020-09-11 RX ADMIN — DEXAMETHASONE SODIUM PHOSPHATE 4 MG: 4 INJECTION, SOLUTION INTRAMUSCULAR; INTRAVENOUS at 12:09

## 2020-09-11 NOTE — ASSESSMENT & PLAN NOTE
- Pt intubated and sedated   - Daily CXRs and abg   9/4/2020: SBT trial today and respiratory mechanics (NIF -11)  Vent Mode: A/C  Oxygen Concentration (%):  [40] 40  Resp Rate Total:  [16 br/min-23 br/min] 16 br/min  Vt Set:  [420 mL] 420 mL  PEEP/CPAP:  [5 cmH20] 5 cmH20  Mean Airway Pressure:  [8.4 cmH20-9.9 cmH20] 9.1 cmH20     PEG and Trach scheduled for Friday.

## 2020-09-11 NOTE — PT/OT/SLP PROGRESS
Occupational Therapy   Treatment    Name: Donita Gonzalez  MRN: [id number]  Admitting Diagnosis:  Embolic stroke involving right middle cerebral artery  Day of Surgery    Recommendations:     Discharge Recommendations: LTACH (long-term acute care hospital)  Discharge Equipment Recommendations:  hospital bed, lift device  Barriers to discharge:  None    Assessment:     Donita Gonzalez is a 64 y.o. female with a medical diagnosis of Embolic stroke involving right middle cerebral artery.  She presents with performance deficits affecting function are weakness, impaired cognition, impaired endurance, impaired sensation, decreased coordination, decreased upper extremity function, impaired self care skills, impaired functional mobilty, decreased lower extremity function, decreased safety awareness, gait instability, impaired balance, impaired cardiopulmonary response to activity, impaired fine motor, impaired coordination, decreased ROM, abnormal tone, visual deficits.     Rehab Prognosis:  Good; patient would benefit from acute skilled OT services to address these deficits and reach maximum level of function.       Plan:     Patient to be seen 3 x/week to address the above listed problems via self-care/home management, therapeutic activities, therapeutic exercises, neuromuscular re-education, cognitive retraining, sensory integration  · Plan of Care Expires: 09/27/20  · Plan of Care Reviewed with: patient    Subjective   Patient:  Nonverbal; intubated  Nurse reported that the patient is scheduled for PEG and trach today.   Pain/Comfort:  · Pain Rating 1: 0/10  · Pain Rating Post-Intervention 1: 0/10    Objective:     Communicated with: Nurse and RT prior to session.  Patient found supine with arterial line, bed alarm, blood pressure cuff, telemetry, SCD, pulse ox (continuous), peripheral IV, barr catheter, ventilator, pressure relief boots upon OT entry to room.  Family not present.    General Precautions: Standard, aspiration,  fall, NPO, seizure   Orthopedic Precautions:N/A   Braces: N/A     Occupational Performance:     Bed Mobility:    · Patient completed Rolling/Turning to Left with  total assistance  · Patient completed Rolling/Turning to Right with total assistance     Functional Mobility/Transfers:  · Dependent drawsheet transfers    Activities of Daily Living:  · Feeding:  NPO    · Grooming: total assistance while supine    [facility] 6 Click ADL: 6    Treatment & Education:  Patient education provided on role of OT, ROM, positioning, daily orientation and need for continued OT upon discharge.  Daily orientation provided.   PROM performed left UE/LE and AAROM right UE/LE one set x 10 rep in all planes of motion with stretches provided at end range; sustained stretch provided for ankle dorsiflexion.  Assistance and facilitation provided for upward rotation of the scapula during shoulder flexion and abduction to promote orientation of glenoid fossa of scapula to humeral head for prevention of post-stroke hemiplegic pain.  Patient kept eyes closed 100% of the session.  Patient kept head turned to the right; tongue protrusion noted. Towel roll used for midline positioning of head.   Provided multi-sensory stimulation to prevent sensory deprivation and improve clinical outcomes.  Positioning provided for midline orientation with bilateral UEs elevated and heels lifted off mattress; positioning provided to prevent flexor synergy pattern in UE (internal rotation and adduction) to decrease risk of post-stroke hemiplegic pain.   Gentle cervical rotation provided.       Patient left supine with all lines intact, call button in reach and bed alarm onEducation:      GOALS:   Multidisciplinary Problems     Occupational Therapy Goals        Problem: Occupational Therapy Goal    Goal Priority Disciplines Outcome Interventions   Occupational Therapy Goal     OT, PT/OT Ongoing, Progressing    Description: Goals set 9/11 to be addressed for 14 days with  expiration date, 9/25:  Patient will increase functional independence with ADLs by performing:    Patient will demonstrate rolling to the right with mod assist.  Not met   Patient will demonstrate rolling to the left with mod assist.   Not met  Patient will demonstrate supine -sit with mod assist.   Not met  Patient will demonstrate stand pivot transfers with mod assist.   Not met  Patient will demonstrate grooming while seated with mod assist.   Not met  Patient will demonstrate upper body dressing with mod assist while seated EOB.   Not met  Patient will demonstrate lower body dressing with max assist while seated EOB.   Not met  Patient will demonstrate toileting with max assist.   Not met  Patient will demonstrate bathing while seated EOB with max assist.   Not met  Patient's family / caregiver will demonstrate independence and safety with assisting patient with self-care skills and functional mobility.     Not met  Patient's family / caregiver will demonstrate independence with providing ROM and changes in bed positioning.   Not met  Patient and/or patient's family will verbalize understanding of stroke prevention guidelines, personal risk factors and stroke warning signs via teachback method.  Not met                              Time Tracking:     OT Date of Treatment: 09/11/20  OT Start Time: 0420  OT Stop Time: 0443  OT Total Time (min): 23 min    Billable Minutes:Neuromuscular Re-education 23    RORY Thomas  9/11/2020

## 2020-09-11 NOTE — OP NOTE
[facility]  Surgery Department  Operative Note    SUMMARY     Date of Procedure: 9/11/2020     Procedure: Procedure(s) (LRB):  CREATION, TRACHEOSTOMY- percutaneous (N/A)  INSERTION, PEG TUBE (N/A)     Surgeon(s) and Role:     * Saurav Branham MD - Primary     * Kaia Ramsey MD - Resident - Assisting        Pre-Operative Diagnosis:   CVA (cerebral vascular accident) [I63.9]  Respiratory Failure  Dysphagia    Post-Operative Diagnosis: Post-Op Diagnosis Codes:     * CVA (cerebral vascular accident) [I63.9]  Respiratory Failure  Dysphagia    Anesthesia: General    OPERATIVE PROCEDURE:     The procedure was performed in the operating room. The patient was identified and monitored throughout. Appropriate position with neck in extension; anterior neck was prepped and draped. We identified our tracheal landmarks, made a 2 cm midline cervical incision. Subcutaneous tissue was bluntly dissected, and appropriate position for the tracheostomy tube was noted. Using the bronchoscope, we withdrew the endotracheal tube to above the level of tracheostomy. The trachea was then cannulated with a hollow bore needle. Aspiration of air confirmed cannulation of the trachea. The guidewire was then placed. Bronchoscopy through the endotracheal tube visualized the wire within the trachea. Tracheostomy was then created by serial dilation; first with the punch dilator and then the Blue Rhino dilator. A #8 Shiley tracheostomy was loaded onto the appropriate size loading dilator and placed into the trachea by Seldinger technique. Bronchoscopy through the tracheostomy confirmed appropriate position with visualization of the kerline. The endotracheal tube was removed and the flange of the tracheostomy tube was secured in place using Velcro tracheostomy tape and four 2-0 Prolene sutures. The cuff of the tracheostomy tube was inflated.    We then directed our attention to the left upper quadrant for gastrostomy tube  placement. The patient's abdomen was prepped and draped. An upper endoscope was introduced into the oropharynx and guided down into the esophagus and stomach. The stomach was insufflated with air. Appropriate position for gastrostomy tube placement 2 finger-breadths below the left subcostal margin was chosen. Palpation of the anterior abdominal wall at this point was visualized endoscopically and transillumination from the endoscope was visualized through the anterior abdominal wall. We made a 1.5 cm transverse skin incision. At this point, the stomach was cannulated with a catheter loaded on a needle. This was grasped by a snare which had been passed through the endoscope. The needle was removed, and a guidewire was placed, and the snare was used to grasp the guidewire. The endoscope, snare, and guidewire were all withdrawn from the patient's mouth. A 20-Kittitian gastrostomy tube was loaded onto the guidewire and pulled through the anterior abdominal wall via Seldinger technique. Repeat endoscopy was performed with the gastrostomy tube at the 3 cm veronika at the skin. There was no blanching of the gastric mucosa, and when the tube was twisted, the button did not grab the mucosa. The insufflation in the stomach was evacuated, and the endoscope was removed. The gastrostomy tube was secured in place using the supplied devices and connected to a bag for gravity drainage.    Complications: No    Estimated Blood Loss (EBL): * No values recorded between 9/11/2020 12:45 PM and 9/11/2020  1:15 PM *           Implants: * No implants in log *    Specimens:   Specimen (12h ago, onward)    None                  Condition: Good    Disposition: PACU - hemodynamically stable.    Kaia Ramsey MD, PGY-5  General Surgery  [phone]

## 2020-09-11 NOTE — PLAN OF CARE
Goals remain appropriate.  RORY Thomas  9/11/2020     Problem: Occupational Therapy Goal  Goal: Occupational Therapy Goal  Description: Goals set 9/11 to be addressed for 14 days with expiration date, 9/25:  Patient will increase functional independence with ADLs by performing:    Patient will demonstrate rolling to the right with mod assist.  Not met   Patient will demonstrate rolling to the left with mod assist.   Not met  Patient will demonstrate supine -sit with mod assist.   Not met  Patient will demonstrate stand pivot transfers with mod assist.   Not met  Patient will demonstrate grooming while seated with mod assist.   Not met  Patient will demonstrate upper body dressing with mod assist while seated EOB.   Not met  Patient will demonstrate lower body dressing with max assist while seated EOB.   Not met  Patient will demonstrate toileting with max assist.   Not met  Patient will demonstrate bathing while seated EOB with max assist.   Not met  Patient's family / caregiver will demonstrate independence and safety with assisting patient with self-care skills and functional mobility.     Not met  Patient's family / caregiver will demonstrate independence with providing ROM and changes in bed positioning.   Not met  Patient and/or patient's family will verbalize understanding of stroke prevention guidelines, personal risk factors and stroke warning signs via teachback method.  Not met             Outcome: Ongoing, Progressing

## 2020-09-11 NOTE — CONSULTS
Therapy with vancomycin complete and/or consult discontinued by provider.  Pharmacy will sign off, please re-consult as needed.    Judi Lin, PharmD, [facility]  Neurocritical Care Pharmacist  [phone]

## 2020-09-11 NOTE — NURSING
Pt transported from OR  back to room 9067 with trach and PEG placement. Transported on 6-10 L/min O2, by face mask. Pt sedated,  Withdrawing R upper and lower extremities.   Report received from Mark Hope CRNA.    Will continue to monitor closely.

## 2020-09-11 NOTE — TRANSFER OF CARE
"Anesthesia Transfer of Care Note    Patient: Donita Gonzalez    Procedure(s) Performed: Procedure(s) (LRB):  CREATION, TRACHEOSTOMY- percutaneous (N/A)  INSERTION, PEG TUBE (N/A)    Patient location: ICU    Anesthesia Type: general    Transport from OR: Transported from OR on 6-10 L/min O2 by face mask with adequate spontaneous ventilation    Post pain: adequate analgesia    Post assessment: no apparent anesthetic complications    Post vital signs: stable    Level of consciousness: sedated    Nausea/Vomiting: no nausea/vomiting    Complications: none    Transfer of care protocol was followed      Last vitals:   Visit Vitals  BP (!) 141/70 (BP Location: Right arm)   Pulse 108   Temp 37.4 °C (99.3 °F) (Core Esophageal)   Resp 18   Ht 5' 5" (1.651 m)   Wt 51 kg (112 lb 7 oz)   LMP  (LMP Unknown)   SpO2 96%   Breastfeeding No   BMI 18.71 kg/m²     "

## 2020-09-11 NOTE — PROGRESS NOTES
[facility]  Neurocritical Care  Progress Note    Admit Date: 8/29/2020  Service Date: 09/11/2020  Length of Stay: 13    Subjective:     Chief Complaint: Embolic stroke involving right middle cerebral artery    History of Present Illness: 64 year old female with a PMH significant for COPD, HTN, and every day smoker who presented at OSH on 08/26/20 with acute left sided facial droop and left sided weakness (last known normal 08/25/20). Pt was not given TPA or endovascular intervention, and she was intubated on 08/26/20 for agitation. CTH on 08/26/20 showed right posterior frontal/parietal infarction with no intracranial hemorrhage. Hospital course complicated by Afib with RVR in which she was started on lovenox and then transitioned to heparin gtt for GOYO and HFrEF. Pt was transferred to NICU for higher level of care and persistent epistaxis. Upon arrival Pt was intubated and sedated, Rhinorocket  Bilaterally in place, propofol and diltiazem gtt. Pt was able to move the right side and moves her left side to noxious stimuli. A-line placed.       Hospital Course: 08/29/20: Pt admitted to NICU for higher level of care and ENT consult. Pt with a run of Vtach associated with hypotension.   8/31/2020: hold anticoagulation until tomorrow due to GI bleed/epistaxis (at other facility) GI consult, pt in Afib- load with digoxin, pending digoxin level tomorrow, continue diltiazem gtt today, start tube feeds, obtain PIV and d/c central line, continue cefepime for total 7 days  9/1/2020: initiated heparin gtt minimal intensity-when at goal obtain CT head, continue diltiazem gtt, start tube feeds, d/c A-line  9/2/2020: changed to PO diltiazem 60mg q8hr from gtt, lasix, miralax, plan for extubation  9/3/2020: started seroquel 25BID  9/4/2020: increased seroquel to 25 mg BID, magnesium citrate, and try SBT  9/5/2020: place A-line, increased Po dilt, add chest PT, add water flushes and repeat magnesium  citrate  9/6/2020: d/c digoxin, add coreg 6.25 BID, d/c water flushes, repeat CMP, add lasix PO 40 mg BID  09/07/2020 Plan to wean FiO2. As we are weaning propofol, precedex will be added. Patient is currently on heparin drip. Daily EKG for QTc monitoring. Patient given one dose of diamox with plans to restart furosemide for metabolic alkalosis. Will discontinue barr tomorrow. F/u with procalcitonin due to increased WBC count. Will speak to family member about Trach/PEG discussion   09/08/2020 Patient remains on her heparin drip. Currently she is rate controlled. Patient is off of her propofol due to significnt hypotension. We are holding the next two doses of lasix and giving her diamox 500 mg for two doses for metabolic alkalosis. Dr. Zavala spoke to the POA, Griffin Gonzalez who would like to proceed with a trach/PEG. Patient was pan cultured today and started on broad spectrum antibiotics. Barr was changed today   09/09/2020 Patient scheduled to get trach/PEG this Friday. Still on broad spectrum antibiotics as she has an elevated WBC. Working up for alternative causes of fever: US of the upper and lower extremities, rapid COVID, and amylase lipase.   09/10/2020 Patient scheduled for trach/peg for tomorrow. We are holding the heparin drip and the tube feeds for this midnight. No DVT on upper and lower extremities. Continuing the broad spectrum antibiotics until the susceptibilities return. D/c a line as it is no longer working.   09/11/2020 Will go to the OR today for trach and PEG. Cefepime to discontinue after ten days (for positive respiratory culture)    Interval History:  Will go to the OR today for trach and PEG. Cefepime to discontinue after ten days (for positive respiratory culture)    Review of Systems   Unable to perform ROS: Intubated   Constitutional: Negative for fever.   HENT: Positive for drooling (copious secretions). Negative for nosebleeds.         Tongue swelling   Eyes: Negative for visual  disturbance.   Respiratory: Negative for chest tightness.         Intubated   Cardiovascular: Negative for chest pain.   Gastrointestinal: Negative for anal bleeding and blood in stool.   Genitourinary: Negative for hematuria.   Neurological: Negative for facial asymmetry.   Psychiatric/Behavioral: Positive for agitation.        Precedex for agitation while on MV.        Objective:     Vitals:  Temp: 99.1 °F (37.3 °C)  Pulse: 98  Rhythm: atrial rhythm  BP: (!) 142/67  MAP (mmHg): 97  Resp: 16  SpO2: 97 %  Oxygen Concentration (%): 40  O2 Device (Oxygen Therapy): ventilator  Vent Mode: A/C  Set Rate: 16 BPM  Vt Set: 420 mL  PEEP/CPAP: 5 cmH20  Peak Airway Pressure: 29 cmH2O  Mean Airway Pressure: 9.5 cmH20  Plateau Pressure: 13 cmH20    Temp  Min: 97.5 °F (36.4 °C)  Max: 99.5 °F (37.5 °C)  Pulse  Min: 89  Max: 106  BP  Min: 109/68  Max: 156/85  MAP (mmHg)  Min: 77  Max: 114  Resp  Min: 16  Max: 27  SpO2  Min: 94 %  Max: 100 %  Oxygen Concentration (%)  Min: 40  Max: 40    09/10 0701 - 09/11 0700  In: 1321.5 [I.V.:271.5]  Out: 1415 [Urine:1415]   Unmeasured Output  Stool Occurrence: 0  Emesis Occurrence: 0  Pad Count: 2       Physical Exam  Unable to test orientation, language, memory, judgment, insight, fund of knowledge, hearing, shoulder shrug, tongue protrusion, coordination, gait due to level of consciousness.    --GCS: E2 VT1 M4  --Mental Status:  Will open eyes   --Pupils left pupil brisk, R pupil fixed   --Corneal reflex, gag, cough intact.  --Will move all extremities spontaneously   -Will follow commands on the right but not the left     Medications:  Continuousdexmedetomidine (PRECEDEX) infusion, Last Rate: 0.2 mcg/kg/hr (09/11/20 0705)  dextrose 10 % in water (D10W)    Scheduledalbuterol-ipratropium, 3 mL, Q6H  atorvastatin, 40 mg, Daily  carvediloL, 3.125 mg, BID  ceFEPime (MAXIPIME) IVPB, 1 g, Q12H  diltiaZEM, 90 mg, Q8H  furosemide, 40 mg, BID  metoprolol, ,   pantoprozole (PROTONIX) IV, 40 mg,  Q12H  polyethylene glycol, 17 g, Daily  QUEtiapine, 50 mg, BID  senna-docusate 8.6-50 mg, 1 tablet, BID  valproic acid (as sodium salt), 250 mg, Q8H    PRNacetaminophen, 650 mg, Q6H PRN  dextrose 10 % in water (D10W), , Continuous PRN  dextrose 50%, 12.5 g, PRN  fentaNYL, 50 mcg, Q2H PRN  glucagon (human recombinant), 1 mg, PRN  labetalol, 10 mg, Q4H PRN  magnesium oxide, 800 mg, PRN  magnesium oxide, 800 mg, PRN  ondansetron, 4 mg, Q6H PRN  potassium chloride, 40 mEq, PRN  potassium chloride, 40 mEq, PRN  potassium chloride, 60 mEq, PRN  potassium, sodium phosphates, 2 packet, PRN  potassium, sodium phosphates, 2 packet, PRN  potassium, sodium phosphates, 2 packet, PRN      Today I personally reviewed pertinent medications, lines/drains/airways, imaging, cardiology results, laboratory results, microbiology results, notably:    Diet  Diet NPO  Diet NPO        Assessment/Plan:     Neuro  * Embolic stroke involving right middle cerebral artery  - Last seen normal on 08/25/20 with no intervention; intubated and sedated.    - Imaging shows large right ischemic stroke in the posterior frontal and parietal regions  - SBP < 160, MAP > 65  - EuNa   - CBC, CMP, coags daily   - heparin gtt low intensity  - repeat CTH stable  - daily statin  - Seroquel to 50 mg BID,started precedex   -Propofol drip discontinued due to hypotension  -General surgery consulted for PEG/Trach placement and they plan on proceeding with the surgery on 9/11/20     ENT  Epistaxis  - persistent epistaxis in the setting of anticoagulation   - H/H stable   - daily CBC   - ENT consulted;   rhino rockets were removed     Pulmonary  PNA (pneumonia)  Respiratory cultures growing serratia  Will continue cefepime with stop date 10 days later     Chronic obstructive pulmonary disease  - daily CXRs while intubated   - goal SpO2 is > 88%  - scheduled duo nebs q6h    Acute on chronic respiratory failure with hypoxia and hypercapnia  - Pt intubated and sedated   -  Daily CXRs and abg   9/4/2020: SBT trial today and respiratory mechanics (NIF -11)  Vent Mode: A/C  Oxygen Concentration (%):  [40] 40  Resp Rate Total:  [16 br/min-23 br/min] 16 br/min  Vt Set:  [420 mL] 420 mL  PEEP/CPAP:  [5 cmH20] 5 cmH20  Mean Airway Pressure:  [8.4 cmH20-9.9 cmH20] 9.1 cmH20     PEG and Trach scheduled for Friday.         Cardiac/Vascular  Atrial fibrillation with rapid ventricular response  - Repeat EKG   - daily Mg and Phos; replace as needed  - dilt PO  -dig level 1.8  - continue heparin gtt minimal intensity  -9/5/2020: increased PO diltiazem to 90 mg  9/6/20: d/c digoxin    Plan to hold heparin drip on 9/11/20 at midnight      Essential hypertension  - SBP goal < 160, MAP >65    Renal/  GOYO (acute kidney injury)  -Avoid nephrotoxic drugs  -Monitor Daily I/O    Oncology  Leukocytosis  WBC  Trending down. No fevers overnight   Patient being appropriately covered for positive respiratory cultures       Endocrine  Diabetes mellitus  -SSI moderate  -POCT glu monitoring    Moderate malnutrition  - tube feeds  - monitor electrolytes with daily CMP, Mg, phos    GI  GI bleed  - upper gi bleed at outside hospital  - recent upper GI bleed at outside facility and epistaxis (2 rhino rockets, up to 5 days prn)  - consulted GI -Continue PPI b.i.d. Trend hemoglobin, transfuse hemoglobin <7            The patient is being Prophylaxed for:  Venous Thromboembolism with: None  Stress Ulcer with: H2B  Ventilator Pneumonia with: chlorhexidine oral care    Activity Orders          Diet NPO: NPO starting at 09/11 0001        Full Code    Finesse Lockwood MD  Neurocritical Care  [facility]

## 2020-09-11 NOTE — PT/OT/SLP PROGRESS
Physical Therapy  One Discipline Only / PT Orders Discontinued    Patient Name:  Donita Gonzalez   MRN:  [id number]  Admitting Diagnosis:  Embolic stroke involving right middle cerebral artery   Recent Surgery: Procedure(s) (LRB):  CREATION, TRACHEOSTOMY- percutaneous (N/A)  INSERTION, PEG TUBE (N/A) Day of Surgery  Admit Date: 8/29/2020  Length of Stay: 13 days    Plan:     Physical Therapy orders received and acknowledged. Patient is intubated and appropriate for one discipline only. Patient going for PEG/Trach today. Pt with established OT POC, will continued to follow. PT orders discontinued. Occupational Therapy to follow and inform PT when appropriate for two disciplines.    Stephanie Plummer PT, DPT  09/11/2020   Pager: [phone]

## 2020-09-11 NOTE — PLAN OF CARE
POC reviewed with pt and family at 1400. Pt unable to verbalized understanding. Pts son updated over the phone, spouse at the bedside, verbalized understanding.  Trach and PEG placement at 13:00. Precedex restarted per order for agitation. Pt receiving Propofol PRN for pain Pt opens eyes, nodes appropriately to pain , follows commands. Restart Hep gtt tonight at 2100. Questions and concerns addressed. No acute events today. Pt progressing toward goals. Will continue to monitor. See flowsheets for full assessment and VS info.

## 2020-09-11 NOTE — SUBJECTIVE & OBJECTIVE
Interval History:  Will go to the OR today for trach and PEG. Cefepime to discontinue after ten days (for positive respiratory culture)    Review of Systems   Unable to perform ROS: Intubated   Constitutional: Negative for fever.   HENT: Positive for drooling (copious secretions). Negative for nosebleeds.         Tongue swelling   Eyes: Negative for visual disturbance.   Respiratory: Negative for chest tightness.         Intubated   Cardiovascular: Negative for chest pain.   Gastrointestinal: Negative for anal bleeding and blood in stool.   Genitourinary: Negative for hematuria.   Neurological: Negative for facial asymmetry.   Psychiatric/Behavioral: Positive for agitation.        Precedex for agitation while on MV.        Objective:     Vitals:  Temp: 99.1 °F (37.3 °C)  Pulse: 98  Rhythm: atrial rhythm  BP: (!) 142/67  MAP (mmHg): 97  Resp: 16  SpO2: 97 %  Oxygen Concentration (%): 40  O2 Device (Oxygen Therapy): ventilator  Vent Mode: A/C  Set Rate: 16 BPM  Vt Set: 420 mL  PEEP/CPAP: 5 cmH20  Peak Airway Pressure: 29 cmH2O  Mean Airway Pressure: 9.5 cmH20  Plateau Pressure: 13 cmH20    Temp  Min: 97.5 °F (36.4 °C)  Max: 99.5 °F (37.5 °C)  Pulse  Min: 89  Max: 106  BP  Min: 109/68  Max: 156/85  MAP (mmHg)  Min: 77  Max: 114  Resp  Min: 16  Max: 27  SpO2  Min: 94 %  Max: 100 %  Oxygen Concentration (%)  Min: 40  Max: 40    09/10 0701 - 09/11 0700  In: 1321.5 [I.V.:271.5]  Out: 1415 [Urine:1415]   Unmeasured Output  Stool Occurrence: 0  Emesis Occurrence: 0  Pad Count: 2       Physical Exam  Unable to test orientation, language, memory, judgment, insight, fund of knowledge, hearing, shoulder shrug, tongue protrusion, coordination, gait due to level of consciousness.    --GCS: E2 VT1 M4  --Mental Status:  Will open eyes   --Pupils left pupil brisk, R pupil fixed   --Corneal reflex, gag, cough intact.  --Will move all extremities spontaneously   -Will follow commands on the right but not the left      Medications:  Continuousdexmedetomidine (PRECEDEX) infusion, Last Rate: 0.2 mcg/kg/hr (09/11/20 0705)  dextrose 10 % in water (D10W)    Scheduledalbuterol-ipratropium, 3 mL, Q6H  atorvastatin, 40 mg, Daily  carvediloL, 3.125 mg, BID  ceFEPime (MAXIPIME) IVPB, 1 g, Q12H  diltiaZEM, 90 mg, Q8H  furosemide, 40 mg, BID  metoprolol, ,   pantoprozole (PROTONIX) IV, 40 mg, Q12H  polyethylene glycol, 17 g, Daily  QUEtiapine, 50 mg, BID  senna-docusate 8.6-50 mg, 1 tablet, BID  valproic acid (as sodium salt), 250 mg, Q8H    PRNacetaminophen, 650 mg, Q6H PRN  dextrose 10 % in water (D10W), , Continuous PRN  dextrose 50%, 12.5 g, PRN  fentaNYL, 50 mcg, Q2H PRN  glucagon (human recombinant), 1 mg, PRN  labetalol, 10 mg, Q4H PRN  magnesium oxide, 800 mg, PRN  magnesium oxide, 800 mg, PRN  ondansetron, 4 mg, Q6H PRN  potassium chloride, 40 mEq, PRN  potassium chloride, 40 mEq, PRN  potassium chloride, 60 mEq, PRN  potassium, sodium phosphates, 2 packet, PRN  potassium, sodium phosphates, 2 packet, PRN  potassium, sodium phosphates, 2 packet, PRN      Today I personally reviewed pertinent medications, lines/drains/airways, imaging, cardiology results, laboratory results, microbiology results, notably:    Diet  Diet NPO  Diet NPO

## 2020-09-11 NOTE — PROGRESS NOTES
General Surgery Note:     Pt to OR tomorrow 9/11 for trach and peg.   Hold tube feeds and heparin gtt at midnight.   Informed consent obtained with family.    Kristan Posada MD  General Surgery Resident, PGY III  Pager [phone]

## 2020-09-11 NOTE — ANESTHESIA POSTPROCEDURE EVALUATION
Anesthesia Post Evaluation    Patient: Donita Gonzalez    Procedure(s) Performed: Procedure(s) (LRB):  CREATION, TRACHEOSTOMY- percutaneous (N/A)  INSERTION, PEG TUBE (N/A)    Final Anesthesia Type: general    Patient location during evaluation: ICU  Patient participation: No - Unable to Participate, Intubation  Level of consciousness: sedated  Post-procedure vital signs: reviewed and stable  Pain management: adequate  Airway patency: patent    PONV status at discharge: No PONV  Anesthetic complications: no      Cardiovascular status: hemodynamically stable  Respiratory status: intubated and ventilator  Hydration status: euvolemic  Follow-up not needed.          Vitals Value Taken Time   /100 09/11/20 1346   Temp  09/11/20 1347   Pulse 112 09/11/20 1347   Resp 25 09/11/20 1347   SpO2 93 % 09/11/20 1347   Vitals shown include unvalidated device data.      No case tracking events are documented in the log.      Pain/[place] Score: Pain Rating Prior to Med Admin: 0 (9/10/2020  1:46 AM)

## 2020-09-11 NOTE — PLAN OF CARE
[facility] Care Plan    POC reviewed with Donita Gonzalez and family. Pt's family verbalized understanding. Questions and concerns addressed. No acute events overnight. Trach and peg scheduled for today. Heparin and TF held at midnight. Precedex infsuing. Will continue to monitor. See below and flowsheets for full assessment and VS info.       Neuro:  [place] Coma Scale  Best Eye Response: 1-->(E1) none  Best Motor Response: 6-->(M6) obeys commands  Best Verbal Response: 1-->(V1) none  [place] Coma Scale Score: 8  Assessment Qualifiers: patient intubated, patient chemically sedated or paralyzed  Pupil PERRLA: no     24 hr Temp:  [97.7 °F (36.5 °C)-99.5 °F (37.5 °C)]     CV:   Rhythm: atrial rhythm  BP goals:   SBP < 160  MAP > 65    Resp:   O2 Device (Oxygen Therapy): ventilator  Vent Mode: A/C  Set Rate: 16 BPM  Oxygen Concentration (%): 40  Vt Set: 420 mL  PEEP/CPAP: 5 cmH20  Pressure Support: 12 cmH20    Plan: Trach and peg today    GI/:  ROXANE Total Score: 1  Diet/Nutrition Received: tube feeding  Last Bowel Movement: 09/08/20  Voiding Characteristics: urethral catheter (bladder)    Intake/Output Summary (Last 24 hours) at 9/11/2020 0427  Last data filed at 9/11/2020 0300  Gross per 24 hour   Intake 1401.74 ml   Output 745 ml   Net 656.74 ml     Unmeasured Output  Stool Occurrence: 0  Emesis Occurrence: 0  Pad Count: 2    Labs/Accuchecks:  Recent Labs   Lab 09/11/20 0220   WBC 15.20*   RBC 3.39*   HGB 10.2*   HCT 32.8*   *      Recent Labs   Lab 09/11/20 0220      K 4.2   CO2 28      BUN 38*   CREATININE 1.2   ALKPHOS 81   ALT 35   AST 50*   BILITOT 0.2      Recent Labs   Lab 09/11/20 0220   APTT 33.9*      Recent Labs   Lab 09/07/20 2226   TROPONINI 0.080*       Gtts:   dexmedetomidine (PRECEDEX) infusion 0.6 mcg/kg/hr (09/11/20 0400)    dextrose 10 % in water (D10W)         LDA/Wounds:  Lines/Drains/Airways       Drain                   NG/OG Tube 08/29/20 5195 orogastric Left mouth 12 days     Female External Urinary Catheter 09/10/20 1720 less than 1 day              Airway                   Airway - Non-Surgical 08/26/20 0931 Endotracheal Tube 15 days              Peripheral Intravenous Line                   Peripheral IV - Single Lumen 09/07/20 0300 20 G Anterior;Left;Proximal Upper Arm 4 days         Peripheral IV - Single Lumen 09/08/20 0905 20 G Anterior;Right Forearm 2 days         Peripheral IV - Single Lumen 09/10/20 1602 20 G Left Hand less than 1 day

## 2020-09-12 LAB
ALBUMIN SERPL BCP-MCNC: 2 G/DL (ref 3.5–5.2)
ALLENS TEST: ABNORMAL
ALP SERPL-CCNC: 80 U/L (ref 55–135)
ALT SERPL W/O P-5'-P-CCNC: 30 U/L (ref 10–44)
ANION GAP SERPL CALC-SCNC: 11 MMOL/L (ref 8–16)
ANISOCYTOSIS BLD QL SMEAR: SLIGHT
APTT BLDCRRT: 33.7 SEC (ref 21–32)
APTT BLDCRRT: 33.7 SEC (ref 21–32)
APTT BLDCRRT: 38.2 SEC (ref 21–32)
AST SERPL-CCNC: 36 U/L (ref 10–40)
BASOPHILS # BLD AUTO: ABNORMAL K/UL (ref 0–0.2)
BASOPHILS NFR BLD: 0 % (ref 0–1.9)
BILIRUB SERPL-MCNC: 0.4 MG/DL (ref 0.1–1)
BUN SERPL-MCNC: 39 MG/DL (ref 8–23)
CALCIUM SERPL-MCNC: 9.4 MG/DL (ref 8.7–10.5)
CHLORIDE SERPL-SCNC: 100 MMOL/L (ref 95–110)
CO2 SERPL-SCNC: 32 MMOL/L (ref 23–29)
CREAT SERPL-MCNC: 1.2 MG/DL (ref 0.5–1.4)
DELSYS: ABNORMAL
DIFFERENTIAL METHOD: ABNORMAL
EOSINOPHIL # BLD AUTO: ABNORMAL K/UL (ref 0–0.5)
EOSINOPHIL NFR BLD: 0 % (ref 0–8)
ERYTHROCYTE [DISTWIDTH] IN BLOOD BY AUTOMATED COUNT: 14.5 % (ref 11.5–14.5)
ERYTHROCYTE [SEDIMENTATION RATE] IN BLOOD BY WESTERGREN METHOD: 16 MM/H
EST. GFR  (AFRICAN AMERICAN): 55.2 ML/MIN/1.73 M^2
EST. GFR  (NON AFRICAN AMERICAN): 47.9 ML/MIN/1.73 M^2
FIO2: 50
GLUCOSE SERPL-MCNC: 102 MG/DL (ref 70–110)
HCO3 UR-SCNC: 30.5 MMOL/L (ref 24–28)
HCT VFR BLD AUTO: 36.1 % (ref 37–48.5)
HGB BLD-MCNC: 11.3 G/DL (ref 12–16)
IMM GRANULOCYTES # BLD AUTO: ABNORMAL K/UL (ref 0–0.04)
IMM GRANULOCYTES NFR BLD AUTO: ABNORMAL % (ref 0–0.5)
LYMPHOCYTES # BLD AUTO: ABNORMAL K/UL (ref 1–4.8)
LYMPHOCYTES NFR BLD: 2 % (ref 18–48)
MAGNESIUM SERPL-MCNC: 1.9 MG/DL (ref 1.6–2.6)
MCH RBC QN AUTO: 30.4 PG (ref 27–31)
MCHC RBC AUTO-ENTMCNC: 31.3 G/DL (ref 32–36)
MCV RBC AUTO: 97 FL (ref 82–98)
METAMYELOCYTES NFR BLD MANUAL: 2 %
MODE: ABNORMAL
MONOCYTES # BLD AUTO: ABNORMAL K/UL (ref 0.3–1)
MONOCYTES NFR BLD: 14 % (ref 4–15)
MYELOCYTES NFR BLD MANUAL: 2 %
NEUTROPHILS NFR BLD: 80 % (ref 38–73)
NRBC BLD-RTO: 0 /100 WBC
PCO2 BLDA: 44.7 MMHG (ref 35–45)
PEEP: 5
PH SMN: 7.44 [PH] (ref 7.35–7.45)
PHOSPHATE SERPL-MCNC: 4.3 MG/DL (ref 2.7–4.5)
PLATELET # BLD AUTO: 576 K/UL (ref 150–350)
PLATELET BLD QL SMEAR: ABNORMAL
PMV BLD AUTO: 10.9 FL (ref 9.2–12.9)
PO2 BLDA: 113 MMHG (ref 80–100)
POC BE: 6 MMOL/L
POC SATURATED O2: 99 % (ref 95–100)
POC TCO2: 32 MMOL/L (ref 23–27)
POTASSIUM SERPL-SCNC: 4.4 MMOL/L (ref 3.5–5.1)
PROT SERPL-MCNC: 6.8 G/DL (ref 6–8.4)
RBC # BLD AUTO: 3.72 M/UL (ref 4–5.4)
SAMPLE: ABNORMAL
SITE: ABNORMAL
SODIUM SERPL-SCNC: 143 MMOL/L (ref 136–145)
SP02: 97
VT: 420
WBC # BLD AUTO: 22.92 K/UL (ref 3.9–12.7)

## 2020-09-12 PROCEDURE — 99291 PR CRITICAL CARE, E/M 30-74 MINUTES: ICD-10-PCS | Mod: ,,, | Performed by: PSYCHIATRY & NEUROLOGY

## 2020-09-12 PROCEDURE — 63600175 PHARM REV CODE 636 W HCPCS: Performed by: NURSE PRACTITIONER

## 2020-09-12 PROCEDURE — 36600 WITHDRAWAL OF ARTERIAL BLOOD: CPT

## 2020-09-12 PROCEDURE — 93010 EKG 12-LEAD: ICD-10-PCS | Mod: ,,, | Performed by: INTERNAL MEDICINE

## 2020-09-12 PROCEDURE — 93005 ELECTROCARDIOGRAM TRACING: CPT

## 2020-09-12 PROCEDURE — 85027 COMPLETE CBC AUTOMATED: CPT

## 2020-09-12 PROCEDURE — 25000003 PHARM REV CODE 250: Performed by: PHYSICIAN ASSISTANT

## 2020-09-12 PROCEDURE — 93010 ELECTROCARDIOGRAM REPORT: CPT | Mod: ,,, | Performed by: INTERNAL MEDICINE

## 2020-09-12 PROCEDURE — 63600175 PHARM REV CODE 636 W HCPCS: Performed by: PSYCHIATRY & NEUROLOGY

## 2020-09-12 PROCEDURE — 82803 BLOOD GASES ANY COMBINATION: CPT

## 2020-09-12 PROCEDURE — 27000221 HC OXYGEN, UP TO 24 HOURS

## 2020-09-12 PROCEDURE — C9113 INJ PANTOPRAZOLE SODIUM, VIA: HCPCS | Performed by: PHYSICIAN ASSISTANT

## 2020-09-12 PROCEDURE — 25000003 PHARM REV CODE 250: Performed by: NURSE PRACTITIONER

## 2020-09-12 PROCEDURE — 94003 VENT MGMT INPAT SUBQ DAY: CPT

## 2020-09-12 PROCEDURE — 82800 BLOOD PH: CPT

## 2020-09-12 PROCEDURE — 85007 BL SMEAR W/DIFF WBC COUNT: CPT

## 2020-09-12 PROCEDURE — 99900035 HC TECH TIME PER 15 MIN (STAT)

## 2020-09-12 PROCEDURE — 94668 MNPJ CHEST WALL SBSQ: CPT

## 2020-09-12 PROCEDURE — 27200966 HC CLOSED SUCTION SYSTEM

## 2020-09-12 PROCEDURE — 25000242 PHARM REV CODE 250 ALT 637 W/ HCPCS: Performed by: NURSE PRACTITIONER

## 2020-09-12 PROCEDURE — 99900026 HC AIRWAY MAINTENANCE (STAT)

## 2020-09-12 PROCEDURE — 99291 CRITICAL CARE FIRST HOUR: CPT | Mod: ,,, | Performed by: PSYCHIATRY & NEUROLOGY

## 2020-09-12 PROCEDURE — 80053 COMPREHEN METABOLIC PANEL: CPT

## 2020-09-12 PROCEDURE — 94640 AIRWAY INHALATION TREATMENT: CPT

## 2020-09-12 PROCEDURE — 85730 THROMBOPLASTIN TIME PARTIAL: CPT | Mod: 91

## 2020-09-12 PROCEDURE — 85730 THROMBOPLASTIN TIME PARTIAL: CPT

## 2020-09-12 PROCEDURE — 84100 ASSAY OF PHOSPHORUS: CPT

## 2020-09-12 PROCEDURE — 63600175 PHARM REV CODE 636 W HCPCS: Performed by: STUDENT IN AN ORGANIZED HEALTH CARE EDUCATION/TRAINING PROGRAM

## 2020-09-12 PROCEDURE — 20000000 HC ICU ROOM

## 2020-09-12 PROCEDURE — 83735 ASSAY OF MAGNESIUM: CPT

## 2020-09-12 PROCEDURE — 25000003 PHARM REV CODE 250: Performed by: STUDENT IN AN ORGANIZED HEALTH CARE EDUCATION/TRAINING PROGRAM

## 2020-09-12 PROCEDURE — 63600175 PHARM REV CODE 636 W HCPCS: Performed by: PHYSICIAN ASSISTANT

## 2020-09-12 PROCEDURE — 94761 N-INVAS EAR/PLS OXIMETRY MLT: CPT

## 2020-09-12 RX ORDER — OXYCODONE HYDROCHLORIDE 5 MG/1
5 TABLET ORAL EVERY 6 HOURS
Status: DISCONTINUED | OUTPATIENT
Start: 2020-09-12 | End: 2020-09-12

## 2020-09-12 RX ORDER — OXYCODONE HYDROCHLORIDE 5 MG/1
5 TABLET ORAL EVERY 6 HOURS PRN
Status: DISCONTINUED | OUTPATIENT
Start: 2020-09-12 | End: 2020-09-16 | Stop reason: HOSPADM

## 2020-09-12 RX ORDER — HEPARIN SODIUM,PORCINE/D5W 25000/250
12 INTRAVENOUS SOLUTION INTRAVENOUS CONTINUOUS
Status: ACTIVE | OUTPATIENT
Start: 2020-09-12 | End: 2020-09-12

## 2020-09-12 RX ORDER — OXYCODONE HYDROCHLORIDE 5 MG/1
5 TABLET ORAL EVERY 6 HOURS PRN
Status: DISCONTINUED | OUTPATIENT
Start: 2020-09-12 | End: 2020-09-12

## 2020-09-12 RX ADMIN — FUROSEMIDE 40 MG: 40 TABLET ORAL at 06:09

## 2020-09-12 RX ADMIN — DOCUSATE SODIUM 50MG AND SENNOSIDES 8.6MG 1 TABLET: 8.6; 5 TABLET, FILM COATED ORAL at 08:09

## 2020-09-12 RX ADMIN — POLYETHYLENE GLYCOL 3350 17 G: 17 POWDER, FOR SOLUTION ORAL at 08:09

## 2020-09-12 RX ADMIN — CEFEPIME 1 G: 1 INJECTION, POWDER, FOR SOLUTION INTRAMUSCULAR; INTRAVENOUS at 09:09

## 2020-09-12 RX ADMIN — VALPROIC ACID 250 MG: 250 SOLUTION ORAL at 02:09

## 2020-09-12 RX ADMIN — FENTANYL CITRATE 50 MCG: 50 INJECTION INTRAMUSCULAR; INTRAVENOUS at 02:09

## 2020-09-12 RX ADMIN — QUETIAPINE FUMARATE 50 MG: 25 TABLET ORAL at 08:09

## 2020-09-12 RX ADMIN — HEPARIN SODIUM AND DEXTROSE 13 UNITS/KG/HR: 10000; 5 INJECTION INTRAVENOUS at 02:09

## 2020-09-12 RX ADMIN — PANTOPRAZOLE SODIUM 40 MG: 40 INJECTION, POWDER, LYOPHILIZED, FOR SOLUTION INTRAVENOUS at 08:09

## 2020-09-12 RX ADMIN — IPRATROPIUM BROMIDE AND ALBUTEROL SULFATE 3 ML: .5; 2.5 SOLUTION RESPIRATORY (INHALATION) at 01:09

## 2020-09-12 RX ADMIN — DILTIAZEM HYDROCHLORIDE 90 MG: 60 TABLET, FILM COATED ORAL at 02:09

## 2020-09-12 RX ADMIN — IPRATROPIUM BROMIDE AND ALBUTEROL SULFATE 3 ML: .5; 2.5 SOLUTION RESPIRATORY (INHALATION) at 07:09

## 2020-09-12 RX ADMIN — DILTIAZEM HYDROCHLORIDE 90 MG: 60 TABLET, FILM COATED ORAL at 05:09

## 2020-09-12 RX ADMIN — ATORVASTATIN CALCIUM 40 MG: 20 TABLET, FILM COATED ORAL at 08:09

## 2020-09-12 RX ADMIN — CARVEDILOL 3.12 MG: 3.12 TABLET, FILM COATED ORAL at 08:09

## 2020-09-12 RX ADMIN — CEFEPIME 1 G: 1 INJECTION, POWDER, FOR SOLUTION INTRAMUSCULAR; INTRAVENOUS at 11:09

## 2020-09-12 RX ADMIN — VALPROIC ACID 250 MG: 250 SOLUTION ORAL at 09:09

## 2020-09-12 RX ADMIN — FUROSEMIDE 40 MG: 40 TABLET ORAL at 08:09

## 2020-09-12 RX ADMIN — VALPROIC ACID 250 MG: 250 SOLUTION ORAL at 05:09

## 2020-09-12 RX ADMIN — DILTIAZEM HYDROCHLORIDE 90 MG: 60 TABLET, FILM COATED ORAL at 09:09

## 2020-09-12 RX ADMIN — IPRATROPIUM BROMIDE AND ALBUTEROL SULFATE 3 ML: .5; 2.5 SOLUTION RESPIRATORY (INHALATION) at 12:09

## 2020-09-12 NOTE — PROGRESS NOTES
[facility]  Neurocritical Care  Progress Note    Admit Date: 8/29/2020  Service Date: 09/12/2020  Length of Stay: 14    Subjective:     Chief Complaint: Embolic stroke involving right middle cerebral artery    History of Present Illness: 64 year old female with a PMH significant for COPD, HTN, and every day smoker who presented at OSH on 08/26/20 with acute left sided facial droop and left sided weakness (last known normal 08/25/20). Pt was not given TPA or endovascular intervention, and she was intubated on 08/26/20 for agitation. CTH on 08/26/20 showed right posterior frontal/parietal infarction with no intracranial hemorrhage. Hospital course complicated by Afib with RVR in which she was started on lovenox and then transitioned to heparin gtt for GOYO and HFrEF. Pt was transferred to NICU for higher level of care and persistent epistaxis. Upon arrival Pt was intubated and sedated, Rhinorocket  Bilaterally in place, propofol and diltiazem gtt. Pt was able to move the right side and moves her left side to noxious stimuli. A-line placed.       Hospital Course: 08/29/20: Pt admitted to NICU for higher level of care and ENT consult. Pt with a run of Vtach associated with hypotension.   8/31/2020: hold anticoagulation until tomorrow due to GI bleed/epistaxis (at other facility) GI consult, pt in Afib- load with digoxin, pending digoxin level tomorrow, continue diltiazem gtt today, start tube feeds, obtain PIV and d/c central line, continue cefepime for total 7 days  9/1/2020: initiated heparin gtt minimal intensity-when at goal obtain CT head, continue diltiazem gtt, start tube feeds, d/c A-line  9/2/2020: changed to PO diltiazem 60mg q8hr from gtt, lasix, miralax, plan for extubation  9/3/2020: started seroquel 25BID  9/4/2020: increased seroquel to 25 mg BID, magnesium citrate, and try SBT  9/5/2020: place A-line, increased Po dilt, add chest PT, add water flushes and repeat magnesium  citrate  9/6/2020: d/c digoxin, add coreg 6.25 BID, d/c water flushes, repeat CMP, add lasix PO 40 mg BID  09/07/2020 Plan to wean FiO2. As we are weaning propofol, precedex will be added. Patient is currently on heparin drip. Daily EKG for QTc monitoring. Patient given one dose of diamox with plans to restart furosemide for metabolic alkalosis. Will discontinue barr tomorrow. F/u with procalcitonin due to increased WBC count. Will speak to family member about Trach/PEG discussion   09/08/2020 Patient remains on her heparin drip. Currently she is rate controlled. Patient is off of her propofol due to significnt hypotension. We are holding the next two doses of lasix and giving her diamox 500 mg for two doses for metabolic alkalosis. Dr. Zavala spoke to the POA, Griffin Gonzalez who would like to proceed with a trach/PEG. Patient was pan cultured today and started on broad spectrum antibiotics. Barr was changed today   09/09/2020 Patient scheduled to get trach/PEG this Friday. Still on broad spectrum antibiotics as she has an elevated WBC. Working up for alternative causes of fever: US of the upper and lower extremities, rapid COVID, and amylase lipase.   09/10/2020 Patient scheduled for trach/peg for tomorrow. We are holding the heparin drip and the tube feeds for this midnight. No DVT on upper and lower extremities. Continuing the broad spectrum antibiotics until the susceptibilities return. D/c a line as it is no longer working.   09/11/2020 Will go to the OR today for trach and PEG. Cefepime to discontinue after ten days (for positive respiratory culture)  09/12/2020 Patient was restarted on the heparin drip last ngiht. Weaning propofol and precedex by adding oxycodone for the next 24 hours (PRN). Following up on EKG. Spontaneous trial once sedation is weaned. Awaiting stroke recommendation about when to transition to a DOAC. Tube feeds were started at 1 PM today.     Interval History:  Patient was restarted on  the heparin drip last ngiht. Weaning propofol and precedex by adding oxycodone for the next 24 hours. Following up on EKG. Spontaneous trial once sedation is weaned. Awaiting stroke recommendation about when to transition to a DOAC. Tube feeds were started at 1 PM today.     Review of Systems   Unable to perform ROS: Intubated   Constitutional: Negative for fever.   HENT: Positive for drooling (copious secretions). Negative for nosebleeds.         Tongue swelling  Has PEG/Trach   Eyes: Negative for visual disturbance.   Respiratory: Negative for chest tightness.         Intubated   Cardiovascular: Negative for chest pain.   Gastrointestinal: Negative for anal bleeding and blood in stool.   Genitourinary: Negative for hematuria.   Neurological: Negative for facial asymmetry.   Psychiatric/Behavioral: Positive for agitation.        Precedex for agitation while on MV.   Started on oxycodone for 24 hours to wean off of fentanyl and precedex        Objective:     Vitals:  Temp: 98.5 °F (36.9 °C)  Pulse: 97  Rhythm: sinus arrhythmia  BP: 110/69  MAP (mmHg): 83  Resp: 15  SpO2: 98 %  Oxygen Concentration (%): 50  O2 Device (Oxygen Therapy): ventilator  Vent Mode: A/C  Set Rate: 16 BPM  Vt Set: 420 mL  PEEP/CPAP: 5 cmH20  Peak Airway Pressure: 17 cmH2O  Mean Airway Pressure: 8.9 cmH20  Plateau Pressure: 0 cmH20    Temp  Min: 96.8 °F (36 °C)  Max: 98.9 °F (37.2 °C)  Pulse  Min: 88  Max: 222  BP  Min: 104/61  Max: 182/103  MAP (mmHg)  Min: 77  Max: 138  Resp  Min: 8  Max: 37  SpO2  Min: 84 %  Max: 100 %  Oxygen Concentration (%)  Min: 40  Max: 60    09/11 0701 - 09/12 0700  In: 128.6 [I.V.:128.6]  Out: 650 [Urine:650]   Unmeasured Output  Urine Occurrence: 1  Stool Occurrence: 0  Emesis Occurrence: 0  Pad Count: 4       Physical Exam  Unable to test orientation, language, memory, judgment, insight, fund of knowledge, hearing, shoulder shrug, tongue protrusion, coordination, gait due to level of consciousness.     --GCS:  E2 VT1 M4  --Mental Status:  Will open eyes   --Pupils left pupil brisk, R pupil fixed   --Corneal reflex, gag, cough intact.  --Will move all extremities spontaneously   -Will follow commands on the right but not the left     Medications:  Continuousdextrose 10 % in water (D10W)  heparin (porcine) in D5W    Scheduledalbuterol-ipratropium, 3 mL, Q6H  atorvastatin, 40 mg, Daily  carvediloL, 3.125 mg, BID  ceFEPime (MAXIPIME) IVPB, 1 g, Q12H  diltiaZEM, 90 mg, Q8H  furosemide, 40 mg, BID  oxyCODONE, 5 mg, Q6H  pantoprozole (PROTONIX) IV, 40 mg, Q12H  polyethylene glycol, 17 g, Daily  QUEtiapine, 50 mg, BID  senna-docusate 8.6-50 mg, 1 tablet, BID  valproic acid (as sodium salt), 250 mg, Q8H    PRNacetaminophen, 650 mg, Q6H PRN  dextrose 10 % in water (D10W), , Continuous PRN  dextrose 50%, 12.5 g, PRN  glucagon (human recombinant), 1 mg, PRN  labetalol, 10 mg, Q4H PRN  magnesium oxide, 800 mg, PRN  magnesium oxide, 800 mg, PRN  ondansetron, 4 mg, Q6H PRN  potassium chloride, 40 mEq, PRN  potassium chloride, 40 mEq, PRN  potassium chloride, 60 mEq, PRN  potassium, sodium phosphates, 2 packet, PRN  potassium, sodium phosphates, 2 packet, PRN  potassium, sodium phosphates, 2 packet, PRN      Today I personally reviewed pertinent medications, lines/drains/airways, imaging, cardiology results, laboratory results, microbiology results, notably:    Diet  Diet NPO  Diet NPO          Assessment/Plan:     Neuro  * Embolic stroke involving right middle cerebral artery  - Last seen normal on 08/25/20 with no intervention; intubated and sedated.    - Imaging shows large right ischemic stroke in the posterior frontal and parietal regions  - SBP < 160, MAP > 65  - EuNa   - CBC, CMP, coags daily   - heparin gtt low intensity  - repeat CTH stable  - daily statin  - Seroquel to 50 mg BID,on precedex   -PEG/Trach placement on 9/12/20     ENT  Epistaxis  - persistent epistaxis in the setting of anticoagulation   - H/H stable   - daily  CBC   - ENT consulted;   rhino rockets were removed     Pulmonary  PNA (pneumonia)  Respiratory cultures growing serratia  Will continue cefepime with stop date after 20 doses     Chronic obstructive pulmonary disease  - daily CXRs  - goal SpO2 is > 88%  - scheduled duo nebs q6h    Acute on chronic respiratory failure with hypoxia and hypercapnia  - Pt trached and peged  - Daily CXRs and abg  - Will attempt SBT once the fentanyl and precedex is weaned off      Vent Mode: A/C  Oxygen Concentration (%):  [40-60] 50  Resp Rate Total:  [16 br/min-22 br/min] 22 br/min  Vt Set:  [420 mL] 420 mL  PEEP/CPAP:  [5 cmH20] 5 cmH20  Mean Airway Pressure:  [7.7 [id number] cmH20] 8.9 cmH20     PEG and Trach scheduled for Friday.         Cardiac/Vascular  Atrial fibrillation with rapid ventricular response  - Repeat EKG   - daily Mg and Phos; replace as needed  - dilt PO  -dig level 1.8  - continue heparin gtt minimal intensity  -9/5/2020: increased PO diltiazem to 90 mg  9/6/20: d/c digoxin    Heparin gtt started last night after PEG/Trach placement. Awaiting vascular neurology recommendations about transitioning patient to DOAC.    Essential hypertension  - SBP goal < 160, MAP >65    Renal/  GOYO (acute kidney injury)  -Avoid nephrotoxic drugs  -Monitor Daily I/O    Oncology  Leukocytosis  WBC  Trending down. No fevers overnight   Patient being appropriately covered for positive respiratory cultures       Endocrine  Diabetes mellitus  -SSI moderate  -POCT glu monitoring    Moderate malnutrition  - tube feeds  - monitor electrolytes with daily CMP, Mg, phos    GI  GI bleed  - upper gi bleed at outside hospital  - recent upper GI bleed at outside facility and epistaxis (2 rhino rockets, up to 5 days prn)  - consulted GI -Continue PPI b.i.d. Trend hemoglobin, transfuse hemoglobin <7            The patient is being Prophylaxed for:  Venous Thromboembolism with: Chemical  Stress Ulcer with: H2B  Ventilator Pneumonia with: chlorhexidine  oral care    Activity Orders          Diet NPO: NPO starting at 09/11 0001        Full Code    Finesse Lockwood MD  Neurocritical Care  [facility]

## 2020-09-12 NOTE — PLAN OF CARE
Back on hep gtt  Rate controlled  F/u EKG  Wean precedex and fentanyl  Spontaneous trial once sedation weaned  Transition to DOAC

## 2020-09-12 NOTE — PLAN OF CARE
POC reviewed with pt at 0500. Pt unable to verbalized understanding. Questions and concerns addressed. No acute events overnight. Pt progressing toward goals. Will continue to monitor. See flowsheets for full assessment and VS info

## 2020-09-12 NOTE — ASSESSMENT & PLAN NOTE
- Repeat EKG   - daily Mg and Phos; replace as needed  - dilt PO  -dig level 1.8  - continue heparin gtt minimal intensity  -9/5/2020: increased PO diltiazem to 90 mg  9/6/20: d/c digoxin    Heparin gtt started last night after PEG/Trach placement. Awaiting vascular neurology recommendations about transitioning patient to DOAC.

## 2020-09-12 NOTE — PROGRESS NOTES
General Surgery Consult Note    Patient seen and examined. No issues with trach or PEG. Okay to use PEG for feeding 24 hours post operatively.    Surgery will sign off, please call with questions or concerns.    Kaia Ramsey MD, PGY-5  General Surgery  [phone]

## 2020-09-12 NOTE — RESPIRATORY THERAPY
Patient was trached 09/11/2020. While doing oral care noticed large wound underneath swollen tongue. Soft bite block was placed between the teeth to relieve the pressure and prevent clamping on the tongue. RN aware,  wound consultation, placed.

## 2020-09-12 NOTE — ASSESSMENT & PLAN NOTE
- Pt trached and peged  - Daily CXRs and abg  - Will attempt SBT once the fentanyl and precedex is weaned off      Vent Mode: A/C  Oxygen Concentration (%):  [40-60] 50  Resp Rate Total:  [16 br/min-22 br/min] 22 br/min  Vt Set:  [420 mL] 420 mL  PEEP/CPAP:  [5 cmH20] 5 cmH20  Mean Airway Pressure:  [7.7 aqI45-85 cmH20] 8.9 cmH20     PEG and Trach scheduled for Friday.

## 2020-09-12 NOTE — SUBJECTIVE & OBJECTIVE
Interval History:  Patient was restarted on the heparin drip last ngiht. Weaning propofol and precedex by adding oxycodone for the next 24 hours. Following up on EKG. Spontaneous trial once sedation is weaned. Awaiting stroke recommendation about when to transition to a DOAC. Tube feeds were started at 1 PM today.     Review of Systems   Unable to perform ROS: Intubated   Constitutional: Negative for fever.   HENT: Positive for drooling (copious secretions). Negative for nosebleeds.         Tongue swelling  Has PEG/Trach   Eyes: Negative for visual disturbance.   Respiratory: Negative for chest tightness.         Intubated   Cardiovascular: Negative for chest pain.   Gastrointestinal: Negative for anal bleeding and blood in stool.   Genitourinary: Negative for hematuria.   Neurological: Negative for facial asymmetry.   Psychiatric/Behavioral: Positive for agitation.        Precedex for agitation while on MV.   Started on oxycodone for 24 hours to wean off of fentanyl and precedex        Objective:     Vitals:  Temp: 98.5 °F (36.9 °C)  Pulse: 97  Rhythm: sinus arrhythmia  BP: 110/69  MAP (mmHg): 83  Resp: 15  SpO2: 98 %  Oxygen Concentration (%): 50  O2 Device (Oxygen Therapy): ventilator  Vent Mode: A/C  Set Rate: 16 BPM  Vt Set: 420 mL  PEEP/CPAP: 5 cmH20  Peak Airway Pressure: 17 cmH2O  Mean Airway Pressure: 8.9 cmH20  Plateau Pressure: 0 cmH20    Temp  Min: 96.8 °F (36 °C)  Max: 98.9 °F (37.2 °C)  Pulse  Min: 88  Max: 222  BP  Min: 104/61  Max: 182/103  MAP (mmHg)  Min: 77  Max: 138  Resp  Min: 8  Max: 37  SpO2  Min: 84 %  Max: 100 %  Oxygen Concentration (%)  Min: 40  Max: 60    09/11 0701 - 09/12 0700  In: 128.6 [I.V.:128.6]  Out: 650 [Urine:650]   Unmeasured Output  Urine Occurrence: 1  Stool Occurrence: 0  Emesis Occurrence: 0  Pad Count: 4       Physical Exam  Unable to test orientation, language, memory, judgment, insight, fund of knowledge, hearing, shoulder shrug, tongue protrusion, coordination, gait due  to level of consciousness.     --GCS: E2 VT1 M4  --Mental Status:  Will open eyes   --Pupils left pupil brisk, R pupil fixed   --Corneal reflex, gag, cough intact.  --Will move all extremities spontaneously   -Will follow commands on the right but not the left     Medications:  Continuousdextrose 10 % in water (D10W)  heparin (porcine) in D5W    Scheduledalbuterol-ipratropium, 3 mL, Q6H  atorvastatin, 40 mg, Daily  carvediloL, 3.125 mg, BID  ceFEPime (MAXIPIME) IVPB, 1 g, Q12H  diltiaZEM, 90 mg, Q8H  furosemide, 40 mg, BID  oxyCODONE, 5 mg, Q6H  pantoprozole (PROTONIX) IV, 40 mg, Q12H  polyethylene glycol, 17 g, Daily  QUEtiapine, 50 mg, BID  senna-docusate 8.6-50 mg, 1 tablet, BID  valproic acid (as sodium salt), 250 mg, Q8H    PRNacetaminophen, 650 mg, Q6H PRN  dextrose 10 % in water (D10W), , Continuous PRN  dextrose 50%, 12.5 g, PRN  glucagon (human recombinant), 1 mg, PRN  labetalol, 10 mg, Q4H PRN  magnesium oxide, 800 mg, PRN  magnesium oxide, 800 mg, PRN  ondansetron, 4 mg, Q6H PRN  potassium chloride, 40 mEq, PRN  potassium chloride, 40 mEq, PRN  potassium chloride, 60 mEq, PRN  potassium, sodium phosphates, 2 packet, PRN  potassium, sodium phosphates, 2 packet, PRN  potassium, sodium phosphates, 2 packet, PRN      Today I personally reviewed pertinent medications, lines/drains/airways, imaging, cardiology results, laboratory results, microbiology results, notably:    Diet  Diet NPO  Diet NPO

## 2020-09-12 NOTE — ASSESSMENT & PLAN NOTE
- Last seen normal on 08/25/20 with no intervention; intubated and sedated.    - Imaging shows large right ischemic stroke in the posterior frontal and parietal regions  - SBP < 160, MAP > 65  - EuNa   - CBC, CMP, coags daily   - heparin gtt low intensity  - repeat CTH stable  - daily statin  - Seroquel to 50 mg BID,on precedex   -PEG/Trach placement on 9/12/20

## 2020-09-12 NOTE — PROGRESS NOTES
Patient's chart was reviewed by a stroke team provider.  Patient transferrring to LTACH soon.  There is no new imaging to review.  Pending diagnostics to follow up on include: none.  For other recommendations please see our previous note completed on: On heparin drip for AFib. Recommend Apixaban 5mg po BID to start 2 hours after heparin infusion stopped. Weight = 51kg (< 60kg) and SCr 1.2. If SCr increases and sustains > 1.5, can dose reduce to 2.5mg po BID. Discussed with NCC resident. Vascular neurology will sign off. Please call with questions.

## 2020-09-13 LAB
ALBUMIN SERPL BCP-MCNC: 2.1 G/DL (ref 3.5–5.2)
ALLENS TEST: ABNORMAL
ALP SERPL-CCNC: 84 U/L (ref 55–135)
ALT SERPL W/O P-5'-P-CCNC: 29 U/L (ref 10–44)
ANION GAP SERPL CALC-SCNC: 13 MMOL/L (ref 8–16)
ANISOCYTOSIS BLD QL SMEAR: SLIGHT
APTT BLDCRRT: 43.8 SEC (ref 21–32)
AST SERPL-CCNC: 40 U/L (ref 10–40)
BACTERIA BLD CULT: NORMAL
BACTERIA BLD CULT: NORMAL
BASOPHILS NFR BLD: 0 % (ref 0–1.9)
BILIRUB SERPL-MCNC: 0.3 MG/DL (ref 0.1–1)
BUN SERPL-MCNC: 42 MG/DL (ref 8–23)
CALCIUM SERPL-MCNC: 9.7 MG/DL (ref 8.7–10.5)
CHLORIDE SERPL-SCNC: 99 MMOL/L (ref 95–110)
CO2 SERPL-SCNC: 30 MMOL/L (ref 23–29)
CREAT SERPL-MCNC: 1.3 MG/DL (ref 0.5–1.4)
DELSYS: ABNORMAL
DIFFERENTIAL METHOD: ABNORMAL
EOSINOPHIL NFR BLD: 0 % (ref 0–8)
ERYTHROCYTE [DISTWIDTH] IN BLOOD BY AUTOMATED COUNT: 14.3 % (ref 11.5–14.5)
ERYTHROCYTE [SEDIMENTATION RATE] IN BLOOD BY WESTERGREN METHOD: 16 MM/H
EST. GFR  (AFRICAN AMERICAN): 50.1 ML/MIN/1.73 M^2
EST. GFR  (NON AFRICAN AMERICAN): 43.5 ML/MIN/1.73 M^2
FIO2: 50
GIANT PLATELETS BLD QL SMEAR: PRESENT
GLUCOSE SERPL-MCNC: 129 MG/DL (ref 70–110)
HCO3 UR-SCNC: 32.7 MMOL/L (ref 24–28)
HCT VFR BLD AUTO: 35.2 % (ref 37–48.5)
HGB BLD-MCNC: 11.1 G/DL (ref 12–16)
HYPOCHROMIA BLD QL SMEAR: ABNORMAL
IMM GRANULOCYTES # BLD AUTO: ABNORMAL K/UL (ref 0–0.04)
IMM GRANULOCYTES NFR BLD AUTO: ABNORMAL % (ref 0–0.5)
LYMPHOCYTES NFR BLD: 4 % (ref 18–48)
MAGNESIUM SERPL-MCNC: 1.8 MG/DL (ref 1.6–2.6)
MCH RBC QN AUTO: 29.9 PG (ref 27–31)
MCHC RBC AUTO-ENTMCNC: 31.5 G/DL (ref 32–36)
MCV RBC AUTO: 95 FL (ref 82–98)
METAMYELOCYTES NFR BLD MANUAL: 3 %
MODE: ABNORMAL
MONOCYTES NFR BLD: 10 % (ref 4–15)
NEUTROPHILS NFR BLD: 81 % (ref 38–73)
NEUTS BAND NFR BLD MANUAL: 2 %
NRBC BLD-RTO: 0 /100 WBC
OVALOCYTES BLD QL SMEAR: ABNORMAL
PCO2 BLDA: 42.2 MMHG (ref 35–45)
PEEP: 5
PH SMN: 7.5 [PH] (ref 7.35–7.45)
PHOSPHATE SERPL-MCNC: 4.5 MG/DL (ref 2.7–4.5)
PLATELET # BLD AUTO: 727 K/UL (ref 150–350)
PLATELET BLD QL SMEAR: ABNORMAL
PMV BLD AUTO: 10.5 FL (ref 9.2–12.9)
PO2 BLDA: 98 MMHG (ref 80–100)
POC BE: 9 MMOL/L
POC SATURATED O2: 98 % (ref 95–100)
POC TCO2: 34 MMOL/L (ref 23–27)
POCT GLUCOSE: 136 MG/DL (ref 70–110)
POIKILOCYTOSIS BLD QL SMEAR: SLIGHT
POTASSIUM SERPL-SCNC: 4.4 MMOL/L (ref 3.5–5.1)
PROT SERPL-MCNC: 7.1 G/DL (ref 6–8.4)
RBC # BLD AUTO: 3.71 M/UL (ref 4–5.4)
SAMPLE: ABNORMAL
SITE: ABNORMAL
SODIUM SERPL-SCNC: 142 MMOL/L (ref 136–145)
SP02: 97
VT: 420
WBC # BLD AUTO: 17.27 K/UL (ref 3.9–12.7)

## 2020-09-13 PROCEDURE — 85730 THROMBOPLASTIN TIME PARTIAL: CPT

## 2020-09-13 PROCEDURE — 85007 BL SMEAR W/DIFF WBC COUNT: CPT

## 2020-09-13 PROCEDURE — 97112 NEUROMUSCULAR REEDUCATION: CPT

## 2020-09-13 PROCEDURE — 36600 WITHDRAWAL OF ARTERIAL BLOOD: CPT

## 2020-09-13 PROCEDURE — 93010 EKG 12-LEAD: ICD-10-PCS | Mod: ,,, | Performed by: INTERNAL MEDICINE

## 2020-09-13 PROCEDURE — 25000003 PHARM REV CODE 250: Performed by: PHYSICIAN ASSISTANT

## 2020-09-13 PROCEDURE — 94640 AIRWAY INHALATION TREATMENT: CPT

## 2020-09-13 PROCEDURE — 93005 ELECTROCARDIOGRAM TRACING: CPT

## 2020-09-13 PROCEDURE — 20000000 HC ICU ROOM

## 2020-09-13 PROCEDURE — C9113 INJ PANTOPRAZOLE SODIUM, VIA: HCPCS | Performed by: PHYSICIAN ASSISTANT

## 2020-09-13 PROCEDURE — 94003 VENT MGMT INPAT SUBQ DAY: CPT

## 2020-09-13 PROCEDURE — 63600175 PHARM REV CODE 636 W HCPCS: Performed by: NURSE PRACTITIONER

## 2020-09-13 PROCEDURE — 63600175 PHARM REV CODE 636 W HCPCS: Performed by: PSYCHIATRY & NEUROLOGY

## 2020-09-13 PROCEDURE — 25000003 PHARM REV CODE 250: Performed by: STUDENT IN AN ORGANIZED HEALTH CARE EDUCATION/TRAINING PROGRAM

## 2020-09-13 PROCEDURE — 27000221 HC OXYGEN, UP TO 24 HOURS

## 2020-09-13 PROCEDURE — 99900026 HC AIRWAY MAINTENANCE (STAT)

## 2020-09-13 PROCEDURE — 27200966 HC CLOSED SUCTION SYSTEM

## 2020-09-13 PROCEDURE — 93010 ELECTROCARDIOGRAM REPORT: CPT | Mod: ,,, | Performed by: INTERNAL MEDICINE

## 2020-09-13 PROCEDURE — 82803 BLOOD GASES ANY COMBINATION: CPT

## 2020-09-13 PROCEDURE — 99233 SBSQ HOSP IP/OBS HIGH 50: CPT | Mod: ,,, | Performed by: PSYCHIATRY & NEUROLOGY

## 2020-09-13 PROCEDURE — 84100 ASSAY OF PHOSPHORUS: CPT

## 2020-09-13 PROCEDURE — 99233 PR SUBSEQUENT HOSPITAL CARE,LEVL III: ICD-10-PCS | Mod: ,,, | Performed by: PSYCHIATRY & NEUROLOGY

## 2020-09-13 PROCEDURE — 94761 N-INVAS EAR/PLS OXIMETRY MLT: CPT

## 2020-09-13 PROCEDURE — 83735 ASSAY OF MAGNESIUM: CPT

## 2020-09-13 PROCEDURE — 99900035 HC TECH TIME PER 15 MIN (STAT)

## 2020-09-13 PROCEDURE — 80053 COMPREHEN METABOLIC PANEL: CPT

## 2020-09-13 PROCEDURE — 25000242 PHARM REV CODE 250 ALT 637 W/ HCPCS: Performed by: NURSE PRACTITIONER

## 2020-09-13 PROCEDURE — 25000003 PHARM REV CODE 250: Performed by: NURSE PRACTITIONER

## 2020-09-13 PROCEDURE — 93010 EKG 12-LEAD: ICD-10-PCS | Mod: 77,,, | Performed by: INTERNAL MEDICINE

## 2020-09-13 PROCEDURE — 93010 ELECTROCARDIOGRAM REPORT: CPT | Mod: 77,,, | Performed by: INTERNAL MEDICINE

## 2020-09-13 PROCEDURE — 63600175 PHARM REV CODE 636 W HCPCS: Performed by: PHYSICIAN ASSISTANT

## 2020-09-13 PROCEDURE — 85027 COMPLETE CBC AUTOMATED: CPT

## 2020-09-13 RX ORDER — HEPARIN SODIUM,PORCINE/D5W 25000/250
12 INTRAVENOUS SOLUTION INTRAVENOUS CONTINUOUS
Status: ACTIVE | OUTPATIENT
Start: 2020-09-13 | End: 2020-09-13

## 2020-09-13 RX ORDER — ACETAZOLAMIDE 500 MG/5ML
500 INJECTION, POWDER, LYOPHILIZED, FOR SOLUTION INTRAVENOUS ONCE
Status: COMPLETED | OUTPATIENT
Start: 2020-09-13 | End: 2020-09-13

## 2020-09-13 RX ORDER — HEPARIN SODIUM,PORCINE/D5W 25000/250
12 INTRAVENOUS SOLUTION INTRAVENOUS CONTINUOUS
Status: DISCONTINUED | OUTPATIENT
Start: 2020-09-13 | End: 2020-09-14

## 2020-09-13 RX ADMIN — VALPROIC ACID 250 MG: 250 SOLUTION ORAL at 09:09

## 2020-09-13 RX ADMIN — PANTOPRAZOLE SODIUM 40 MG: 40 INJECTION, POWDER, LYOPHILIZED, FOR SOLUTION INTRAVENOUS at 08:09

## 2020-09-13 RX ADMIN — MAGNESIUM GLUCONATE 500 MG ORAL TABLET 800 MG: 500 TABLET ORAL at 03:09

## 2020-09-13 RX ADMIN — CARVEDILOL 3.12 MG: 3.12 TABLET, FILM COATED ORAL at 08:09

## 2020-09-13 RX ADMIN — CEFEPIME 1 G: 1 INJECTION, POWDER, FOR SOLUTION INTRAMUSCULAR; INTRAVENOUS at 11:09

## 2020-09-13 RX ADMIN — MAGNESIUM GLUCONATE 500 MG ORAL TABLET 800 MG: 500 TABLET ORAL at 08:09

## 2020-09-13 RX ADMIN — OXYCODONE 5 MG: 5 TABLET ORAL at 07:09

## 2020-09-13 RX ADMIN — POLYETHYLENE GLYCOL 3350 17 G: 17 POWDER, FOR SOLUTION ORAL at 08:09

## 2020-09-13 RX ADMIN — DILTIAZEM HYDROCHLORIDE 90 MG: 60 TABLET, FILM COATED ORAL at 05:09

## 2020-09-13 RX ADMIN — VALPROIC ACID 250 MG: 250 SOLUTION ORAL at 02:09

## 2020-09-13 RX ADMIN — DOCUSATE SODIUM 50MG AND SENNOSIDES 8.6MG 1 TABLET: 8.6; 5 TABLET, FILM COATED ORAL at 08:09

## 2020-09-13 RX ADMIN — FUROSEMIDE 40 MG: 40 TABLET ORAL at 08:09

## 2020-09-13 RX ADMIN — QUETIAPINE FUMARATE 50 MG: 25 TABLET ORAL at 08:09

## 2020-09-13 RX ADMIN — IPRATROPIUM BROMIDE AND ALBUTEROL SULFATE 3 ML: .5; 2.5 SOLUTION RESPIRATORY (INHALATION) at 12:09

## 2020-09-13 RX ADMIN — OXYCODONE 5 MG: 5 TABLET ORAL at 08:09

## 2020-09-13 RX ADMIN — IPRATROPIUM BROMIDE AND ALBUTEROL SULFATE 3 ML: .5; 2.5 SOLUTION RESPIRATORY (INHALATION) at 07:09

## 2020-09-13 RX ADMIN — HEPARIN SODIUM AND DEXTROSE 15 UNITS/KG/HR: 10000; 5 INJECTION INTRAVENOUS at 02:09

## 2020-09-13 RX ADMIN — ACETAZOLAMIDE 500 MG: 500 INJECTION, POWDER, LYOPHILIZED, FOR SOLUTION INTRAVENOUS at 01:09

## 2020-09-13 RX ADMIN — ATORVASTATIN CALCIUM 40 MG: 20 TABLET, FILM COATED ORAL at 08:09

## 2020-09-13 RX ADMIN — DILTIAZEM HYDROCHLORIDE 90 MG: 60 TABLET, FILM COATED ORAL at 09:09

## 2020-09-13 RX ADMIN — DILTIAZEM HYDROCHLORIDE 90 MG: 60 TABLET, FILM COATED ORAL at 01:09

## 2020-09-13 RX ADMIN — CEFEPIME 1 G: 1 INJECTION, POWDER, FOR SOLUTION INTRAMUSCULAR; INTRAVENOUS at 09:09

## 2020-09-13 RX ADMIN — OXYCODONE 5 MG: 5 TABLET ORAL at 01:09

## 2020-09-13 RX ADMIN — VALPROIC ACID 250 MG: 250 SOLUTION ORAL at 05:09

## 2020-09-13 RX ADMIN — POTASSIUM & SODIUM PHOSPHATES POWDER PACK 280-160-250 MG 2 PACKET: 280-160-250 PACK at 03:09

## 2020-09-13 RX ADMIN — IPRATROPIUM BROMIDE AND ALBUTEROL SULFATE 3 ML: .5; 2.5 SOLUTION RESPIRATORY (INHALATION) at 01:09

## 2020-09-13 NOTE — PLAN OF CARE
POC reviewed with pt and family at 1400. Pt unable to verbalize understanding. Spouse at the bedside evrbalizd understanding. TF restarted at 1300. Running at 20ml/hr. Precedex DC per order. Hep gtt running at 15units/kg/hr. Questions and concerns addressed. No acute events today. Pt progressing toward goals. Will continue to monitor. See flowsheets for full assessment and VS info.

## 2020-09-13 NOTE — ASSESSMENT & PLAN NOTE
- Pt trached and peged 9/13  - Daily CXRs and ABG 9/14     Vent Mode: A/C  Oxygen Concentration (%):  [50] 50  Resp Rate Total:  [16 br/min-25 br/min] 17 br/min  Vt Set:  [420 mL] 420 mL  PEEP/CPAP:  [5 cmH20] 5 cmH20  Mean Airway Pressure:  [7.3 cmH20-8.8 cmH20] 7.4 cmH20

## 2020-09-13 NOTE — PLAN OF CARE
[facility] Care Plan    POC reviewed with Donita Gonzalez and family at 1400. Pt's  verbalized understanding. Questions and concerns addressed. No acute events today. Pt progressing toward goals. CT head completed.  Heparin gtt therapeutic.  Will continue to monitor. See below and flowsheets for full assessment and VS info.       Neuro:  Manuel Coma Scale  Best Eye Response: 1-->(E1) none  Best Motor Response: 5-->(M5) localizes pain  Best Verbal Response: 1-->(V1) none  [place] Coma Scale Score: 7  Assessment Qualifiers: patient intubated  Pupil PERRLA: no     24 hr Temp:  [97.4 °F (36.3 °C)-98.6 °F (37 °C)]     CV:   Rhythm: atrial rhythm  BP goals:   SBP < 160  MAP > 65    Resp:   O2 Device (Oxygen Therapy): ventilator  Vent Mode: A/C  Set Rate: 16 BPM  Oxygen Concentration (%): 50  Vt Set: 420 mL  PEEP/CPAP: 5 cmH20  Pressure Support: 12 cmH20    Plan: trach in place    GI/:  ROXANE Total Score: 1  Diet/Nutrition Received: tube feeding  Last Bowel Movement: 09/10/20  Voiding Characteristics: external catheter    Intake/Output Summary (Last 24 hours) at 9/13/2020 1823  Last data filed at 9/13/2020 1800  Gross per 24 hour   Intake 1141 ml   Output 1425 ml   Net -284 ml     Unmeasured Output  Urine Occurrence: 1  Stool Occurrence: 0  Emesis Occurrence: 0  Pad Count: 1    Labs/Accuchecks:  Recent Labs   Lab 09/13/20 0133   WBC 17.27*   RBC 3.71*   HGB 11.1*   HCT 35.2*   *      Recent Labs   Lab 09/13/20 0133      K 4.4   CO2 30*   CL 99   BUN 42*   CREATININE 1.3   ALKPHOS 84   ALT 29   AST 40   BILITOT 0.3      Recent Labs   Lab 09/13/20 0133   APTT 43.8*      Recent Labs   Lab 09/07/20  2226   TROPONINI 0.080*       Electrolytes: N/A - electrolytes WDL  Accuchecks: Q6H    Gtts:   dextrose 10 % in water (D10W)      heparin (porcine) in D5W 15 Units/kg/hr (09/13/20 1816)       LDA/Wounds:  Lines/Drains/Airways       Drain              Female External Urinary Catheter 09/10/20 1720 3 days          Gastrostomy/Enterostomy 09/11/20 1300 Percutaneous endoscopic gastrostomy (PEG) 2 days              Airway                   Surgical Airway 09/11/20 1249 Eduardo 2 days              Peripheral Intravenous Line                   Peripheral IV - Single Lumen 09/08/20 0905 20 G Anterior;Right Forearm 5 days         Peripheral IV - Single Lumen 09/10/20 1602 20 G Left Hand 3 days                  Wounds: Yes  Wound care consulted: Yes

## 2020-09-13 NOTE — ASSESSMENT & PLAN NOTE
- WBC trending down. No fevers overnight   - Patient being appropriately covered for positive respiratory cultures

## 2020-09-13 NOTE — SUBJECTIVE & OBJECTIVE
Interval History:  Weaned off fentanyl and precedex. Responding well to oxycodone.     Review of Systems   Unable to perform ROS: Intubated   Constitutional: Negative for fever.   HENT: Positive for drooling (copious secretions). Negative for nosebleeds.         Tongue swelling  Has PEG/Trach   Eyes: Negative for visual disturbance.   Respiratory: Negative for chest tightness.         Trach   Cardiovascular: Negative for chest pain.   Gastrointestinal: Negative for anal bleeding and blood in stool.   Genitourinary: Negative for hematuria.   Neurological: Negative for facial asymmetry.   Psychiatric/Behavioral: Negative for agitation.       Objective:     Vitals:  Temp: 97.4 °F (36.3 °C)  Pulse: 88  Rhythm: atrial rhythm  BP: 116/65  MAP (mmHg): 79  Resp: 16  SpO2: 97 %  Oxygen Concentration (%): 50  O2 Device (Oxygen Therapy): ventilator  Vent Mode: A/C  Set Rate: 16 BPM  Vt Set: 420 mL  PEEP/CPAP: 5 cmH20  Peak Airway Pressure: 16 cmH2O  Mean Airway Pressure: 7.4 cmH20  Plateau Pressure: 13 cmH20    Temp  Min: 97.4 °F (36.3 °C)  Max: 98.6 °F (37 °C)  Pulse  Min: 88  Max: 119  BP  Min: 109/56  Max: 192/104  MAP (mmHg)  Min: 77  Max: 147  Resp  Min: 5  Max: 25  SpO2  Min: 95 %  Max: 100 %  Oxygen Concentration (%)  Min: 50  Max: 50    09/12 0701 - 09/13 0700  In: 877.8 [I.V.:247.8]  Out: 850 [Urine:850]   Unmeasured Output  Urine Occurrence: 1  Stool Occurrence: 0  Emesis Occurrence: 0  Pad Count: 1       Physical Exam    Unable to test orientation, language, memory, judgment, insight, fund of knowledge, hearing, shoulder shrug, tongue protrusion, coordination, gait due to level of consciousness.     -- GCS: E2 VT1 M4  -- Mental Status:  Will open eyes   -- Pupils left pupil 3mm and brisk, R pupil fixed (cataract)  -- Corneal reflex, gag, cough intact  -- R side extremities move spontaneously but no commands  -- LUE does not respond to pain, LLE withdraws    Medications:  Continuousdextrose 10 % in water  (D10W)  heparin (porcine) in D5W, Last Rate: 15.098 Units/kg/hr (09/13/20 1400)    Scheduledalbuterol-ipratropium, 3 mL, Q6H  atorvastatin, 40 mg, Daily  carvediloL, 3.125 mg, BID  ceFEPime (MAXIPIME) IVPB, 1 g, Q12H  diltiaZEM, 90 mg, Q8H  furosemide, 40 mg, BID  pantoprozole (PROTONIX) IV, 40 mg, Q12H  polyethylene glycol, 17 g, Daily  QUEtiapine, 50 mg, BID  senna-docusate 8.6-50 mg, 1 tablet, BID  valproic acid (as sodium salt), 250 mg, Q8H    PRNacetaminophen, 650 mg, Q6H PRN  dextrose 10 % in water (D10W), , Continuous PRN  dextrose 50%, 12.5 g, PRN  glucagon (human recombinant), 1 mg, PRN  labetalol, 10 mg, Q4H PRN  magnesium oxide, 800 mg, PRN  magnesium oxide, 800 mg, PRN  ondansetron, 4 mg, Q6H PRN  oxyCODONE, 5 mg, Q6H PRN  potassium chloride, 40 mEq, PRN  potassium chloride, 40 mEq, PRN  potassium chloride, 60 mEq, PRN  potassium, sodium phosphates, 2 packet, PRN  potassium, sodium phosphates, 2 packet, PRN  potassium, sodium phosphates, 2 packet, PRN      Today I personally reviewed pertinent medications, lines/drains/airways, imaging, cardiology results, laboratory results, microbiology results, notably:    Diet  Diet NPO  Diet NPO

## 2020-09-13 NOTE — PROGRESS NOTES
[facility]  Neurocritical Care  Progress Note    Admit Date: 8/29/2020  Service Date: 09/13/2020  Length of Stay: 15    Subjective:     Chief Complaint: Embolic stroke involving right middle cerebral artery    History of Present Illness: 64 year old female with a PMH significant for COPD, HTN, and every day smoker who presented at OSH on 08/26/20 with acute left sided facial droop and left sided weakness (last known normal 08/25/20). Pt was not given TPA or endovascular intervention, and she was intubated on 08/26/20 for agitation. CTH on 08/26/20 showed right posterior frontal/parietal infarction with no intracranial hemorrhage. Hospital course complicated by Afib with RVR in which she was started on lovenox and then transitioned to heparin gtt for GOYO and HFrEF. Pt was transferred to NICU for higher level of care and persistent epistaxis. Upon arrival Pt was intubated and sedated, Rhinorocket  Bilaterally in place, propofol and diltiazem gtt. Pt was able to move the right side and moves her left side to noxious stimuli. A-line placed.       Hospital Course: 08/29/20: Pt admitted to NICU for higher level of care and ENT consult. Pt with a run of Vtach associated with hypotension.   8/31/2020: hold anticoagulation until tomorrow due to GI bleed/epistaxis (at other facility) GI consult, pt in Afib- load with digoxin, pending digoxin level tomorrow, continue diltiazem gtt today, start tube feeds, obtain PIV and d/c central line, continue cefepime for total 7 days  9/1/2020: initiated heparin gtt minimal intensity-when at goal obtain CT head, continue diltiazem gtt, start tube feeds, d/c A-line  9/2/2020: changed to PO diltiazem 60mg q8hr from gtt, lasix, miralax, plan for extubation  9/3/2020: started seroquel 25BID  9/4/2020: increased seroquel to 25 mg BID, magnesium citrate, and try SBT  9/5/2020: place A-line, increased Po dilt, add chest PT, add water flushes and repeat magnesium  citrate  9/6/2020: d/c digoxin, add coreg 6.25 BID, d/c water flushes, repeat CMP, add lasix PO 40 mg BID  09/07/2020 Plan to wean FiO2. As we are weaning propofol, precedex will be added. Patient is currently on heparin drip. Daily EKG for QTc monitoring. Patient given one dose of diamox with plans to restart furosemide for metabolic alkalosis. Will discontinue barr tomorrow. F/u with procalcitonin due to increased WBC count. Will speak to family member about Trach/PEG discussion   09/08/2020 Patient remains on her heparin drip. Currently she is rate controlled. Patient is off of her propofol due to significnt hypotension. We are holding the next two doses of lasix and giving her diamox 500 mg for two doses for metabolic alkalosis. Dr. Zavala spoke to the POA, Griffin Gonzalez who would like to proceed with a trach/PEG. Patient was pan cultured today and started on broad spectrum antibiotics. Barr was changed today   09/09/2020 Patient scheduled to get trach/PEG this Friday. Still on broad spectrum antibiotics as she has an elevated WBC. Working up for alternative causes of fever: US of the upper and lower extremities, rapid COVID, and amylase lipase.   09/10/2020 Patient scheduled for trach/peg for tomorrow. We are holding the heparin drip and the tube feeds for this midnight. No DVT on upper and lower extremities. Continuing the broad spectrum antibiotics until the susceptibilities return. D/c a line as it is no longer working.   09/11/2020 Will go to the OR today for trach and PEG. Cefepime to discontinue after ten days (for positive respiratory culture)  09/12/2020 Patient was restarted on the heparin drip last ngiht. Weaning propofol and precedex by adding oxycodone for the next 24 hours (PRN). Following up on EKG. Spontaneous trial once sedation is weaned. Awaiting stroke recommendation about when to transition to a DOAC. Tube feeds were started at 1 PM today.   9/13/2020 [facility] today before transition heparin  to DOAC. Weaned off fentanyl and precedex. Responding well to oxycodone. 1x 500 acetazolamide for metabolic alkalosis. Potential d/c to LTAC tomorrow.    Interval History:  Weaned off fentanyl and precedex. Responding well to oxycodone.     Review of Systems   Unable to perform ROS: Intubated   Constitutional: Negative for fever.   HENT: Positive for drooling (copious secretions). Negative for nosebleeds.         Tongue swelling  Has PEG/Trach   Eyes: Negative for visual disturbance.   Respiratory: Negative for chest tightness.         Trach   Cardiovascular: Negative for chest pain.   Gastrointestinal: Negative for anal bleeding and blood in stool.   Genitourinary: Negative for hematuria.   Neurological: Negative for facial asymmetry.   Psychiatric/Behavioral: Negative for agitation.       Objective:     Vitals:  Temp: 97.4 °F (36.3 °C)  Pulse: 88  Rhythm: atrial rhythm  BP: 116/65  MAP (mmHg): 79  Resp: 16  SpO2: 97 %  Oxygen Concentration (%): 50  O2 Device (Oxygen Therapy): ventilator  Vent Mode: A/C  Set Rate: 16 BPM  Vt Set: 420 mL  PEEP/CPAP: 5 cmH20  Peak Airway Pressure: 16 cmH2O  Mean Airway Pressure: 7.4 cmH20  Plateau Pressure: 13 cmH20    Temp  Min: 97.4 °F (36.3 °C)  Max: 98.6 °F (37 °C)  Pulse  Min: 88  Max: 119  BP  Min: 109/56  Max: 192/104  MAP (mmHg)  Min: 77  Max: 147  Resp  Min: 5  Max: 25  SpO2  Min: 95 %  Max: 100 %  Oxygen Concentration (%)  Min: 50  Max: 50    09/12 0701 - 09/13 0700  In: 877.8 [I.V.:247.8]  Out: 850 [Urine:850]   Unmeasured Output  Urine Occurrence: 1  Stool Occurrence: 0  Emesis Occurrence: 0  Pad Count: 1       Physical Exam    Unable to test orientation, language, memory, judgment, insight, fund of knowledge, hearing, shoulder shrug, tongue protrusion, coordination, gait due to level of consciousness.     -- GCS: E2 VT1 M4  -- Mental Status:  Will open eyes   -- Pupils left pupil 3mm and brisk, R pupil fixed (cataract)  -- Corneal reflex, gag, cough intact  -- R side  extremities move spontaneously but no commands  -- LUE does not respond to pain, LLE withdraws    Medications:  Continuousdextrose 10 % in water (D10W)  heparin (porcine) in D5W, Last Rate: 15.098 Units/kg/hr (09/13/20 1400)    Scheduledalbuterol-ipratropium, 3 mL, Q6H  atorvastatin, 40 mg, Daily  carvediloL, 3.125 mg, BID  ceFEPime (MAXIPIME) IVPB, 1 g, Q12H  diltiaZEM, 90 mg, Q8H  furosemide, 40 mg, BID  pantoprozole (PROTONIX) IV, 40 mg, Q12H  polyethylene glycol, 17 g, Daily  QUEtiapine, 50 mg, BID  senna-docusate 8.6-50 mg, 1 tablet, BID  valproic acid (as sodium salt), 250 mg, Q8H    PRNacetaminophen, 650 mg, Q6H PRN  dextrose 10 % in water (D10W), , Continuous PRN  dextrose 50%, 12.5 g, PRN  glucagon (human recombinant), 1 mg, PRN  labetalol, 10 mg, Q4H PRN  magnesium oxide, 800 mg, PRN  magnesium oxide, 800 mg, PRN  ondansetron, 4 mg, Q6H PRN  oxyCODONE, 5 mg, Q6H PRN  potassium chloride, 40 mEq, PRN  potassium chloride, 40 mEq, PRN  potassium chloride, 60 mEq, PRN  potassium, sodium phosphates, 2 packet, PRN  potassium, sodium phosphates, 2 packet, PRN  potassium, sodium phosphates, 2 packet, PRN      Today I personally reviewed pertinent medications, lines/drains/airways, imaging, cardiology results, laboratory results, microbiology results, notably:    Diet  Diet NPO  Diet NPO          Assessment/Plan:     Neuro  * Embolic stroke involving right middle cerebral artery  - Last seen normal on 08/25/20 with no intervention; intubated and sedated.    - Imaging shows large right ischemic stroke in the posterior frontal and parietal regions  - SBP < 160, MAP > 65  - EuNa   - CBC, CMP, coags daily   - heparin gtt low intensity  - repeat CTH stable  - daily statin  - Seroquel to 50 mg BID  - PEG/Trach placement on 9/12/20  - Weaned off precedex and fentanyl 9/13 and responding well to oxycodone  - 9/13 Acetazolamide 500 mg x1 (substitute evening lasix dose)    ENT  Epistaxis  - persistent epistaxis in the setting  of anticoagulation   - H/H stable   - daily CBC   - ENT consulted;   rhino rockets were removed     Pulmonary  PNA (pneumonia)  - Respiratory cultures growing serratia  - Will continue cefepime with stop date after 20 doses     Chronic obstructive pulmonary disease  - daily CXRs  - goal SpO2 is > 88%  - scheduled duo nebs q6h    Acute on chronic respiratory failure with hypoxia and hypercapnia  - Pt trached and peged 9/13  - Daily CXRs and ABG 9/14     Vent Mode: A/C  Oxygen Concentration (%):  [50] 50  Resp Rate Total:  [16 br/min-25 br/min] 17 br/min  Vt Set:  [420 mL] 420 mL  PEEP/CPAP:  [5 cmH20] 5 cmH20  Mean Airway Pressure:  [7.3 cmH20-8.8 cmH20] 7.4 cmH20         Cardiac/Vascular  Atrial fibrillation with rapid ventricular response  - Repeat EKG   - daily Mg and Phos; replace as needed  - dilt PO  - dig level 1.8  - continue heparin gtt minimal intensity  -9/5/2020: increased PO diltiazem to 90 mg  9/6/20: d/c digoxin    -- Heparin gtt after PEG/Trach placement  -- Heparin is therapeutic at 44  -- CTH today before transition to DOAC apixiban    Essential hypertension  - SBP goal < 160, MAP >65    Renal/  GOYO (acute kidney injury)  - Avoid nephrotoxic drugs  - Monitor Daily I/O    Oncology  Leukocytosis  - WBC trending down. No fevers overnight   - Patient being appropriately covered for positive respiratory cultures       Endocrine  Diabetes mellitus  - SSI moderate  - POCT glu monitoring    Moderate malnutrition  - tube feeds  - monitor electrolytes with daily CMP, Mg, phos    GI  GI bleed  - upper GI bleed at outside hospital  - recent upper GI bleed at outside facility and epistaxis (2 rhino rockets, up to 5 days prn)  - consulted GI - Continue PPI b.i.d. Trend hemoglobin, transfuse hemoglobin <7            The patient is being Prophylaxed for:  Venous Thromboembolism with: Chemical  Stress Ulcer with: PPI  Ventilator Pneumonia with: chlorhexidine oral care    Activity Orders          Diet NPO: NPO  starting at 09/11 0001        Full Code    Dimas Vegas MD  Neurocritical Care  [facility]

## 2020-09-13 NOTE — ASSESSMENT & PLAN NOTE
- Last seen normal on 08/25/20 with no intervention; intubated and sedated.    - Imaging shows large right ischemic stroke in the posterior frontal and parietal regions  - SBP < 160, MAP > 65  - EuNa   - CBC, CMP, coags daily   - heparin gtt low intensity  - repeat CTH stable  - daily statin  - Seroquel to 50 mg BID  - PEG/Trach placement on 9/12/20  - Weaned off precedex and fentanyl 9/13 and responding well to oxycodone  - 9/13 Acetazolamide 500 mg x1 (substitute evening lasix dose)

## 2020-09-13 NOTE — PT/OT/SLP PROGRESS
Occupational Therapy   Treatment    Name: Donita Gonzalez  MRN: [id number]  Admitting Diagnosis:  Embolic stroke involving right middle cerebral artery  2 Days Post-Op    Recommendations:     Discharge Recommendations: LTACH (long-term acute care hospital)  Discharge Equipment Recommendations:  hospital bed  Barriers to discharge:  None    Assessment:     Donita Gonzalez is a 64 y.o. female with a medical diagnosis of Embolic stroke involving right middle cerebral artery.  She presents with performance deficits affecting function are weakness, impaired endurance, impaired sensation, decreased coordination, decreased upper extremity function, impaired self care skills, impaired functional mobilty, gait instability, impaired balance, decreased lower extremity function, decreased safety awareness, impaired coordination, impaired fine motor, impaired cardiopulmonary response to activity, decreased ROM, impaired cognition, abnormal tone.     Rehab Prognosis:  Good; patient would benefit from acute skilled OT services to address these deficits and reach maximum level of function.       Plan:     Patient to be seen 3 x/week to address the above listed problems via self-care/home management, therapeutic activities, therapeutic exercises, neuromuscular re-education, cognitive retraining, sensory integration  · Plan of Care Expires: 09/27/20  · Plan of Care Reviewed with: patient    Subjective   Patient:  Nonverbal.  Pain/Comfort:  · Pain Rating 1: 0/10  · Pain Rating Post-Intervention 1: 0/10    Objective:     Communicated with: Nurse prior to session.  Patient found supine with arterial line, bed alarm, blood pressure cuff, peripheral IV, restraints, SCD, telemetry, pulse ox (continuous), pressure relief boots, tracheostomy, PEG Tube, PureWick upon OT entry to room.  Family not present.    General Precautions: Standard, aspiration, fall, NPO, seizure   Orthopedic Precautions:N/A   Braces: N/A     Occupational Performance:     Bed  Mobility:    · Patient completed Rolling/Turning to Left with  total assistance  · Patient completed Rolling/Turning to Right with total assistance     Functional Mobility/Transfers:  · Dependent drawsheet transfers    Activities of Daily Living:  · Feeding:  NPO    · Grooming: total assistance while supine    [facility] 6 Click ADL: 6    Treatment & Education:  Patient education provided on role of OT, ROM, positioning, daily orientation and need for continued OT upon discharge.  Daily orientation provided.   PROM performed left UE/LE and AAROM right UE/LE one set x 10 rep in all planes of motion with stretches provided at end range; sustained stretch provided for ankle dorsiflexion.  Assistance and facilitation provided for upward rotation of the scapula during shoulder flexion and abduction to promote orientation of glenoid fossa of scapula to humeral head for prevention of post-stroke hemiplegic pain.  Patient kept eyes closed 100% of the session.  Patient kept head turned to the right; tongue protrusion noted. Towel roll used for midline positioning of head.   Provided multi-sensory stimulation to prevent sensory deprivation and improve clinical outcomes.  Positioning provided for midline orientation with bilateral UEs elevated and heels lifted off mattress; positioning provided to prevent flexor synergy pattern in UE (internal rotation and adduction) to decrease risk of post-stroke hemiplegic pain.   Gentle cervical rotation provided.       Patient left supine with all lines intact, call button in reach and bed alarm onEducation:      GOALS:   Multidisciplinary Problems     Occupational Therapy Goals        Problem: Occupational Therapy Goal    Goal Priority Disciplines Outcome Interventions   Occupational Therapy Goal     OT, PT/OT Ongoing, Progressing    Description: Goals set 9/11 to be addressed for 14 days with expiration date, 9/25:  Patient will increase functional independence with ADLs by  performing:    Patient will demonstrate rolling to the right with mod assist.  Not met   Patient will demonstrate rolling to the left with mod assist.   Not met  Patient will demonstrate supine -sit with mod assist.   Not met  Patient will demonstrate stand pivot transfers with mod assist.   Not met  Patient will demonstrate grooming while seated with mod assist.   Not met  Patient will demonstrate upper body dressing with mod assist while seated EOB.   Not met  Patient will demonstrate lower body dressing with max assist while seated EOB.   Not met  Patient will demonstrate toileting with max assist.   Not met  Patient will demonstrate bathing while seated EOB with max assist.   Not met  Patient's family / caregiver will demonstrate independence and safety with assisting patient with self-care skills and functional mobility.     Not met  Patient's family / caregiver will demonstrate independence with providing ROM and changes in bed positioning.   Not met  Patient and/or patient's family will verbalize understanding of stroke prevention guidelines, personal risk factors and stroke warning signs via teachback method.  Not met                              Time Tracking:     OT Date of Treatment: 09/13/20  OT Start Time: 0420  OT Stop Time: 0443  OT Total Time (min): 23 min    Billable Minutes:Neuromuscular Re-education 23    RORY Thomas  9/13/2020

## 2020-09-13 NOTE — PLAN OF CARE
Goals remain appropriate.  RORY Thomas  9/13/2020    Problem: Occupational Therapy Goal  Goal: Occupational Therapy Goal  Description: Goals set 9/11 to be addressed for 14 days with expiration date, 9/25:  Patient will increase functional independence with ADLs by performing:    Patient will demonstrate rolling to the right with mod assist.  Not met   Patient will demonstrate rolling to the left with mod assist.   Not met  Patient will demonstrate supine -sit with mod assist.   Not met  Patient will demonstrate stand pivot transfers with mod assist.   Not met  Patient will demonstrate grooming while seated with mod assist.   Not met  Patient will demonstrate upper body dressing with mod assist while seated EOB.   Not met  Patient will demonstrate lower body dressing with max assist while seated EOB.   Not met  Patient will demonstrate toileting with max assist.   Not met  Patient will demonstrate bathing while seated EOB with max assist.   Not met  Patient's family / caregiver will demonstrate independence and safety with assisting patient with self-care skills and functional mobility.     Not met  Patient's family / caregiver will demonstrate independence with providing ROM and changes in bed positioning.   Not met  Patient and/or patient's family will verbalize understanding of stroke prevention guidelines, personal risk factors and stroke warning signs via teachback method.  Not met             Outcome: Ongoing, Progressing

## 2020-09-13 NOTE — ASSESSMENT & PLAN NOTE
- Repeat EKG   - daily Mg and Phos; replace as needed  - dilt PO  - dig level 1.8  - continue heparin gtt minimal intensity  -9/5/2020: increased PO diltiazem to 90 mg  9/6/20: d/c digoxin    -- Heparin gtt after PEG/Trach placement  -- Heparin is therapeutic at 44  -- CTH today before transition to DOAC apixiban

## 2020-09-13 NOTE — ASSESSMENT & PLAN NOTE
- upper GI bleed at outside hospital  - recent upper GI bleed at outside facility and epistaxis (2 rhino rockets, up to 5 days prn)  - consulted GI - Continue PPI b.i.d. Trend hemoglobin, transfuse hemoglobin <7

## 2020-09-14 LAB
ALBUMIN SERPL BCP-MCNC: 2 G/DL (ref 3.5–5.2)
ALLENS TEST: ABNORMAL
ALLENS TEST: ABNORMAL
ALP SERPL-CCNC: 82 U/L (ref 55–135)
ALT SERPL W/O P-5'-P-CCNC: 27 U/L (ref 10–44)
ANION GAP SERPL CALC-SCNC: 11 MMOL/L (ref 8–16)
ANISOCYTOSIS BLD QL SMEAR: SLIGHT
APTT BLDCRRT: 50.4 SEC (ref 21–32)
AST SERPL-CCNC: 35 U/L (ref 10–40)
BASOPHILS # BLD AUTO: ABNORMAL K/UL (ref 0–0.2)
BASOPHILS NFR BLD: 0 % (ref 0–1.9)
BILIRUB SERPL-MCNC: 0.2 MG/DL (ref 0.1–1)
BUN SERPL-MCNC: 41 MG/DL (ref 8–23)
BURR CELLS BLD QL SMEAR: ABNORMAL
CALCIUM SERPL-MCNC: 9 MG/DL (ref 8.7–10.5)
CHLORIDE SERPL-SCNC: 100 MMOL/L (ref 95–110)
CO2 SERPL-SCNC: 32 MMOL/L (ref 23–29)
CREAT SERPL-MCNC: 1.4 MG/DL (ref 0.5–1.4)
DELSYS: ABNORMAL
DELSYS: ABNORMAL
DIFFERENTIAL METHOD: ABNORMAL
EOSINOPHIL # BLD AUTO: ABNORMAL K/UL (ref 0–0.5)
EOSINOPHIL NFR BLD: 1 % (ref 0–8)
ERYTHROCYTE [DISTWIDTH] IN BLOOD BY AUTOMATED COUNT: 14.5 % (ref 11.5–14.5)
ERYTHROCYTE [SEDIMENTATION RATE] IN BLOOD BY WESTERGREN METHOD: 16 MM/H
ERYTHROCYTE [SEDIMENTATION RATE] IN BLOOD BY WESTERGREN METHOD: 20 MM/H
EST. GFR  (AFRICAN AMERICAN): 45.8 ML/MIN/1.73 M^2
EST. GFR  (NON AFRICAN AMERICAN): 39.7 ML/MIN/1.73 M^2
FIO2: 40
FIO2: 50
GIANT PLATELETS BLD QL SMEAR: PRESENT
GLUCOSE SERPL-MCNC: 146 MG/DL (ref 70–110)
HCO3 UR-SCNC: 31.3 MMOL/L (ref 24–28)
HCO3 UR-SCNC: 32.9 MMOL/L (ref 24–28)
HCT VFR BLD AUTO: 33.5 % (ref 37–48.5)
HGB BLD-MCNC: 10.3 G/DL (ref 12–16)
HYPOCHROMIA BLD QL SMEAR: ABNORMAL
IMM GRANULOCYTES # BLD AUTO: ABNORMAL K/UL (ref 0–0.04)
IMM GRANULOCYTES NFR BLD AUTO: ABNORMAL % (ref 0–0.5)
LYMPHOCYTES # BLD AUTO: ABNORMAL K/UL (ref 1–4.8)
LYMPHOCYTES NFR BLD: 6 % (ref 18–48)
MAGNESIUM SERPL-MCNC: 1.9 MG/DL (ref 1.6–2.6)
MCH RBC QN AUTO: 30.1 PG (ref 27–31)
MCHC RBC AUTO-ENTMCNC: 30.7 G/DL (ref 32–36)
MCV RBC AUTO: 98 FL (ref 82–98)
METAMYELOCYTES NFR BLD MANUAL: 6 %
MIN VOL: 7.62
MODE: ABNORMAL
MODE: ABNORMAL
MONOCYTES # BLD AUTO: ABNORMAL K/UL (ref 0.3–1)
MONOCYTES NFR BLD: 14 % (ref 4–15)
MYELOCYTES NFR BLD MANUAL: 4 %
NEUTROPHILS NFR BLD: 67 % (ref 38–73)
NEUTS BAND NFR BLD MANUAL: 2 %
NRBC BLD-RTO: 0 /100 WBC
OVALOCYTES BLD QL SMEAR: ABNORMAL
PCO2 BLDA: 50.4 MMHG (ref 35–45)
PCO2 BLDA: 53.9 MMHG (ref 35–45)
PEEP: 5
PEEP: 5
PH SMN: 7.39 [PH] (ref 7.35–7.45)
PH SMN: 7.4 [PH] (ref 7.35–7.45)
PHOSPHATE SERPL-MCNC: 3.9 MG/DL (ref 2.7–4.5)
PLATELET # BLD AUTO: 666 K/UL (ref 150–350)
PLATELET BLD QL SMEAR: ABNORMAL
PMV BLD AUTO: 10.2 FL (ref 9.2–12.9)
PO2 BLDA: 57 MMHG (ref 80–100)
PO2 BLDA: 84 MMHG (ref 80–100)
POC BE: 7 MMOL/L
POC BE: 8 MMOL/L
POC SATURATED O2: 89 % (ref 95–100)
POC SATURATED O2: 96 % (ref 95–100)
POC TCO2: 33 MMOL/L (ref 23–27)
POC TCO2: 34 MMOL/L (ref 23–27)
POCT GLUCOSE: 122 MG/DL (ref 70–110)
POCT GLUCOSE: 142 MG/DL (ref 70–110)
POIKILOCYTOSIS BLD QL SMEAR: SLIGHT
POTASSIUM SERPL-SCNC: 4.1 MMOL/L (ref 3.5–5.1)
PROT SERPL-MCNC: 6.7 G/DL (ref 6–8.4)
RBC # BLD AUTO: 3.42 M/UL (ref 4–5.4)
SAMPLE: ABNORMAL
SAMPLE: ABNORMAL
SCHISTOCYTES BLD QL SMEAR: ABNORMAL
SITE: ABNORMAL
SITE: ABNORMAL
SODIUM SERPL-SCNC: 143 MMOL/L (ref 136–145)
SP02: 97
TARGETS BLD QL SMEAR: ABNORMAL
TOXIC GRANULES BLD QL SMEAR: PRESENT
VT: 420
VT: 420
WBC # BLD AUTO: 15.06 K/UL (ref 3.9–12.7)
WBC TOXIC VACUOLES BLD QL SMEAR: PRESENT

## 2020-09-14 PROCEDURE — 99900026 HC AIRWAY MAINTENANCE (STAT)

## 2020-09-14 PROCEDURE — 93010 ELECTROCARDIOGRAM REPORT: CPT | Mod: ,,, | Performed by: INTERNAL MEDICINE

## 2020-09-14 PROCEDURE — 85027 COMPLETE CBC AUTOMATED: CPT

## 2020-09-14 PROCEDURE — 25000003 PHARM REV CODE 250: Performed by: STUDENT IN AN ORGANIZED HEALTH CARE EDUCATION/TRAINING PROGRAM

## 2020-09-14 PROCEDURE — 93005 ELECTROCARDIOGRAM TRACING: CPT

## 2020-09-14 PROCEDURE — 80053 COMPREHEN METABOLIC PANEL: CPT

## 2020-09-14 PROCEDURE — C9113 INJ PANTOPRAZOLE SODIUM, VIA: HCPCS | Performed by: PHYSICIAN ASSISTANT

## 2020-09-14 PROCEDURE — 99900035 HC TECH TIME PER 15 MIN (STAT)

## 2020-09-14 PROCEDURE — 85007 BL SMEAR W/DIFF WBC COUNT: CPT

## 2020-09-14 PROCEDURE — 63600175 PHARM REV CODE 636 W HCPCS: Performed by: NURSE PRACTITIONER

## 2020-09-14 PROCEDURE — 36600 WITHDRAWAL OF ARTERIAL BLOOD: CPT

## 2020-09-14 PROCEDURE — 87070 CULTURE OTHR SPECIMN AEROBIC: CPT

## 2020-09-14 PROCEDURE — 27000221 HC OXYGEN, UP TO 24 HOURS

## 2020-09-14 PROCEDURE — 85730 THROMBOPLASTIN TIME PARTIAL: CPT

## 2020-09-14 PROCEDURE — 87205 SMEAR GRAM STAIN: CPT

## 2020-09-14 PROCEDURE — 27200966 HC CLOSED SUCTION SYSTEM

## 2020-09-14 PROCEDURE — 82803 BLOOD GASES ANY COMBINATION: CPT

## 2020-09-14 PROCEDURE — 63600175 PHARM REV CODE 636 W HCPCS: Performed by: PHYSICIAN ASSISTANT

## 2020-09-14 PROCEDURE — 20000000 HC ICU ROOM

## 2020-09-14 PROCEDURE — 25000242 PHARM REV CODE 250 ALT 637 W/ HCPCS: Performed by: NURSE PRACTITIONER

## 2020-09-14 PROCEDURE — 87077 CULTURE AEROBIC IDENTIFY: CPT

## 2020-09-14 PROCEDURE — 84100 ASSAY OF PHOSPHORUS: CPT

## 2020-09-14 PROCEDURE — 94761 N-INVAS EAR/PLS OXIMETRY MLT: CPT

## 2020-09-14 PROCEDURE — 83735 ASSAY OF MAGNESIUM: CPT

## 2020-09-14 PROCEDURE — 25000003 PHARM REV CODE 250: Performed by: NURSE PRACTITIONER

## 2020-09-14 PROCEDURE — 99233 SBSQ HOSP IP/OBS HIGH 50: CPT | Mod: ,,, | Performed by: NURSE PRACTITIONER

## 2020-09-14 PROCEDURE — 25000003 PHARM REV CODE 250: Performed by: PHYSICIAN ASSISTANT

## 2020-09-14 PROCEDURE — 99233 PR SUBSEQUENT HOSPITAL CARE,LEVL III: ICD-10-PCS | Mod: ,,, | Performed by: NURSE PRACTITIONER

## 2020-09-14 PROCEDURE — 94003 VENT MGMT INPAT SUBQ DAY: CPT

## 2020-09-14 PROCEDURE — 94640 AIRWAY INHALATION TREATMENT: CPT

## 2020-09-14 PROCEDURE — 93010 ELECTROCARDIOGRAM REPORT: CPT | Mod: 76,,, | Performed by: INTERNAL MEDICINE

## 2020-09-14 PROCEDURE — 87186 SC STD MICRODIL/AGAR DIL: CPT

## 2020-09-14 PROCEDURE — 63600175 PHARM REV CODE 636 W HCPCS: Performed by: PSYCHIATRY & NEUROLOGY

## 2020-09-14 PROCEDURE — 93010 EKG 12-LEAD: ICD-10-PCS | Mod: ,,, | Performed by: INTERNAL MEDICINE

## 2020-09-14 PROCEDURE — 82800 BLOOD PH: CPT

## 2020-09-14 RX ORDER — BISACODYL 10 MG
10 SUPPOSITORY, RECTAL RECTAL DAILY
Status: DISCONTINUED | OUTPATIENT
Start: 2020-09-15 | End: 2020-09-16 | Stop reason: HOSPADM

## 2020-09-14 RX ADMIN — POLYETHYLENE GLYCOL 3350 17 G: 17 POWDER, FOR SOLUTION ORAL at 08:09

## 2020-09-14 RX ADMIN — FUROSEMIDE 40 MG: 40 TABLET ORAL at 05:09

## 2020-09-14 RX ADMIN — IPRATROPIUM BROMIDE AND ALBUTEROL SULFATE 3 ML: .5; 2.5 SOLUTION RESPIRATORY (INHALATION) at 12:09

## 2020-09-14 RX ADMIN — DILTIAZEM HYDROCHLORIDE 90 MG: 60 TABLET, FILM COATED ORAL at 01:09

## 2020-09-14 RX ADMIN — VALPROIC ACID 250 MG: 250 SOLUTION ORAL at 10:09

## 2020-09-14 RX ADMIN — QUETIAPINE FUMARATE 50 MG: 25 TABLET ORAL at 08:09

## 2020-09-14 RX ADMIN — PANTOPRAZOLE SODIUM 40 MG: 40 INJECTION, POWDER, LYOPHILIZED, FOR SOLUTION INTRAVENOUS at 08:09

## 2020-09-14 RX ADMIN — ATORVASTATIN CALCIUM 40 MG: 20 TABLET, FILM COATED ORAL at 08:09

## 2020-09-14 RX ADMIN — CEFEPIME 1 G: 1 INJECTION, POWDER, FOR SOLUTION INTRAMUSCULAR; INTRAVENOUS at 10:09

## 2020-09-14 RX ADMIN — OXYCODONE 5 MG: 5 TABLET ORAL at 04:09

## 2020-09-14 RX ADMIN — APIXABAN 5 MG: 2.5 TABLET, FILM COATED ORAL at 10:09

## 2020-09-14 RX ADMIN — DILTIAZEM HYDROCHLORIDE 90 MG: 60 TABLET, FILM COATED ORAL at 05:09

## 2020-09-14 RX ADMIN — DOCUSATE SODIUM 50MG AND SENNOSIDES 8.6MG 1 TABLET: 8.6; 5 TABLET, FILM COATED ORAL at 08:09

## 2020-09-14 RX ADMIN — HEPARIN SODIUM AND DEXTROSE 15 UNITS/KG/HR: 10000; 5 INJECTION INTRAVENOUS at 08:09

## 2020-09-14 RX ADMIN — FUROSEMIDE 40 MG: 40 TABLET ORAL at 08:09

## 2020-09-14 RX ADMIN — IPRATROPIUM BROMIDE AND ALBUTEROL SULFATE 3 ML: .5; 2.5 SOLUTION RESPIRATORY (INHALATION) at 07:09

## 2020-09-14 RX ADMIN — CARVEDILOL 3.12 MG: 3.12 TABLET, FILM COATED ORAL at 08:09

## 2020-09-14 RX ADMIN — VALPROIC ACID 250 MG: 250 SOLUTION ORAL at 01:09

## 2020-09-14 RX ADMIN — DILTIAZEM HYDROCHLORIDE 90 MG: 60 TABLET, FILM COATED ORAL at 10:09

## 2020-09-14 RX ADMIN — VALPROIC ACID 250 MG: 250 SOLUTION ORAL at 05:09

## 2020-09-14 RX ADMIN — APIXABAN 5 MG: 2.5 TABLET, FILM COATED ORAL at 08:09

## 2020-09-14 RX ADMIN — OXYCODONE 5 MG: 5 TABLET ORAL at 05:09

## 2020-09-14 NOTE — ASSESSMENT & PLAN NOTE
- Repeat EKG   - daily Mg and Phos; replace as needed  - dilt PO 90mg  - dig level 1.8  9/14 transition heparin gtt  To apixaban 5mg bid

## 2020-09-14 NOTE — ASSESSMENT & PLAN NOTE
- WBC trending down. No fevers overnight   - Patient being appropriately covered for positive respiratory cultures   Cefepime 1g q12h

## 2020-09-14 NOTE — PLAN OF CARE
[facility] Care Plan    POC reviewed with Donita Gonzalez and  at 1600. Pt unable to verbalize understanding. Questions and concerns addressed with spouse. No acute events today.  Heparin drip discontinued and Eloquis started. CXR and KUB done. Pt progressing toward goals. Will continue to monitor. See below and flowsheets for full assessment and VS info.       Neuro:  Seminary Coma Scale  Best Eye Response: 2-->(E2) to pain  Best Motor Response: 5-->(M5) localizes pain  Best Verbal Response: 1-->(V1) none  Seminary Coma Scale Score: 8  Assessment Qualifiers: patient intubated  Pupil PERRLA: no     24 hr Temp:  [98.6 °F (37 °C)-99.4 °F (37.4 °C)]     CV:   Rhythm: atrial rhythm  BP goals:   SBP < 160  MAP > 65    Resp:   O2 Device (Oxygen Therapy): ventilator  Vent Mode: A/C  Set Rate: 20 BPM  Oxygen Concentration (%): 50  Vt Set: 420 mL  PEEP/CPAP: 5 cmH20  Pressure Support: 12 cmH20    Plan: trach in place    GI/:  ROXANE Total Score: 1  Diet/Nutrition Received: tube feeding  Last Bowel Movement: 09/08/20  Voiding Characteristics: external catheter    Intake/Output Summary (Last 24 hours) at 9/14/2020 1816  Last data filed at 9/14/2020 1805  Gross per 24 hour   Intake 1202.32 ml   Output 500 ml   Net 702.32 ml     Unmeasured Output  Urine Occurrence: 1  Stool Occurrence: 0  Emesis Occurrence: 0  Pad Count: 1    Labs/Accuchecks:  Recent Labs   Lab 09/14/20  0128   WBC 15.06*   RBC 3.42*   HGB 10.3*   HCT 33.5*   *      Recent Labs   Lab 09/14/20  0128      K 4.1   CO2 32*      BUN 41*   CREATININE 1.4   ALKPHOS 82   ALT 27   AST 35   BILITOT 0.2      Recent Labs   Lab 09/14/20  0128   APTT 50.4*      Recent Labs   Lab 09/07/20  2226   TROPONINI 0.080*       Electrolytes: N/A - electrolytes WDL  Accuchecks: Q6H    Gtts:   dextrose 10 % in water (D10W)         LDA/Wounds:  Lines/Drains/Airways       Drain              Female External Urinary Catheter 09/10/20 1720 4 days         Gastrostomy/Enterostomy  09/11/20 1300 Percutaneous endoscopic gastrostomy (PEG) 3 days              Airway                   Surgical Airway 09/11/20 1249 Eduardo 3 days              Peripheral Intravenous Line                   Peripheral IV - Single Lumen 09/10/20 1602 20 G Left Hand 4 days         Peripheral IV - Single Lumen 09/14/20 22 G Right Hand less than 1 day                  Wounds: Yes  Wound care consulted: Yes

## 2020-09-14 NOTE — PROGRESS NOTES
[facility]  Neurocritical Care  Progress Note    Admit Date: 8/29/2020  Service Date: 09/14/2020  Length of Stay: 16    Subjective:     Chief Complaint: Embolic stroke involving right middle cerebral artery    History of Present Illness: 64 year old female with a PMH significant for COPD, HTN, and every day smoker who presented at OSH on 08/26/20 with acute left sided facial droop and left sided weakness (last known normal 08/25/20). Pt was not given TPA or endovascular intervention, and she was intubated on 08/26/20 for agitation. CTH on 08/26/20 showed right posterior frontal/parietal infarction with no intracranial hemorrhage. Hospital course complicated by Afib with RVR in which she was started on lovenox and then transitioned to heparin gtt for GOYO and HFrEF. Pt was transferred to NICU for higher level of care and persistent epistaxis. Upon arrival Pt was intubated and sedated, Rhinorocket  Bilaterally in place, propofol and diltiazem gtt. Pt was able to move the right side and moves her left side to noxious stimuli. A-line placed.       Hospital Course: 08/29/20: Pt admitted to NICU for higher level of care and ENT consult. Pt with a run of Vtach associated with hypotension.   8/31/2020: hold anticoagulation until tomorrow due to GI bleed/epistaxis (at other facility) GI consult, pt in Afib- load with digoxin, pending digoxin level tomorrow, continue diltiazem gtt today, start tube feeds, obtain PIV and d/c central line, continue cefepime for total 7 days  9/1/2020: initiated heparin gtt minimal intensity-when at goal obtain CT head, continue diltiazem gtt, start tube feeds, d/c A-line  9/2/2020: changed to PO diltiazem 60mg q8hr from gtt, lasix, miralax, plan for extubation  9/3/2020: started seroquel 25BID  9/4/2020: increased seroquel to 25 mg BID, magnesium citrate, and try SBT  9/5/2020: place A-line, increased Po dilt, add chest PT, add water flushes and repeat magnesium  citrate  9/6/2020: d/c digoxin, add coreg 6.25 BID, d/c water flushes, repeat CMP, add lasix PO 40 mg BID  09/07/2020 Plan to wean FiO2. As we are weaning propofol, precedex will be added. Patient is currently on heparin drip. Daily EKG for QTc monitoring. Patient given one dose of diamox with plans to restart furosemide for metabolic alkalosis. Will discontinue barr tomorrow. F/u with procalcitonin due to increased WBC count. Will speak to family member about Trach/PEG discussion   09/08/2020 Patient remains on her heparin drip. Currently she is rate controlled. Patient is off of her propofol due to significnt hypotension. We are holding the next two doses of lasix and giving her diamox 500 mg for two doses for metabolic alkalosis. Dr. Zavala spoke to the POA, Griffin Gonzalez who would like to proceed with a trach/PEG. Patient was pan cultured today and started on broad spectrum antibiotics. Barr was changed today   09/09/2020 Patient scheduled to get trach/PEG this Friday. Still on broad spectrum antibiotics as she has an elevated WBC. Working up for alternative causes of fever: US of the upper and lower extremities, rapid COVID, and amylase lipase.   09/10/2020 Patient scheduled for trach/peg for tomorrow. We are holding the heparin drip and the tube feeds for this midnight. No DVT on upper and lower extremities. Continuing the broad spectrum antibiotics until the susceptibilities return. D/c a line as it is no longer working.   09/11/2020 Will go to the OR today for trach and PEG. Cefepime to discontinue after ten days (for positive respiratory culture)  09/12/2020 Patient was restarted on the heparin drip last ngiht. Weaning propofol and precedex by adding oxycodone for the next 24 hours (PRN). Following up on EKG. Spontaneous trial once sedation is weaned. Awaiting stroke recommendation about when to transition to a DOAC. Tube feeds were started at 1 PM today.   9/13/2020 [facility] today before transition heparin  to DOAC. Weaned off fentanyl and precedex. Responding well to oxycodone. 1x 500 acetazolamide for metabolic alkalosis. Potential d/c to LTAC tomorrow.  9/14 NAEON Transitioned from heparin gtt to Apixaban. Remains intubated. Increased rate early am, Pco2in 50s, repeat ABG the same. Pt with hx of COPD. Secretions tan colored sputum sent for culture. Continue ABXs for now.     Interval History: NAEON Transitioned from heparin gtt to Apixaban. Remains intubated. Increased rate early am, Pco2in 50s, repeat ABG the same. Pt with hx of COPD. Secretions tan colored sputum sent for culture. Continue ABXs for now.       Review of Systems: Unable to obtain a complete ROS due to level of consciousness. And intubated    Vitals:   Temp: 99.2 °F (37.3 °C)  Pulse: 98  Rhythm: atrial rhythm  BP: 117/79  MAP (mmHg): 92  Resp: 20  SpO2: 96 %  Oxygen Concentration (%): 50  O2 Device (Oxygen Therapy): ventilator  Vent Mode: A/C  Set Rate: 20 BPM  Vt Set: 420 mL  PEEP/CPAP: 5 cmH20  Peak Airway Pressure: 25 cmH2O  Mean Airway Pressure: 8.7 cmH20  Plateau Pressure: 13 cmH20    Temp  Min: 98.6 °F (37 °C)  Max: 99.2 °F (37.3 °C)  Pulse  Min: 88  Max: 108  BP  Min: 99/66  Max: 137/84  MAP (mmHg)  Min: 69  Max: 104  Resp  Min: 2  Max: 21  SpO2  Min: 91 %  Max: 98 %  Oxygen Concentration (%)  Min: 40  Max: 50    09/13 0701 - 09/14 0700  In: 1264.3 [I.V.:184.3]  Out: 1075 [Urine:1075]   Unmeasured Output  Urine Occurrence: 1  Stool Occurrence: 0  Emesis Occurrence: 0  Pad Count: 1     Examination:   Constitutional: Well-nourished and -developed. No apparent distress.   Eyes: Conjunctiva clear, anicteric. Lids no lesions.  Head/Ears/Nose/Mouth/Throat/Neck: copious tan secretions. Moist mucous membranes. External ears, nose atraumatic.   Cardiovascular:A-Fib irregular rhythm. No murmurs. No leg edema.  Respiratory: Mech. Ventilation. Bibasilar breath sounds coarse.  Gastrointestinal: No hernia. Soft, nondistended, nontender. + bowel  sounds.    Neurologic:  -GCS E2V1M4  -Intubated, will open eyes to voice or pain. Does not follow commands.  -Cranial nerves L pupil 3+ R pupil fixed  -Motor Moves RUE/ERLE spon. LUE no movement. LLE withddraws to noxious stimuli  Unable to test orientation, language, memory, judgment, insight, fund of knowledge, hearing, shoulder shrug, tongue protrusion, coordination, gait due to level of consciousness.    Medications:   Continuousdextrose 10 % in water (D10W)    Scheduledalbuterol-ipratropium, 3 mL, Q6H  apixaban, 5 mg, BID  atorvastatin, 40 mg, Daily  [START ON 9/15/2020] bisacodyL, 10 mg, Daily  carvediloL, 3.125 mg, BID  ceFEPime (MAXIPIME) IVPB, 1 g, Q12H  diltiaZEM, 90 mg, Q8H  furosemide, 40 mg, BID  pantoprozole (PROTONIX) IV, 40 mg, Q12H  polyethylene glycol, 17 g, Daily  QUEtiapine, 50 mg, BID  senna-docusate 8.6-50 mg, 1 tablet, BID  valproic acid (as sodium salt), 250 mg, Q8H    PRNacetaminophen, 650 mg, Q6H PRN  dextrose 10 % in water (D10W), , Continuous PRN  dextrose 50%, 12.5 g, PRN  glucagon (human recombinant), 1 mg, PRN  labetalol, 10 mg, Q4H PRN  magnesium oxide, 800 mg, PRN  magnesium oxide, 800 mg, PRN  ondansetron, 4 mg, Q6H PRN  oxyCODONE, 5 mg, Q6H PRN  potassium chloride, 40 mEq, PRN  potassium chloride, 40 mEq, PRN  potassium chloride, 60 mEq, PRN  potassium, sodium phosphates, 2 packet, PRN  potassium, sodium phosphates, 2 packet, PRN  potassium, sodium phosphates, 2 packet, PRN       Today I independently reviewed pertinent medications, lines/drains/airways, imaging, laboratory results, microbiology results, notably:     ISTAT:   Recent Labs   Lab 09/14/20  0941   PH 7.401   PCO2 50.4*   PO2 57*   POCSATURATED 89*   HCO3 31.3*   BE 7   POCTCO2 33*   SAMPLE ARTERIAL      Chem:   Recent Labs   Lab 09/14/20  0128      K 4.1      CO2 32*   *   BUN 41*   CREATININE 1.4   ESTGFRAFRICA 45.8*   EGFRNONAA 39.7*   CALCIUM 9.0   MG 1.9   PHOS 3.9   ANIONGAP 11   PROT 6.7    ALBUMIN 2.0*   BILITOT 0.2   ALKPHOS 82   AST 35   ALT 27     Heme:   Recent Labs   Lab 09/14/20  0128   WBC 15.06*   HGB 10.3*   HCT 33.5*   *     Endo:   Recent Labs   Lab 09/14/20  1100   POCTGLUCOSE 122*      Assessment/Plan:     Neuro  * Embolic stroke involving right middle cerebral artery  - Last seen normal on 08/25/20 with no intervention; intubated and sedated.    - Imaging shows large right ischemic stroke in the posterior frontal and parietal regions  - repeat CTH stable   - SBP < 160, MAP > 65  - EuNa   - CBC, CMP, coags daily   -9/14 heparin gtt transitioned to apixaban 5mg bid  -  Atorvastatin 40mg daily  - Seroquel to 50 mg BID  - PEG/Trach placement on 9/12/20  PT.OT/SLP      ENT  Epistaxis  - persistent epistaxis in the setting of anticoagulation   - H/H stable   - daily CBC   - ENT consulted;   rhino rockets were removed     Pulmonary  PNA (pneumonia)  - Respiratory cultures growing serratia  - Will continue cefepime 1g q12h with stop date after 20 doses     Chronic obstructive pulmonary disease  - daily CXRs  - goal SpO2 is > 88%  - scheduled duo nebs q6h, CPT, NT suctioningprn    Acute on chronic respiratory failure with hypoxia and hypercapnia  - Pt trached and peged 9/13  - Daily CXRs and ABG  duonebs q6h, CPT, NT suction prn  9/14 Sputum culture sent  Vent Mode: A/C  Oxygen Concentration (%):  [40-50] 50  Resp Rate Total:  [16 br/min-23 br/min] 20 br/min  Vt Set:  [420 mL] 420 mL  PEEP/CPAP:  [5 cmH20] 5 cmH20  Mean Airway Pressure:  [7.5 cmH20-9 cmH20] 8.7 cmH20      Cardiac/Vascular  Atrial fibrillation with rapid ventricular response  - Repeat EKG   - daily Mg and Phos; replace as needed  - dilt PO 90mg  - dig level 1.8  9/14 transition heparin gtt  To apixaban 5mg bid          Essential hypertension  - SBP goal < 160, MAP >65  Coreg 3.125 bid  Diltiazem 90mg q8h  Lasix 40mg bid    · Echo: Moderate concentric left ventricular hypertrophy.  · Moderately to severely decreased left  ventricular systolic function. The estimated ejection fraction is 25-30%.  · Left ventricular diastolic dysfunction.  · Moderately to severely reduced right ventricular systolic function.  · Moderate left atrial enlargement.  · Severe right atrial enlargement.  · Moderate mitral regurgitation.  · Moderate tricuspid regurgitation.  · Intermediate central venous pressure (8 mmHg).  · The estimated PA systolic pressure is 42 mmHg.  · Pulmonary hypertension present.  · Negative bubble study.         Renal/  GOYO (acute kidney injury)  - Avoid nephrotoxic drugs  - Monitor Daily I/O  Monitor CMP    Oncology  Leukocytosis  - WBC trending down. No fevers overnight   - Patient being appropriately covered for positive respiratory cultures   Cefepime 1g q12h      Endocrine  Diabetes mellitus  - SSI moderate  - POCT glu monitoring q4h    Moderate malnutrition  - tube feeds isosource 40cc/h  - monitor electrolytes with daily CMP, Mg, phos    GI  GI bleed  - upper GI bleed at outside hospital  - recent upper GI bleed at outside facility and epistaxis (2 rhino rockets, up to 5 days prn)  - consulted GI - Continue PPI b.i.d.  Trend hemoglobin, transfuse hemoglobin <7            The patient is being Prophylaxed for:  Venous Thromboembolism with: Mechanical  Stress Ulcer with: PPI  Ventilator Pneumonia with: chlorhexidine oral care    Activity Orders          Diet NPO: NPO starting at 09/11 0001        Full Code     I have spent 35 min with this patient, with over 50% of this time spent coordinating care and speaking with the family    Patti Melgar NP  Neurocritical Care  [facility]

## 2020-09-14 NOTE — PLAN OF CARE
SW advised by Sandrine with [facility] they have auth but no bed at least until Wed maybe longer. Advised Pt  at bedside. He reported he is agreeable to moving to [facility] as the next option. Sent note via  requesting they submit for auth.    Zofia Pantoja, JOHN  Neurocritical Care   [facility]  [id number]

## 2020-09-14 NOTE — ASSESSMENT & PLAN NOTE
- Pt trached and peged 9/13  - Daily CXRs and ABG  duonebs q6h, CPT, NT suction prn  9/14 Sputum culture sent  Vent Mode: A/C  Oxygen Concentration (%):  [40-50] 50  Resp Rate Total:  [16 br/min-23 br/min] 20 br/min  Vt Set:  [420 mL] 420 mL  PEEP/CPAP:  [5 cmH20] 5 cmH20  Mean Airway Pressure:  [7.5 cmH20-9 cmH20] 8.7 cmH20

## 2020-09-14 NOTE — ASSESSMENT & PLAN NOTE
- Respiratory cultures growing serratia  - Will continue cefepime 1g q12h with stop date after 20 doses

## 2020-09-14 NOTE — CONSULTS
Wound care consult received from RRT for assessment of wound under tongue. Patient known to wound care team from this admission.     Was unable to assess wound under tongue because patient would not unclench her jaw. Notified MD team and nursing. Will re-attempt at a later time. Wound care to continue to follow pt PRN [id number]        Mouth/lips/ tongue

## 2020-09-14 NOTE — ASSESSMENT & PLAN NOTE
- SBP goal < 160, MAP >65  Coreg 3.125 bid  Diltiazem 90mg q8h  Lasix 40mg bid    · Echo: Moderate concentric left ventricular hypertrophy.  · Moderately to severely decreased left ventricular systolic function. The estimated ejection fraction is 25-30%.  · Left ventricular diastolic dysfunction.  · Moderately to severely reduced right ventricular systolic function.  · Moderate left atrial enlargement.  · Severe right atrial enlargement.  · Moderate mitral regurgitation.  · Moderate tricuspid regurgitation.  · Intermediate central venous pressure (8 mmHg).  · The estimated PA systolic pressure is 42 mmHg.  · Pulmonary hypertension present.  · Negative bubble study.

## 2020-09-14 NOTE — ASSESSMENT & PLAN NOTE
- Last seen normal on 08/25/20 with no intervention; intubated and sedated.    - Imaging shows large right ischemic stroke in the posterior frontal and parietal regions  - repeat CTH stable   - SBP < 160, MAP > 65  - EuNa   - CBC, CMP, coags daily   -9/14 heparin gtt transitioned to apixaban 5mg bid  -  Atorvastatin 40mg daily  - Seroquel to 50 mg BID  - PEG/Trach placement on 9/12/20  PT.OT/SLP

## 2020-09-14 NOTE — PLAN OF CARE
Per MD, patient is medically ready for LTAC.  Per RONY, [facility] has no beds today and will not likely have a bed soon.  SW to reach out to family for second choice.        09/14/20 1328   Discharge Reassessment   Assessment Type Discharge Planning Reassessment   Provided patient/caregiver education on the expected discharge date and the discharge plan Yes   Do you have any problems affording any of your prescribed medications? No   Discharge Plan A Long-term acute care facility (LTAC)   Discharge Plan B Long-term acute care facility (LTAC)   DME Needed Upon Discharge  other (see comments)  (tbd)   Anticipated Discharge Disposition LTAC   Can the patient/caregiver answer the patient profile reliably? No, cognitively impaired   Describe the patient's ability to walk at the present time. Does not walk or unable to take any steps at all   How often would a person be available to care for the patient? Whenever needed   Number of comorbid conditions (as recorded on the chart) Five or more       Mai Gonzalez RN, CCRN-K, [facility]  Neuro-Critical Care   [phone]

## 2020-09-15 LAB
ALBUMIN SERPL BCP-MCNC: 2 G/DL (ref 3.5–5.2)
ALLENS TEST: ABNORMAL
ALP SERPL-CCNC: 84 U/L (ref 55–135)
ALT SERPL W/O P-5'-P-CCNC: 27 U/L (ref 10–44)
ANION GAP SERPL CALC-SCNC: 12 MMOL/L (ref 8–16)
ANISOCYTOSIS BLD QL SMEAR: SLIGHT
APTT BLDCRRT: 37.2 SEC (ref 21–32)
AST SERPL-CCNC: 40 U/L (ref 10–40)
BASO STIPL BLD QL SMEAR: ABNORMAL
BASOPHILS # BLD AUTO: ABNORMAL K/UL (ref 0–0.2)
BASOPHILS NFR BLD: 0 % (ref 0–1.9)
BASOPHILS NFR BLD: 0 % (ref 0–1.9)
BILIRUB SERPL-MCNC: 0.3 MG/DL (ref 0.1–1)
BUN SERPL-MCNC: 38 MG/DL (ref 8–23)
CALCIUM SERPL-MCNC: 9.4 MG/DL (ref 8.7–10.5)
CHLORIDE SERPL-SCNC: 100 MMOL/L (ref 95–110)
CO2 SERPL-SCNC: 31 MMOL/L (ref 23–29)
CREAT SERPL-MCNC: 1.2 MG/DL (ref 0.5–1.4)
DELSYS: ABNORMAL
DIFFERENTIAL METHOD: ABNORMAL
DIFFERENTIAL METHOD: ABNORMAL
EOSINOPHIL # BLD AUTO: ABNORMAL K/UL (ref 0–0.5)
EOSINOPHIL NFR BLD: 0 % (ref 0–8)
EOSINOPHIL NFR BLD: 0 % (ref 0–8)
ERYTHROCYTE [DISTWIDTH] IN BLOOD BY AUTOMATED COUNT: 14.6 % (ref 11.5–14.5)
ERYTHROCYTE [DISTWIDTH] IN BLOOD BY AUTOMATED COUNT: 14.6 % (ref 11.5–14.5)
ERYTHROCYTE [SEDIMENTATION RATE] IN BLOOD BY WESTERGREN METHOD: 20 MM/H
EST. GFR  (AFRICAN AMERICAN): 55.2 ML/MIN/1.73 M^2
EST. GFR  (NON AFRICAN AMERICAN): 47.9 ML/MIN/1.73 M^2
FIO2: 50
GLUCOSE SERPL-MCNC: 103 MG/DL (ref 70–110)
HCO3 UR-SCNC: 32.1 MMOL/L (ref 24–28)
HCT VFR BLD AUTO: 28.6 % (ref 37–48.5)
HCT VFR BLD AUTO: 33.8 % (ref 37–48.5)
HGB BLD-MCNC: 10.5 G/DL (ref 12–16)
HGB BLD-MCNC: 8.7 G/DL (ref 12–16)
HYPOCHROMIA BLD QL SMEAR: ABNORMAL
IMM GRANULOCYTES # BLD AUTO: ABNORMAL K/UL (ref 0–0.04)
IMM GRANULOCYTES # BLD AUTO: ABNORMAL K/UL (ref 0–0.04)
IMM GRANULOCYTES NFR BLD AUTO: ABNORMAL % (ref 0–0.5)
IMM GRANULOCYTES NFR BLD AUTO: ABNORMAL % (ref 0–0.5)
LYMPHOCYTES # BLD AUTO: ABNORMAL K/UL (ref 1–4.8)
LYMPHOCYTES NFR BLD: 5 % (ref 18–48)
LYMPHOCYTES NFR BLD: 6 % (ref 18–48)
MAGNESIUM SERPL-MCNC: 2 MG/DL (ref 1.6–2.6)
MCH RBC QN AUTO: 29.6 PG (ref 27–31)
MCH RBC QN AUTO: 29.9 PG (ref 27–31)
MCHC RBC AUTO-ENTMCNC: 30.4 G/DL (ref 32–36)
MCHC RBC AUTO-ENTMCNC: 31.1 G/DL (ref 32–36)
MCV RBC AUTO: 96 FL (ref 82–98)
MCV RBC AUTO: 97 FL (ref 82–98)
METAMYELOCYTES NFR BLD MANUAL: 1 %
MODE: ABNORMAL
MONOCYTES # BLD AUTO: ABNORMAL K/UL (ref 0.3–1)
MONOCYTES NFR BLD: 13 % (ref 4–15)
MONOCYTES NFR BLD: 17 % (ref 4–15)
MYELOCYTES NFR BLD MANUAL: 10 %
NEUTROPHILS NFR BLD: 66 % (ref 38–73)
NEUTROPHILS NFR BLD: 82 % (ref 38–73)
NRBC BLD-RTO: 0 /100 WBC
NRBC BLD-RTO: 0 /100 WBC
OVALOCYTES BLD QL SMEAR: ABNORMAL
PCO2 BLDA: 47.4 MMHG (ref 35–45)
PEEP: 5
PH SMN: 7.44 [PH] (ref 7.35–7.45)
PHOSPHATE SERPL-MCNC: 3 MG/DL (ref 2.7–4.5)
PLATELET # BLD AUTO: 651 K/UL (ref 150–350)
PLATELET # BLD AUTO: 764 K/UL (ref 150–350)
PLATELET BLD QL SMEAR: ABNORMAL
PLATELET BLD QL SMEAR: ABNORMAL
PMV BLD AUTO: 10.2 FL (ref 9.2–12.9)
PMV BLD AUTO: 10.7 FL (ref 9.2–12.9)
PO2 BLDA: 79 MMHG (ref 80–100)
POC BE: 8 MMOL/L
POC SATURATED O2: 96 % (ref 95–100)
POC TCO2: 34 MMOL/L (ref 23–27)
POCT GLUCOSE: 118 MG/DL (ref 70–110)
POCT GLUCOSE: 134 MG/DL (ref 70–110)
POIKILOCYTOSIS BLD QL SMEAR: SLIGHT
POLYCHROMASIA BLD QL SMEAR: ABNORMAL
POTASSIUM SERPL-SCNC: 4.1 MMOL/L (ref 3.5–5.1)
PROCALCITONIN SERPL IA-MCNC: 0.58 NG/ML
PROT SERPL-MCNC: 6.9 G/DL (ref 6–8.4)
RBC # BLD AUTO: 2.94 M/UL (ref 4–5.4)
RBC # BLD AUTO: 3.51 M/UL (ref 4–5.4)
SAMPLE: ABNORMAL
SCHISTOCYTES BLD QL SMEAR: ABNORMAL
SITE: ABNORMAL
SODIUM SERPL-SCNC: 143 MMOL/L (ref 136–145)
SP02: 100
VT: 420
WBC # BLD AUTO: 19.45 K/UL (ref 3.9–12.7)
WBC # BLD AUTO: 20.58 K/UL (ref 3.9–12.7)

## 2020-09-15 PROCEDURE — 93010 EKG 12-LEAD: ICD-10-PCS | Mod: ,,, | Performed by: INTERNAL MEDICINE

## 2020-09-15 PROCEDURE — 93005 ELECTROCARDIOGRAM TRACING: CPT

## 2020-09-15 PROCEDURE — 99900026 HC AIRWAY MAINTENANCE (STAT)

## 2020-09-15 PROCEDURE — 63600175 PHARM REV CODE 636 W HCPCS: Performed by: PHYSICIAN ASSISTANT

## 2020-09-15 PROCEDURE — 84100 ASSAY OF PHOSPHORUS: CPT

## 2020-09-15 PROCEDURE — 25000003 PHARM REV CODE 250: Performed by: PHYSICIAN ASSISTANT

## 2020-09-15 PROCEDURE — 94668 MNPJ CHEST WALL SBSQ: CPT

## 2020-09-15 PROCEDURE — 97112 NEUROMUSCULAR REEDUCATION: CPT

## 2020-09-15 PROCEDURE — 94640 AIRWAY INHALATION TREATMENT: CPT

## 2020-09-15 PROCEDURE — 99900035 HC TECH TIME PER 15 MIN (STAT)

## 2020-09-15 PROCEDURE — 99233 PR SUBSEQUENT HOSPITAL CARE,LEVL III: ICD-10-PCS | Mod: ,,, | Performed by: PHYSICIAN ASSISTANT

## 2020-09-15 PROCEDURE — 25000003 PHARM REV CODE 250: Performed by: NURSE PRACTITIONER

## 2020-09-15 PROCEDURE — 36600 WITHDRAWAL OF ARTERIAL BLOOD: CPT

## 2020-09-15 PROCEDURE — 85730 THROMBOPLASTIN TIME PARTIAL: CPT

## 2020-09-15 PROCEDURE — 99233 SBSQ HOSP IP/OBS HIGH 50: CPT | Mod: ,,, | Performed by: PHYSICIAN ASSISTANT

## 2020-09-15 PROCEDURE — 25000242 PHARM REV CODE 250 ALT 637 W/ HCPCS: Performed by: NURSE PRACTITIONER

## 2020-09-15 PROCEDURE — 25000003 PHARM REV CODE 250: Performed by: STUDENT IN AN ORGANIZED HEALTH CARE EDUCATION/TRAINING PROGRAM

## 2020-09-15 PROCEDURE — 80053 COMPREHEN METABOLIC PANEL: CPT

## 2020-09-15 PROCEDURE — 20000000 HC ICU ROOM

## 2020-09-15 PROCEDURE — 84145 PROCALCITONIN (PCT): CPT

## 2020-09-15 PROCEDURE — 25000003 PHARM REV CODE 250: Performed by: PSYCHIATRY & NEUROLOGY

## 2020-09-15 PROCEDURE — 83735 ASSAY OF MAGNESIUM: CPT

## 2020-09-15 PROCEDURE — 27200966 HC CLOSED SUCTION SYSTEM

## 2020-09-15 PROCEDURE — 85027 COMPLETE CBC AUTOMATED: CPT | Mod: 91

## 2020-09-15 PROCEDURE — 93010 ELECTROCARDIOGRAM REPORT: CPT | Mod: ,,, | Performed by: INTERNAL MEDICINE

## 2020-09-15 PROCEDURE — 94761 N-INVAS EAR/PLS OXIMETRY MLT: CPT

## 2020-09-15 PROCEDURE — 27000221 HC OXYGEN, UP TO 24 HOURS

## 2020-09-15 PROCEDURE — 85007 BL SMEAR W/DIFF WBC COUNT: CPT | Mod: 91

## 2020-09-15 PROCEDURE — 82803 BLOOD GASES ANY COMBINATION: CPT

## 2020-09-15 PROCEDURE — C9113 INJ PANTOPRAZOLE SODIUM, VIA: HCPCS | Performed by: PHYSICIAN ASSISTANT

## 2020-09-15 PROCEDURE — 94003 VENT MGMT INPAT SUBQ DAY: CPT

## 2020-09-15 PROCEDURE — 63600175 PHARM REV CODE 636 W HCPCS: Performed by: PSYCHIATRY & NEUROLOGY

## 2020-09-15 RX ORDER — QUETIAPINE FUMARATE 25 MG/1
50 TABLET, FILM COATED ORAL 2 TIMES DAILY
Status: DISCONTINUED | OUTPATIENT
Start: 2020-09-15 | End: 2020-09-16 | Stop reason: HOSPADM

## 2020-09-15 RX ORDER — CARVEDILOL 3.12 MG/1
3.12 TABLET ORAL ONCE
Status: COMPLETED | OUTPATIENT
Start: 2020-09-15 | End: 2020-09-15

## 2020-09-15 RX ORDER — LANOLIN ALCOHOL/MO/W.PET/CERES
800 CREAM (GRAM) TOPICAL
Status: DISCONTINUED | OUTPATIENT
Start: 2020-09-15 | End: 2020-09-16 | Stop reason: HOSPADM

## 2020-09-15 RX ORDER — VALPROIC ACID 250 MG/5ML
250 SOLUTION ORAL EVERY 8 HOURS
Status: DISCONTINUED | OUTPATIENT
Start: 2020-09-15 | End: 2020-09-16 | Stop reason: HOSPADM

## 2020-09-15 RX ORDER — CARVEDILOL 6.25 MG/1
6.25 TABLET ORAL 2 TIMES DAILY
Status: DISCONTINUED | OUTPATIENT
Start: 2020-09-15 | End: 2020-09-15

## 2020-09-15 RX ORDER — SODIUM,POTASSIUM PHOSPHATES 280-250MG
2 POWDER IN PACKET (EA) ORAL
Status: DISCONTINUED | OUTPATIENT
Start: 2020-09-15 | End: 2020-09-16 | Stop reason: HOSPADM

## 2020-09-15 RX ORDER — POTASSIUM CHLORIDE 1.5 G/1.58G
40 POWDER, FOR SOLUTION ORAL
Status: DISCONTINUED | OUTPATIENT
Start: 2020-09-15 | End: 2020-09-16 | Stop reason: HOSPADM

## 2020-09-15 RX ORDER — ATORVASTATIN CALCIUM 20 MG/1
40 TABLET, FILM COATED ORAL DAILY
Status: DISCONTINUED | OUTPATIENT
Start: 2020-09-16 | End: 2020-09-16 | Stop reason: HOSPADM

## 2020-09-15 RX ORDER — POTASSIUM CHLORIDE 1.5 G/1.58G
60 POWDER, FOR SOLUTION ORAL
Status: DISCONTINUED | OUTPATIENT
Start: 2020-09-15 | End: 2020-09-16 | Stop reason: HOSPADM

## 2020-09-15 RX ORDER — POLYETHYLENE GLYCOL 3350 17 G/17G
17 POWDER, FOR SOLUTION ORAL DAILY
Status: DISCONTINUED | OUTPATIENT
Start: 2020-09-16 | End: 2020-09-16 | Stop reason: HOSPADM

## 2020-09-15 RX ORDER — CARVEDILOL 6.25 MG/1
6.25 TABLET ORAL 2 TIMES DAILY
Status: DISCONTINUED | OUTPATIENT
Start: 2020-09-15 | End: 2020-09-16 | Stop reason: HOSPADM

## 2020-09-15 RX ORDER — AMOXICILLIN 250 MG
1 CAPSULE ORAL 2 TIMES DAILY
Status: DISCONTINUED | OUTPATIENT
Start: 2020-09-15 | End: 2020-09-16 | Stop reason: HOSPADM

## 2020-09-15 RX ORDER — ACETAMINOPHEN 325 MG/1
650 TABLET ORAL EVERY 6 HOURS PRN
Status: DISCONTINUED | OUTPATIENT
Start: 2020-09-15 | End: 2020-09-16 | Stop reason: HOSPADM

## 2020-09-15 RX ORDER — METOPROLOL TARTRATE 1 MG/ML
5 INJECTION, SOLUTION INTRAVENOUS ONCE
Status: COMPLETED | OUTPATIENT
Start: 2020-09-15 | End: 2020-09-15

## 2020-09-15 RX ORDER — FUROSEMIDE 40 MG/1
40 TABLET ORAL 2 TIMES DAILY
Status: DISCONTINUED | OUTPATIENT
Start: 2020-09-15 | End: 2020-09-16 | Stop reason: HOSPADM

## 2020-09-15 RX ADMIN — APIXABAN 5 MG: 2.5 TABLET, FILM COATED ORAL at 08:09

## 2020-09-15 RX ADMIN — DILTIAZEM HYDROCHLORIDE 90 MG: 60 TABLET, FILM COATED ORAL at 08:09

## 2020-09-15 RX ADMIN — VALPROIC ACID 250 MG: 250 SOLUTION ORAL at 09:09

## 2020-09-15 RX ADMIN — OXYCODONE 5 MG: 5 TABLET ORAL at 07:09

## 2020-09-15 RX ADMIN — DILTIAZEM HYDROCHLORIDE 90 MG: 60 TABLET, FILM COATED ORAL at 09:09

## 2020-09-15 RX ADMIN — QUETIAPINE FUMARATE 50 MG: 25 TABLET ORAL at 08:09

## 2020-09-15 RX ADMIN — IPRATROPIUM BROMIDE AND ALBUTEROL SULFATE 3 ML: .5; 2.5 SOLUTION RESPIRATORY (INHALATION) at 12:09

## 2020-09-15 RX ADMIN — CARVEDILOL 3.12 MG: 3.12 TABLET, FILM COATED ORAL at 09:09

## 2020-09-15 RX ADMIN — FUROSEMIDE 40 MG: 40 TABLET ORAL at 06:09

## 2020-09-15 RX ADMIN — METOROPROLOL TARTRATE 5 MG: 5 INJECTION, SOLUTION INTRAVENOUS at 07:09

## 2020-09-15 RX ADMIN — CEFEPIME 1 G: 1 INJECTION, POWDER, FOR SOLUTION INTRAMUSCULAR; INTRAVENOUS at 09:09

## 2020-09-15 RX ADMIN — FUROSEMIDE 40 MG: 40 TABLET ORAL at 08:09

## 2020-09-15 RX ADMIN — PANTOPRAZOLE SODIUM 40 MG: 40 INJECTION, POWDER, LYOPHILIZED, FOR SOLUTION INTRAVENOUS at 08:09

## 2020-09-15 RX ADMIN — ATORVASTATIN CALCIUM 40 MG: 20 TABLET, FILM COATED ORAL at 08:09

## 2020-09-15 RX ADMIN — CARVEDILOL 3.12 MG: 3.12 TABLET, FILM COATED ORAL at 08:09

## 2020-09-15 RX ADMIN — IPRATROPIUM BROMIDE AND ALBUTEROL SULFATE 3 ML: .5; 2.5 SOLUTION RESPIRATORY (INHALATION) at 07:09

## 2020-09-15 RX ADMIN — CARVEDILOL 6.25 MG: 6.25 TABLET, FILM COATED ORAL at 08:09

## 2020-09-15 RX ADMIN — DILTIAZEM HYDROCHLORIDE 90 MG: 60 TABLET, FILM COATED ORAL at 03:09

## 2020-09-15 RX ADMIN — DOCUSATE SODIUM 50MG AND SENNOSIDES 8.6MG 1 TABLET: 8.6; 5 TABLET, FILM COATED ORAL at 08:09

## 2020-09-15 RX ADMIN — BISACODYL 10 MG: 10 SUPPOSITORY RECTAL at 08:09

## 2020-09-15 RX ADMIN — POLYETHYLENE GLYCOL 3350 17 G: 17 POWDER, FOR SOLUTION ORAL at 08:09

## 2020-09-15 RX ADMIN — VALPROIC ACID 250 MG: 250 SOLUTION ORAL at 03:09

## 2020-09-15 RX ADMIN — VALPROIC ACID 250 MG: 250 SOLUTION ORAL at 08:09

## 2020-09-15 NOTE — PLAN OF CARE
[facility] Care Plan    POC reviewed with Donita Gonzalez and spouse at 1600. Pt unable to verbalize understanding. Questions and concerns addressed with . No acute events today. Pt progressing toward goals. Will continue to monitor. See below and flowsheets for full assessment and VS info.       Neuro:  Manuel Coma Scale  Best Eye Response: 4-->(E4) spontaneous  Best Motor Response: 5-->(M5) localizes pain  Best Verbal Response: 1-->(V1) none  Manuel Coma Scale Score: 10  Assessment Qualifiers: patient intubated  Pupil PERRLA: no     24 hr Temp:  [98.6 °F (37 °C)-99.6 °F (37.6 °C)]     CV:   Rhythm: atrial rhythm  BP goals:   SBP < 160  MAP > 65    Resp:   O2 Device (Oxygen Therapy): ventilator  Vent Mode: A/C  Set Rate: 20 BPM  Oxygen Concentration (%): 50  Vt Set: 420 mL  PEEP/CPAP: 5 cmH20  Pressure Support: 12 cmH20    Plan: trach in place    GI/:  ROXANE Total Score: 1  Diet/Nutrition Received: tube feeding  Last Bowel Movement: 09/15/20  Voiding Characteristics: external catheter    Intake/Output Summary (Last 24 hours) at 9/15/2020 1622  Last data filed at 9/15/2020 1605  Gross per 24 hour   Intake 780 ml   Output 1000 ml   Net -220 ml     Unmeasured Output  Urine Occurrence: 1  Stool Occurrence: 1  Emesis Occurrence: 0  Pad Count: 1    Labs/Accuchecks:  Recent Labs   Lab 09/15/20  1533   WBC 19.45*   RBC 3.51*   HGB 10.5*   HCT 33.8*   *      Recent Labs   Lab 09/15/20  0324      K 4.1   CO2 31*      BUN 38*   CREATININE 1.2   ALKPHOS 84   ALT 27   AST 40   BILITOT 0.3      Recent Labs   Lab 09/15/20  0324   APTT 37.2*    No results for input(s): CPK, CPKMB, TROPONINI, MB in the last 168 hours.    Electrolytes: N/A - electrolytes WDL  Accuchecks: Q6H    Gtts:   dextrose 10 % in water (D10W)         LDA/Wounds:  Lines/Drains/Airways       Drain                   Gastrostomy/Enterostomy 09/11/20 1300 Percutaneous endoscopic gastrostomy (PEG) 4 days    Female External Urinary Catheter  09/10/20 1720 4 days              Airway                   Surgical Airway 09/11/20 1249 Eduardo 4 days              Peripheral Intravenous Line                   Peripheral IV - Single Lumen 09/10/20 1602 20 G Left Hand 5 days         Peripheral IV - Single Lumen 09/15/20 22 G Right Upper Arm less than 1 day                  Wounds: Yes  Wound care consulted: Yes

## 2020-09-15 NOTE — PLAN OF CARE
09/15/20 1046   Post-Acute Status   Post-Acute Authorization Placement   Post-Acute Placement Status Pending Payor Review  (for [facility])     SW advised by April with [facility] they are submitting auth and reaching out to the Pt  this AM. If they can get auth, they should be able to take her today provided the MD team still clears her during rounds.     Zofia Pantoja LCSW  Neurocritical Care   [facility]  [id number]

## 2020-09-15 NOTE — ASSESSMENT & PLAN NOTE
- Respiratory cultures growing serratia  - Will continue cefepime 1g q12h with stop date after 10 days total

## 2020-09-15 NOTE — PROGRESS NOTES
Wound care follow up:     Re-attempt today was unsuccessful in assessing patient's tongue. Full thickness moisture related breakdown to R buttocks improving with increasing amounts of new epithelium to wound bed. Nursing to continue with orders placed. Notified MD team regarding inability to assess patient's tongue.     Wound care to continue to follow pt PRN [id number]     R and L buttocks

## 2020-09-15 NOTE — ASSESSMENT & PLAN NOTE
- persistent epistaxis in the setting of anticoagulation; resolved  - H/H stable   - daily CBC   - ENT consulted   rhino rockets were removed

## 2020-09-15 NOTE — PLAN OF CARE
Goals remain appropriate.  RORY Thomas  9/15/2020    Problem: Occupational Therapy Goal  Goal: Occupational Therapy Goal  Description: Goals set 9/11 to be addressed for 14 days with expiration date, 9/25:  Patient will increase functional independence with ADLs by performing:    Patient will demonstrate rolling to the right with mod assist.  Not met   Patient will demonstrate rolling to the left with mod assist.   Not met  Patient will demonstrate supine -sit with mod assist.   Not met  Patient will demonstrate stand pivot transfers with mod assist.   Not met  Patient will demonstrate grooming while seated with mod assist.   Not met  Patient will demonstrate upper body dressing with mod assist while seated EOB.   Not met  Patient will demonstrate lower body dressing with max assist while seated EOB.   Not met  Patient will demonstrate toileting with max assist.   Not met  Patient will demonstrate bathing while seated EOB with max assist.   Not met  Patient's family / caregiver will demonstrate independence and safety with assisting patient with self-care skills and functional mobility.     Not met  Patient's family / caregiver will demonstrate independence with providing ROM and changes in bed positioning.   Not met  Patient and/or patient's family will verbalize understanding of stroke prevention guidelines, personal risk factors and stroke warning signs via teachback method.  Not met             Outcome: Ongoing, Progressing

## 2020-09-15 NOTE — PT/OT/SLP PROGRESS
Occupational Therapy   Treatment    Name: Donita Gonzalez  MRN: [id number]  Admitting Diagnosis:  Embolic stroke involving right middle cerebral artery  4 Days Post-Op    Recommendations:     Discharge Recommendations: LTACH (long-term acute care hospital)  Discharge Equipment Recommendations:  hospital bed  Barriers to discharge:  None    Assessment:     Donita Gonzalez is a 64 y.o. female with a medical diagnosis of Embolic stroke involving right middle cerebral artery.  She presents with performance deficits affecting function are weakness, impaired endurance, impaired sensation, impaired self care skills, impaired functional mobilty, gait instability, impaired balance, decreased safety awareness, decreased lower extremity function, decreased upper extremity function, impaired cardiopulmonary response to activity, decreased coordination, impaired cognition, decreased ROM, impaired coordination, impaired fine motor, abnormal tone.     Rehab Prognosis:  Good; patient would benefit from acute skilled OT services to address these deficits and reach maximum level of function.       Plan:     Patient to be seen 3 x/week to address the above listed problems via self-care/home management, therapeutic activities, therapeutic exercises, neuromuscular re-education, cognitive retraining, sensory integration  · Plan of Care Expires: 09/27/20  · Plan of Care Reviewed with: patient    Subjective   Patient:  Nonverbal; intubated    Pain/Comfort:  · Pain Rating 1: 0/10  · Pain Rating Post-Intervention 1: 0/10    Objective:     Communicated with: Nurse prior to session.  Patient found supine with arterial line, bed alarm, blood pressure cuff, peripheral IV, restraints, SCD, telemetry, pulse ox (continuous), pressure relief boots, tracheostomy, PEG Tube, PureWick upon OT entry to room.  Family not present.  General Precautions: Standard, aspiration, fall, NPO, seizure   Orthopedic Precautions:N/A   Braces: N/A     Occupational Performance:      Bed Mobility:    · Patient completed Rolling/Turning to Left with  total assistance  · Patient completed Rolling/Turning to Right with total assistance     Functional Mobility/Transfers:  · Dependent drawsheet transfers    Activities of Daily Living:  · Feeding:  NPO    · Grooming: total assistance while supine    [facility] 6 Click ADL: 6    Treatment & Education:  Patient education provided on role of OT, ROM, positioning, daily orientation and need for continued OT upon discharge.  Daily orientation provided.   PROM performed left UE/LE and AAROM right UE/LE one set x 10 rep in all planes of motion with stretches provided at end range; sustained stretch provided for ankle dorsiflexion.  Assistance and facilitation provided for upward rotation of the scapula during shoulder flexion and abduction to promote orientation of glenoid fossa of scapula to humeral head for prevention of post-stroke hemiplegic pain.  Patient kept eyes closed 10% of the session.  Patient kept head turned to the right; tongue protrusion noted. Towel roll used for midline positioning of head.   Provided multi-sensory stimulation to prevent sensory deprivation and improve clinical outcomes.  Positioning provided for midline orientation with bilateral UEs elevated and heels lifted off mattress; positioning provided to prevent flexor synergy pattern in UE (internal rotation and adduction) to decrease risk of post-stroke hemiplegic pain. Gentle cervical rotation provided.       Patient left supine with all lines intact, call button in reach and bed alarm onEducation:      GOALS:   Multidisciplinary Problems     Occupational Therapy Goals        Problem: Occupational Therapy Goal    Goal Priority Disciplines Outcome Interventions   Occupational Therapy Goal     OT, PT/OT Ongoing, Progressing    Description: Goals set 9/11 to be addressed for 14 days with expiration date, 9/25:  Patient will increase functional independence with ADLs by  performing:    Patient will demonstrate rolling to the right with mod assist.  Not met   Patient will demonstrate rolling to the left with mod assist.   Not met  Patient will demonstrate supine -sit with mod assist.   Not met  Patient will demonstrate stand pivot transfers with mod assist.   Not met  Patient will demonstrate grooming while seated with mod assist.   Not met  Patient will demonstrate upper body dressing with mod assist while seated EOB.   Not met  Patient will demonstrate lower body dressing with max assist while seated EOB.   Not met  Patient will demonstrate toileting with max assist.   Not met  Patient will demonstrate bathing while seated EOB with max assist.   Not met  Patient's family / caregiver will demonstrate independence and safety with assisting patient with self-care skills and functional mobility.     Not met  Patient's family / caregiver will demonstrate independence with providing ROM and changes in bed positioning.   Not met  Patient and/or patient's family will verbalize understanding of stroke prevention guidelines, personal risk factors and stroke warning signs via teachback method.  Not met                              Time Tracking:     OT Date of Treatment: 09/15/20  OT Start Time: 0407  OT Stop Time: 0430  OT Total Time (min): 23 min    Billable Minutes:Neuromuscular Re-education 23    RORY Thomas  9/15/2020

## 2020-09-15 NOTE — ASSESSMENT & PLAN NOTE
- Pt trached and peged 9/13  - Daily CXRs and ABG  duonebs q6h, CPT, NT suction prn  9/14 Sputum culture sent  Continue cefepime for 10 days total

## 2020-09-15 NOTE — ASSESSMENT & PLAN NOTE
- SBP goal < 160, MAP >65  Coreg 6.25 mg bid  Diltiazem 90mg q8h  Lasix 40mg bid    · Echo: Moderate concentric left ventricular hypertrophy.  · Moderately to severely decreased left ventricular systolic function. The estimated ejection fraction is 25-30%.  · Left ventricular diastolic dysfunction.  · Moderately to severely reduced right ventricular systolic function.  · Moderate left atrial enlargement.  · Severe right atrial enlargement.  · Moderate mitral regurgitation.  · Moderate tricuspid regurgitation.  · Intermediate central venous pressure (8 mmHg).  · The estimated PA systolic pressure is 42 mmHg.  · Pulmonary hypertension present.  · Negative bubble study.

## 2020-09-15 NOTE — ASSESSMENT & PLAN NOTE
Increased leukocytosis overnight but remained afebrile  Trend procal  Continue 10 day course of cefepime

## 2020-09-15 NOTE — ASSESSMENT & PLAN NOTE
- Last seen normal on 08/25/20 with no intervention; intubated and sedated.    - Imaging shows large right ischemic stroke in the posterior frontal and parietal regions  - repeat CTH stable   - SBP < 160, MAP > 65  - EuNa   - CBC, CMP, coags daily   -9/14 heparin gtt transitioned to apixaban 5mg bid  -  Atorvastatin 40mg daily  - Seroquel to 50 mg BID  - PEG/Trach placement on 9/12/20  PT/OT/SLP
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
## 2020-09-15 NOTE — ASSESSMENT & PLAN NOTE
- upper GI bleed at outside hospital  - recent upper GI bleed at outside facility and epistaxis (2 rhino rockets, up to 5 days prn)  - consulted GI - Continue PPI b.i.d.  - H/H decreased today, repeat CBC pending

## 2020-09-15 NOTE — PROGRESS NOTES
[facility]  Neurocritical Care  Progress Note    Admit Date: 8/29/2020  Service Date: 09/15/2020  Length of Stay: 17    Subjective:     Chief Complaint: Embolic stroke involving right middle cerebral artery    History of Present Illness: 64 year old female with a PMH significant for COPD, HTN, and every day smoker who presented at OSH on 08/26/20 with acute left sided facial droop and left sided weakness (last known normal 08/25/20). Pt was not given TPA or endovascular intervention, and she was intubated on 08/26/20 for agitation. CTH on 08/26/20 showed right posterior frontal/parietal infarction with no intracranial hemorrhage. Hospital course complicated by Afib with RVR in which she was started on lovenox and then transitioned to heparin gtt for GOYO and HFrEF. Pt was transferred to NICU for higher level of care and persistent epistaxis. Upon arrival Pt was intubated and sedated, Rhinorocket  Bilaterally in place, propofol and diltiazem gtt. Pt was able to move the right side and moves her left side to noxious stimuli. A-line placed.       Hospital Course: 08/29/20: Pt admitted to NICU for higher level of care and ENT consult. Pt with a run of Vtach associated with hypotension.   8/31/2020: hold anticoagulation until tomorrow due to GI bleed/epistaxis (at other facility) GI consult, pt in Afib- load with digoxin, pending digoxin level tomorrow, continue diltiazem gtt today, start tube feeds, obtain PIV and d/c central line, continue cefepime for total 7 days  9/1/2020: initiated heparin gtt minimal intensity-when at goal obtain CT head, continue diltiazem gtt, start tube feeds, d/c A-line  9/2/2020: changed to PO diltiazem 60mg q8hr from gtt, lasix, miralax, plan for extubation  9/3/2020: started seroquel 25BID  9/4/2020: increased seroquel to 25 mg BID, magnesium citrate, and try SBT  9/5/2020: place A-line, increased Po dilt, add chest PT, add water flushes and repeat magnesium  citrate  9/6/2020: d/c digoxin, add coreg 6.25 BID, d/c water flushes, repeat CMP, add lasix PO 40 mg BID  09/07/2020 Plan to wean FiO2. As we are weaning propofol, precedex will be added. Patient is currently on heparin drip. Daily EKG for QTc monitoring. Patient given one dose of diamox with plans to restart furosemide for metabolic alkalosis. Will discontinue barr tomorrow. F/u with procalcitonin due to increased WBC count. Will speak to family member about Trach/PEG discussion   09/08/2020 Patient remains on her heparin drip. Currently she is rate controlled. Patient is off of her propofol due to significnt hypotension. We are holding the next two doses of lasix and giving her diamox 500 mg for two doses for metabolic alkalosis. Dr. Zavala spoke to the POA, Griffin Gonzalez who would like to proceed with a trach/PEG. Patient was pan cultured today and started on broad spectrum antibiotics. Barr was changed today   09/09/2020 Patient scheduled to get trach/PEG this Friday. Still on broad spectrum antibiotics as she has an elevated WBC. Working up for alternative causes of fever: US of the upper and lower extremities, rapid COVID, and amylase lipase.   09/10/2020 Patient scheduled for trach/peg for tomorrow. We are holding the heparin drip and the tube feeds for this midnight. No DVT on upper and lower extremities. Continuing the broad spectrum antibiotics until the susceptibilities return. D/c a line as it is no longer working.   09/11/2020 Will go to the OR today for trach and PEG. Cefepime to discontinue after ten days (for positive respiratory culture)  09/12/2020 Patient was restarted on the heparin drip last ngiht. Weaning propofol and precedex by adding oxycodone for the next 24 hours (PRN). Following up on EKG. Spontaneous trial once sedation is weaned. Awaiting stroke recommendation about when to transition to a DOAC. Tube feeds were started at 1 PM today.   9/13/2020 [facility] today before transition heparin  to DOAC. Weaned off fentanyl and precedex. Responding well to oxycodone. 1x 500 acetazolamide for metabolic alkalosis. Potential d/c to LTAC tomorrow.  9/14 NAEON Transitioned from heparin gtt to Apixaban. Remains intubated. Increased rate early am, Pco2in 50s, repeat ABG the same. Pt with hx of COPD. Secretions tan colored sputum sent for culture. Continue ABXs for now.   09/15/2020 increased leukocytosis overnight without fever       Interval History: Decreased H/H with increased leukocytosis overnight.  Repeat CBC pending.  Will continue current course of abx and trend procal.      Review of Systems: Unable to obtain a complete ROS due to level of consciousness.     Vitals:   Temp: 99.4 °F (37.4 °C)  Pulse: 86  Rhythm: atrial rhythm  BP: 122/70  MAP (mmHg): 91  Resp: (!) 21  SpO2: 99 %  Oxygen Concentration (%): 50  O2 Device (Oxygen Therapy): ventilator  Vent Mode: A/C  Set Rate: 20 BPM  Vt Set: 420 mL  PEEP/CPAP: 5 cmH20  Peak Airway Pressure: 21 cmH2O  Mean Airway Pressure: 9.1 cmH20  Plateau Pressure: 13 cmH20    Temp  Min: 98.6 °F (37 °C)  Max: 99.6 °F (37.6 °C)  Pulse  Min: 86  Max: 123  BP  Min: 117/79  Max: 152/84  MAP (mmHg)  Min: 87  Max: 111  Resp  Min: 3  Max: 27  SpO2  Min: 93 %  Max: 100 %  Oxygen Concentration (%)  Min: 50  Max: 50    09/14 0701 - 09/15 0700  In: 846.7 [I.V.:86.7]  Out: 1300 [Urine:1300]   Unmeasured Output  Urine Occurrence: 1  Stool Occurrence: 1  Emesis Occurrence: 0  Pad Count: 1     Examination:   Constitutional: Well-nourished and -developed. No apparent distress.   Eyes: Conjunctiva clear, anicteric. Lids no lesions.  Head/Ears/Nose/Mouth/Throat/Neck: Moist mucous membranes. External ears, nose atraumatic. Tracheostomy CDI   Cardiovascular: Regular rhythm. No murmurs. No leg edema.  Respiratory: Comfortable respirations. Clear to auscultation.  Gastrointestinal: No hernia. Soft, nondistended, nontender. + bowel sounds. PEG CDI    Neurologic:  -GCS [id number]  -Awakens to voice  and tracks examiner, follows simple commands  -Motor Follows on LUE, moves LLE spontaneously  RUE/RLE withdraws to noxious stimuli  -Sensation intact  -R pupil fixed, L brisk    Medications:   Continuousdextrose 10 % in water (D10W)    Scheduledalbuterol-ipratropium, 3 mL, Q6H  apixaban, 5 mg, BID  atorvastatin, 40 mg, Daily  bisacodyL, 10 mg, Daily  carvediloL, 6.25 mg, BID  ceFEPime (MAXIPIME) IVPB, 1 g, Q12H  diltiaZEM, 90 mg, Q8H  furosemide, 40 mg, BID  pantoprozole (PROTONIX) IV, 40 mg, Q12H  polyethylene glycol, 17 g, Daily  QUEtiapine, 50 mg, BID  senna-docusate 8.6-50 mg, 1 tablet, BID  valproic acid (as sodium salt), 250 mg, Q8H    PRNacetaminophen, 650 mg, Q6H PRN  dextrose 10 % in water (D10W), , Continuous PRN  dextrose 50%, 12.5 g, PRN  glucagon (human recombinant), 1 mg, PRN  labetalol, 10 mg, Q4H PRN  magnesium oxide, 800 mg, PRN  magnesium oxide, 800 mg, PRN  ondansetron, 4 mg, Q6H PRN  oxyCODONE, 5 mg, Q6H PRN  potassium chloride, 40 mEq, PRN  potassium chloride, 40 mEq, PRN  potassium chloride, 60 mEq, PRN  potassium, sodium phosphates, 2 packet, PRN  potassium, sodium phosphates, 2 packet, PRN  potassium, sodium phosphates, 2 packet, PRN       Today I independently reviewed pertinent medications, lines/drains/airways, imaging, cardiology results, laboratory results, microbiology results,     ISTAT:   Recent Labs   Lab 09/15/20  0413   PH 7.439   PCO2 47.4*   PO2 79*   POCSATURATED 96   HCO3 32.1*   BE 8   POCTCO2 34*   SAMPLE ARTERIAL      Chem:   Recent Labs   Lab 09/15/20  0324      K 4.1      CO2 31*      BUN 38*   CREATININE 1.2   ESTGFRAFRICA 55.2*   EGFRNONAA 47.9*   CALCIUM 9.4   MG 2.0   PHOS 3.0   ANIONGAP 12   PROT 6.9   ALBUMIN 2.0*   BILITOT 0.3   ALKPHOS 84   AST 40   ALT 27     Heme:   Recent Labs   Lab 09/15/20  0324 09/15/20  1016   WBC 20.58*  --    HGB 8.7*  --    HCT 28.6*  --    *  --    PROCAL  --  0.58*     Endo:   Recent Labs   Lab 09/14/20  8844  09/15/20  1150   POCTGLUCOSE 142* 134*          Assessment/Plan:     Neuro  * Embolic stroke involving right middle cerebral artery  - Last seen normal on 08/25/20 with no intervention; intubated and sedated.    - Imaging shows large right ischemic stroke in the posterior frontal and parietal regions  - repeat CTH stable   - SBP < 160, MAP > 65  - EuNa   - CBC, CMP, coags daily   -9/14 heparin gtt transitioned to apixaban 5mg bid  -  Atorvastatin 40mg daily  - Seroquel to 50 mg BID  - PEG/Trach placement on 9/12/20  PT/OT/SLP      ENT  Epistaxis  - persistent epistaxis in the setting of anticoagulation; resolved  - H/H stable   - daily CBC   - ENT consulted   rhino rockets were removed     Pulmonary  PNA (pneumonia)  - Respiratory cultures growing serratia  - Will continue cefepime 1g q12h with stop date after 10 days total    Chronic obstructive pulmonary disease  - daily CXRs  - goal SpO2 is > 88%  - scheduled duo nebs q6h, CPT    Acute on chronic respiratory failure with hypoxia and hypercapnia  - Pt trached and peged 9/13  - Daily CXRs and ABG  duonebs q6h, CPT, NT suction prn  9/14 Sputum culture sent  Continue cefepime for 10 days total     Cardiac/Vascular  Atrial fibrillation with rapid ventricular response  - Repeat EKG   - daily Mg and Phos; replace as needed  - dilt PO 90mg  - dig level 1.8  9/14 transition heparin gtt to apixaban 5mg bid          Essential hypertension  - SBP goal < 160, MAP >65  Coreg 6.25 mg bid  Diltiazem 90mg q8h  Lasix 40mg bid    · Echo: Moderate concentric left ventricular hypertrophy.  · Moderately to severely decreased left ventricular systolic function. The estimated ejection fraction is 25-30%.  · Left ventricular diastolic dysfunction.  · Moderately to severely reduced right ventricular systolic function.  · Moderate left atrial enlargement.  · Severe right atrial enlargement.  · Moderate mitral regurgitation.  · Moderate tricuspid regurgitation.  · Intermediate central  venous pressure (8 mmHg).  · The estimated PA systolic pressure is 42 mmHg.  · Pulmonary hypertension present.  · Negative bubble study.         Renal/  GOYO (acute kidney injury)  - Avoid nephrotoxic drugs  - Monitor Daily I/O  Monitor CMP    Oncology  Leukocytosis  Increased leukocytosis overnight but remained afebrile  Trend procal  Continue 10 day course of cefepime       Endocrine  Diabetes mellitus  - SSI moderate  - POCT glu monitoring q4h    Moderate malnutrition  - tube feeds isosource 40cc/h  - monitor electrolytes with daily CMP, Mg, phos    GI  GI bleed  - upper GI bleed at outside hospital  - recent upper GI bleed at outside facility and epistaxis (2 rhino rockets, up to 5 days prn)  - consulted GI - Continue PPI b.i.d.  - H/H decreased today, repeat CBC pending          The patient is being Prophylaxed for:  Venous Thromboembolism with: Chemical  Stress Ulcer with: PPI  Ventilator Pneumonia with: chlorhexidine oral care    Activity Orders          Diet NPO: NPO starting at 09/11 0001        Full Code    Melissa Myrick PA-C  Neurocritical Care  [facility]

## 2020-09-16 VITALS
DIASTOLIC BLOOD PRESSURE: 80 MMHG | BODY MASS INDEX: 18.73 KG/M2 | TEMPERATURE: 99 F | OXYGEN SATURATION: 94 % | HEART RATE: 112 BPM | HEIGHT: 65 IN | WEIGHT: 112.44 LBS | SYSTOLIC BLOOD PRESSURE: 143 MMHG | RESPIRATION RATE: 20 BRPM

## 2020-09-16 PROBLEM — K92.2 GI BLEED: Status: RESOLVED | Noted: 2020-08-28 | Resolved: 2020-09-16

## 2020-09-16 PROBLEM — R04.0 EPISTAXIS: Status: RESOLVED | Noted: 2020-08-28 | Resolved: 2020-09-16

## 2020-09-16 PROBLEM — E11.9 DIABETES MELLITUS: Status: RESOLVED | Noted: 2020-09-03 | Resolved: 2020-09-16

## 2020-09-16 PROBLEM — G93.6 CYTOTOXIC CEREBRAL EDEMA: Status: RESOLVED | Noted: 2020-08-30 | Resolved: 2020-09-16

## 2020-09-16 LAB
ALBUMIN SERPL BCP-MCNC: 2.1 G/DL (ref 3.5–5.2)
ALLENS TEST: ABNORMAL
ALP SERPL-CCNC: 88 U/L (ref 55–135)
ALT SERPL W/O P-5'-P-CCNC: 24 U/L (ref 10–44)
ANION GAP SERPL CALC-SCNC: 9 MMOL/L (ref 8–16)
ANISOCYTOSIS BLD QL SMEAR: SLIGHT
AST SERPL-CCNC: 34 U/L (ref 10–40)
BACTERIA SPEC AEROBE CULT: ABNORMAL
BACTERIA SPEC AEROBE CULT: ABNORMAL
BASOPHILS # BLD AUTO: ABNORMAL K/UL (ref 0–0.2)
BASOPHILS NFR BLD: 0 % (ref 0–1.9)
BILIRUB SERPL-MCNC: 0.3 MG/DL (ref 0.1–1)
BUN SERPL-MCNC: <2 MG/DL (ref 8–23)
CALCIUM SERPL-MCNC: 9.2 MG/DL (ref 8.7–10.5)
CHLORIDE SERPL-SCNC: 100 MMOL/L (ref 95–110)
CO2 SERPL-SCNC: 34 MMOL/L (ref 23–29)
CREAT SERPL-MCNC: 1.2 MG/DL (ref 0.5–1.4)
DELSYS: ABNORMAL
DIFFERENTIAL METHOD: ABNORMAL
EOSINOPHIL # BLD AUTO: ABNORMAL K/UL (ref 0–0.5)
EOSINOPHIL NFR BLD: 0 % (ref 0–8)
ERYTHROCYTE [DISTWIDTH] IN BLOOD BY AUTOMATED COUNT: 14.6 % (ref 11.5–14.5)
ERYTHROCYTE [SEDIMENTATION RATE] IN BLOOD BY WESTERGREN METHOD: 20 MM/H
EST. GFR  (AFRICAN AMERICAN): 55.2 ML/MIN/1.73 M^2
EST. GFR  (NON AFRICAN AMERICAN): 47.9 ML/MIN/1.73 M^2
FIO2: 50
GLUCOSE SERPL-MCNC: 129 MG/DL (ref 70–110)
GRAM STN SPEC: ABNORMAL
HCO3 UR-SCNC: 34.2 MMOL/L (ref 24–28)
HCT VFR BLD AUTO: 31.8 % (ref 37–48.5)
HGB BLD-MCNC: 9.9 G/DL (ref 12–16)
IMM GRANULOCYTES # BLD AUTO: ABNORMAL K/UL (ref 0–0.04)
IMM GRANULOCYTES NFR BLD AUTO: ABNORMAL % (ref 0–0.5)
LYMPHOCYTES # BLD AUTO: ABNORMAL K/UL (ref 1–4.8)
LYMPHOCYTES NFR BLD: 7 % (ref 18–48)
MAGNESIUM SERPL-MCNC: 1.9 MG/DL (ref 1.6–2.6)
MCH RBC QN AUTO: 29.7 PG (ref 27–31)
MCHC RBC AUTO-ENTMCNC: 31.1 G/DL (ref 32–36)
MCV RBC AUTO: 96 FL (ref 82–98)
METAMYELOCYTES NFR BLD MANUAL: 3 %
MODE: ABNORMAL
MONOCYTES # BLD AUTO: ABNORMAL K/UL (ref 0.3–1)
MONOCYTES NFR BLD: 8 % (ref 4–15)
MYELOCYTES NFR BLD MANUAL: 6 %
NEUTROPHILS NFR BLD: 73 % (ref 38–73)
NEUTS BAND NFR BLD MANUAL: 3 %
NRBC BLD-RTO: 0 /100 WBC
OVALOCYTES BLD QL SMEAR: ABNORMAL
PCO2 BLDA: 46.4 MMHG (ref 35–45)
PEEP: 5
PH SMN: 7.47 [PH] (ref 7.35–7.45)
PHOSPHATE SERPL-MCNC: 2.3 MG/DL (ref 2.7–4.5)
PLATELET # BLD AUTO: 679 K/UL (ref 150–350)
PLATELET BLD QL SMEAR: ABNORMAL
PMV BLD AUTO: 10.1 FL (ref 9.2–12.9)
PO2 BLDA: 95 MMHG (ref 80–100)
POC BE: 11 MMOL/L
POC SATURATED O2: 98 % (ref 95–100)
POC TCO2: 36 MMOL/L (ref 23–27)
POCT GLUCOSE: 126 MG/DL (ref 70–110)
POCT GLUCOSE: 132 MG/DL (ref 70–110)
POCT GLUCOSE: 133 MG/DL (ref 70–110)
POCT GLUCOSE: 94 MG/DL (ref 70–110)
POTASSIUM SERPL-SCNC: 3.5 MMOL/L (ref 3.5–5.1)
PROT SERPL-MCNC: 6.9 G/DL (ref 6–8.4)
RBC # BLD AUTO: 3.33 M/UL (ref 4–5.4)
SAMPLE: ABNORMAL
SITE: ABNORMAL
SODIUM SERPL-SCNC: 143 MMOL/L (ref 136–145)
SP02: 100
VT: 420
WBC # BLD AUTO: 20.34 K/UL (ref 3.9–12.7)

## 2020-09-16 PROCEDURE — 25000003 PHARM REV CODE 250: Performed by: PSYCHIATRY & NEUROLOGY

## 2020-09-16 PROCEDURE — 99233 SBSQ HOSP IP/OBS HIGH 50: CPT | Mod: ,,, | Performed by: NURSE PRACTITIONER

## 2020-09-16 PROCEDURE — 93010 ELECTROCARDIOGRAM REPORT: CPT | Mod: ,,, | Performed by: INTERNAL MEDICINE

## 2020-09-16 PROCEDURE — 25000003 PHARM REV CODE 250: Performed by: PHYSICIAN ASSISTANT

## 2020-09-16 PROCEDURE — 99900026 HC AIRWAY MAINTENANCE (STAT)

## 2020-09-16 PROCEDURE — 25000003 PHARM REV CODE 250: Performed by: STUDENT IN AN ORGANIZED HEALTH CARE EDUCATION/TRAINING PROGRAM

## 2020-09-16 PROCEDURE — 83735 ASSAY OF MAGNESIUM: CPT

## 2020-09-16 PROCEDURE — 93010 EKG 12-LEAD: ICD-10-PCS | Mod: ,,, | Performed by: INTERNAL MEDICINE

## 2020-09-16 PROCEDURE — C9113 INJ PANTOPRAZOLE SODIUM, VIA: HCPCS | Performed by: PHYSICIAN ASSISTANT

## 2020-09-16 PROCEDURE — 63600175 PHARM REV CODE 636 W HCPCS: Performed by: PHYSICIAN ASSISTANT

## 2020-09-16 PROCEDURE — 94761 N-INVAS EAR/PLS OXIMETRY MLT: CPT

## 2020-09-16 PROCEDURE — 80053 COMPREHEN METABOLIC PANEL: CPT

## 2020-09-16 PROCEDURE — 99900035 HC TECH TIME PER 15 MIN (STAT)

## 2020-09-16 PROCEDURE — 25000242 PHARM REV CODE 250 ALT 637 W/ HCPCS: Performed by: NURSE PRACTITIONER

## 2020-09-16 PROCEDURE — 85027 COMPLETE CBC AUTOMATED: CPT

## 2020-09-16 PROCEDURE — 63600175 PHARM REV CODE 636 W HCPCS: Performed by: PSYCHIATRY & NEUROLOGY

## 2020-09-16 PROCEDURE — 85007 BL SMEAR W/DIFF WBC COUNT: CPT

## 2020-09-16 PROCEDURE — 99233 PR SUBSEQUENT HOSPITAL CARE,LEVL III: ICD-10-PCS | Mod: ,,, | Performed by: NURSE PRACTITIONER

## 2020-09-16 PROCEDURE — 84100 ASSAY OF PHOSPHORUS: CPT

## 2020-09-16 PROCEDURE — 27000221 HC OXYGEN, UP TO 24 HOURS

## 2020-09-16 PROCEDURE — 93005 ELECTROCARDIOGRAM TRACING: CPT

## 2020-09-16 PROCEDURE — 94003 VENT MGMT INPAT SUBQ DAY: CPT

## 2020-09-16 PROCEDURE — 27200966 HC CLOSED SUCTION SYSTEM

## 2020-09-16 PROCEDURE — 94640 AIRWAY INHALATION TREATMENT: CPT

## 2020-09-16 RX ORDER — VALPROIC ACID 250 MG/5ML
250 SOLUTION ORAL EVERY 8 HOURS
Start: 2020-09-16 | End: 2021-01-04 | Stop reason: SDUPTHER

## 2020-09-16 RX ORDER — DILTIAZEM HYDROCHLORIDE 90 MG/1
90 TABLET, FILM COATED ORAL EVERY 8 HOURS
Start: 2020-09-16 | End: 2021-01-20 | Stop reason: SDUPTHER

## 2020-09-16 RX ORDER — CEFEPIME HYDROCHLORIDE 1 G/1
1 INJECTION, POWDER, FOR SOLUTION INTRAMUSCULAR; INTRAVENOUS EVERY 12 HOURS
Qty: 2 G | Refills: 0
Start: 2020-09-16 | End: 2020-09-17

## 2020-09-16 RX ORDER — FUROSEMIDE 40 MG/1
40 TABLET ORAL 2 TIMES DAILY
Start: 2020-09-16 | End: 2021-03-10

## 2020-09-16 RX ORDER — QUETIAPINE FUMARATE 50 MG/1
50 TABLET, FILM COATED ORAL 2 TIMES DAILY
Start: 2020-09-16 | End: 2021-01-20 | Stop reason: SDUPTHER

## 2020-09-16 RX ORDER — AMOXICILLIN 250 MG
1 CAPSULE ORAL 2 TIMES DAILY
Start: 2020-09-16 | End: 2021-03-10

## 2020-09-16 RX ORDER — BISACODYL 10 MG
10 SUPPOSITORY, RECTAL RECTAL DAILY
Refills: 0
Start: 2020-09-17 | End: 2021-03-10

## 2020-09-16 RX ORDER — CARVEDILOL 6.25 MG/1
6.25 TABLET ORAL 2 TIMES DAILY
Start: 2020-09-16 | End: 2021-03-10

## 2020-09-16 RX ORDER — PANTOPRAZOLE SODIUM 40 MG/10ML
40 INJECTION, POWDER, LYOPHILIZED, FOR SOLUTION INTRAVENOUS EVERY 12 HOURS
Start: 2020-09-16 | End: 2021-03-10

## 2020-09-16 RX ORDER — IPRATROPIUM BROMIDE AND ALBUTEROL SULFATE 2.5; .5 MG/3ML; MG/3ML
3 SOLUTION RESPIRATORY (INHALATION) EVERY 6 HOURS
Refills: 0
Start: 2020-09-16 | End: 2021-03-10

## 2020-09-16 RX ORDER — ATORVASTATIN CALCIUM 40 MG/1
40 TABLET, FILM COATED ORAL DAILY
Start: 2020-09-17 | End: 2021-01-20 | Stop reason: SDUPTHER

## 2020-09-16 RX ORDER — POLYETHYLENE GLYCOL 3350 17 G/17G
17 POWDER, FOR SOLUTION ORAL DAILY
Refills: 0
Start: 2020-09-17 | End: 2021-03-10

## 2020-09-16 RX ADMIN — DOCUSATE SODIUM 50MG AND SENNOSIDES 8.6MG 1 TABLET: 8.6; 5 TABLET, FILM COATED ORAL at 08:09

## 2020-09-16 RX ADMIN — FUROSEMIDE 40 MG: 40 TABLET ORAL at 08:09

## 2020-09-16 RX ADMIN — QUETIAPINE FUMARATE 50 MG: 25 TABLET ORAL at 08:09

## 2020-09-16 RX ADMIN — IPRATROPIUM BROMIDE AND ALBUTEROL SULFATE 3 ML: .5; 2.5 SOLUTION RESPIRATORY (INHALATION) at 08:09

## 2020-09-16 RX ADMIN — IPRATROPIUM BROMIDE AND ALBUTEROL SULFATE 3 ML: .5; 2.5 SOLUTION RESPIRATORY (INHALATION) at 01:09

## 2020-09-16 RX ADMIN — IPRATROPIUM BROMIDE AND ALBUTEROL SULFATE 3 ML: .5; 2.5 SOLUTION RESPIRATORY (INHALATION) at 12:09

## 2020-09-16 RX ADMIN — POTASSIUM & SODIUM PHOSPHATES POWDER PACK 280-160-250 MG 2 PACKET: 280-160-250 PACK at 05:09

## 2020-09-16 RX ADMIN — POTASSIUM CHLORIDE 40 MEQ: 1.5 POWDER, FOR SOLUTION ORAL at 08:09

## 2020-09-16 RX ADMIN — OXYCODONE 5 MG: 5 TABLET ORAL at 08:09

## 2020-09-16 RX ADMIN — ACETAMINOPHEN 650 MG: 325 TABLET ORAL at 08:09

## 2020-09-16 RX ADMIN — APIXABAN 5 MG: 2.5 TABLET, FILM COATED ORAL at 08:09

## 2020-09-16 RX ADMIN — DILTIAZEM HYDROCHLORIDE 90 MG: 60 TABLET, FILM COATED ORAL at 02:09

## 2020-09-16 RX ADMIN — CEFEPIME 1 G: 1 INJECTION, POWDER, FOR SOLUTION INTRAMUSCULAR; INTRAVENOUS at 09:09

## 2020-09-16 RX ADMIN — PANTOPRAZOLE SODIUM 40 MG: 40 INJECTION, POWDER, LYOPHILIZED, FOR SOLUTION INTRAVENOUS at 08:09

## 2020-09-16 RX ADMIN — DILTIAZEM HYDROCHLORIDE 90 MG: 60 TABLET, FILM COATED ORAL at 05:09

## 2020-09-16 RX ADMIN — VALPROIC ACID 250 MG: 250 SOLUTION ORAL at 02:09

## 2020-09-16 RX ADMIN — VALPROIC ACID 250 MG: 250 SOLUTION ORAL at 05:09

## 2020-09-16 RX ADMIN — CARVEDILOL 6.25 MG: 6.25 TABLET, FILM COATED ORAL at 08:09

## 2020-09-16 RX ADMIN — IPRATROPIUM BROMIDE AND ALBUTEROL SULFATE 3 ML: .5; 2.5 SOLUTION RESPIRATORY (INHALATION) at 07:09

## 2020-09-16 RX ADMIN — ATORVASTATIN CALCIUM 40 MG: 20 TABLET, FILM COATED ORAL at 08:09

## 2020-09-16 RX ADMIN — ACETAMINOPHEN 650 MG: 325 TABLET ORAL at 01:09

## 2020-09-16 RX ADMIN — POTASSIUM CHLORIDE 40 MEQ: 1.5 POWDER, FOR SOLUTION ORAL at 05:09

## 2020-09-16 NOTE — PLAN OF CARE
[facility]    FACILITY TRANSFER ORDERS      Patient Name: Donita Gonzalez  YOB: 1955    PCP: Jordna Campbell MD   PCP Address: [address] / [place] [address]  PCP Phone Number: [phone]  PCP Fax: [phone]    Encounter Date: 09/16/2020    Admit to: [facility] ( [facility])    Vital Signs:  Routine    Diagnoses:   Active Hospital Problems    Diagnosis  POA    *Embolic stroke involving right middle cerebral artery [I63.411]  Yes    PNA (pneumonia) [J18.9]  Unknown    Leukocytosis [D72.829]  Yes    Macroglossia [Q38.2]  Not Applicable    Moderate malnutrition [E44.0]  Yes     Recommendations     1. Continue Isosource 1.5 @40ml/hr (provides 1440 kcal, 65g pro, 733ml free water).   Goals: 1. Pt to meet % EEN and EPN by RD follow up.  Nutrition Goal Status: progressing towards goal  Communication of RD Recs: (POC)    Assessment and Plan  Nutrition Problem:  Moderate Protein-Calorie Malnutrition  Malnutrition in the context of Acute Illness/Injury     Related to (etiology):  Poor intake in setting of stroke     Signs and Symptoms (as evidenced by):  Energy Intake:<75% of estimated energy requirement for 7 days  Body Fat Depletion: mild depletion of orbitals, triceps   Muscle Mass Depletion: moderate to severe depletion of temples, clavicles, lower extremities        Interventions(treatment strategy):  Collaboration of care with providers.  Enteral nutrition.     Nutrition Diagnosis Status:  New         Diabetes mellitus [E11.9]  Yes    Cytotoxic cerebral edema [G93.6]  Yes    Epistaxis [R04.0]  Yes     Likely from traumatic NGT placement and anticoagulation.  If ENT available, would consult      GI bleed [K92.2]  Yes    Atrial fibrillation with rapid ventricular response [I48.91]  Yes    Chronic obstructive pulmonary disease [J44.9]  Yes    Essential hypertension [I10]  Yes    GOYO (acute kidney injury) [N17.9]  Yes    Acute on chronic respiratory failure with  hypoxia and hypercapnia [J96.21, J96.22]  Yes      Resolved Hospital Problems   No resolved problems to display.       Allergies:Review of patient's allergies indicates:  No Known Allergies    Diet: tube feedings: Continuous issource 1.5  at @40 mL per hour for 24 hours per day    Activities: Activity as tolerated    Nursing: Continuous pulse ox   Intake/Output q4 hours   Aspiration precautions   Assess 1) Evaluate bowel status every day. 2) Report any abdominal discomfort, pain, or distention. 3) If no bowel movement in 3 days, notify physician   Flush feeding tube with 30-50 mL water: 1) Before and after feedings. 2) After residual check. 3) After bag change. 4) After medication administration. 5) If tube becomes clogged, check for impaction in stub nose adapter and clean or replace. (use 30 mL syringe to irrigate Gastric or Jejunal tube with water).     Mechanical Ventilation Goals Vent Mode: A/C  Oxygen Concentration (%):  [50] 50  Resp Rate Total:  [20 br/min-50 br/min] 20 br/min  Vt Set:  [420 mL] 420 mL  PEEP/CPAP:  [5 cmH20] 5 cmH20  Mean Airway Pressure:  [8.7 cmH20-9.7 cmH20] 8.7 cmH20      Labs: CBC, CMP and INR Daily for 3 days then as per LTAC MD discretion     CONSULTS:    Physical Therapy to evaluate and treat. , Occupational Therapy to evaluate and treat., Speech Therapy to evaluate and treat for Language, Swallowing and Cognition. and  to evaluate for community resources/long-range planning.    MISCELLANEOUS CARE:  PEG Care: Clean site every 24 hours.  and Routine Skin for Bedridden Patients: Apply moisture barrier cream to all skin folds and wet areas in perineal area daily and after baths and all bowel movements.    WOUND CARE ORDERS  Yes: moisture associated ulcer to buttocks   Recommendations:   - Nursing to cleanse sacrum/ buttocks with cleansing cloths, then apply a thin layer of triad ointment BID/ PRN after incontinent episodes to provide a hydrophilic environment to promote  healing of exposed tissue and prevent breakdown of intact skin. No cover dressing required.     -MOISES surface and nursing to continue with pressure injury prevention interventions      Medications: Review discharge medications with patient and family and provide education.      Current Discharge Medication List      START taking these medications    Details   albuterol-ipratropium (DUO-NEB) 2.5 mg-0.5 mg/3 mL nebulizer solution Take 3 mLs by nebulization every 6 (six) hours. Rescue  Refills: 0      apixaban (ELIQUIS) 5 mg Tab 1 tablet (5 mg total) by Per G Tube route 2 (two) times daily.  Qty:        bisacodyL (DULCOLAX) 10 mg Supp Place 1 suppository (10 mg total) rectally once daily.  Refills: 0      carvediloL (COREG) 6.25 MG tablet 1 tablet (6.25 mg total) by Per G Tube route 2 (two) times daily.    Comments: .      ceFEPIme (MAXIPIME) 1 gram injection Inject 1 g into the vein every 12 (twelve) hours. for 1 dose  Qty: 2 g, Refills: 0      diltiaZEM (CARDIZEM) 90 MG tablet 1 tablet (90 mg total) by Per G Tube route every 8 (eight) hours.      furosemide (LASIX) 40 MG tablet 1 tablet (40 mg total) by Per G Tube route 2 (two) times daily.      pantoprazole (PROTONIX) 40 mg injection Inject 40 mg into the vein every 12 (twelve) hours.      polyethylene glycol (GLYCOLAX) 17 gram PwPk 17 g by Per G Tube route once daily.  Refills: 0      QUEtiapine (SEROQUEL) 50 MG tablet 1 tablet (50 mg total) by Per G Tube route 2 (two) times daily.      senna-docusate 8.6-50 mg (PERICOLACE) 8.6-50 mg per tablet 1 tablet by Per G Tube route 2 (two) times daily.  Qty:        valproic acid, as sodium salt, (DEPAKENE) 250 mg/5 mL (5 mL) Soln 5 mLs (250 mg total) by Per G Tube route every 8 (eight) hours.         CONTINUE these medications which have CHANGED    Details   atorvastatin (LIPITOR) 40 MG tablet 1 tablet (40 mg total) by Per G Tube route once daily.         STOP taking these medications       amLODIPine (NORVASC) 10 MG tablet  Comments:   Reason for Stopping:         aspirin 325 MG tablet Comments:   Reason for Stopping:         cloNIDine (CATAPRES) 0.1 MG tablet Comments:   Reason for Stopping:         clopidogreL (PLAVIX) 75 mg tablet Comments:   Reason for Stopping:         albuterol (PROVENTIL/VENTOLIN HFA) 90 mcg/actuation inhaler Comments:   Reason for Stopping:         ammonium lactate (LAC-HYDRIN) 12 % lotion Comments:   Reason for Stopping:         divalproex ER (DEPAKOTE) 500 MG Tb24 Comments:   Reason for Stopping:         losartan-hydrochlorothiazide 50-12.5 mg (HYZAAR) 50-12.5 mg per tablet Comments:   Reason for Stopping:         triamcinolone acetonide 0.5% (KENALOG) 0.5 % Crea Comments:   Reason for Stopping:         umeclidinium-vilanterol (ANORO ELLIPTA) 62.5-25 mcg/actuation DsDv Comments:   Reason for Stopping:                    _________________________________  Harmony Patrick NP  09/16/2020

## 2020-09-16 NOTE — NURSING
Multiple attempted to print 12 leak EKG this morning with no success. Attempted to save to Muse as well, also unsuccessful. EKG have been save in pt in room monitor. QTc this morning is 419

## 2020-09-16 NOTE — ASSESSMENT & PLAN NOTE
- Last seen normal on 08/25/20 with no intervention; intubated and sedated.    - Imaging shows large right ischemic stroke in the posterior frontal and parietal regions  - repeat CTH stable   - SBP < 160, MAP > 65  - EuNa   - CBC, CMP, coags daily X 3 days  -9/14 heparin gtt transitioned to apixaban 5mg bid  -  Atorvastatin 40mg daily  - Seroquel to 50 mg BID  - PEG/Trach placement on 9/12/20  PT/OT/SLP

## 2020-09-16 NOTE — ASSESSMENT & PLAN NOTE
- Respiratory cultures growing serratia  - Will continue cefepime 1g q12h with stop date after 10 days

## 2020-09-16 NOTE — PLAN OF CARE
[facility] Care Plan    POC reviewed with Donita Gonzalez at 1400 no family present at this time. Pt is not able to verbalized understanding due to trach and neuro cognition. Pt has order to transfer to LTAC today. Hopefully pt will be able to transfer to the LTAC today. From the notes pt  is aware of the plan for transfer.  PIVs placed 9/15 x2 remain intact. Purwick is in place but has not worked all day. Pt has had multiple wet pads and two BM today. Pt was bath and linens changed today. Tube feeds were stopped this afternoon to help prevent emesis during transist to LTAC. Tube feeds were are goal and pt was tolerating well. BM medication were held today as pt had three BM yesterday and today's Bms were quite loose. Tmax 99.4. neuro exam remains unchanged from previous. Wound scare for sacrum wound completed today as well. Questions and concerns addressed. No acute events today. Pt progressing toward goals. Will continue to monitor. See below and flowsheets for full assessment and VS info.       Neuro:  [place] Coma Scale  Best Eye Response: 3-->(E3) to speech  Best Motor Response: 5-->(M5) localizes pain  Best Verbal Response: 1-->(V1) none  Manuel Coma Scale Score: 9  Assessment Qualifiers: patient not sedated/intubated  Pupil PERRLA: no     24 hr Temp:  [98 °F (36.7 °C)-99.6 °F (37.6 °C)]     CV:   Rhythm: atrial rhythm  BP goals:   SBP < 160  MAP > 65    Resp:   O2 Device (Oxygen Therapy): ventilator  Vent Mode: A/C  Set Rate: 20 BPM  Oxygen Concentration (%): 50  Vt Set: 420 mL  PEEP/CPAP: 5 cmH20  Pressure Support: 12 cmH20    Plan: trach in place    GI/:  ROXANE Total Score: 1  Diet/Nutrition Received: NPO, tube feeding  Last Bowel Movement: 09/15/20  Voiding Characteristics: incontinence, external catheter    Intake/Output Summary (Last 24 hours) at 9/16/2020 1540  Last data filed at 9/16/2020 1200  Gross per 24 hour   Intake 1070 ml   Output 453 ml   Net 617 ml     Unmeasured Output  Urine Occurrence:  1  Stool Occurrence: 0  Emesis Occurrence: 0  Pad Count: 1    Labs/Accuchecks:  Recent Labs   Lab 09/16/20  0330   WBC 20.34*   RBC 3.33*   HGB 9.9*   HCT 31.8*   *      Recent Labs   Lab 09/16/20  0330      K 3.5   CO2 34*      BUN <2*   CREATININE 1.2   ALKPHOS 88   ALT 24   AST 34   BILITOT 0.3      Recent Labs   Lab 09/15/20  0324   APTT 37.2*    No results for input(s): CPK, CPKMB, TROPONINI, MB in the last 168 hours.    Electrolytes: Electrolytes replaced  Accuchecks: Q6H    Gtts:   dextrose 10 % in water (D10W)         LDA/Wounds:  Lines/Drains/Airways       Drain                   Gastrostomy/Enterostomy 09/11/20 1300 Percutaneous endoscopic gastrostomy (PEG) 5 days    Female External Urinary Catheter 09/10/20 1720 5 days              Airway                   Surgical Airway 09/11/20 1249 Shiley 5 days              Peripheral Intravenous Line                   Peripheral IV - Single Lumen 09/15/20 22 G Right Upper Arm 1 day         Peripheral IV - Single Lumen 09/15/20 2355 20 G Left;Posterior Wrist less than 1 day                  Wounds: Yes  Wound care consulted: Yes   Problem: Adult Inpatient Plan of Care  Goal: Plan of Care Review  Outcome: Ongoing, Progressing  Goal: Patient-Specific Goal (Individualization)  Description: Admit Date: 8/29/20    Admit Dx: CVA    Past Medical History:  No date: COPD (chronic obstructive pulmonary disease)  No date: Hypertension    Past Surgical History:  No date: AUGMENTATION OF BREAST  No date: HYSTERECTOMY    Individualization:   1. Pt likes multiple blankets  2. Adequate sedation  3.  updated    Restraints: R wrist soft restraint, R mitt          Outcome: Ongoing, Progressing  Goal: Absence of Hospital-Acquired Illness or Injury  Outcome: Ongoing, Progressing  Goal: Readiness for Transition of Care  Outcome: Ongoing, Progressing     Problem: Electrolyte Imbalance (Acute Kidney Injury/Impairment)  Goal: Serum Electrolyte Balance  Outcome:  Ongoing, Progressing     Problem: Nutrition Impairment (Mechanical Ventilation, Invasive)  Goal: Optimal Nutrition Delivery  Outcome: Ongoing, Progressing     Problem: Skin and Tissue Injury (Artificial Airway)  Goal: Absence of Device-Related Skin or Tissue Injury  Outcome: Ongoing, Progressing     Problem: Restraint, Nonbehavioral (Nonviolent)  Goal: Discontinuation Criteria Achieved  Outcome: Ongoing, Progressing  Goal: Personal Dignity and Safety Maintained  Outcome: Ongoing, Progressing     Problem: Bowel Elimination Impaired (Stroke, Ischemic/Transient Ischemic Attack)  Goal: Effective Bowel Elimination  Outcome: Ongoing, Progressing     Problem: Urinary Elimination Impaired (Stroke, Ischemic/Transient Ischemic Attack)  Goal: Effective Urinary Elimination  Outcome: Ongoing, Progressing

## 2020-09-16 NOTE — PLAN OF CARE
09/16/20 1058   Post-Acute Status   Post-Acute Authorization Placement   Post-Acute Placement Status Authorization Obtained  ([facility]     RONY advised by April from [facility] at [facility] that she completed the assessment with the Pt and submitted auth yesterday. She is expecting it today.    RONY advised by April that she has auth and a bed for this Pt today. RONY advised MD team, pending clearance then orders if appropriate.     Zofia Pantoja LCSW  Neurocritical Care   [facility]  [id number]

## 2020-09-16 NOTE — ASSESSMENT & PLAN NOTE
- Repeat EKG prn  - daily Mg and Phos X3 days then per LTAC discretion    - dilt PO 90mg  - dig level 1.8 9/1 9/14 transition heparin gtt to apixaban 5mg bid

## 2020-09-16 NOTE — SUBJECTIVE & OBJECTIVE
Interval History:  9/16/20. Pt had slight increase in WBC continue cefepime on day nine will continue 10 day course. Ventilator setting remain the same. V/S stable. Transfer to LTAC today.  Review of Systems   Reason unable to perform ROS: pt trached no family at bedside.     2 systems OR Unable to obtain a complete ROS due to level of consciousness.  Objective:     Vitals:  Temp: 98 °F (36.7 °C)  Pulse: 92  Rhythm: atrial rhythm  BP: 119/82  MAP (mmHg): 97  Resp: 20  SpO2: 97 %  Oxygen Concentration (%): 50  O2 Device (Oxygen Therapy): ventilator  Vent Mode: A/C  Set Rate: 20 BPM  Vt Set: 420 mL  PEEP/CPAP: 5 cmH20  Peak Airway Pressure: 19 cmH2O  Mean Airway Pressure: 8.7 cmH20  Plateau Pressure: 13 cmH20    Temp  Min: 98 °F (36.7 °C)  Max: 99.6 °F (37.6 °C)  Pulse  Min: 85  Max: 151  BP  Min: 111/74  Max: 171/86  MAP (mmHg)  Min: 81  Max: 117  Resp  Min: 3  Max: 28  SpO2  Min: 78 %  Max: 100 %  Oxygen Concentration (%)  Min: 50  Max: 50    09/15 0701 - 09/16 0700  In: 1195 [I.V.:45]  Out: 450 [Urine:450]   Unmeasured Output  Urine Occurrence: 1  Stool Occurrence: 0  Emesis Occurrence: 0  Pad Count: 1       Physical Exam  HENT:      Head: Normocephalic.      Nose: Nose normal.      Mouth/Throat:      Mouth: Mucous membranes are moist.      Pharynx: Oropharynx is clear.   Neck:      Musculoskeletal: Normal range of motion.   Cardiovascular:      Rate and Rhythm: Normal rate and regular rhythm.      Pulses: Normal pulses.      Heart sounds: Normal heart sounds.   Pulmonary:      Effort: Pulmonary effort is normal.      Breath sounds: Rales present.   Abdominal:      General: Bowel sounds are normal.      Palpations: Abdomen is soft.   Musculoskeletal: Normal range of motion.   Skin:     General: Skin is warm and dry.      Capillary Refill: Capillary refill takes 2 to 3 seconds.   Neurological:      Mental Status: She is alert.      Comments: GCS- E3M5V1  Right eyes non-reactive (cataracts), left eyes reactive and  brisk  Pt withdrawal to pain on both sided right more than left and grimaces to pain.   Sensory intact in all extremities             Medications:  Continuousdextrose 10 % in water (D10W)    Scheduledalbuterol-ipratropium, 3 mL, Q6H  apixaban, 5 mg, BID  atorvastatin, 40 mg, Daily  bisacodyL, 10 mg, Daily  carvediloL, 6.25 mg, BID  ceFEPime (MAXIPIME) IVPB, 1 g, Q12H  diltiaZEM, 90 mg, Q8H  furosemide, 40 mg, BID  pantoprozole (PROTONIX) IV, 40 mg, Q12H  polyethylene glycol, 17 g, Daily  QUEtiapine, 50 mg, BID  senna-docusate 8.6-50 mg, 1 tablet, BID  valproic acid (as sodium salt), 250 mg, Q8H    PRNacetaminophen, 650 mg, Q6H PRN  dextrose 10 % in water (D10W), , Continuous PRN  dextrose 50%, 12.5 g, PRN  glucagon (human recombinant), 1 mg, PRN  labetalol, 10 mg, Q4H PRN  magnesium oxide, 800 mg, PRN  magnesium oxide, 800 mg, PRN  ondansetron, 4 mg, Q6H PRN  oxyCODONE, 5 mg, Q6H PRN  potassium chloride, 40 mEq, PRN  potassium chloride, 40 mEq, PRN  potassium chloride, 60 mEq, PRN  potassium, sodium phosphates, 2 packet, PRN  potassium, sodium phosphates, 2 packet, PRN  potassium, sodium phosphates, 2 packet, PRN      Today I personally reviewed pertinent medications, lines/drains/airways, imaging, cardiology results, laboratory results, microbiology results,     Diet  Diet NPO  Diet NPO

## 2020-09-16 NOTE — PLAN OF CARE
09/16/20 1628   Post-Acute Status   Post-Acute Authorization Placement   Post-Acute Placement Status Set-up Complete  ([facility])     SW sent orders via . Advised by April Pt will go to room 7116 and she requested call to report at [phone] at 6:30pm.  scheduled via [facility] orders at 7pm. Advised RN and Pt  at bedside. Provided [place] envelope to RN.    Zofia Pantoja, JUAN LUISW  Neurocritical Care   [facility]  [id number]

## 2020-09-16 NOTE — PROGRESS NOTES
[facility]  Neurocritical Care  Progress Note    Admit Date: 8/29/2020  Service Date: 09/16/2020  Length of Stay: 18    Subjective:     Chief Complaint: Embolic stroke involving right middle cerebral artery    History of Present Illness: 64 year old female with a PMH significant for COPD, HTN, and every day smoker who presented at OSH on 08/26/20 with acute left sided facial droop and left sided weakness (last known normal 08/25/20). Pt was not given TPA or endovascular intervention, and she was intubated on 08/26/20 for agitation. CTH on 08/26/20 showed right posterior frontal/parietal infarction with no intracranial hemorrhage. Hospital course complicated by Afib with RVR in which she was started on lovenox and then transitioned to heparin gtt for GOYO and HFrEF. Pt was transferred to NICU for higher level of care and persistent epistaxis. Upon arrival Pt was intubated and sedated, Rhinorocket  Bilaterally in place, propofol and diltiazem gtt. Pt was able to move the right side and moves her left side to noxious stimuli. A-line placed.       Hospital Course: 08/29/20: Pt admitted to NICU for higher level of care and ENT consult. Pt with a run of Vtach associated with hypotension.   8/31/2020: hold anticoagulation until tomorrow due to GI bleed/epistaxis (at other facility) GI consult, pt in Afib- load with digoxin, pending digoxin level tomorrow, continue diltiazem gtt today, start tube feeds, obtain PIV and d/c central line, continue cefepime for total 7 days  9/1/2020: initiated heparin gtt minimal intensity-when at goal obtain CT head, continue diltiazem gtt, start tube feeds, d/c A-line  9/2/2020: changed to PO diltiazem 60mg q8hr from gtt, lasix, miralax, plan for extubation  9/3/2020: started seroquel 25BID  9/4/2020: increased seroquel to 25 mg BID, magnesium citrate, and try SBT  9/5/2020: place A-line, increased Po dilt, add chest PT, add water flushes and repeat magnesium  citrate  9/6/2020: d/c digoxin, add coreg 6.25 BID, d/c water flushes, repeat CMP, add lasix PO 40 mg BID  09/07/2020 Plan to wean FiO2. As we are weaning propofol, precedex will be added. Patient is currently on heparin drip. Daily EKG for QTc monitoring. Patient given one dose of diamox with plans to restart furosemide for metabolic alkalosis. Will discontinue barr tomorrow. F/u with procalcitonin due to increased WBC count. Will speak to family member about Trach/PEG discussion   09/08/2020 Patient remains on her heparin drip. Currently she is rate controlled. Patient is off of her propofol due to significnt hypotension. We are holding the next two doses of lasix and giving her diamox 500 mg for two doses for metabolic alkalosis. Dr. Zavala spoke to the POA, Griffin Gonzalez who would like to proceed with a trach/PEG. Patient was pan cultured today and started on broad spectrum antibiotics. Barr was changed today   09/09/2020 Patient scheduled to get trach/PEG this Friday. Still on broad spectrum antibiotics as she has an elevated WBC. Working up for alternative causes of fever: US of the upper and lower extremities, rapid COVID, and amylase lipase.   09/10/2020 Patient scheduled for trach/peg for tomorrow. We are holding the heparin drip and the tube feeds for this midnight. No DVT on upper and lower extremities. Continuing the broad spectrum antibiotics until the susceptibilities return. D/c a line as it is no longer working.   09/11/2020 Will go to the OR today for trach and PEG. Cefepime to discontinue after ten days (for positive respiratory culture)  09/12/2020 Patient was restarted on the heparin drip last ngiht. Weaning propofol and precedex by adding oxycodone for the next 24 hours (PRN). Following up on EKG. Spontaneous trial once sedation is weaned. Awaiting stroke recommendation about when to transition to a DOAC. Tube feeds were started at 1 PM today.   9/13/2020 [facility] today before transition heparin  to DOAC. Weaned off fentanyl and precedex. Responding well to oxycodone. 1x 500 acetazolamide for metabolic alkalosis. Potential d/c to LTAC tomorrow.  9/14 NAEON Transitioned from heparin gtt to Apixaban. Remains intubated. Increased rate early am, Pco2in 50s, repeat ABG the same. Pt with hx of COPD. Secretions tan colored sputum sent for culture. Continue ABXs for now.   09/15/2020 increased leukocytosis overnight without fever  9/16/2020 slight increase overnight remain on cefepime. Possible transfer to LTAC today. Pt has authorization for LTAC and bed available today. V/S stable over night       Interval History:  9/16/20. Pt had slight increase in WBC continue cefepime on day nine will continue 10 day course. Ventilator setting remain the same. V/S stable. Transfer to LTAC today.  Review of Systems   Reason unable to perform ROS: pt trached no family at bedside.     2 systems OR Unable to obtain a complete ROS due to level of consciousness.  Objective:     Vitals:  Temp: 98 °F (36.7 °C)  Pulse: 92  Rhythm: atrial rhythm  BP: 119/82  MAP (mmHg): 97  Resp: 20  SpO2: 97 %  Oxygen Concentration (%): 50  O2 Device (Oxygen Therapy): ventilator  Vent Mode: A/C  Set Rate: 20 BPM  Vt Set: 420 mL  PEEP/CPAP: 5 cmH20  Peak Airway Pressure: 19 cmH2O  Mean Airway Pressure: 8.7 cmH20  Plateau Pressure: 13 cmH20    Temp  Min: 98 °F (36.7 °C)  Max: 99.6 °F (37.6 °C)  Pulse  Min: 85  Max: 151  BP  Min: 111/74  Max: 171/86  MAP (mmHg)  Min: 81  Max: 117  Resp  Min: 3  Max: 28  SpO2  Min: 78 %  Max: 100 %  Oxygen Concentration (%)  Min: 50  Max: 50    09/15 0701 - 09/16 0700  In: 1195 [I.V.:45]  Out: 450 [Urine:450]   Unmeasured Output  Urine Occurrence: 1  Stool Occurrence: 0  Emesis Occurrence: 0  Pad Count: 1       Physical Exam  HENT:      Head: Normocephalic.      Nose: Nose normal.      Mouth/Throat:      Mouth: Mucous membranes are moist.      Pharynx: Oropharynx is clear.   Neck:      Musculoskeletal: Normal range of  motion.   Cardiovascular:      Rate and Rhythm: Normal rate and regular rhythm.      Pulses: Normal pulses.      Heart sounds: Normal heart sounds.   Pulmonary:      Effort: Pulmonary effort is normal.      Breath sounds: Rales present.   Abdominal:      General: Bowel sounds are normal.      Palpations: Abdomen is soft.   Musculoskeletal: Normal range of motion.   Skin:     General: Skin is warm and dry.      Capillary Refill: Capillary refill takes 2 to 3 seconds.   Neurological:      Mental Status: She is alert.      Comments: GCS- [id number]  Right eyes non-reactive (cataracts), left eyes reactive and brisk  Pt withdrawal to pain on both sided right more than left and grimaces to pain.   Sensory intact in all extremities             Medications:  Continuousdextrose 10 % in water (D10W)    Scheduledalbuterol-ipratropium, 3 mL, Q6H  apixaban, 5 mg, BID  atorvastatin, 40 mg, Daily  bisacodyL, 10 mg, Daily  carvediloL, 6.25 mg, BID  ceFEPime (MAXIPIME) IVPB, 1 g, Q12H  diltiaZEM, 90 mg, Q8H  furosemide, 40 mg, BID  pantoprozole (PROTONIX) IV, 40 mg, Q12H  polyethylene glycol, 17 g, Daily  QUEtiapine, 50 mg, BID  senna-docusate 8.6-50 mg, 1 tablet, BID  valproic acid (as sodium salt), 250 mg, Q8H    PRNacetaminophen, 650 mg, Q6H PRN  dextrose 10 % in water (D10W), , Continuous PRN  dextrose 50%, 12.5 g, PRN  glucagon (human recombinant), 1 mg, PRN  labetalol, 10 mg, Q4H PRN  magnesium oxide, 800 mg, PRN  magnesium oxide, 800 mg, PRN  ondansetron, 4 mg, Q6H PRN  oxyCODONE, 5 mg, Q6H PRN  potassium chloride, 40 mEq, PRN  potassium chloride, 40 mEq, PRN  potassium chloride, 60 mEq, PRN  potassium, sodium phosphates, 2 packet, PRN  potassium, sodium phosphates, 2 packet, PRN  potassium, sodium phosphates, 2 packet, PRN      Today I personally reviewed pertinent medications, lines/drains/airways, imaging, cardiology results, laboratory results, microbiology results,     Diet  Diet NPO  Diet NPO          Assessment/Plan:      Neuro  * Embolic stroke involving right middle cerebral artery  - Last seen normal on 08/25/20 with no intervention; intubated and sedated.    - Imaging shows large right ischemic stroke in the posterior frontal and parietal regions  - repeat CTH stable   - SBP < 160, MAP > 65  - EuNa   - CBC, CMP, coags daily X 3 days  -9/14 heparin gtt transitioned to apixaban 5mg bid  -  Atorvastatin 40mg daily  - Seroquel to 50 mg BID  - PEG/Trach placement on 9/12/20  PT/OT/SLP      Pulmonary  PNA (pneumonia)  - Respiratory cultures growing serratia  - Will continue cefepime 1g q12h with stop date after 10 days        Chronic obstructive pulmonary disease  - goal SpO2 is > 88%  - scheduled duo nebs q6h, CPT  - wean vent as tolerated    Acute on chronic respiratory failure with hypoxia and hypercapnia  - Pt trached and peged 9/13  - Daily CXRs and ABG  duonebs q6h, CPT, NT suction prn  9/14 Sputum culture sent  Continue cefepime for 10 days total     Cardiac/Vascular  Atrial fibrillation with rapid ventricular response  - Repeat EKG prn  - daily Mg and Phos X3 days then per LTAC discretion    - dilt PO 90mg  - dig level 1.8 9/1 9/14 transition heparin gtt to apixaban 5mg bid          Essential hypertension  - SBP goal < 160, MAP >65  Coreg 6.25 mg bid  Diltiazem 90mg q8h  Lasix 40mg bid    · Echo: Moderate concentric left ventricular hypertrophy.  · Moderately to severely decreased left ventricular systolic function. The estimated ejection fraction is 25-30%.  · Left ventricular diastolic dysfunction.  · Moderately to severely reduced right ventricular systolic function.  · Moderate left atrial enlargement.  · Severe right atrial enlargement.  · Moderate mitral regurgitation.  · Moderate tricuspid regurgitation.  · Intermediate central venous pressure (8 mmHg).  · The estimated PA systolic pressure is 42 mmHg.  · Pulmonary hypertension present.  · Negative bubble study.         Renal/  GOYO (acute kidney injury)  - Avoid  nephrotoxic drugs  - Monitor Daily I/O  Monitor CMP    Endocrine  Moderate malnutrition  - tube feeds isosource 40cc/h  - monitor electrolytes with daily CMP, Mg, phos    Diabetes mellitus-resolved as of 9/16/2020  - SSI moderate  - POCT glu monitoring q4h          The patient is being Prophylaxed for:  Venous Thromboembolism with: Chemical  Stress Ulcer with: PPI  Ventilator Pneumonia with: chlorhexidine oral care    Activity Orders          Diet NPO: NPO starting at 09/11 0001        Full Code  Level three care  Harmony Patrick NP  Neurocritical Care  [facility]

## 2020-09-16 NOTE — DISCHARGE SUMMARY
[facility]  Neurocritical Care  Discharge Summary    Admit Date: 8/29/2020    Service Date: 09/16/2020    Discharge Date: 9/16/2020    Length of Stay: 18    Final Active Diagnoses:    Diagnosis Date Noted POA    PRINCIPAL PROBLEM:  Embolic stroke involving right middle cerebral artery [I63.411] 08/29/2020 Yes    PNA (pneumonia) [J18.9] 09/11/2020 Unknown    Leukocytosis [D72.829] 09/09/2020 Yes    Macroglossia [Q38.2] 09/07/2020 Not Applicable    Moderate malnutrition [E44.0] 09/03/2020 Yes    Atrial fibrillation with rapid ventricular response [I48.91] 08/26/2020 Yes    Chronic obstructive pulmonary disease [J44.9] 06/04/2019 Yes    Essential hypertension [I10] 04/19/2019 Yes    GOYO (acute kidney injury) [N17.9] 04/17/2019 Yes    Acute on chronic respiratory failure with hypoxia and hypercapnia [J96.21, J96.22] 04/16/2019 Yes      Problems Resolved During this Admission:    Diagnosis Date Noted Date Resolved POA    Diabetes mellitus [E11.9] 09/03/2020 09/16/2020 Yes    Cytotoxic cerebral edema [G93.6] 08/30/2020 09/16/2020 Yes    Epistaxis [R04.0] 08/28/2020 09/16/2020 Yes    GI bleed [K92.2] 08/28/2020 09/16/2020 Yes      History of Present Illness: 64 year old female with a PMH significant for COPD, HTN, and every day smoker who presented at OSH on 08/26/20 with acute left sided facial droop and left sided weakness (last known normal 08/25/20). Pt was not given TPA or endovascular intervention, and she was intubated on 08/26/20 for agitation. CTH on 08/26/20 showed right posterior frontal/parietal infarction with no intracranial hemorrhage. Hospital course complicated by Afib with RVR in which she was started on lovenox and then transitioned to heparin gtt for GOYO and HFrEF. Pt was transferred to NICU for higher level of care and persistent epistaxis. Upon arrival Pt was intubated and sedated, Rhinorocket  Bilaterally in place, propofol and diltiazem gtt. Pt was able to move the  right side and moves her left side to noxious stimuli. A-line placed.       Hospital Course by Event: 08/29/20: Pt admitted to NICU for higher level of care and ENT consult. Pt with a run of Vtach associated with hypotension.   8/31/2020: hold anticoagulation until tomorrow due to GI bleed/epistaxis (at other facility) GI consult, pt in Afib- load with digoxin, pending digoxin level tomorrow, continue diltiazem gtt today, start tube feeds, obtain PIV and d/c central line, continue cefepime for total 7 days  9/1/2020: initiated heparin gtt minimal intensity-when at goal obtain CT head, continue diltiazem gtt, start tube feeds, d/c A-line  9/2/2020: changed to PO diltiazem 60mg q8hr from gtt, lasix, miralax, plan for extubation  9/3/2020: started seroquel 25BID  9/4/2020: increased seroquel to 25 mg BID, magnesium citrate, and try SBT  9/5/2020: place A-line, increased Po dilt, add chest PT, add water flushes and repeat magnesium citrate  9/6/2020: d/c digoxin, add coreg 6.25 BID, d/c water flushes, repeat CMP, add lasix PO 40 mg BID  09/07/2020 Plan to wean FiO2. As we are weaning propofol, precedex will be added. Patient is currently on heparin drip. Daily EKG for QTc monitoring. Patient given one dose of diamox with plans to restart furosemide for metabolic alkalosis. Will discontinue barr tomorrow. F/u with procalcitonin due to increased WBC count. Will speak to family member about Trach/PEG discussion   09/08/2020 Patient remains on her heparin drip. Currently she is rate controlled. Patient is off of her propofol due to significnt hypotension. We are holding the next two doses of lasix and giving her diamox 500 mg for two doses for metabolic alkalosis. Dr. Zavala spoke to the POA, Griffin Gonzalez who would like to proceed with a trach/PEG. Patient was pan cultured today and started on broad spectrum antibiotics. Barr was changed today   09/09/2020 Patient scheduled to get trach/PEG this Friday. Still on broad  spectrum antibiotics as she has an elevated WBC. Working up for alternative causes of fever: US of the upper and lower extremities, rapid COVID, and amylase lipase.   09/10/2020 Patient scheduled for trach/peg for tomorrow. We are holding the heparin drip and the tube feeds for this midnight. No DVT on upper and lower extremities. Continuing the broad spectrum antibiotics until the susceptibilities return. D/c a line as it is no longer working.   09/11/2020 Will go to the OR today for trach and PEG. Cefepime to discontinue after ten days (for positive respiratory culture)  09/12/2020 Patient was restarted on the heparin drip last ngiht. Weaning propofol and precedex by adding oxycodone for the next 24 hours (PRN). Following up on EKG. Spontaneous trial once sedation is weaned. Awaiting stroke recommendation about when to transition to a DOAC. Tube feeds were started at 1 PM today.   9/13/2020 CTH today before transition heparin to DOAC. Weaned off fentanyl and precedex. Responding well to oxycodone. 1x 500 acetazolamide for metabolic alkalosis. Potential d/c to LTAC tomorrow.  9/14 NAEON Transitioned from heparin gtt to Apixaban. Remains intubated. Increased rate early am, Pco2in 50s, repeat ABG the same. Pt with hx of COPD. Secretions tan colored sputum sent for culture. Continue ABXs for now.   09/15/2020 increased leukocytosis overnight without fever  9/16/2020 slight increase overnight remain on cefepime. Possible transfer to LTAC today. Pt has authorization for LTAC and bed available today. V/S stable over night       Hospital Course by Problem:   * Embolic stroke involving right middle cerebral artery  - Last seen normal on 08/25/20 with no intervention; intubated and sedated.    - Imaging shows large right ischemic stroke in the posterior frontal and parietal regions  - repeat CTH stable   - SBP < 160, MAP > 65  - EuNa   - CBC, CMP, coags daily X 3 days  -9/14 heparin gtt transitioned to apixaban 5mg bid  -   Atorvastatin 40mg daily  - Seroquel to 50 mg BID  - PEG/Trach placement on 9/12/20  PT/OT/SLP      PNA (pneumonia)  - Respiratory cultures growing serratia  - Will continue cefepime 1g q12h with stop date after 10 days        Leukocytosis  Increased leukocytosis overnight but remained afebrile  Trend procal  Continue 10 day course of cefepime       Moderate malnutrition  - tube feeds isosource 40cc/h  - monitor electrolytes with daily CMP, Mg, phos    Atrial fibrillation with rapid ventricular response  - Repeat EKG prn  - daily Mg and Phos X3 days then per LTAC discretion    - dilt PO 90mg  - dig level 1.8 9/1 9/14 transition heparin gtt to apixaban 5mg bid          Chronic obstructive pulmonary disease  - goal SpO2 is > 88%  - scheduled duo nebs q6h, CPT  - wean vent as tolerated  - abg prn    Essential hypertension  - SBP goal < 160, MAP >65  Coreg 6.25 mg bid  Diltiazem 90mg q8h  Lasix 40mg bid    · Echo: Moderate concentric left ventricular hypertrophy.  · Moderately to severely decreased left ventricular systolic function. The estimated ejection fraction is 25-30%.  · Left ventricular diastolic dysfunction.  · Moderately to severely reduced right ventricular systolic function.  · Moderate left atrial enlargement.  · Severe right atrial enlargement.  · Moderate mitral regurgitation.  · Moderate tricuspid regurgitation.  · Intermediate central venous pressure (8 mmHg).  · The estimated PA systolic pressure is 42 mmHg.  · Pulmonary hypertension present.  · Negative bubble study.         GOYO (acute kidney injury)  - Avoid nephrotoxic drugs  - Monitor Daily I/O  -Monitor CMP    Acute on chronic respiratory failure with hypoxia and hypercapnia  - Pt trached and peged 9/13  - Daily CXRs and ABG  duonebs q6h, CPT, NT suction prn  9/14 Sputum culture sent  Continue cefepime for 10 days total       Significant Results:  Imaging:  MRI 8/28/2020: Large area of restricted diffusion consistent with continued evolution of  recent infarction involving the right posterior frontal and parietal lobes lobes.  No evidence of hemorrhage.     Chronic microvascular ischemic changes.     Head CT 8/30/2020. Continued evolution of right frontal and parietal lobe infarctions with no evidence of hemorrhage.  Associated edema is noted with no midline shift.  Mild mass effect on the right lateral ventricle.  No hydrocephalus.     Head CT 9/13/2020: 1. Evolution of large right MCA distribution infarct with development of mild patchy petechial hemorrhage.  Mildly improved mass effect.  2. Chronic microvascular ischemic change.  3. Paranasal sinus disease and mastoid effusions       Cardiology:  ·  ECHO 8/26.Moderate concentric left ventricular hypertrophy.  · Moderately to severely decreased left ventricular systolic function. The estimated ejection fraction is 25-30%.  · Left ventricular diastolic dysfunction.  · Moderately to severely reduced right ventricular systolic function.  · Moderate left atrial enlargement.  · Severe right atrial enlargement.  · Moderate mitral regurgitation.  · Moderate tricuspid regurgitation.  · Intermediate central venous pressure (8 mmHg).  · The estimated PA systolic pressure is 42 mmHg.  · Pulmonary hypertension present.  Negative bubble study.     Microbiology:  Respiratory Culture 9/8/2020: SERRATIA MARCESCENS   Few, STAPHYLOCOCCUS AUREUS   Moderate        Laboratory:  Lab Results   Component Value Date    HGBA1C 6.0 (H) 08/29/2020    CHOL 139 08/29/2020    HDL 49 08/29/2020    LDLCALC 70.2 08/29/2020    TRIG 99 08/29/2020    TSH 0.453 08/29/2020       Pending Results:     Consultations:  IP CONSULT TO REGISTERED DIETITIAN/NUTRITIONIST  IP CONSULT TO SOCIAL WORK/CASE MANAGEMENT  IP CONSULT TO ENT  IP CONSULT TO GI  WOUND CARE CONSULT  IP CONSULT TO GENERAL SURGERY  PHARMACY TO DOSE VANCOMYCIN CONSULT  WOUND CARE CONSULT      Procedures:   Procedure(s) (LRB):  CREATION, TRACHEOSTOMY- percutaneous (N/A)  INSERTION, PEG  TUBE (N/A) by Saurav Branham MD.      Medications:    Donita Gonzalez   Home Medication Instructions ODESSA:[id number]    Printed on:09/16/20 1641   Medication Information                      albuterol-ipratropium (DUO-NEB) 2.5 mg-0.5 mg/3 mL nebulizer solution  Take 3 mLs by nebulization every 6 (six) hours. Rescue             apixaban (ELIQUIS) 5 mg Tab  1 tablet (5 mg total) by Per G Tube route 2 (two) times daily.             atorvastatin (LIPITOR) 40 MG tablet  1 tablet (40 mg total) by Per G Tube route once daily.             bisacodyL (DULCOLAX) 10 mg Supp  Place 1 suppository (10 mg total) rectally once daily.             carvediloL (COREG) 6.25 MG tablet  1 tablet (6.25 mg total) by Per G Tube route 2 (two) times daily.             ceFEPIme (MAXIPIME) 1 gram injection  Inject 1 g into the vein every 12 (twelve) hours. for 1 dose             diltiaZEM (CARDIZEM) 90 MG tablet  1 tablet (90 mg total) by Per G Tube route every 8 (eight) hours.             furosemide (LASIX) 40 MG tablet  1 tablet (40 mg total) by Per G Tube route 2 (two) times daily.             pantoprazole (PROTONIX) 40 mg injection  Inject 40 mg into the vein every 12 (twelve) hours.             polyethylene glycol (GLYCOLAX) 17 gram PwPk  17 g by Per G Tube route once daily.             QUEtiapine (SEROQUEL) 50 MG tablet  1 tablet (50 mg total) by Per G Tube route 2 (two) times daily.             senna-docusate 8.6-50 mg (PERICOLACE) 8.6-50 mg per tablet  1 tablet by Per G Tube route 2 (two) times daily.             valproic acid, as sodium salt, (DEPAKENE) 250 mg/5 mL (5 mL) Soln  5 mLs (250 mg total) by Per G Tube route every 8 (eight) hours.               Diet: Tube Feeds   Activity: As tolerated.     Disposition: Discharged to [place] (LTAC) in stable condition.    Follow Up Plan: per LTAC      This discharge took less than 30 minutes to complete.    Harmony Patrick NP  Neurocritical Care  [facility]

## 2020-09-17 NOTE — NURSING
Attempted to call report at 1825 to [phone] and they asked for my name and number and would call me back in 10 minutes. Information given and will wait for call back.

## 2020-09-17 NOTE — NURSING
LTAC facility called at 20:25 and report given to NYASIA Lagos. Pt exited room at 20:40 with Judi. Pt transferred to stretched and connected to monitoring and vent per Judi without incident.

## 2020-09-17 NOTE — NURSING
"Attempted to call report again to [phone] and they stated that they are still trying to "get the patient out of 14 and then they will have to clean it and turn it around. Is it ok for us to give you a call back in 30 minutes". Told them that would be fine. Gave shift report to night RN and he will communicate with them when report is needed prior to transport. Transport was set for 1900 but no one has arrived at this time.   "

## 2020-12-01 NOTE — NURSING
-140, sustained Afib with RVR. ELENI Madsen, notified. Metoprolol 5 mg IVP given. WCTM.   Plastic Surgeon Procedure Text (A): After obtaining clear surgical margins the patient was sent to plastics for surgical repair.  The patient understands they will receive post-surgical care and follow-up from the referring physician's office.

## 2020-12-17 ENCOUNTER — TELEPHONE (OUTPATIENT)
Dept: NEUROLOGY | Facility: CLINIC | Age: 65
End: 2020-12-17

## 2020-12-17 NOTE — TELEPHONE ENCOUNTER
----- Message from Sarah Fox sent at 12/17/2020  8:15 AM CST -----  Contact: pt    Pt needs to be for neuro critical issue at [facility] transfer [facility] august       [phone] nurse michael fabian

## 2021-01-13 ENCOUNTER — OFFICE VISIT (OUTPATIENT)
Dept: NEUROLOGY | Facility: CLINIC | Age: 66
End: 2021-01-13
Payer: COMMERCIAL

## 2021-01-13 VITALS
HEART RATE: 89 BPM | SYSTOLIC BLOOD PRESSURE: 162 MMHG | BODY MASS INDEX: 14.53 KG/M2 | HEIGHT: 66 IN | DIASTOLIC BLOOD PRESSURE: 72 MMHG

## 2021-01-13 DIAGNOSIS — I10 ESSENTIAL HYPERTENSION: Primary | ICD-10-CM

## 2021-01-13 DIAGNOSIS — E78.5 HYPERLIPIDEMIA, UNSPECIFIED HYPERLIPIDEMIA TYPE: ICD-10-CM

## 2021-01-13 DIAGNOSIS — Z86.73 HISTORY OF STROKE: ICD-10-CM

## 2021-01-13 DIAGNOSIS — I48.91 ATRIAL FIBRILLATION WITH RAPID VENTRICULAR RESPONSE: ICD-10-CM

## 2021-01-13 PROCEDURE — 3008F PR BODY MASS INDEX (BMI) DOCUMENTED: ICD-10-PCS | Mod: CPTII,S$GLB,, | Performed by: PSYCHIATRY & NEUROLOGY

## 2021-01-13 PROCEDURE — 1126F AMNT PAIN NOTED NONE PRSNT: CPT | Mod: S$GLB,,, | Performed by: PSYCHIATRY & NEUROLOGY

## 2021-01-13 PROCEDURE — 3077F PR MOST RECENT SYSTOLIC BLOOD PRESSURE >= 140 MM HG: ICD-10-PCS | Mod: CPTII,S$GLB,, | Performed by: PSYCHIATRY & NEUROLOGY

## 2021-01-13 PROCEDURE — 3008F BODY MASS INDEX DOCD: CPT | Mod: CPTII,S$GLB,, | Performed by: PSYCHIATRY & NEUROLOGY

## 2021-01-13 PROCEDURE — 1126F PR PAIN SEVERITY QUANTIFIED, NO PAIN PRESENT: ICD-10-PCS | Mod: S$GLB,,, | Performed by: PSYCHIATRY & NEUROLOGY

## 2021-01-13 PROCEDURE — 99214 PR OFFICE/OUTPT VISIT, EST, LEVL IV, 30-39 MIN: ICD-10-PCS | Mod: S$GLB,,, | Performed by: PSYCHIATRY & NEUROLOGY

## 2021-01-13 PROCEDURE — 3078F PR MOST RECENT DIASTOLIC BLOOD PRESSURE < 80 MM HG: ICD-10-PCS | Mod: CPTII,S$GLB,, | Performed by: PSYCHIATRY & NEUROLOGY

## 2021-01-13 PROCEDURE — 3077F SYST BP >= 140 MM HG: CPT | Mod: CPTII,S$GLB,, | Performed by: PSYCHIATRY & NEUROLOGY

## 2021-01-13 PROCEDURE — 99999 PR PBB SHADOW E&M-EST. PATIENT-LVL III: CPT | Mod: PBBFAC,,, | Performed by: PSYCHIATRY & NEUROLOGY

## 2021-01-13 PROCEDURE — 99999 PR PBB SHADOW E&M-EST. PATIENT-LVL III: ICD-10-PCS | Mod: PBBFAC,,, | Performed by: PSYCHIATRY & NEUROLOGY

## 2021-01-13 PROCEDURE — 99214 OFFICE O/P EST MOD 30 MIN: CPT | Mod: S$GLB,,, | Performed by: PSYCHIATRY & NEUROLOGY

## 2021-01-13 PROCEDURE — 3078F DIAST BP <80 MM HG: CPT | Mod: CPTII,S$GLB,, | Performed by: PSYCHIATRY & NEUROLOGY

## 2021-01-13 RX ORDER — SCOLOPAMINE TRANSDERMAL SYSTEM 1 MG/1
PATCH, EXTENDED RELEASE TRANSDERMAL
COMMUNITY
Start: 2020-12-17 | End: 2021-03-10 | Stop reason: SDUPTHER

## 2021-01-13 RX ORDER — BACLOFEN 5 MG/1
5 TABLET ORAL 2 TIMES DAILY
COMMUNITY
Start: 2020-12-16 | End: 2021-01-15

## 2021-01-13 RX ORDER — METOPROLOL TARTRATE 25 MG/1
6.25 TABLET, FILM COATED ORAL EVERY 8 HOURS
COMMUNITY
Start: 2020-12-16 | End: 2021-01-15

## 2021-01-13 RX ORDER — ATORVASTATIN CALCIUM 40 MG/1
40 TABLET, FILM COATED ORAL NIGHTLY
COMMUNITY
Start: 2020-12-16 | End: 2021-01-15

## 2021-01-13 RX ORDER — AMIODARONE HYDROCHLORIDE 200 MG/1
200 TABLET ORAL 2 TIMES DAILY
COMMUNITY
Start: 2020-12-16 | End: 2021-01-15

## 2021-01-13 RX ORDER — OMEPRAZOLE 40 MG/1
40 CAPSULE, DELAYED RELEASE ORAL DAILY
COMMUNITY
Start: 2020-12-17 | End: 2021-03-10 | Stop reason: SDUPTHER

## 2021-01-13 RX ORDER — SENNOSIDES 8.6 MG/1
1 TABLET ORAL 2 TIMES DAILY
COMMUNITY
Start: 2020-12-16 | End: 2021-01-20

## 2021-01-20 DIAGNOSIS — I16.0 HYPERTENSIVE URGENCY: ICD-10-CM

## 2021-01-20 DIAGNOSIS — E44.0 MODERATE MALNUTRITION: ICD-10-CM

## 2021-01-20 DIAGNOSIS — I63.9 CEREBROVASCULAR ACCIDENT (CVA), UNSPECIFIED MECHANISM: Primary | ICD-10-CM

## 2021-01-20 DIAGNOSIS — I48.91 ATRIAL FIBRILLATION WITH RAPID VENTRICULAR RESPONSE: ICD-10-CM

## 2021-01-20 DIAGNOSIS — E78.5 HYPERLIPIDEMIA, UNSPECIFIED HYPERLIPIDEMIA TYPE: ICD-10-CM

## 2021-01-20 DIAGNOSIS — R44.3 HALLUCINATIONS: ICD-10-CM

## 2021-01-20 RX ORDER — METOPROLOL TARTRATE 25 MG/1
25 TABLET, FILM COATED ORAL EVERY 8 HOURS
COMMUNITY
End: 2021-03-10 | Stop reason: SDUPTHER

## 2021-01-21 RX ORDER — CLONIDINE HYDROCHLORIDE 0.1 MG/1
0.1 TABLET ORAL EVERY 6 HOURS PRN
Qty: 60 TABLET | Refills: 2 | Status: ON HOLD | OUTPATIENT
Start: 2021-01-21 | End: 2021-04-15

## 2021-01-21 RX ORDER — QUETIAPINE FUMARATE 50 MG/1
50 TABLET, FILM COATED ORAL NIGHTLY
Qty: 90 TABLET | Refills: 1 | Status: SHIPPED | OUTPATIENT
Start: 2021-01-21 | End: 2022-01-01 | Stop reason: SDUPTHER

## 2021-01-21 RX ORDER — ATORVASTATIN CALCIUM 40 MG/1
40 TABLET, FILM COATED ORAL DAILY
Qty: 90 TABLET | Refills: 3
Start: 2021-01-21 | End: 2021-03-10

## 2021-01-21 RX ORDER — METOPROLOL TARTRATE 25 MG/1
25 TABLET, FILM COATED ORAL EVERY 8 HOURS
Qty: 180 TABLET | Refills: 1 | OUTPATIENT
Start: 2021-01-21

## 2021-01-21 RX ORDER — SENNOSIDES 8.6 MG/1
TABLET ORAL
Qty: 60 TABLET | Refills: 2 | Status: SHIPPED | OUTPATIENT
Start: 2021-01-21 | End: 2021-05-04 | Stop reason: SDUPTHER

## 2021-01-21 RX ORDER — DILTIAZEM HYDROCHLORIDE 30 MG/1
30 TABLET, FILM COATED ORAL EVERY 8 HOURS
Qty: 90 TABLET | Refills: 3 | Status: ON HOLD | OUTPATIENT
Start: 2021-01-21 | End: 2021-04-15 | Stop reason: HOSPADM

## 2021-03-09 ENCOUNTER — PATIENT OUTREACH (OUTPATIENT)
Dept: ADMINISTRATIVE | Facility: HOSPITAL | Age: 66
End: 2021-03-09

## 2021-03-10 ENCOUNTER — OFFICE VISIT (OUTPATIENT)
Dept: PRIMARY CARE CLINIC | Facility: CLINIC | Age: 66
End: 2021-03-10
Payer: COMMERCIAL

## 2021-03-10 ENCOUNTER — TELEPHONE (OUTPATIENT)
Dept: PRIMARY CARE CLINIC | Facility: CLINIC | Age: 66
End: 2021-03-10

## 2021-03-10 VITALS
TEMPERATURE: 98 F | OXYGEN SATURATION: 91 % | DIASTOLIC BLOOD PRESSURE: 88 MMHG | SYSTOLIC BLOOD PRESSURE: 156 MMHG | HEIGHT: 66 IN | RESPIRATION RATE: 20 BRPM | HEART RATE: 54 BPM | BODY MASS INDEX: 14.53 KG/M2

## 2021-03-10 DIAGNOSIS — R56.9 SEIZURE SECONDARY TO SUBTHERAPEUTIC ANTICONVULSANT MEDICATION: ICD-10-CM

## 2021-03-10 DIAGNOSIS — R56.9 SEIZURE: ICD-10-CM

## 2021-03-10 DIAGNOSIS — R26.9 ABNORMALITY OF GAIT: ICD-10-CM

## 2021-03-10 DIAGNOSIS — I10 ESSENTIAL HYPERTENSION: Primary | ICD-10-CM

## 2021-03-10 DIAGNOSIS — Z78.0 ASYMPTOMATIC AGE-RELATED POSTMENOPAUSAL STATE: ICD-10-CM

## 2021-03-10 DIAGNOSIS — Z11.59 NEED FOR HEPATITIS C SCREENING TEST: ICD-10-CM

## 2021-03-10 DIAGNOSIS — I48.91 ATRIAL FIBRILLATION WITH RAPID VENTRICULAR RESPONSE: ICD-10-CM

## 2021-03-10 DIAGNOSIS — Z79.899 SEIZURE SECONDARY TO SUBTHERAPEUTIC ANTICONVULSANT MEDICATION: ICD-10-CM

## 2021-03-10 DIAGNOSIS — Z99.81 DEPENDENCE ON SUPPLEMENTAL OXYGEN: ICD-10-CM

## 2021-03-10 DIAGNOSIS — Z11.4 SCREENING FOR HIV (HUMAN IMMUNODEFICIENCY VIRUS): ICD-10-CM

## 2021-03-10 DIAGNOSIS — Z12.11 SCREENING FOR COLON CANCER: ICD-10-CM

## 2021-03-10 DIAGNOSIS — Z12.31 ENCOUNTER FOR SCREENING MAMMOGRAM FOR MALIGNANT NEOPLASM OF BREAST: ICD-10-CM

## 2021-03-10 DIAGNOSIS — I69.359 HEMIPARESIS DUE TO RECENT CEREBROVASCULAR ACCIDENT (CVA): Primary | ICD-10-CM

## 2021-03-10 DIAGNOSIS — Z93.1 STATUS POST INSERTION OF PERCUTANEOUS ENDOSCOPIC GASTROSTOMY (PEG) TUBE: ICD-10-CM

## 2021-03-10 DIAGNOSIS — Z99.89 DEPENDENCE ON OTHER ENABLING MACHINE: ICD-10-CM

## 2021-03-10 PROCEDURE — 99999 PR PBB SHADOW E&M-EST. PATIENT-LVL V: ICD-10-PCS | Mod: PBBFAC,,, | Performed by: INTERNAL MEDICINE

## 2021-03-10 PROCEDURE — 3077F SYST BP >= 140 MM HG: CPT | Mod: CPTII,S$GLB,, | Performed by: INTERNAL MEDICINE

## 2021-03-10 PROCEDURE — 1126F PR PAIN SEVERITY QUANTIFIED, NO PAIN PRESENT: ICD-10-PCS | Mod: S$GLB,,, | Performed by: INTERNAL MEDICINE

## 2021-03-10 PROCEDURE — 3288F PR FALLS RISK ASSESSMENT DOCUMENTED: ICD-10-PCS | Mod: CPTII,S$GLB,, | Performed by: INTERNAL MEDICINE

## 2021-03-10 PROCEDURE — 99999 PR PBB SHADOW E&M-EST. PATIENT-LVL V: CPT | Mod: PBBFAC,,, | Performed by: INTERNAL MEDICINE

## 2021-03-10 PROCEDURE — 1101F PT FALLS ASSESS-DOCD LE1/YR: CPT | Mod: CPTII,S$GLB,, | Performed by: INTERNAL MEDICINE

## 2021-03-10 PROCEDURE — 3077F PR MOST RECENT SYSTOLIC BLOOD PRESSURE >= 140 MM HG: ICD-10-PCS | Mod: CPTII,S$GLB,, | Performed by: INTERNAL MEDICINE

## 2021-03-10 PROCEDURE — 3079F DIAST BP 80-89 MM HG: CPT | Mod: CPTII,S$GLB,, | Performed by: INTERNAL MEDICINE

## 2021-03-10 PROCEDURE — 99214 PR OFFICE/OUTPT VISIT, EST, LEVL IV, 30-39 MIN: ICD-10-PCS | Mod: S$GLB,,, | Performed by: INTERNAL MEDICINE

## 2021-03-10 PROCEDURE — 1101F PR PT FALLS ASSESS DOC 0-1 FALLS W/OUT INJ PAST YR: ICD-10-PCS | Mod: CPTII,S$GLB,, | Performed by: INTERNAL MEDICINE

## 2021-03-10 PROCEDURE — 3079F PR MOST RECENT DIASTOLIC BLOOD PRESSURE 80-89 MM HG: ICD-10-PCS | Mod: CPTII,S$GLB,, | Performed by: INTERNAL MEDICINE

## 2021-03-10 PROCEDURE — 3288F FALL RISK ASSESSMENT DOCD: CPT | Mod: CPTII,S$GLB,, | Performed by: INTERNAL MEDICINE

## 2021-03-10 PROCEDURE — 3008F PR BODY MASS INDEX (BMI) DOCUMENTED: ICD-10-PCS | Mod: CPTII,S$GLB,, | Performed by: INTERNAL MEDICINE

## 2021-03-10 PROCEDURE — 3008F BODY MASS INDEX DOCD: CPT | Mod: CPTII,S$GLB,, | Performed by: INTERNAL MEDICINE

## 2021-03-10 PROCEDURE — 1126F AMNT PAIN NOTED NONE PRSNT: CPT | Mod: S$GLB,,, | Performed by: INTERNAL MEDICINE

## 2021-03-10 PROCEDURE — 99214 OFFICE O/P EST MOD 30 MIN: CPT | Mod: S$GLB,,, | Performed by: INTERNAL MEDICINE

## 2021-03-10 RX ORDER — SCOLOPAMINE TRANSDERMAL SYSTEM 1 MG/1
PATCH, EXTENDED RELEASE TRANSDERMAL
Qty: 30 PATCH | Refills: 0 | Status: SHIPPED | OUTPATIENT
Start: 2021-03-10 | End: 2021-11-22

## 2021-03-10 RX ORDER — SILVER SULFADIAZINE 10 G/1000G
CREAM TOPICAL DAILY
Qty: 50 G | Refills: 1 | Status: SHIPPED | OUTPATIENT
Start: 2021-03-10 | End: 2021-11-22

## 2021-03-10 RX ORDER — METOPROLOL TARTRATE 25 MG/1
25 TABLET, FILM COATED ORAL EVERY 8 HOURS
Qty: 90 TABLET | Refills: 3 | Status: SHIPPED | OUTPATIENT
Start: 2021-03-10 | End: 2021-03-10

## 2021-03-10 RX ORDER — MUPIROCIN 20 MG/G
OINTMENT TOPICAL 2 TIMES DAILY
Qty: 30 G | Refills: 1 | Status: SHIPPED | OUTPATIENT
Start: 2021-03-10

## 2021-03-10 RX ORDER — VALPROIC ACID 250 MG/5ML
250 SOLUTION ORAL EVERY 8 HOURS
Qty: 450 ML | Refills: 0 | Status: SHIPPED | OUTPATIENT
Start: 2021-03-10 | End: 2021-03-11 | Stop reason: DRUGHIGH

## 2021-03-10 RX ORDER — OMEPRAZOLE 40 MG/1
40 CAPSULE, DELAYED RELEASE ORAL DAILY
Qty: 90 CAPSULE | Refills: 3 | Status: SHIPPED | OUTPATIENT
Start: 2021-03-10 | End: 2021-11-22 | Stop reason: SDUPTHER

## 2021-03-10 RX ORDER — CLOTRIMAZOLE AND BETAMETHASONE DIPROPIONATE 10; .64 MG/G; MG/G
CREAM TOPICAL 2 TIMES DAILY
Qty: 45 G | Refills: 1 | Status: SHIPPED | OUTPATIENT
Start: 2021-03-10 | End: 2022-01-01 | Stop reason: SDUPTHER

## 2021-03-10 RX ORDER — METOPROLOL TARTRATE 25 MG/1
25 TABLET, FILM COATED ORAL 2 TIMES DAILY
Qty: 180 TABLET | Refills: 3 | Status: ON HOLD | OUTPATIENT
Start: 2021-03-10 | End: 2021-04-15 | Stop reason: HOSPADM

## 2021-03-11 DIAGNOSIS — R56.9 SEIZURE SECONDARY TO SUBTHERAPEUTIC ANTICONVULSANT MEDICATION: ICD-10-CM

## 2021-03-11 DIAGNOSIS — Z79.899 SEIZURE SECONDARY TO SUBTHERAPEUTIC ANTICONVULSANT MEDICATION: ICD-10-CM

## 2021-03-11 RX ORDER — VALPROIC ACID 250 MG/5ML
SOLUTION ORAL
Qty: 540 ML | Refills: 0 | Status: ON HOLD | OUTPATIENT
Start: 2021-03-11 | End: 2021-04-15 | Stop reason: HOSPADM

## 2021-03-19 DIAGNOSIS — M81.0 OSTEOPOROSIS, UNSPECIFIED OSTEOPOROSIS TYPE, UNSPECIFIED PATHOLOGICAL FRACTURE PRESENCE: Primary | ICD-10-CM

## 2021-03-19 RX ORDER — ALENDRONATE SODIUM 70 MG/1
70 TABLET ORAL
Qty: 12 TABLET | Refills: 3 | Status: SHIPPED | OUTPATIENT
Start: 2021-03-19 | End: 2022-03-04

## 2021-04-12 PROBLEM — I16.0 HYPERTENSIVE URGENCY: Status: ACTIVE | Noted: 2021-04-12

## 2021-04-13 PROBLEM — G40.919 BREAKTHROUGH SEIZURE: Status: ACTIVE | Noted: 2021-04-13

## 2021-04-15 PROBLEM — J96.21 ACUTE ON CHRONIC RESPIRATORY FAILURE WITH HYPOXIA: Status: RESOLVED | Noted: 2019-04-16 | Resolved: 2021-04-15

## 2021-04-15 PROBLEM — I16.0 HYPERTENSIVE URGENCY: Status: RESOLVED | Noted: 2021-04-12 | Resolved: 2021-04-15

## 2021-04-17 ENCOUNTER — PATIENT OUTREACH (OUTPATIENT)
Dept: ADMINISTRATIVE | Facility: CLINIC | Age: 66
End: 2021-04-17

## 2021-04-17 ENCOUNTER — NURSE TRIAGE (OUTPATIENT)
Dept: ADMINISTRATIVE | Facility: CLINIC | Age: 66
End: 2021-04-17

## 2021-04-19 ENCOUNTER — TELEPHONE (OUTPATIENT)
Dept: PRIMARY CARE CLINIC | Facility: CLINIC | Age: 66
End: 2021-04-19

## 2021-04-21 ENCOUNTER — TELEPHONE (OUTPATIENT)
Dept: PRIMARY CARE CLINIC | Facility: CLINIC | Age: 66
End: 2021-04-21

## 2021-04-23 ENCOUNTER — TELEPHONE (OUTPATIENT)
Dept: PRIMARY CARE CLINIC | Facility: CLINIC | Age: 66
End: 2021-04-23

## 2021-04-27 ENCOUNTER — OFFICE VISIT (OUTPATIENT)
Dept: PRIMARY CARE CLINIC | Facility: CLINIC | Age: 66
End: 2021-04-27
Payer: COMMERCIAL

## 2021-04-27 VITALS
BODY MASS INDEX: 18.15 KG/M2 | OXYGEN SATURATION: 99 % | HEIGHT: 66 IN | TEMPERATURE: 98 F | HEART RATE: 52 BPM | SYSTOLIC BLOOD PRESSURE: 154 MMHG | RESPIRATION RATE: 18 BRPM | DIASTOLIC BLOOD PRESSURE: 82 MMHG

## 2021-04-27 DIAGNOSIS — Z93.1 PRESENCE OF EXTERNALLY REMOVABLE PERCUTANEOUS ENDOSCOPIC GASTROSTOMY (PEG) TUBE: ICD-10-CM

## 2021-04-27 DIAGNOSIS — Z12.31 ENCOUNTER FOR SCREENING MAMMOGRAM FOR BREAST CANCER: ICD-10-CM

## 2021-04-27 DIAGNOSIS — Z12.11 ENCOUNTER FOR SCREENING COLONOSCOPY: ICD-10-CM

## 2021-04-27 DIAGNOSIS — R56.9 SEIZURE: ICD-10-CM

## 2021-04-27 DIAGNOSIS — I63.9 ACUTE CVA (CEREBROVASCULAR ACCIDENT): ICD-10-CM

## 2021-04-27 DIAGNOSIS — A52.9: Primary | ICD-10-CM

## 2021-04-27 DIAGNOSIS — I10 ESSENTIAL HYPERTENSION: ICD-10-CM

## 2021-04-27 PROCEDURE — 3008F BODY MASS INDEX DOCD: CPT | Mod: CPTII,S$GLB,, | Performed by: INTERNAL MEDICINE

## 2021-04-27 PROCEDURE — 1101F PR PT FALLS ASSESS DOC 0-1 FALLS W/OUT INJ PAST YR: ICD-10-PCS | Mod: CPTII,S$GLB,, | Performed by: INTERNAL MEDICINE

## 2021-04-27 PROCEDURE — 96372 THER/PROPH/DIAG INJ SC/IM: CPT | Mod: S$GLB,,, | Performed by: INTERNAL MEDICINE

## 2021-04-27 PROCEDURE — 3008F PR BODY MASS INDEX (BMI) DOCUMENTED: ICD-10-PCS | Mod: CPTII,S$GLB,, | Performed by: INTERNAL MEDICINE

## 2021-04-27 PROCEDURE — 1126F AMNT PAIN NOTED NONE PRSNT: CPT | Mod: S$GLB,,, | Performed by: INTERNAL MEDICINE

## 2021-04-27 PROCEDURE — 99214 PR OFFICE/OUTPT VISIT, EST, LEVL IV, 30-39 MIN: ICD-10-PCS | Mod: 25,S$GLB,, | Performed by: INTERNAL MEDICINE

## 2021-04-27 PROCEDURE — 96372 PR INJECTION,THERAP/PROPH/DIAG2ST, IM OR SUBCUT: ICD-10-PCS | Mod: S$GLB,,, | Performed by: INTERNAL MEDICINE

## 2021-04-27 PROCEDURE — 3288F FALL RISK ASSESSMENT DOCD: CPT | Mod: CPTII,S$GLB,, | Performed by: INTERNAL MEDICINE

## 2021-04-27 PROCEDURE — 99214 OFFICE O/P EST MOD 30 MIN: CPT | Mod: 25,S$GLB,, | Performed by: INTERNAL MEDICINE

## 2021-04-27 PROCEDURE — 99999 PR PBB SHADOW E&M-EST. PATIENT-LVL IV: ICD-10-PCS | Mod: PBBFAC,,, | Performed by: INTERNAL MEDICINE

## 2021-04-27 PROCEDURE — 3288F PR FALLS RISK ASSESSMENT DOCUMENTED: ICD-10-PCS | Mod: CPTII,S$GLB,, | Performed by: INTERNAL MEDICINE

## 2021-04-27 PROCEDURE — 1101F PT FALLS ASSESS-DOCD LE1/YR: CPT | Mod: CPTII,S$GLB,, | Performed by: INTERNAL MEDICINE

## 2021-04-27 PROCEDURE — 99999 PR PBB SHADOW E&M-EST. PATIENT-LVL IV: CPT | Mod: PBBFAC,,, | Performed by: INTERNAL MEDICINE

## 2021-04-27 PROCEDURE — 1126F PR PAIN SEVERITY QUANTIFIED, NO PAIN PRESENT: ICD-10-PCS | Mod: S$GLB,,, | Performed by: INTERNAL MEDICINE

## 2021-04-28 ENCOUNTER — TELEPHONE (OUTPATIENT)
Dept: PRIMARY CARE CLINIC | Facility: CLINIC | Age: 66
End: 2021-04-28

## 2021-04-28 DIAGNOSIS — I69.359 HEMIPARESIS DUE TO RECENT CEREBROVASCULAR ACCIDENT (CVA): ICD-10-CM

## 2021-04-28 DIAGNOSIS — M62.422 CONTRACTURE OF MUSCLE, LEFT UPPER ARM: Primary | ICD-10-CM

## 2021-04-29 ENCOUNTER — TELEPHONE (OUTPATIENT)
Dept: PRIMARY CARE CLINIC | Facility: CLINIC | Age: 66
End: 2021-04-29

## 2021-05-04 ENCOUNTER — CLINICAL SUPPORT (OUTPATIENT)
Dept: PRIMARY CARE CLINIC | Facility: CLINIC | Age: 66
End: 2021-05-04
Payer: COMMERCIAL

## 2021-05-04 DIAGNOSIS — E44.0 MODERATE MALNUTRITION: ICD-10-CM

## 2021-05-04 DIAGNOSIS — Z20.2 EXPOSURE TO SEXUALLY TRANSMITTED DISEASE (STD): Primary | ICD-10-CM

## 2021-05-04 PROCEDURE — 96372 THER/PROPH/DIAG INJ SC/IM: CPT | Mod: S$GLB,,, | Performed by: INTERNAL MEDICINE

## 2021-05-04 PROCEDURE — 96372 PR INJECTION,THERAP/PROPH/DIAG2ST, IM OR SUBCUT: ICD-10-PCS | Mod: S$GLB,,, | Performed by: INTERNAL MEDICINE

## 2021-05-04 RX ORDER — SENNOSIDES 8.6 MG/1
1 TABLET ORAL 2 TIMES DAILY
Qty: 60 TABLET | Refills: 3 | Status: SHIPPED | OUTPATIENT
Start: 2021-05-04 | End: 2021-11-22 | Stop reason: SDUPTHER

## 2021-05-11 ENCOUNTER — CLINICAL SUPPORT (OUTPATIENT)
Dept: PRIMARY CARE CLINIC | Facility: CLINIC | Age: 66
End: 2021-05-11
Payer: COMMERCIAL

## 2021-05-11 VITALS — SYSTOLIC BLOOD PRESSURE: 152 MMHG | DIASTOLIC BLOOD PRESSURE: 94 MMHG

## 2021-05-11 PROCEDURE — 96372 PR INJECTION,THERAP/PROPH/DIAG2ST, IM OR SUBCUT: ICD-10-PCS | Mod: S$GLB,,, | Performed by: INTERNAL MEDICINE

## 2021-05-11 PROCEDURE — 96372 THER/PROPH/DIAG INJ SC/IM: CPT | Mod: S$GLB,,, | Performed by: INTERNAL MEDICINE

## 2021-07-07 ENCOUNTER — OFFICE VISIT (OUTPATIENT)
Dept: PRIMARY CARE CLINIC | Facility: CLINIC | Age: 66
End: 2021-07-07
Payer: COMMERCIAL

## 2021-07-07 VITALS
RESPIRATION RATE: 16 BRPM | DIASTOLIC BLOOD PRESSURE: 84 MMHG | OXYGEN SATURATION: 95 % | HEART RATE: 67 BPM | TEMPERATURE: 98 F | SYSTOLIC BLOOD PRESSURE: 136 MMHG | HEIGHT: 66 IN | BODY MASS INDEX: 18 KG/M2 | WEIGHT: 112 LBS

## 2021-07-07 DIAGNOSIS — H60.541 ECZEMA OF RIGHT EXTERNAL EAR: ICD-10-CM

## 2021-07-07 DIAGNOSIS — M79.671 PAIN IN BOTH FEET: ICD-10-CM

## 2021-07-07 DIAGNOSIS — I69.359 HEMIPARESIS DUE TO RECENT CEREBROVASCULAR ACCIDENT (CVA): ICD-10-CM

## 2021-07-07 DIAGNOSIS — J44.9 CHRONIC OBSTRUCTIVE PULMONARY DISEASE, UNSPECIFIED COPD TYPE: Primary | ICD-10-CM

## 2021-07-07 DIAGNOSIS — Z99.81 DEPENDENCE ON SUPPLEMENTAL OXYGEN: ICD-10-CM

## 2021-07-07 DIAGNOSIS — J40 BRONCHITIS: ICD-10-CM

## 2021-07-07 DIAGNOSIS — M79.672 PAIN IN BOTH FEET: ICD-10-CM

## 2021-07-07 PROCEDURE — 3008F PR BODY MASS INDEX (BMI) DOCUMENTED: ICD-10-PCS | Mod: CPTII,S$GLB,, | Performed by: INTERNAL MEDICINE

## 2021-07-07 PROCEDURE — 1126F AMNT PAIN NOTED NONE PRSNT: CPT | Mod: S$GLB,,, | Performed by: INTERNAL MEDICINE

## 2021-07-07 PROCEDURE — 3008F BODY MASS INDEX DOCD: CPT | Mod: CPTII,S$GLB,, | Performed by: INTERNAL MEDICINE

## 2021-07-07 PROCEDURE — 99214 OFFICE O/P EST MOD 30 MIN: CPT | Mod: S$GLB,,, | Performed by: INTERNAL MEDICINE

## 2021-07-07 PROCEDURE — 1126F PR PAIN SEVERITY QUANTIFIED, NO PAIN PRESENT: ICD-10-PCS | Mod: S$GLB,,, | Performed by: INTERNAL MEDICINE

## 2021-07-07 PROCEDURE — 99999 PR PBB SHADOW E&M-EST. PATIENT-LVL V: ICD-10-PCS | Mod: PBBFAC,,, | Performed by: INTERNAL MEDICINE

## 2021-07-07 PROCEDURE — 1101F PT FALLS ASSESS-DOCD LE1/YR: CPT | Mod: CPTII,S$GLB,, | Performed by: INTERNAL MEDICINE

## 2021-07-07 PROCEDURE — 99214 PR OFFICE/OUTPT VISIT, EST, LEVL IV, 30-39 MIN: ICD-10-PCS | Mod: S$GLB,,, | Performed by: INTERNAL MEDICINE

## 2021-07-07 PROCEDURE — 1101F PR PT FALLS ASSESS DOC 0-1 FALLS W/OUT INJ PAST YR: ICD-10-PCS | Mod: CPTII,S$GLB,, | Performed by: INTERNAL MEDICINE

## 2021-07-07 PROCEDURE — 3288F PR FALLS RISK ASSESSMENT DOCUMENTED: ICD-10-PCS | Mod: CPTII,S$GLB,, | Performed by: INTERNAL MEDICINE

## 2021-07-07 PROCEDURE — 3288F FALL RISK ASSESSMENT DOCD: CPT | Mod: CPTII,S$GLB,, | Performed by: INTERNAL MEDICINE

## 2021-07-07 PROCEDURE — 99999 PR PBB SHADOW E&M-EST. PATIENT-LVL V: CPT | Mod: PBBFAC,,, | Performed by: INTERNAL MEDICINE

## 2021-07-07 RX ORDER — AZITHROMYCIN 250 MG/1
TABLET, FILM COATED ORAL
Qty: 6 TABLET | Refills: 0 | Status: SHIPPED | OUTPATIENT
Start: 2021-07-07 | End: 2021-07-11

## 2021-07-07 RX ORDER — LEVALBUTEROL INHALATION SOLUTION 0.63 MG/3ML
1 SOLUTION RESPIRATORY (INHALATION) EVERY 4 HOURS PRN
Qty: 1 BOX | Refills: 5 | Status: SHIPPED | OUTPATIENT
Start: 2021-07-07 | End: 2022-01-01 | Stop reason: SDUPTHER

## 2021-07-07 RX ORDER — TRIAMCINOLONE ACETONIDE 5 MG/G
CREAM TOPICAL 2 TIMES DAILY
Qty: 15 G | Refills: 1 | Status: SHIPPED | OUTPATIENT
Start: 2021-07-07 | End: 2021-11-22

## 2021-07-20 ENCOUNTER — TELEPHONE (OUTPATIENT)
Dept: PRIMARY CARE CLINIC | Facility: CLINIC | Age: 66
End: 2021-07-20

## 2021-07-20 DIAGNOSIS — J44.9 CHRONIC OBSTRUCTIVE PULMONARY DISEASE, UNSPECIFIED COPD TYPE: Primary | ICD-10-CM

## 2021-08-25 ENCOUNTER — OFFICE VISIT (OUTPATIENT)
Dept: PODIATRY | Facility: CLINIC | Age: 66
End: 2021-08-25
Payer: COMMERCIAL

## 2021-08-25 VITALS
HEIGHT: 66 IN | HEART RATE: 68 BPM | SYSTOLIC BLOOD PRESSURE: 174 MMHG | DIASTOLIC BLOOD PRESSURE: 93 MMHG | BODY MASS INDEX: 18 KG/M2 | WEIGHT: 112 LBS

## 2021-08-25 DIAGNOSIS — I69.398 HEMIPARESIS AND ALTERATION OF SENSATIONS AS LATE EFFECT OF CEREBROVASCULAR ACCIDENT (CVA): ICD-10-CM

## 2021-08-25 DIAGNOSIS — M79.671 PAIN IN BOTH FEET: ICD-10-CM

## 2021-08-25 DIAGNOSIS — Z79.01 LONG TERM CURRENT USE OF ANTICOAGULANT: ICD-10-CM

## 2021-08-25 DIAGNOSIS — I63.9 ACUTE CVA (CEREBROVASCULAR ACCIDENT): ICD-10-CM

## 2021-08-25 DIAGNOSIS — L60.0 INGROWN NAIL: Primary | ICD-10-CM

## 2021-08-25 DIAGNOSIS — M79.672 PAIN IN BOTH FEET: ICD-10-CM

## 2021-08-25 DIAGNOSIS — L84 CALLUS OF FOOT: ICD-10-CM

## 2021-08-25 DIAGNOSIS — B35.1 ONYCHOMYCOSIS OF TOENAIL: ICD-10-CM

## 2021-08-25 DIAGNOSIS — R20.0 NUMBNESS IN FEET: ICD-10-CM

## 2021-08-25 DIAGNOSIS — I69.359 HEMIPARESIS AND ALTERATION OF SENSATIONS AS LATE EFFECT OF CEREBROVASCULAR ACCIDENT (CVA): ICD-10-CM

## 2021-08-25 DIAGNOSIS — L84 HD (HELOMA DURUM): ICD-10-CM

## 2021-08-25 DIAGNOSIS — L60.2 ONYCHOGRYPOSIS OF TOENAIL: ICD-10-CM

## 2021-08-25 PROCEDURE — 3008F PR BODY MASS INDEX (BMI) DOCUMENTED: ICD-10-PCS | Mod: CPTII,S$GLB,, | Performed by: PODIATRIST

## 2021-08-25 PROCEDURE — 3077F PR MOST RECENT SYSTOLIC BLOOD PRESSURE >= 140 MM HG: ICD-10-PCS | Mod: CPTII,S$GLB,, | Performed by: PODIATRIST

## 2021-08-25 PROCEDURE — 1101F PT FALLS ASSESS-DOCD LE1/YR: CPT | Mod: CPTII,S$GLB,, | Performed by: PODIATRIST

## 2021-08-25 PROCEDURE — 3080F PR MOST RECENT DIASTOLIC BLOOD PRESSURE >= 90 MM HG: ICD-10-PCS | Mod: CPTII,S$GLB,, | Performed by: PODIATRIST

## 2021-08-25 PROCEDURE — 99999 PR PBB SHADOW E&M-EST. PATIENT-LVL IV: CPT | Mod: PBBFAC,,, | Performed by: PODIATRIST

## 2021-08-25 PROCEDURE — 99999 PR PBB SHADOW E&M-EST. PATIENT-LVL IV: ICD-10-PCS | Mod: PBBFAC,,, | Performed by: PODIATRIST

## 2021-08-25 PROCEDURE — 1101F PR PT FALLS ASSESS DOC 0-1 FALLS W/OUT INJ PAST YR: ICD-10-PCS | Mod: CPTII,S$GLB,, | Performed by: PODIATRIST

## 2021-08-25 PROCEDURE — 99203 OFFICE O/P NEW LOW 30 MIN: CPT | Mod: 25,S$GLB,, | Performed by: PODIATRIST

## 2021-08-25 PROCEDURE — 1159F MED LIST DOCD IN RCRD: CPT | Mod: CPTII,S$GLB,, | Performed by: PODIATRIST

## 2021-08-25 PROCEDURE — 3008F BODY MASS INDEX DOCD: CPT | Mod: CPTII,S$GLB,, | Performed by: PODIATRIST

## 2021-08-25 PROCEDURE — 1159F PR MEDICATION LIST DOCUMENTED IN MEDICAL RECORD: ICD-10-PCS | Mod: CPTII,S$GLB,, | Performed by: PODIATRIST

## 2021-08-25 PROCEDURE — 11721 ROUTINE FOOT CARE: ICD-10-PCS | Mod: 59,S$GLB,, | Performed by: PODIATRIST

## 2021-08-25 PROCEDURE — 1126F AMNT PAIN NOTED NONE PRSNT: CPT | Mod: CPTII,S$GLB,, | Performed by: PODIATRIST

## 2021-08-25 PROCEDURE — 11056 PARNG/CUTG B9 HYPRKR LES 2-4: CPT | Mod: S$GLB,,, | Performed by: PODIATRIST

## 2021-08-25 PROCEDURE — 99203 PR OFFICE/OUTPT VISIT, NEW, LEVL III, 30-44 MIN: ICD-10-PCS | Mod: 25,S$GLB,, | Performed by: PODIATRIST

## 2021-08-25 PROCEDURE — 3288F PR FALLS RISK ASSESSMENT DOCUMENTED: ICD-10-PCS | Mod: CPTII,S$GLB,, | Performed by: PODIATRIST

## 2021-08-25 PROCEDURE — 11056 ROUTINE FOOT CARE: ICD-10-PCS | Mod: S$GLB,,, | Performed by: PODIATRIST

## 2021-08-25 PROCEDURE — 1160F RVW MEDS BY RX/DR IN RCRD: CPT | Mod: CPTII,S$GLB,, | Performed by: PODIATRIST

## 2021-08-25 PROCEDURE — 1126F PR PAIN SEVERITY QUANTIFIED, NO PAIN PRESENT: ICD-10-PCS | Mod: CPTII,S$GLB,, | Performed by: PODIATRIST

## 2021-08-25 PROCEDURE — 3044F PR MOST RECENT HEMOGLOBIN A1C LEVEL <7.0%: ICD-10-PCS | Mod: CPTII,S$GLB,, | Performed by: PODIATRIST

## 2021-08-25 PROCEDURE — 3077F SYST BP >= 140 MM HG: CPT | Mod: CPTII,S$GLB,, | Performed by: PODIATRIST

## 2021-08-25 PROCEDURE — 3080F DIAST BP >= 90 MM HG: CPT | Mod: CPTII,S$GLB,, | Performed by: PODIATRIST

## 2021-08-25 PROCEDURE — 1160F PR REVIEW ALL MEDS BY PRESCRIBER/CLIN PHARMACIST DOCUMENTED: ICD-10-PCS | Mod: CPTII,S$GLB,, | Performed by: PODIATRIST

## 2021-08-25 PROCEDURE — 11721 DEBRIDE NAIL 6 OR MORE: CPT | Mod: 59,S$GLB,, | Performed by: PODIATRIST

## 2021-08-25 PROCEDURE — 3288F FALL RISK ASSESSMENT DOCD: CPT | Mod: CPTII,S$GLB,, | Performed by: PODIATRIST

## 2021-08-25 PROCEDURE — 3044F HG A1C LEVEL LT 7.0%: CPT | Mod: CPTII,S$GLB,, | Performed by: PODIATRIST

## 2021-08-26 ENCOUNTER — EXTERNAL HOME HEALTH (OUTPATIENT)
Dept: HOME HEALTH SERVICES | Facility: HOSPITAL | Age: 66
End: 2021-08-26

## 2021-11-22 ENCOUNTER — OFFICE VISIT (OUTPATIENT)
Dept: PRIMARY CARE CLINIC | Facility: CLINIC | Age: 66
End: 2021-11-22
Payer: COMMERCIAL

## 2021-11-22 VITALS
HEART RATE: 49 BPM | HEIGHT: 66 IN | DIASTOLIC BLOOD PRESSURE: 78 MMHG | SYSTOLIC BLOOD PRESSURE: 126 MMHG | OXYGEN SATURATION: 88 % | WEIGHT: 100.19 LBS | BODY MASS INDEX: 16.1 KG/M2 | RESPIRATION RATE: 18 BRPM

## 2021-11-22 DIAGNOSIS — Z13.6 ENCOUNTER FOR LIPID SCREENING FOR CARDIOVASCULAR DISEASE: ICD-10-CM

## 2021-11-22 DIAGNOSIS — I69.359 HEMIPARESIS DUE TO RECENT CEREBROVASCULAR ACCIDENT (CVA): Primary | ICD-10-CM

## 2021-11-22 DIAGNOSIS — J30.89 NON-SEASONAL ALLERGIC RHINITIS, UNSPECIFIED TRIGGER: ICD-10-CM

## 2021-11-22 DIAGNOSIS — J44.9 CHRONIC OBSTRUCTIVE PULMONARY DISEASE, UNSPECIFIED COPD TYPE: ICD-10-CM

## 2021-11-22 DIAGNOSIS — Z13.220 ENCOUNTER FOR LIPID SCREENING FOR CARDIOVASCULAR DISEASE: ICD-10-CM

## 2021-11-22 DIAGNOSIS — E44.0 MODERATE MALNUTRITION: ICD-10-CM

## 2021-11-22 DIAGNOSIS — R56.9 SEIZURE: ICD-10-CM

## 2021-11-22 DIAGNOSIS — Z12.31 ENCOUNTER FOR SCREENING MAMMOGRAM FOR BREAST CANCER: ICD-10-CM

## 2021-11-22 DIAGNOSIS — K21.9 GASTROESOPHAGEAL REFLUX DISEASE WITHOUT ESOPHAGITIS: ICD-10-CM

## 2021-11-22 DIAGNOSIS — K59.00 CONSTIPATION, UNSPECIFIED CONSTIPATION TYPE: ICD-10-CM

## 2021-11-22 PROCEDURE — 99214 OFFICE O/P EST MOD 30 MIN: CPT | Mod: S$GLB,,, | Performed by: INTERNAL MEDICINE

## 2021-11-22 PROCEDURE — 99999 PR PBB SHADOW E&M-EST. PATIENT-LVL IV: CPT | Mod: PBBFAC,,, | Performed by: INTERNAL MEDICINE

## 2021-11-22 PROCEDURE — 99214 PR OFFICE/OUTPT VISIT, EST, LEVL IV, 30-39 MIN: ICD-10-PCS | Mod: S$GLB,,, | Performed by: INTERNAL MEDICINE

## 2021-11-22 PROCEDURE — 99999 PR PBB SHADOW E&M-EST. PATIENT-LVL IV: ICD-10-PCS | Mod: PBBFAC,,, | Performed by: INTERNAL MEDICINE

## 2021-11-22 RX ORDER — LANOLIN ALCOHOL/MO/W.PET/CERES
CREAM (GRAM) TOPICAL
COMMUNITY
End: 2022-01-01 | Stop reason: ALTCHOICE

## 2021-11-22 RX ORDER — MINERAL OIL
180 ENEMA (ML) RECTAL DAILY
Qty: 30 TABLET | Refills: 5 | Status: SHIPPED | OUTPATIENT
Start: 2021-11-22 | End: 2022-01-01 | Stop reason: SDUPTHER

## 2021-11-22 RX ORDER — OMEPRAZOLE 40 MG/1
40 CAPSULE, DELAYED RELEASE ORAL DAILY
Qty: 90 CAPSULE | Refills: 3 | Status: SHIPPED | OUTPATIENT
Start: 2021-11-22 | End: 2022-01-01

## 2021-11-22 RX ORDER — SENNOSIDES 8.6 MG/1
1 TABLET ORAL 2 TIMES DAILY
Qty: 60 TABLET | Refills: 3 | Status: SHIPPED | OUTPATIENT
Start: 2021-11-22 | End: 2022-01-01 | Stop reason: SDUPTHER

## 2022-01-01 ENCOUNTER — PATIENT OUTREACH (OUTPATIENT)
Dept: ADMINISTRATIVE | Facility: OTHER | Age: 67
End: 2022-01-01
Payer: COMMERCIAL

## 2022-01-01 ENCOUNTER — OFFICE VISIT (OUTPATIENT)
Dept: GASTROENTEROLOGY | Facility: CLINIC | Age: 67
End: 2022-01-01
Payer: COMMERCIAL

## 2022-01-01 ENCOUNTER — OFFICE VISIT (OUTPATIENT)
Dept: PRIMARY CARE CLINIC | Facility: CLINIC | Age: 67
End: 2022-01-01
Payer: COMMERCIAL

## 2022-01-01 VITALS
DIASTOLIC BLOOD PRESSURE: 72 MMHG | WEIGHT: 99.19 LBS | HEART RATE: 68 BPM | RESPIRATION RATE: 16 BRPM | HEIGHT: 66 IN | BODY MASS INDEX: 15.94 KG/M2 | SYSTOLIC BLOOD PRESSURE: 136 MMHG | OXYGEN SATURATION: 98 %

## 2022-01-01 VITALS
DIASTOLIC BLOOD PRESSURE: 80 MMHG | BODY MASS INDEX: 17.68 KG/M2 | HEART RATE: 62 BPM | OXYGEN SATURATION: 95 % | WEIGHT: 110 LBS | HEIGHT: 66 IN | SYSTOLIC BLOOD PRESSURE: 123 MMHG

## 2022-01-01 VITALS
SYSTOLIC BLOOD PRESSURE: 132 MMHG | RESPIRATION RATE: 18 BRPM | OXYGEN SATURATION: 94 % | WEIGHT: 100 LBS | BODY MASS INDEX: 16.07 KG/M2 | HEIGHT: 66 IN | DIASTOLIC BLOOD PRESSURE: 84 MMHG | HEART RATE: 82 BPM

## 2022-01-01 DIAGNOSIS — Z23 ENCOUNTER FOR VACCINATION: Primary | ICD-10-CM

## 2022-01-01 DIAGNOSIS — K21.9 GASTROESOPHAGEAL REFLUX DISEASE WITHOUT ESOPHAGITIS: ICD-10-CM

## 2022-01-01 DIAGNOSIS — I10 ESSENTIAL HYPERTENSION: ICD-10-CM

## 2022-01-01 DIAGNOSIS — R56.9 SEIZURE: ICD-10-CM

## 2022-01-01 DIAGNOSIS — R13.10 DYSPHAGIA, UNSPECIFIED TYPE: ICD-10-CM

## 2022-01-01 DIAGNOSIS — I48.91 ATRIAL FIBRILLATION WITH RAPID VENTRICULAR RESPONSE: ICD-10-CM

## 2022-01-01 DIAGNOSIS — Z93.1 STATUS POST INSERTION OF PERCUTANEOUS ENDOSCOPIC GASTROSTOMY (PEG) TUBE: Primary | ICD-10-CM

## 2022-01-01 DIAGNOSIS — K59.00 CONSTIPATION, UNSPECIFIED CONSTIPATION TYPE: ICD-10-CM

## 2022-01-01 DIAGNOSIS — Z79.01 CHRONIC ANTICOAGULATION: ICD-10-CM

## 2022-01-01 DIAGNOSIS — I16.0 HYPERTENSIVE URGENCY: ICD-10-CM

## 2022-01-01 DIAGNOSIS — R44.3 HALLUCINATIONS: ICD-10-CM

## 2022-01-01 DIAGNOSIS — H60.541 ECZEMA OF RIGHT EXTERNAL EAR: ICD-10-CM

## 2022-01-01 DIAGNOSIS — J30.89 NON-SEASONAL ALLERGIC RHINITIS, UNSPECIFIED TRIGGER: ICD-10-CM

## 2022-01-01 DIAGNOSIS — I63.9 CEREBROVASCULAR ACCIDENT (CVA), UNSPECIFIED MECHANISM: ICD-10-CM

## 2022-01-01 DIAGNOSIS — E44.0 MODERATE MALNUTRITION: ICD-10-CM

## 2022-01-01 DIAGNOSIS — J44.9 CHRONIC OBSTRUCTIVE PULMONARY DISEASE, UNSPECIFIED COPD TYPE: ICD-10-CM

## 2022-01-01 DIAGNOSIS — Z12.11 ENCOUNTER FOR SCREENING COLONOSCOPY: ICD-10-CM

## 2022-01-01 DIAGNOSIS — Z12.31 ENCOUNTER FOR SCREENING MAMMOGRAM FOR BREAST CANCER: ICD-10-CM

## 2022-01-01 DIAGNOSIS — J44.1 COPD EXACERBATION: Primary | ICD-10-CM

## 2022-01-01 DIAGNOSIS — Z99.81 DEPENDENCE ON SUPPLEMENTAL OXYGEN: ICD-10-CM

## 2022-01-01 PROCEDURE — 1100F PR PT FALLS ASSESS DOC 2+ FALLS/FALL W/INJURY/YR: ICD-10-PCS | Mod: CPTII,S$GLB,, | Performed by: INTERNAL MEDICINE

## 2022-01-01 PROCEDURE — 3074F PR MOST RECENT SYSTOLIC BLOOD PRESSURE < 130 MM HG: ICD-10-PCS | Mod: CPTII,S$GLB,, | Performed by: INTERNAL MEDICINE

## 2022-01-01 PROCEDURE — 1160F RVW MEDS BY RX/DR IN RCRD: CPT | Mod: CPTII,S$GLB,, | Performed by: INTERNAL MEDICINE

## 2022-01-01 PROCEDURE — 3288F FALL RISK ASSESSMENT DOCD: CPT | Mod: CPTII,S$GLB,, | Performed by: INTERNAL MEDICINE

## 2022-01-01 PROCEDURE — 1101F PR PT FALLS ASSESS DOC 0-1 FALLS W/OUT INJ PAST YR: ICD-10-PCS | Mod: CPTII,S$GLB,, | Performed by: INTERNAL MEDICINE

## 2022-01-01 PROCEDURE — 99999 PR PBB SHADOW E&M-EST. PATIENT-LVL IV: CPT | Mod: PBBFAC,,, | Performed by: INTERNAL MEDICINE

## 2022-01-01 PROCEDURE — 90670 PCV13 VACCINE IM: CPT | Mod: S$GLB,,, | Performed by: INTERNAL MEDICINE

## 2022-01-01 PROCEDURE — 1159F PR MEDICATION LIST DOCUMENTED IN MEDICAL RECORD: ICD-10-PCS | Mod: CPTII,S$GLB,, | Performed by: INTERNAL MEDICINE

## 2022-01-01 PROCEDURE — 3008F PR BODY MASS INDEX (BMI) DOCUMENTED: ICD-10-PCS | Mod: CPTII,S$GLB,, | Performed by: INTERNAL MEDICINE

## 2022-01-01 PROCEDURE — 3079F PR MOST RECENT DIASTOLIC BLOOD PRESSURE 80-89 MM HG: ICD-10-PCS | Mod: CPTII,S$GLB,, | Performed by: INTERNAL MEDICINE

## 2022-01-01 PROCEDURE — 3288F PR FALLS RISK ASSESSMENT DOCUMENTED: ICD-10-PCS | Mod: CPTII,S$GLB,, | Performed by: INTERNAL MEDICINE

## 2022-01-01 PROCEDURE — 99999 PR PBB SHADOW E&M-EST. PATIENT-LVL V: CPT | Mod: PBBFAC,,, | Performed by: INTERNAL MEDICINE

## 2022-01-01 PROCEDURE — 99999 PR PBB SHADOW E&M-EST. PATIENT-LVL IV: ICD-10-PCS | Mod: PBBFAC,,, | Performed by: INTERNAL MEDICINE

## 2022-01-01 PROCEDURE — 1126F PR PAIN SEVERITY QUANTIFIED, NO PAIN PRESENT: ICD-10-PCS | Mod: CPTII,S$GLB,, | Performed by: INTERNAL MEDICINE

## 2022-01-01 PROCEDURE — 3075F PR MOST RECENT SYSTOLIC BLOOD PRESS GE 130-139MM HG: ICD-10-PCS | Mod: CPTII,S$GLB,, | Performed by: INTERNAL MEDICINE

## 2022-01-01 PROCEDURE — 1160F PR REVIEW ALL MEDS BY PRESCRIBER/CLIN PHARMACIST DOCUMENTED: ICD-10-PCS | Mod: CPTII,S$GLB,, | Performed by: INTERNAL MEDICINE

## 2022-01-01 PROCEDURE — 1125F PR PAIN SEVERITY QUANTIFIED, PAIN PRESENT: ICD-10-PCS | Mod: CPTII,S$GLB,, | Performed by: INTERNAL MEDICINE

## 2022-01-01 PROCEDURE — 99999 PR PBB SHADOW E&M-EST. PATIENT-LVL V: ICD-10-PCS | Mod: PBBFAC,,, | Performed by: INTERNAL MEDICINE

## 2022-01-01 PROCEDURE — 99214 OFFICE O/P EST MOD 30 MIN: CPT | Mod: 25,S$GLB,, | Performed by: INTERNAL MEDICINE

## 2022-01-01 PROCEDURE — 3008F BODY MASS INDEX DOCD: CPT | Mod: CPTII,S$GLB,, | Performed by: INTERNAL MEDICINE

## 2022-01-01 PROCEDURE — 3075F SYST BP GE 130 - 139MM HG: CPT | Mod: CPTII,S$GLB,, | Performed by: INTERNAL MEDICINE

## 2022-01-01 PROCEDURE — 3079F DIAST BP 80-89 MM HG: CPT | Mod: CPTII,S$GLB,, | Performed by: INTERNAL MEDICINE

## 2022-01-01 PROCEDURE — 96372 THER/PROPH/DIAG INJ SC/IM: CPT | Mod: S$GLB,,, | Performed by: INTERNAL MEDICINE

## 2022-01-01 PROCEDURE — 99214 OFFICE O/P EST MOD 30 MIN: CPT | Mod: S$GLB,,, | Performed by: INTERNAL MEDICINE

## 2022-01-01 PROCEDURE — 1159F MED LIST DOCD IN RCRD: CPT | Mod: CPTII,S$GLB,, | Performed by: INTERNAL MEDICINE

## 2022-01-01 PROCEDURE — 99214 PR OFFICE/OUTPT VISIT, EST, LEVL IV, 30-39 MIN: ICD-10-PCS | Mod: 25,S$GLB,, | Performed by: INTERNAL MEDICINE

## 2022-01-01 PROCEDURE — 90471 IMMUNIZATION ADMIN: CPT | Mod: S$GLB,,, | Performed by: INTERNAL MEDICINE

## 2022-01-01 PROCEDURE — 3074F SYST BP LT 130 MM HG: CPT | Mod: CPTII,S$GLB,, | Performed by: INTERNAL MEDICINE

## 2022-01-01 PROCEDURE — 1101F PT FALLS ASSESS-DOCD LE1/YR: CPT | Mod: CPTII,S$GLB,, | Performed by: INTERNAL MEDICINE

## 2022-01-01 PROCEDURE — 96372 PR INJECTION,THERAP/PROPH/DIAG2ST, IM OR SUBCUT: ICD-10-PCS | Mod: S$GLB,,, | Performed by: INTERNAL MEDICINE

## 2022-01-01 PROCEDURE — 1125F AMNT PAIN NOTED PAIN PRSNT: CPT | Mod: CPTII,S$GLB,, | Performed by: INTERNAL MEDICINE

## 2022-01-01 PROCEDURE — 90670 PNEUMOCOCCAL CONJUGATE VACCINE 13-VALENT LESS THAN 5YO & GREATER THAN: ICD-10-PCS | Mod: S$GLB,,, | Performed by: INTERNAL MEDICINE

## 2022-01-01 PROCEDURE — 99214 PR OFFICE/OUTPT VISIT, EST, LEVL IV, 30-39 MIN: ICD-10-PCS | Mod: S$GLB,,, | Performed by: INTERNAL MEDICINE

## 2022-01-01 PROCEDURE — 3078F PR MOST RECENT DIASTOLIC BLOOD PRESSURE < 80 MM HG: ICD-10-PCS | Mod: CPTII,S$GLB,, | Performed by: INTERNAL MEDICINE

## 2022-01-01 PROCEDURE — 3078F DIAST BP <80 MM HG: CPT | Mod: CPTII,S$GLB,, | Performed by: INTERNAL MEDICINE

## 2022-01-01 PROCEDURE — 90471 PNEUMOCOCCAL CONJUGATE VACCINE 13-VALENT LESS THAN 5YO & GREATER THAN: ICD-10-PCS | Mod: S$GLB,,, | Performed by: INTERNAL MEDICINE

## 2022-01-01 PROCEDURE — 1126F AMNT PAIN NOTED NONE PRSNT: CPT | Mod: CPTII,S$GLB,, | Performed by: INTERNAL MEDICINE

## 2022-01-01 PROCEDURE — 1100F PTFALLS ASSESS-DOCD GE2>/YR: CPT | Mod: CPTII,S$GLB,, | Performed by: INTERNAL MEDICINE

## 2022-01-01 RX ORDER — OMEPRAZOLE 40 MG/1
40 CAPSULE, DELAYED RELEASE ORAL DAILY
Qty: 90 CAPSULE | Refills: 3 | Status: SHIPPED | OUTPATIENT
Start: 2022-01-01

## 2022-01-01 RX ORDER — MINERAL OIL
180 ENEMA (ML) RECTAL DAILY
Qty: 30 TABLET | Refills: 5 | Status: SHIPPED | OUTPATIENT
Start: 2022-01-01 | End: 2023-09-21

## 2022-01-01 RX ORDER — IPRATROPIUM BROMIDE AND ALBUTEROL SULFATE 2.5; .5 MG/3ML; MG/3ML
3 SOLUTION RESPIRATORY (INHALATION)
Status: SHIPPED | OUTPATIENT
Start: 2022-01-01

## 2022-01-01 RX ORDER — PREDNISONE 20 MG/1
20 TABLET ORAL 2 TIMES DAILY
Qty: 10 TABLET | Refills: 0 | Status: SHIPPED | OUTPATIENT
Start: 2022-01-01 | End: 2022-01-01

## 2022-01-01 RX ORDER — VALPROIC ACID 250 MG/5ML
6 SOLUTION ORAL EVERY 8 HOURS
COMMUNITY
End: 2022-01-01 | Stop reason: SDUPTHER

## 2022-01-01 RX ORDER — ALBUTEROL SULFATE 90 UG/1
2 AEROSOL, METERED RESPIRATORY (INHALATION) EVERY 4 HOURS PRN
Qty: 18 G | Refills: 2 | Status: SHIPPED | OUTPATIENT
Start: 2022-01-01 | End: 2023-01-01 | Stop reason: SDUPTHER

## 2022-01-01 RX ORDER — QUETIAPINE FUMARATE 50 MG/1
50 TABLET, FILM COATED ORAL NIGHTLY
Qty: 90 TABLET | Refills: 1 | Status: SHIPPED | OUTPATIENT
Start: 2022-01-01

## 2022-01-01 RX ORDER — SENNOSIDES 8.6 MG/1
1 TABLET ORAL 2 TIMES DAILY
Qty: 60 TABLET | Refills: 3 | Status: SHIPPED | OUTPATIENT
Start: 2022-01-01

## 2022-01-01 RX ORDER — LEVALBUTEROL INHALATION SOLUTION 0.63 MG/3ML
1 SOLUTION RESPIRATORY (INHALATION) EVERY 4 HOURS PRN
Qty: 1 EACH | Refills: 5 | Status: SHIPPED | OUTPATIENT
Start: 2022-01-01 | End: 2023-09-21

## 2022-01-01 RX ORDER — CLOTRIMAZOLE AND BETAMETHASONE DIPROPIONATE 10; .64 MG/G; MG/G
CREAM TOPICAL 2 TIMES DAILY
Qty: 45 G | Refills: 1 | Status: SHIPPED | OUTPATIENT
Start: 2022-01-01

## 2022-01-01 RX ORDER — TRIAMCINOLONE ACETONIDE 40 MG/ML
60 INJECTION, SUSPENSION INTRA-ARTICULAR; INTRAMUSCULAR ONCE
Status: COMPLETED | OUTPATIENT
Start: 2022-01-01 | End: 2022-01-01

## 2022-01-01 RX ORDER — CLONIDINE HYDROCHLORIDE 0.1 MG/1
TABLET ORAL
Qty: 60 TABLET | Refills: 3 | Status: SHIPPED | OUTPATIENT
Start: 2022-01-01 | End: 2023-01-01 | Stop reason: SDUPTHER

## 2022-01-01 RX ORDER — CLONIDINE HYDROCHLORIDE 0.1 MG/1
TABLET ORAL
Qty: 60 TABLET | Refills: 3 | Status: SHIPPED | OUTPATIENT
Start: 2022-01-01 | End: 2022-01-01 | Stop reason: SDUPTHER

## 2022-01-01 RX ORDER — VALPROIC ACID 250 MG/5ML
6 SOLUTION ORAL EVERY 8 HOURS
Qty: 473 ML | Refills: 3 | Status: SHIPPED | OUTPATIENT
Start: 2022-01-01 | End: 2022-01-01

## 2022-01-01 RX ORDER — AZITHROMYCIN 250 MG/1
TABLET, FILM COATED ORAL
Qty: 6 TABLET | Refills: 0 | Status: SHIPPED | OUTPATIENT
Start: 2022-01-01 | End: 2022-01-01

## 2022-01-01 RX ADMIN — TRIAMCINOLONE ACETONIDE 60 MG: 40 INJECTION, SUSPENSION INTRA-ARTICULAR; INTRAMUSCULAR at 08:09

## 2022-03-03 DIAGNOSIS — M81.0 OSTEOPOROSIS, UNSPECIFIED OSTEOPOROSIS TYPE, UNSPECIFIED PATHOLOGICAL FRACTURE PRESENCE: ICD-10-CM

## 2022-03-04 RX ORDER — ALENDRONATE SODIUM 70 MG/1
TABLET ORAL
Qty: 12 TABLET | Refills: 3 | Status: SHIPPED | OUTPATIENT
Start: 2022-03-04

## 2022-03-21 ENCOUNTER — OFFICE VISIT (OUTPATIENT)
Dept: PRIMARY CARE CLINIC | Facility: CLINIC | Age: 67
End: 2022-03-21
Payer: COMMERCIAL

## 2022-03-21 VITALS
SYSTOLIC BLOOD PRESSURE: 136 MMHG | BODY MASS INDEX: 16.07 KG/M2 | DIASTOLIC BLOOD PRESSURE: 78 MMHG | RESPIRATION RATE: 16 BRPM | WEIGHT: 100 LBS | HEART RATE: 60 BPM | HEIGHT: 66 IN | OXYGEN SATURATION: 95 %

## 2022-03-21 DIAGNOSIS — Z93.1 STATUS POST INSERTION OF PERCUTANEOUS ENDOSCOPIC GASTROSTOMY (PEG) TUBE: ICD-10-CM

## 2022-03-21 DIAGNOSIS — Z13.220 ENCOUNTER FOR LIPID SCREENING FOR CARDIOVASCULAR DISEASE: ICD-10-CM

## 2022-03-21 DIAGNOSIS — Z13.6 ENCOUNTER FOR LIPID SCREENING FOR CARDIOVASCULAR DISEASE: ICD-10-CM

## 2022-03-21 DIAGNOSIS — J44.9 CHRONIC OBSTRUCTIVE PULMONARY DISEASE, UNSPECIFIED COPD TYPE: ICD-10-CM

## 2022-03-21 DIAGNOSIS — I10 ESSENTIAL HYPERTENSION: ICD-10-CM

## 2022-03-21 DIAGNOSIS — R56.9 SEIZURE: ICD-10-CM

## 2022-03-21 DIAGNOSIS — E78.5 HYPERLIPIDEMIA, UNSPECIFIED HYPERLIPIDEMIA TYPE: ICD-10-CM

## 2022-03-21 DIAGNOSIS — I69.359 HEMIPARESIS FOLLOWING CEREBROVASCULAR ACCIDENT (CVA): Primary | ICD-10-CM

## 2022-03-21 PROCEDURE — 3008F BODY MASS INDEX DOCD: CPT | Mod: CPTII,S$GLB,, | Performed by: INTERNAL MEDICINE

## 2022-03-21 PROCEDURE — 1101F PT FALLS ASSESS-DOCD LE1/YR: CPT | Mod: CPTII,S$GLB,, | Performed by: INTERNAL MEDICINE

## 2022-03-21 PROCEDURE — 99214 PR OFFICE/OUTPT VISIT, EST, LEVL IV, 30-39 MIN: ICD-10-PCS | Mod: S$GLB,,, | Performed by: INTERNAL MEDICINE

## 2022-03-21 PROCEDURE — 3075F PR MOST RECENT SYSTOLIC BLOOD PRESS GE 130-139MM HG: ICD-10-PCS | Mod: CPTII,S$GLB,, | Performed by: INTERNAL MEDICINE

## 2022-03-21 PROCEDURE — 3075F SYST BP GE 130 - 139MM HG: CPT | Mod: CPTII,S$GLB,, | Performed by: INTERNAL MEDICINE

## 2022-03-21 PROCEDURE — 99999 PR PBB SHADOW E&M-EST. PATIENT-LVL V: CPT | Mod: PBBFAC,,, | Performed by: INTERNAL MEDICINE

## 2022-03-21 PROCEDURE — 3078F DIAST BP <80 MM HG: CPT | Mod: CPTII,S$GLB,, | Performed by: INTERNAL MEDICINE

## 2022-03-21 PROCEDURE — 99999 PR PBB SHADOW E&M-EST. PATIENT-LVL V: ICD-10-PCS | Mod: PBBFAC,,, | Performed by: INTERNAL MEDICINE

## 2022-03-21 PROCEDURE — 1159F MED LIST DOCD IN RCRD: CPT | Mod: CPTII,S$GLB,, | Performed by: INTERNAL MEDICINE

## 2022-03-21 PROCEDURE — 3288F FALL RISK ASSESSMENT DOCD: CPT | Mod: CPTII,S$GLB,, | Performed by: INTERNAL MEDICINE

## 2022-03-21 PROCEDURE — 99214 OFFICE O/P EST MOD 30 MIN: CPT | Mod: S$GLB,,, | Performed by: INTERNAL MEDICINE

## 2022-03-21 PROCEDURE — 1160F RVW MEDS BY RX/DR IN RCRD: CPT | Mod: CPTII,S$GLB,, | Performed by: INTERNAL MEDICINE

## 2022-03-21 PROCEDURE — 3078F PR MOST RECENT DIASTOLIC BLOOD PRESSURE < 80 MM HG: ICD-10-PCS | Mod: CPTII,S$GLB,, | Performed by: INTERNAL MEDICINE

## 2022-03-21 PROCEDURE — 1159F PR MEDICATION LIST DOCUMENTED IN MEDICAL RECORD: ICD-10-PCS | Mod: CPTII,S$GLB,, | Performed by: INTERNAL MEDICINE

## 2022-03-21 PROCEDURE — 1126F PR PAIN SEVERITY QUANTIFIED, NO PAIN PRESENT: ICD-10-PCS | Mod: CPTII,S$GLB,, | Performed by: INTERNAL MEDICINE

## 2022-03-21 PROCEDURE — 1101F PR PT FALLS ASSESS DOC 0-1 FALLS W/OUT INJ PAST YR: ICD-10-PCS | Mod: CPTII,S$GLB,, | Performed by: INTERNAL MEDICINE

## 2022-03-21 PROCEDURE — 1160F PR REVIEW ALL MEDS BY PRESCRIBER/CLIN PHARMACIST DOCUMENTED: ICD-10-PCS | Mod: CPTII,S$GLB,, | Performed by: INTERNAL MEDICINE

## 2022-03-21 PROCEDURE — 1126F AMNT PAIN NOTED NONE PRSNT: CPT | Mod: CPTII,S$GLB,, | Performed by: INTERNAL MEDICINE

## 2022-03-21 PROCEDURE — 3288F PR FALLS RISK ASSESSMENT DOCUMENTED: ICD-10-PCS | Mod: CPTII,S$GLB,, | Performed by: INTERNAL MEDICINE

## 2022-03-21 PROCEDURE — 3008F PR BODY MASS INDEX (BMI) DOCUMENTED: ICD-10-PCS | Mod: CPTII,S$GLB,, | Performed by: INTERNAL MEDICINE

## 2022-03-21 NOTE — PROGRESS NOTES
Subjective:       Patient ID: Donita Gonzalez is a 66 y.o. female.    Chief Complaint: Follow-up (4 mth)    HPI  Pt visit today for routine f/u she is being well cared for by her son who bought equipment for pt to exercise since her CVA with left hemipareses her lower ext better she tolerate po diet well not usinf PEG tube any more request removal her BM is normal so as her urine she denies sob cp [place]   Review of Systems   Constitutional: Negative for unexpected weight change.   HENT: Positive for congestion.    Respiratory: Negative for shortness of breath.    Cardiovascular: Negative for chest pain.   Genitourinary: Negative for difficulty urinating.   Musculoskeletal: Positive for back pain.   Psychiatric/Behavioral: Negative for dysphoric mood.       Objective:      Physical Exam  Vitals and nursing note reviewed.   Constitutional:       General: She is not in acute distress.     Appearance: She is well-developed.      Comments: wheelchair   HENT:      Head: Normocephalic and atraumatic.      Right Ear: External ear normal.      Left Ear: External ear normal.      Nose: Nose normal.      Mouth/Throat:      Pharynx: No oropharyngeal exudate.   Eyes:      Extraocular Movements: Extraocular movements intact.      Conjunctiva/sclera: Conjunctivae normal.      Pupils: Pupils are equal, round, and reactive to light.   Neck:      Thyroid: No thyromegaly.   Cardiovascular:      Rate and Rhythm: Normal rate and regular rhythm.      Heart sounds: Normal heart sounds. No murmur heard.    No friction rub. No gallop.   Pulmonary:      Effort: Pulmonary effort is normal. No respiratory distress.      Breath sounds: Normal breath sounds. No wheezing or rales.   Abdominal:      General: Bowel sounds are normal. There is no distension.      Palpations: Abdomen is soft.      Tenderness: There is no abdominal tenderness. There is no guarding.      Comments: PEG tube in place with old blood in the lumen   Musculoskeletal:          General: No tenderness or deformity. Normal range of motion.      Cervical back: Normal range of motion and neck supple.   Lymphadenopathy:      Cervical: No cervical adenopathy.   Skin:     General: Skin is warm and dry.      Findings: No erythema or rash.   Neurological:      General: No focal deficit present.      Mental Status: She is alert and oriented to person, place, and time.   Psychiatric:         Thought Content: Thought content normal.         Judgment: Judgment normal.         Assessment:       1. Hemiparesis following cerebrovascular accident (CVA)    2. Chronic obstructive pulmonary disease, unspecified COPD type    3. Encounter for lipid screening for cardiovascular disease    4. Essential hypertension    5. Hyperlipidemia, unspecified hyperlipidemia type    6. Status post insertion of percutaneous endoscopic gastrostomy (PEG) tube    7. Seizure        Plan:       Hemiparesis following cerebrovascular accident (CVA)    Chronic obstructive pulmonary disease, unspecified COPD type    Encounter for lipid screening for cardiovascular disease    Essential hypertension  Comments:  BP fairly controlled continue with tx  Orders:  -     CBC Auto Differential; Future; Expected date: 03/21/2022  -     Comprehensive Metabolic Panel; Future; Expected date: 03/21/2022    Hyperlipidemia, unspecified hyperlipidemia type  -     Lipid Panel; Future; Expected date: 03/21/2022    Status post insertion of percutaneous endoscopic gastrostomy (PEG) tube  -     Ambulatory referral/consult to Gastroenterology; Future; Expected date: 03/22/2022    Seizure  Comments:  stable on medication continue with tx  Orders:  -     VALPROIC ACID; Future; Expected date: 03/21/2022        Medication List with Changes/Refills   Current Medications    ALENDRONATE (FOSAMAX) 70 MG TABLET    TAKE 1 TABLET(70 MG) BY MOUTH EVERY 7 DAYS    APIXABAN (ELIQUIS) 5 MG TAB    1 tablet (5 mg total) by Per G Tube route 2 (two) times daily.    ARM BRACE  MISC    Please dispense a small left adjustable resting hand splint for contraction management.    ASPIRIN 81 MG CHEW    Take 1 tablet (81 mg total) by mouth once daily.    ATORVASTATIN (LIPITOR) 40 MG TABLET    Take 1 tablet (40 mg total) by mouth once daily.    CLONIDINE (CATAPRES) 0.1 MG TABLET    TAKE 1 TABLET BY MOUTH EVERY 6 HOURS AS NEEDED FOR BLOOD PRESSURE GREATER THAN 170/90    CLOTRIMAZOLE-BETAMETHASONE 1-0.05% (LOTRISONE) CREAM    Apply topically 2 (two) times daily.    DILTIAZEM (CARDIZEM CD) 240 MG 24 HR CAPSULE    Take 1 capsule (240 mg total) by mouth once daily.    FEXOFENADINE (ALLEGRA) 180 MG TABLET    Take 1 tablet (180 mg total) by mouth once daily.    LANOLIN ALCOHOL-MINERAL OIL-WHITE PETROLATUM-CERES (EUCERIN) CREA CREAM    Apply topically as needed.    LEVALBUTEROL (XOPENEX) 0.63 MG/3 ML NEBULIZER SOLUTION    Take 3 mLs (0.63 mg total) by nebulization every 4 (four) hours as needed for Wheezing or Shortness of Breath. Rescue    METOPROLOL TARTRATE (LOPRESSOR) 50 MG TABLET    Take 1 tablet (50 mg total) by mouth 2 (two) times daily.    MUPIROCIN (BACTROBAN) 2 % OINTMENT    Apply topically 2 (two) times daily.    OMEPRAZOLE (PRILOSEC) 40 MG CAPSULE    Take 1 capsule (40 mg total) by mouth once daily.    QUETIAPINE (SEROQUEL) 50 MG TABLET    1 tablet (50 mg total) by Per G Tube route nightly.    SENNA (ALMITA-GA) 8.6 MG TABLET    Take 1 tablet by mouth 2 (two) times daily.

## 2022-05-02 NOTE — PROGRESS NOTES
LINKS immunization registry updated  Care Everywhere updated  Health Maintenance updated  DIS/Chart reviewed for overdue Proactive [facility] Encounters (FLO) health maintenance testing (CRS, Breast Ca, Diabetic Eye Exam)   Orders entered:N/A

## 2022-05-05 NOTE — PROGRESS NOTES
Subjective:       Patient ID: Donita Gonzalez is a 66 y.o. female.    Chief Complaint: Follow-up (To remove feeding tube)    Patient here today to establish care with aforementioned complaints.  Patient reports history of solid-food dysphagia which occurred after stroke in September 2020.  Patient had prolonged hospitalization requiring tracheostomy and gastrostomy tube placement.  She has since reported significant improvement in both her string as well as her dysphagia.  She is not taking all food by mouth and would like her gastroscopy tube removed.  She is currently on Eliquis, taking medication today.      Patient is accompanied by her son who corroborates above history.    Past Medical History:   Diagnosis Date    Chronic combined systolic and diastolic CHF (congestive heart failure)     COPD (chronic obstructive pulmonary disease)     History of stroke     Hypertension     Paroxysmal atrial fibrillation     Seizure disorder     Stroke        Past Surgical History:   Procedure Laterality Date    AUGMENTATION OF BREAST      GASTROSTOMY TUBE PLACEMENT      HYSTERECTOMY      TRACHEOSTOMY N/A 9/11/2020    Procedure: CREATION, TRACHEOSTOMY- percutaneous;  Surgeon: Saurav Branham MD;  Location: [facility] OR [address] [place] [address];  Service: General;  Laterality: N/A;       Social History  Social History     Tobacco Use    Smoking status: Former Smoker     Packs/day: 1.00     Types: Cigarettes     Start date: 4/16/1980    Smokeless tobacco: Never Used   Substance Use Topics    Alcohol use: Never    Drug use: Never       Family History   Problem Relation Age of Onset    Hypertension Father        Review of Systems   Constitutional: Negative for unexpected weight change.   HENT: Negative for trouble swallowing.    Gastrointestinal: Negative for abdominal distention and abdominal pain.           Objective:      Physical Exam  Constitutional:       General: She is not in acute distress.     Appearance: She is  "well-developed. She is ill-appearing. She is not toxic-appearing.   HENT:      Head: Normocephalic and atraumatic.   Eyes:      Conjunctiva/sclera: Conjunctivae normal.   Pulmonary:      Effort: Pulmonary effort is normal. No respiratory distress.   Abdominal:      Comments: + G Tube   Musculoskeletal:      Cervical back: Normal range of motion.   Neurological:      Mental Status: She is alert and oriented to person, place, and time.   Psychiatric:         Behavior: Behavior normal.         Thought Content: Thought content normal.         Judgment: Judgment normal.           Pertinent labs and imaging studies reviewed    Assessment:       1. Status post insertion of percutaneous endoscopic gastrostomy (PEG) tube    2. Chronic anticoagulation    3. Dysphagia, unspecified type        Plan:       Discussed removing gastrostomy tube today in clinic and potential complication with ongoing use to of Eliquis.  Offered to bring her back to clinic next week after holding DOAC for a few days. She responded that she "wants it out today". Son confirms. Discussed risk of bleeding. She is unwavering  GT removed in office today. Bandage applied  Hold Eliquis for 4 days  Monitor for s/s bleeding. If bleeding presist present to the ER  Continue PPI  F/u as needed    (Portions of this note were dictated using voice recognition software and may contain dictation related errors in spelling/grammar/syntax not found on text review)        "

## 2022-06-21 NOTE — PROGRESS NOTES
Verified pt ID using name and . NKDA. Administered Prevnar 13 IM in Left deltoid per physician order using aseptic technique. Aspirated and no blood return noted. Pt tolerated well with no adverse reactions noted.

## 2022-06-21 NOTE — PROGRESS NOTES
Subjective:       Patient ID: Donita Gonzalez is a 66 y.o. female.    Chief Complaint: Annual Exam, Dry Skin, and gum pain    HPI  Pt visit today for routine f/u she ha sno physical c/o she being taken cared well  By her son she has good appetide BM is normal no ueine incontinence no wt loss no sob cp her son ha sbeen trying to do PT with her at home . No sz activity no side efeect from medications and she needs refills  Review of Systems   Constitutional: Negative for unexpected weight change.   HENT: Negative for congestion.    Respiratory: Negative for shortness of breath.    Cardiovascular: Negative for chest pain.   Gastrointestinal: Negative for abdominal pain.   Musculoskeletal: Negative for arthralgias.   Neurological:        Rt hemipareses       Objective:      Physical Exam  Vitals and nursing note reviewed.   Constitutional:       General: She is not in acute distress.     Appearance: She is well-developed.      Comments: Pt is in wheel chair no acute distress   HENT:      Head: Normocephalic and atraumatic.      Right Ear: External ear normal.      Left Ear: External ear normal.      Nose: Nose normal.      Mouth/Throat:      Pharynx: No oropharyngeal exudate.   Eyes:      General:         Left eye: No discharge.      Extraocular Movements: Extraocular movements intact.      Conjunctiva/sclera: Conjunctivae normal.      Pupils: Pupils are equal, round, and reactive to light.   Neck:      Thyroid: No thyromegaly.   Cardiovascular:      Rate and Rhythm: Normal rate and regular rhythm.      Heart sounds: Normal heart sounds. No murmur heard.    No friction rub. No gallop.   Pulmonary:      Effort: Pulmonary effort is normal. No respiratory distress.      Breath sounds: Normal breath sounds. No wheezing or rales.   Chest:      Chest wall: No tenderness.   Abdominal:      General: Bowel sounds are normal. There is no distension.      Palpations: Abdomen is soft.      Tenderness: There is no abdominal tenderness.  There is no rebound.   Musculoskeletal:         General: No tenderness or deformity. Normal range of motion.      Cervical back: Normal range of motion and neck supple.   Lymphadenopathy:      Cervical: No cervical adenopathy.   Skin:     General: Skin is warm and dry.      Findings: No erythema or rash.      Comments: Scaly rash in ears   Neurological:      Mental Status: She is alert and oriented to person, place, and time.      Comments: Rt hemipareses from old CVA   Psychiatric:         Thought Content: Thought content normal.         Judgment: Judgment normal.         Assessment:       1. Encounter for vaccination    2. Encounter for screening colonoscopy    3. Essential hypertension    4. Seizure    5. Eczema of right external ear    6. Encounter for screening mammogram for breast cancer        Plan:       Encounter for vaccination  -     (In Office Administered) Pneumococcal Conjugate Vaccine (13 Valent) (IM)    Encounter for screening colonoscopy  -     Cologuard Screening (Multitarget Stool DNA); Future; Expected date: 06/21/2022    Essential hypertension  Comments:  BP well controlled continue with tx    Seizure  Comments:  no sz acivitty no side effcet from meds  Orders:  -     valproate (DEPAKENE) 250 mg/5 mL syrup; Take 6 mLs (300 mg total) by mouth every 8 (eight) hours.  Dispense: 473 mL; Refill: 3    Eczema of right external ear  -     clotrimazole-betamethasone 1-0.05% (LOTRISONE) cream; Apply topically 2 (two) times daily.  Dispense: 45 g; Refill: 1    Encounter for screening mammogram for breast cancer  -     Mammo Digital Screening Bilat w/ Carlos; Future; Expected date: 06/21/2022        Medication List with Changes/Refills   Current Medications    ALENDRONATE (FOSAMAX) 70 MG TABLET    TAKE 1 TABLET(70 MG) BY MOUTH EVERY 7 DAYS    APIXABAN (ELIQUIS) 5 MG TAB    1 tablet (5 mg total) by Per G Tube route 2 (two) times daily.    ASPIRIN 81 MG CHEW    Take 1 tablet (81 mg total) by mouth once daily.     ATORVASTATIN (LIPITOR) 40 MG TABLET    Take 1 tablet (40 mg total) by mouth once daily.    CLONIDINE (CATAPRES) 0.1 MG TABLET    TAKE 1 TABLET BY MOUTH EVERY 6 HOURS AS NEEDED FOR BLOOD PRESSURE GREATER THAN 170/90    DILTIAZEM (CARDIZEM CD) 240 MG 24 HR CAPSULE    TAKE 1 CAPSULE(240 MG) BY MOUTH EVERY DAY    FEXOFENADINE (ALLEGRA) 180 MG TABLET    Take 1 tablet (180 mg total) by mouth once daily.    LEVALBUTEROL (XOPENEX) 0.63 MG/3 ML NEBULIZER SOLUTION    Take 3 mLs (0.63 mg total) by nebulization every 4 (four) hours as needed for Wheezing or Shortness of Breath. Rescue    METOPROLOL TARTRATE (LOPRESSOR) 50 MG TABLET    Take 1 tablet (50 mg total) by mouth 2 (two) times daily.    MUPIROCIN (BACTROBAN) 2 % OINTMENT    Apply topically 2 (two) times daily.    OMEPRAZOLE (PRILOSEC) 40 MG CAPSULE    Take 1 capsule (40 mg total) by mouth once daily.    QUETIAPINE (SEROQUEL) 50 MG TABLET    1 tablet (50 mg total) by Per G Tube route nightly.    SENNA (ALMITA-GA) 8.6 MG TABLET    Take 1 tablet by mouth 2 (two) times daily.   Changed and/or Refilled Medications    Modified Medication Previous Medication    CLOTRIMAZOLE-BETAMETHASONE 1-0.05% (LOTRISONE) CREAM clotrimazole-betamethasone 1-0.05% (LOTRISONE) cream       Apply topically 2 (two) times daily.    Apply topically 2 (two) times daily.    VALPROATE (DEPAKENE) 250 MG/5 ML SYRUP valproate (DEPAKENE) 250 mg/5 mL syrup       Take 6 mLs (300 mg total) by mouth every 8 (eight) hours.    Take 6 mLs by mouth every 8 (eight) hours.   Discontinued Medications    ARM BRACE MISC    Please dispense a small left adjustable resting hand splint for contraction management.    LANOLIN ALCOHOL-MINERAL OIL-WHITE PETROLATUM-CERES (EUCERIN) CREA CREAM    Apply topically as needed.

## 2022-07-06 NOTE — ASSESSMENT & PLAN NOTE
Past medical history:  Hypertension previous CVA      Past surgical history:  Patient states she had the surgery in the past but is not able to tell me which procedures likely due to aphasia.      Allergies:  From her records she has no known drug allergies however she states that she is allergic to a medication however this time she is not able to speak so we will contact family later.    Medications:  Amlodipine 5 mg a day per records.    Family history:  Patient denies history of stroke in the family      Social history:  Patient quit smoking 4 years ago  Drink 4 beers a day  Denies drug use      Review of Systems   Reason unable to perform ROS: Ten point systems reviewed with patient nodding since she has aphasia at this time and is not able to speak, and they are negative pertinent positives present in HPI.   Objective:     Vital Signs (Most Recent):  Temp: 98.2 °F (36.8 °C) (07/06/22 0130)  Pulse: 97 (07/06/22 0244)  Resp: (!) 28 (07/06/22 0244)  BP: (!) 149/65 (07/06/22 0230)  SpO2: 95 % (07/06/22 0244)   Vital Signs (24h Range):  Temp:  [98.2 °F (36.8 °C)] 98.2 °F (36.8 °C)  Pulse:  [] 97  Resp:  [19-28] 28  SpO2:  [95 %-97 %] 95 %  BP: (125-158)/(65-91) 149/65     Weight: 64.1 kg (141 lb 5 oz)  Body mass index is 24.26 kg/m².    Physical Exam  Constitutional:       General: She is not in acute distress.     Appearance: Normal appearance. She is not ill-appearing, toxic-appearing or diaphoretic.   HENT:      Head: Normocephalic and atraumatic.      Nose: Nose normal.   Cardiovascular:      Rate and Rhythm: Normal rate and regular rhythm.      Heart sounds: Normal heart sounds. No murmur heard.    No friction rub. No gallop.   Pulmonary:      Effort: Pulmonary effort is normal. No respiratory distress.      Breath sounds: Normal breath sounds. No stridor. No wheezing, rhonchi or rales.   Abdominal:      General: Abdomen is flat. Bowel sounds are normal. There is no distension.      Tenderness: There  - persistent epistaxis in the setting of anticoagulation   - H/H stable at 11.4/34.6   - daily CBC   - ENT consulted; appreciate recs   - started antibiotics empirically and rhino rockets can stay as long as 5 days if needed.       is no abdominal tenderness. There is no guarding or rebound.   Neurological:      Mental Status: She is alert.      Comments: Patient with left side weakness that she states it is old  + aphasia  She follows commands             Significant Labs: All pertinent labs within the past 24 hours have been reviewed.  CBC: No results for input(s): WBC, HGB, HCT, PLT in the last 48 hours.  CMP: No results for input(s): NA, K, CL, CO2, GLU, BUN, CREATININE, CALCIUM, PROT, ALBUMIN, BILITOT, ALKPHOS, AST, ALT, ANIONGAP, EGFRNONAA in the last 48 hours.    Invalid input(s): ESTGFAFRICA  Cardiac Markers: No results for input(s): CKMB, MYOGLOBIN, BNP, TROPISTAT in the last 48 hours.  Troponin: No results for input(s): TROPONINI in the last 48 hours.  TSH: No results for input(s): TSH in the last 4320 hours.  Urine Culture: No results for input(s): LABURIN in the last 48 hours.  Urine Studies: No results for input(s): COLORU, APPEARANCEUA, PHUR, SPECGRAV, PROTEINUA, GLUCUA, KETONESU, BILIRUBINUA, OCCULTUA, NITRITE, UROBILINOGEN, LEUKOCYTESUR, RBCUA, WBCUA, BACTERIA, SQUAMEPITHEL, HYALINECASTS in the last 48 hours.    Invalid input(s): WRIGHTSUR    Significant Imaging: I have reviewed all pertinent imaging results/findings within the past 24 hours.    Patient with hyponatremia and hypokalemia mild both.

## 2022-09-21 NOTE — PROGRESS NOTES
Subjective:       Patient ID: Donita Gonzalez is a 66 y.o. female.    Chief Complaint: Follow-up (3 month follow-up), Hot Flashes (Having hot flashes often), and Shortness of Breath (Wants an inhaler to help breath)    HPI  Pt visit today fr routine f/u  but she also c/o sob feeling bad no fever chill cp no n/v/d no n/v/d no loss of sense taste smell no body ache she had covid vaccines and she wheel chair bounced with rt hemipareses from CVA   Review of Systems    Objective:      Physical Exam  Vitals and nursing note reviewed.   Constitutional:       General: She is not in acute distress.     Appearance: She is well-developed.   HENT:      Head: Normocephalic and atraumatic.      Right Ear: External ear normal.      Left Ear: External ear normal.      Nose: Nose normal.      Mouth/Throat:      Pharynx: No oropharyngeal exudate.   Eyes:      Extraocular Movements: Extraocular movements intact.      Conjunctiva/sclera: Conjunctivae normal.      Pupils: Pupils are equal, round, and reactive to light.   Neck:      Thyroid: No thyromegaly.   Cardiovascular:      Rate and Rhythm: Normal rate. Rhythm irregular.      Heart sounds: Normal heart sounds. No murmur heard.    No friction rub. No gallop.   Pulmonary:      Effort: No respiratory distress.      Breath sounds: No wheezing.      Comments: Decrease BS bilaterally  Abdominal:      General: Bowel sounds are normal. There is no distension.      Palpations: Abdomen is soft.      Tenderness: There is no abdominal tenderness.   Musculoskeletal:         General: No tenderness or deformity. Normal range of motion.      Cervical back: Normal range of motion and neck supple.   Lymphadenopathy:      Cervical: No cervical adenopathy.   Skin:     General: Skin is warm and dry.      Findings: No erythema or rash.   Neurological:      Mental Status: She is alert and oriented to person, place, and time.   Psychiatric:         Thought Content: Thought content normal.         Judgment:  Judgment normal.       Assessment:       1. COPD exacerbation    2. Cerebrovascular accident (CVA), unspecified mechanism    3. Atrial fibrillation with rapid ventricular response    4. Hypertensive urgency    5. Non-seasonal allergic rhinitis, unspecified trigger    6. Hallucinations    7. Moderate malnutrition    8. Constipation, unspecified constipation type    9. Chronic obstructive pulmonary disease, unspecified COPD type    10. Dependence on supplemental oxygen          Plan:       COPD exacerbation  Comments:  pt feels better less sob after breathing tx   Orders:  -     albuterol-ipratropium 2.5 mg-0.5 mg/3 mL nebulizer solution 3 mL  -     triamcinolone acetonide injection 60 mg  -     X-Ray Chest PA And Lateral; Future; Expected date: 09/21/2022  -     NEBULIZER FOR HOME USE  -     azithromycin (Z-MAURO) 250 MG tablet; 2 tabs by mouth day 1, then 1 tab by mouth daily x 4 days  Dispense: 6 tablet; Refill: 0  -     predniSONE (DELTASONE) 20 MG tablet; Take 1 tablet (20 mg total) by mouth 2 (two) times daily. for 5 days  Dispense: 10 tablet; Refill: 0  -     albuterol (PROVENTIL/VENTOLIN HFA) 90 mcg/actuation inhaler; Inhale 2 puffs into the lungs every 4 (four) hours as needed for Wheezing or Shortness of Breath. Rescue  Dispense: 18 g; Refill: 2    Cerebrovascular accident (CVA), unspecified mechanism  -     apixaban (ELIQUIS) 5 mg Tab; 1 tablet (5 mg total) by Per G Tube route 2 (two) times daily.  Dispense: 180 tablet; Refill: 0    Atrial fibrillation with rapid ventricular response  Comments:  controlled HR continue with anticoagulant  Orders:  -     apixaban (ELIQUIS) 5 mg Tab; 1 tablet (5 mg total) by Per G Tube route 2 (two) times daily.  Dispense: 180 tablet; Refill: 0    Hypertensive urgency  -     Discontinue: cloNIDine (CATAPRES) 0.1 MG tablet; TAKE 1 TABLET BY MOUTH EVERY 6 HOURS AS NEEDED FOR BLOOD PRESSURE GREATER THAN 170/90  Strength: 0.1 mg  Dispense: 60 tablet; Refill: 3  -     cloNIDine  (CATAPRES) 0.1 MG tablet; TAKE 1 TABLET BY MOUTH EVERY 6 HOURS AS NEEDED FOR BLOOD PRESSURE GREATER THAN 170/90 Strength: 0.1 mg  Dispense: 60 tablet; Refill: 3    Non-seasonal allergic rhinitis, unspecified trigger  -     fexofenadine (ALLEGRA) 180 MG tablet; Take 1 tablet (180 mg total) by mouth once daily.  Dispense: 30 tablet; Refill: 5    Hallucinations  -     QUEtiapine (SEROQUEL) 50 MG tablet; 1 tablet (50 mg total) by Per G Tube route nightly.  Dispense: 90 tablet; Refill: 1    Moderate malnutrition  -     senna (ALMITA-GA) 8.6 mg tablet; Take 1 tablet by mouth 2 (two) times daily.  Dispense: 60 tablet; Refill: 3    Constipation, unspecified constipation type  -     senna (ALMITA-GA) 8.6 mg tablet; Take 1 tablet by mouth 2 (two) times daily.  Dispense: 60 tablet; Refill: 3    Chronic obstructive pulmonary disease, unspecified COPD type  -     levalbuterol (XOPENEX) 0.63 mg/3 mL nebulizer solution; Take 3 mLs (0.63 mg total) by nebulization every 4 (four) hours as needed for Wheezing or Shortness of Breath. Rescue  Dispense: 1 each; Refill: 5    Dependence on supplemental oxygen  Comments:  pt needs portable oxygen to use when out of the house  Orders:  -     levalbuterol (XOPENEX) 0.63 mg/3 mL nebulizer solution; Take 3 mLs (0.63 mg total) by nebulization every 4 (four) hours as needed for Wheezing or Shortness of Breath. Rescue  Dispense: 1 each; Refill: 5      Medication List with Changes/Refills   New Medications    ALBUTEROL (PROVENTIL/VENTOLIN HFA) 90 MCG/ACTUATION INHALER    Inhale 2 puffs into the lungs every 4 (four) hours as needed for Wheezing or Shortness of Breath. Rescue    AZITHROMYCIN (Z-MAURO) 250 MG TABLET    2 tabs by mouth day 1, then 1 tab by mouth daily x 4 days    PREDNISONE (DELTASONE) 20 MG TABLET    Take 1 tablet (20 mg total) by mouth 2 (two) times daily. for 5 days   Current Medications    ALENDRONATE (FOSAMAX) 70 MG TABLET    TAKE 1 TABLET(70 MG) BY MOUTH EVERY 7 DAYS    ASPIRIN 81 MG  CHEW    Take 1 tablet (81 mg total) by mouth once daily.    ATORVASTATIN (LIPITOR) 40 MG TABLET    Take 1 tablet (40 mg total) by mouth once daily.    CLOTRIMAZOLE-BETAMETHASONE 1-0.05% (LOTRISONE) CREAM    Apply topically 2 (two) times daily.    DILTIAZEM (CARDIZEM CD) 240 MG 24 HR CAPSULE    TAKE 1 CAPSULE(240 MG) BY MOUTH EVERY DAY    METOPROLOL TARTRATE (LOPRESSOR) 50 MG TABLET    TAKE 1 TABLET(50 MG) BY MOUTH TWICE DAILY    MUPIROCIN (BACTROBAN) 2 % OINTMENT    Apply topically 2 (two) times daily.    OMEPRAZOLE (PRILOSEC) 40 MG CAPSULE    Take 1 capsule (40 mg total) by mouth once daily.    VALPROATE (DEPAKENE) 250 MG/5 ML SYRUP    Take 6 mLs (300 mg total) by mouth every 8 (eight) hours.   Changed and/or Refilled Medications    Modified Medication Previous Medication    APIXABAN (ELIQUIS) 5 MG TAB apixaban (ELIQUIS) 5 mg Tab       1 tablet (5 mg total) by Per G Tube route 2 (two) times daily.    1 tablet (5 mg total) by Per G Tube route 2 (two) times daily.    CLONIDINE (CATAPRES) 0.1 MG TABLET cloNIDine (CATAPRES) 0.1 MG tablet       TAKE 1 TABLET BY MOUTH EVERY 6 HOURS AS NEEDED FOR BLOOD PRESSURE GREATER THAN 170/90 Strength: 0.1 mg    TAKE 1 TABLET BY MOUTH EVERY 6 HOURS AS NEEDED FOR BLOOD PRESSURE GREATER THAN 170/90    FEXOFENADINE (ALLEGRA) 180 MG TABLET fexofenadine (ALLEGRA) 180 MG tablet       Take 1 tablet (180 mg total) by mouth once daily.    Take 1 tablet (180 mg total) by mouth once daily.    LEVALBUTEROL (XOPENEX) 0.63 MG/3 ML NEBULIZER SOLUTION levalbuterol (XOPENEX) 0.63 mg/3 mL nebulizer solution       Take 3 mLs (0.63 mg total) by nebulization every 4 (four) hours as needed for Wheezing or Shortness of Breath. Rescue    Take 3 mLs (0.63 mg total) by nebulization every 4 (four) hours as needed for Wheezing or Shortness of Breath. Rescue    QUETIAPINE (SEROQUEL) 50 MG TABLET QUEtiapine (SEROQUEL) 50 MG tablet       1 tablet (50 mg total) by Per G Tube route nightly.    1 tablet (50 mg total)  by Per G Tube route nightly.    SENNA (ALMITA-GA) 8.6 MG TABLET senna (ALMITA-GA) 8.6 mg tablet       Take 1 tablet by mouth 2 (two) times daily.    Take 1 tablet by mouth 2 (two) times daily.   Discontinued Medications    ALBUTEROL (VENTOLIN HFA) 90 MCG/ACTUATION INHALER    Inhale 2 puffs into the lungs every 6 (six) hours as needed for Wheezing. Rescue

## 2022-11-15 NOTE — TELEPHONE ENCOUNTER
No new care gaps identified.  [facility] Embedded Care Gaps. Reference number: [id number]. 11/15/2022   12:03:36 PM CST

## 2022-11-16 NOTE — TELEPHONE ENCOUNTER
Refill Decision Note   Donita Gonzalez  is requesting a refill authorization.  Brief Assessment and Rationale for Refill:  Approve     Medication Therapy Plan:       Medication Reconciliation Completed: No   Comments:     No Care Gaps recommended.     Note composed:10:41 AM 11/16/2022

## 2023-01-01 ENCOUNTER — OFFICE VISIT (OUTPATIENT)
Dept: PRIMARY CARE CLINIC | Facility: CLINIC | Age: 68
End: 2023-01-01
Payer: COMMERCIAL

## 2023-01-01 ENCOUNTER — OFFICE VISIT (OUTPATIENT)
Dept: PODIATRY | Facility: CLINIC | Age: 68
End: 2023-01-01
Payer: COMMERCIAL

## 2023-01-01 VITALS
SYSTOLIC BLOOD PRESSURE: 122 MMHG | HEIGHT: 66 IN | HEART RATE: 66 BPM | RESPIRATION RATE: 18 BRPM | DIASTOLIC BLOOD PRESSURE: 74 MMHG | TEMPERATURE: 98 F | BODY MASS INDEX: 16.14 KG/M2 | OXYGEN SATURATION: 97 %

## 2023-01-01 VITALS — WEIGHT: 100 LBS | BODY MASS INDEX: 16.07 KG/M2 | HEIGHT: 66 IN

## 2023-01-01 DIAGNOSIS — I73.9 PVD (PERIPHERAL VASCULAR DISEASE): ICD-10-CM

## 2023-01-01 DIAGNOSIS — L60.2 HYPERTROPHIC TOENAIL: ICD-10-CM

## 2023-01-01 DIAGNOSIS — Z12.31 ENCOUNTER FOR SCREENING MAMMOGRAM FOR BREAST CANCER: ICD-10-CM

## 2023-01-01 DIAGNOSIS — E78.5 HYPERLIPIDEMIA, UNSPECIFIED HYPERLIPIDEMIA TYPE: ICD-10-CM

## 2023-01-01 DIAGNOSIS — Z23 ENCOUNTER FOR VACCINATION: ICD-10-CM

## 2023-01-01 DIAGNOSIS — Z12.11 ENCOUNTER FOR SCREENING COLONOSCOPY: ICD-10-CM

## 2023-01-01 DIAGNOSIS — Z79.01 LONG TERM CURRENT USE OF ANTICOAGULANT: ICD-10-CM

## 2023-01-01 DIAGNOSIS — I99.8 ISCHEMIC TOE: ICD-10-CM

## 2023-01-01 DIAGNOSIS — I99.8 ISCHEMIC TOE: Primary | ICD-10-CM

## 2023-01-01 DIAGNOSIS — I10 ESSENTIAL HYPERTENSION: Primary | ICD-10-CM

## 2023-01-01 DIAGNOSIS — L60.2 ONYCHOGRYPOSIS OF TOENAIL: ICD-10-CM

## 2023-01-01 DIAGNOSIS — I69.854 HEMIPLEGIA OF LEFT NONDOMINANT SIDE AS LATE EFFECT OF OTHER CEREBROVASCULAR DISEASE, UNSPECIFIED HEMIPLEGIA TYPE: ICD-10-CM

## 2023-01-01 DIAGNOSIS — B35.1 ONYCHOMYCOSIS WITH INGROWN TOENAIL: ICD-10-CM

## 2023-01-01 DIAGNOSIS — L60.0 ONYCHOMYCOSIS WITH INGROWN TOENAIL: ICD-10-CM

## 2023-01-01 DIAGNOSIS — R56.9 SEIZURE: ICD-10-CM

## 2023-01-01 DIAGNOSIS — L97.521 ULCER OF GREAT TOE, LEFT, LIMITED TO BREAKDOWN OF SKIN: ICD-10-CM

## 2023-01-01 DIAGNOSIS — J44.1 COPD EXACERBATION: ICD-10-CM

## 2023-01-01 DIAGNOSIS — I16.0 HYPERTENSIVE URGENCY: ICD-10-CM

## 2023-01-01 PROCEDURE — 1160F RVW MEDS BY RX/DR IN RCRD: CPT | Mod: CPTII,S$GLB,, | Performed by: INTERNAL MEDICINE

## 2023-01-01 PROCEDURE — 11721 ROUTINE FOOT CARE: ICD-10-PCS | Mod: 59,S$GLB,, | Performed by: PODIATRIST

## 2023-01-01 PROCEDURE — 3074F PR MOST RECENT SYSTOLIC BLOOD PRESSURE < 130 MM HG: ICD-10-PCS | Mod: CPTII,S$GLB,, | Performed by: INTERNAL MEDICINE

## 2023-01-01 PROCEDURE — 99214 PR OFFICE/OUTPT VISIT, EST, LEVL IV, 30-39 MIN: ICD-10-PCS | Mod: 25,S$GLB,, | Performed by: PODIATRIST

## 2023-01-01 PROCEDURE — 3008F PR BODY MASS INDEX (BMI) DOCUMENTED: ICD-10-PCS | Mod: CPTII,S$GLB,, | Performed by: INTERNAL MEDICINE

## 2023-01-01 PROCEDURE — 1160F PR REVIEW ALL MEDS BY PRESCRIBER/CLIN PHARMACIST DOCUMENTED: ICD-10-PCS | Mod: CPTII,S$GLB,, | Performed by: INTERNAL MEDICINE

## 2023-01-01 PROCEDURE — 99999 PR PBB SHADOW E&M-EST. PATIENT-LVL V: CPT | Mod: PBBFAC,,, | Performed by: INTERNAL MEDICINE

## 2023-01-01 PROCEDURE — 1125F AMNT PAIN NOTED PAIN PRSNT: CPT | Mod: CPTII,S$GLB,, | Performed by: INTERNAL MEDICINE

## 2023-01-01 PROCEDURE — 99999 PR PBB SHADOW E&M-EST. PATIENT-LVL IV: CPT | Mod: PBBFAC,,, | Performed by: PODIATRIST

## 2023-01-01 PROCEDURE — 97597 DBRDMT OPN WND 1ST 20 CM/<: CPT | Mod: S$GLB,,, | Performed by: PODIATRIST

## 2023-01-01 PROCEDURE — 1159F MED LIST DOCD IN RCRD: CPT | Mod: CPTII,S$GLB,, | Performed by: INTERNAL MEDICINE

## 2023-01-01 PROCEDURE — 3074F SYST BP LT 130 MM HG: CPT | Mod: CPTII,S$GLB,, | Performed by: INTERNAL MEDICINE

## 2023-01-01 PROCEDURE — 3288F FALL RISK ASSESSMENT DOCD: CPT | Mod: CPTII,S$GLB,, | Performed by: INTERNAL MEDICINE

## 2023-01-01 PROCEDURE — 1159F MED LIST DOCD IN RCRD: CPT | Mod: CPTII,S$GLB,, | Performed by: PODIATRIST

## 2023-01-01 PROCEDURE — 90471 IMMUNIZATION ADMIN: CPT | Mod: S$GLB,,, | Performed by: INTERNAL MEDICINE

## 2023-01-01 PROCEDURE — 3008F BODY MASS INDEX DOCD: CPT | Mod: CPTII,S$GLB,, | Performed by: PODIATRIST

## 2023-01-01 PROCEDURE — 99999 PR PBB SHADOW E&M-EST. PATIENT-LVL V: ICD-10-PCS | Mod: PBBFAC,,, | Performed by: INTERNAL MEDICINE

## 2023-01-01 PROCEDURE — 1126F AMNT PAIN NOTED NONE PRSNT: CPT | Mod: CPTII,S$GLB,, | Performed by: PODIATRIST

## 2023-01-01 PROCEDURE — 1125F PR PAIN SEVERITY QUANTIFIED, PAIN PRESENT: ICD-10-PCS | Mod: CPTII,S$GLB,, | Performed by: INTERNAL MEDICINE

## 2023-01-01 PROCEDURE — 1159F PR MEDICATION LIST DOCUMENTED IN MEDICAL RECORD: ICD-10-PCS | Mod: CPTII,S$GLB,, | Performed by: INTERNAL MEDICINE

## 2023-01-01 PROCEDURE — 3008F PR BODY MASS INDEX (BMI) DOCUMENTED: ICD-10-PCS | Mod: CPTII,S$GLB,, | Performed by: PODIATRIST

## 2023-01-01 PROCEDURE — 99214 PR OFFICE/OUTPT VISIT, EST, LEVL IV, 30-39 MIN: ICD-10-PCS | Mod: 25,S$GLB,, | Performed by: INTERNAL MEDICINE

## 2023-01-01 PROCEDURE — 1159F PR MEDICATION LIST DOCUMENTED IN MEDICAL RECORD: ICD-10-PCS | Mod: CPTII,S$GLB,, | Performed by: PODIATRIST

## 2023-01-01 PROCEDURE — 11721 DEBRIDE NAIL 6 OR MORE: CPT | Mod: 59,S$GLB,, | Performed by: PODIATRIST

## 2023-01-01 PROCEDURE — 3078F PR MOST RECENT DIASTOLIC BLOOD PRESSURE < 80 MM HG: ICD-10-PCS | Mod: CPTII,S$GLB,, | Performed by: INTERNAL MEDICINE

## 2023-01-01 PROCEDURE — 90694 VACC AIIV4 NO PRSRV 0.5ML IM: CPT | Mod: S$GLB,,, | Performed by: INTERNAL MEDICINE

## 2023-01-01 PROCEDURE — 99214 OFFICE O/P EST MOD 30 MIN: CPT | Mod: 25,S$GLB,, | Performed by: PODIATRIST

## 2023-01-01 PROCEDURE — 99214 OFFICE O/P EST MOD 30 MIN: CPT | Mod: 25,S$GLB,, | Performed by: INTERNAL MEDICINE

## 2023-01-01 PROCEDURE — 3008F BODY MASS INDEX DOCD: CPT | Mod: CPTII,S$GLB,, | Performed by: INTERNAL MEDICINE

## 2023-01-01 PROCEDURE — 3078F DIAST BP <80 MM HG: CPT | Mod: CPTII,S$GLB,, | Performed by: INTERNAL MEDICINE

## 2023-01-01 PROCEDURE — 3288F PR FALLS RISK ASSESSMENT DOCUMENTED: ICD-10-PCS | Mod: CPTII,S$GLB,, | Performed by: PODIATRIST

## 2023-01-01 PROCEDURE — 1101F PT FALLS ASSESS-DOCD LE1/YR: CPT | Mod: CPTII,S$GLB,, | Performed by: INTERNAL MEDICINE

## 2023-01-01 PROCEDURE — 3288F FALL RISK ASSESSMENT DOCD: CPT | Mod: CPTII,S$GLB,, | Performed by: PODIATRIST

## 2023-01-01 PROCEDURE — 97597 WOUND DEBRIDEMENT L GREAT TOE: ICD-10-PCS | Mod: S$GLB,,, | Performed by: PODIATRIST

## 2023-01-01 PROCEDURE — 99999 PR PBB SHADOW E&M-EST. PATIENT-LVL IV: ICD-10-PCS | Mod: PBBFAC,,, | Performed by: PODIATRIST

## 2023-01-01 PROCEDURE — 1101F PT FALLS ASSESS-DOCD LE1/YR: CPT | Mod: CPTII,S$GLB,, | Performed by: PODIATRIST

## 2023-01-01 PROCEDURE — 1126F PR PAIN SEVERITY QUANTIFIED, NO PAIN PRESENT: ICD-10-PCS | Mod: CPTII,S$GLB,, | Performed by: PODIATRIST

## 2023-01-01 PROCEDURE — 90471 FLU VACCINE - QUADRIVALENT - ADJUVANTED: ICD-10-PCS | Mod: S$GLB,,, | Performed by: INTERNAL MEDICINE

## 2023-01-01 PROCEDURE — 3288F PR FALLS RISK ASSESSMENT DOCUMENTED: ICD-10-PCS | Mod: CPTII,S$GLB,, | Performed by: INTERNAL MEDICINE

## 2023-01-01 PROCEDURE — 1101F PR PT FALLS ASSESS DOC 0-1 FALLS W/OUT INJ PAST YR: ICD-10-PCS | Mod: CPTII,S$GLB,, | Performed by: INTERNAL MEDICINE

## 2023-01-01 PROCEDURE — 90694 FLU VACCINE - QUADRIVALENT - ADJUVANTED: ICD-10-PCS | Mod: S$GLB,,, | Performed by: INTERNAL MEDICINE

## 2023-01-01 PROCEDURE — 1101F PR PT FALLS ASSESS DOC 0-1 FALLS W/OUT INJ PAST YR: ICD-10-PCS | Mod: CPTII,S$GLB,, | Performed by: PODIATRIST

## 2023-01-01 RX ORDER — VALPROIC ACID 250 MG/5ML
SOLUTION ORAL
Qty: 1900 ML | Refills: 1 | Status: SHIPPED | OUTPATIENT
Start: 2023-01-01

## 2023-01-01 RX ORDER — NAPROXEN SODIUM 220 MG/1
81 TABLET, FILM COATED ORAL DAILY
Qty: 90 TABLET | Refills: 3 | Status: SHIPPED | OUTPATIENT
Start: 2023-01-01 | End: 2024-01-04

## 2023-01-01 RX ORDER — CLONIDINE HYDROCHLORIDE 0.1 MG/1
TABLET ORAL
Qty: 60 TABLET | Refills: 3 | Status: SHIPPED | OUTPATIENT
Start: 2023-01-01

## 2023-01-01 RX ORDER — ALBUTEROL SULFATE 90 UG/1
2 AEROSOL, METERED RESPIRATORY (INHALATION) EVERY 4 HOURS PRN
Qty: 18 G | Refills: 2 | Status: SHIPPED | OUTPATIENT
Start: 2023-01-01

## 2023-01-01 RX ORDER — ATORVASTATIN CALCIUM 40 MG/1
40 TABLET, FILM COATED ORAL DAILY
Qty: 90 TABLET | Refills: 3 | Status: SHIPPED | OUTPATIENT
Start: 2023-01-01 | End: 2024-01-04

## 2023-01-04 NOTE — PROGRESS NOTES
Subjective:       Patient ID: Donita Gonzalez is a 67 y.o. female.    Chief Complaint: COPD (Follow up/) and Seizures (Last night)    HPI  patient with history of CVA left hemiparesis hypertension hyperlipidemia COPD peripheral vascular disease patient visit today for routine follow-up her son reports the seizure activity yesterday with involuntary movement of her right upper extremity and right lower extremity but no loss of consciousness improved with the extra dose of the Depakote she deny any new neurological symptom she tolerated p.o. diet well bowel movement and urination okay no weight loss deny increasing short of breath or chest pain or fever.  She also complained of her left foot left toe has been hurting her son try the best he can to clean the left foot left toes  Review of Systems    Objective:      Physical Exam  Vitals and nursing note reviewed.   Constitutional:       General: She is not in acute distress.     Appearance: She is well-developed.      Comments: Patient in the wheelchair no distress able to understand and communicate verbally   HENT:      Head: Normocephalic and atraumatic.      Right Ear: External ear normal.      Left Ear: External ear normal.      Nose: Nose normal.      Mouth/Throat:      Pharynx: No oropharyngeal exudate.   Eyes:      Extraocular Movements: Extraocular movements intact.      Conjunctiva/sclera: Conjunctivae normal.      Pupils: Pupils are equal, round, and reactive to light.   Neck:      Thyroid: No thyromegaly.   Cardiovascular:      Rate and Rhythm: Normal rate and regular rhythm.      Heart sounds: Normal heart sounds. No murmur heard.    No friction rub. No gallop.      Comments: Not able to palpate left dorsal pedis pulse and left posterior tibial pulse and left foot cold to touch  Pulmonary:      Effort: Pulmonary effort is normal. No respiratory distress.      Breath sounds: Normal breath sounds. No wheezing.   Abdominal:      General: Bowel sounds are normal.  There is no distension.      Palpations: Abdomen is soft.      Tenderness: There is no abdominal tenderness.   Musculoskeletal:         General: No tenderness or deformity. Normal range of motion.      Cervical back: Normal range of motion and neck supple.   Lymphadenopathy:      Cervical: No cervical adenopathy.   Skin:     General: Skin is warm and dry.      Capillary Refill: Capillary refill takes less than 2 seconds.      Findings: No erythema or rash.      Comments: Scaly and hyper pigmented discoloration of the left toe hypertrophic toenail ingrown toenail   Neurological:      Mental Status: She is alert and oriented to person, place, and time.      Comments: Left hemiparesis   Psychiatric:         Thought Content: Thought content normal.         Judgment: Judgment normal.       Assessment:       1. Essential hypertension    2. Seizure    3. COPD exacerbation    4. Hypertensive urgency    5. Hyperlipidemia, unspecified hyperlipidemia type    6. Encounter for vaccination    7. Hypertrophic toenail    8. Ischemic toe    9. PVD (peripheral vascular disease)    10. Encounter for screening mammogram for breast cancer    11. Encounter for screening colonoscopy          Plan:       Essential hypertension  -     aspirin 81 MG Chew; Take 1 tablet (81 mg total) by mouth once daily.  Dispense: 90 tablet; Refill: 3  -     NURSING COMMUNICATION: [facility]  -     Hypertension Digital Medicine ([facility]) Enrollment Order  -     Hypertension Digital Medicine ([facility]): Assign Onboarding Questionnaires    Seizure  Comments:  no sz acivitty no side effcet from meds  Orders:  -     valproate (DEPAKENE) 250 mg/5 mL syrup; TAKE 7 ML(300 MG) BY MOUTH EVERY 8 HOURS  Dispense: 1900 mL; Refill: 1    COPD exacerbation  Comments:  pt feels better less sob after breathing tx   Orders:  -     albuterol (PROVENTIL/VENTOLIN HFA) 90 mcg/actuation inhaler; Inhale 2 puffs into the lungs every 4 (four) hours as needed for Wheezing or  Shortness of Breath. Rescue  Dispense: 18 g; Refill: 2    Hypertensive urgency  -     cloNIDine (CATAPRES) 0.1 MG tablet; TAKE 1 TABLET BY MOUTH EVERY 6 HOURS AS NEEDED FOR BLOOD PRESSURE GREATER THAN 170/90 Strength: 0.1 mg  Dispense: 60 tablet; Refill: 3    Hyperlipidemia, unspecified hyperlipidemia type  -     atorvastatin (LIPITOR) 40 MG tablet; Take 1 tablet (40 mg total) by mouth once daily.  Dispense: 90 tablet; Refill: 3    Encounter for vaccination  -     Influenza (FLUAD) - Quadrivalent (Adjuvanted) *Preferred* (65+) (PF)    Hypertrophic toenail  -     Ambulatory referral/consult to Podiatry; Future; Expected date: 01/11/2023    Ischemic toe  -     US Lower Extremity Arteries Left; Future; Expected date: 01/11/2023  -     Ambulatory referral/consult to Podiatry; Future; Expected date: 01/11/2023    PVD (peripheral vascular disease)  -     US Lower Extremity Arteries Left; Future; Expected date: 01/11/2023    Encounter for screening mammogram for breast cancer  -     Mammo Digital Screening Bilat w/ Carlos; Future; Expected date: 01/04/2024    Encounter for screening colonoscopy  -     Cologuard Screening (Multitarget Stool DNA); Future; Expected date: 01/04/2023        Medication List with Changes/Refills   Current Medications    ALENDRONATE (FOSAMAX) 70 MG TABLET    TAKE 1 TABLET(70 MG) BY MOUTH EVERY 7 DAYS    APIXABAN (ELIQUIS) 5 MG TAB    1 tablet (5 mg total) by Per G Tube route 2 (two) times daily.    CLOTRIMAZOLE-BETAMETHASONE 1-0.05% (LOTRISONE) CREAM    Apply topically 2 (two) times daily.    DILTIAZEM (CARDIZEM CD) 240 MG 24 HR CAPSULE    TAKE 1 CAPSULE(240 MG) BY MOUTH EVERY DAY    FEXOFENADINE (ALLEGRA) 180 MG TABLET    Take 1 tablet (180 mg total) by mouth once daily.    LEVALBUTEROL (XOPENEX) 0.63 MG/3 ML NEBULIZER SOLUTION    Take 3 mLs (0.63 mg total) by nebulization every 4 (four) hours as needed for Wheezing or Shortness of Breath. Rescue    METOPROLOL TARTRATE (LOPRESSOR) 50 MG TABLET     TAKE 1 TABLET(50 MG) BY MOUTH TWICE DAILY    MUPIROCIN (BACTROBAN) 2 % OINTMENT    Apply topically 2 (two) times daily.    OMEPRAZOLE (PRILOSEC) 40 MG CAPSULE    Take 1 capsule (40 mg total) by mouth once daily.    QUETIAPINE (SEROQUEL) 50 MG TABLET    1 tablet (50 mg total) by Per G Tube route nightly.    SENNA (ALMITA-GA) 8.6 MG TABLET    Take 1 tablet by mouth 2 (two) times daily.   Changed and/or Refilled Medications    Modified Medication Previous Medication    ALBUTEROL (PROVENTIL/VENTOLIN HFA) 90 MCG/ACTUATION INHALER albuterol (PROVENTIL/VENTOLIN HFA) 90 mcg/actuation inhaler       Inhale 2 puffs into the lungs every 4 (four) hours as needed for Wheezing or Shortness of Breath. Rescue    Inhale 2 puffs into the lungs every 4 (four) hours as needed for Wheezing or Shortness of Breath. Rescue    ASPIRIN 81 MG CHEW aspirin 81 MG Chew       Take 1 tablet (81 mg total) by mouth once daily.    Take 1 tablet (81 mg total) by mouth once daily.    ATORVASTATIN (LIPITOR) 40 MG TABLET atorvastatin (LIPITOR) 40 MG tablet       Take 1 tablet (40 mg total) by mouth once daily.    Take 1 tablet (40 mg total) by mouth once daily.    CLONIDINE (CATAPRES) 0.1 MG TABLET cloNIDine (CATAPRES) 0.1 MG tablet       TAKE 1 TABLET BY MOUTH EVERY 6 HOURS AS NEEDED FOR BLOOD PRESSURE GREATER THAN 170/90 Strength: 0.1 mg    TAKE 1 TABLET BY MOUTH EVERY 6 HOURS AS NEEDED FOR BLOOD PRESSURE GREATER THAN 170/90 Strength: 0.1 mg    VALPROATE (DEPAKENE) 250 MG/5 ML SYRUP valproate (DEPAKENE) 250 mg/5 mL syrup       TAKE 7 ML(300 MG) BY MOUTH EVERY 8 HOURS    TAKE 6 ML(300 MG) BY MOUTH EVERY 8 HOURS

## 2023-01-04 NOTE — PROGRESS NOTES
Patient was Identified by name and . Allergies were reviewed. Administered Influenza vaccine IM using aseptic technique.

## 2023-01-23 NOTE — PROGRESS NOTES
Subjective:      Patient ID: Donita Gonzalez is a 67 y.o. female.    Chief Complaint: No chief complaint on file.    Donita is a 67 y.o. female who presents to the clinic, accompanied by her  & is in a wheelchair, O2 dependent. Last visit in this clinic was almost 1-1/2 yrs.ago.     Donita has a past medical history of Chronic combined systolic and diastolic CHF (congestive heart failure), COPD (chronic obstructive pulmonary disease), History of stroke, Hypertension, Paroxysmal atrial fibrillation, Seizure disorder, and Stroke.     The patient's chief complaint long, thick ingrown toenails.  concerned about pain & discoloration to L great toe. Son has been trying to keep it clean (has 2 sons @ home). No h/o injury. Saw PCP, & had Xray 1/4/23.    This patient has documented high risk feet requiring routine maintenance secondary to peripheral neuropathy.    PCP: Jordan Campbell MD    Date Last Seen by PCP: 1/4/23    Current shoe gear: Wheelchair-bound since CVA in June 2021. Wears non-slip socks.     Objective:      Physical Exam  Vitals reviewed.   Constitutional:       General: She is not in acute distress.     Appearance: She is underweight. She is ill-appearing.   Cardiovascular:      Pulses:           Dorsalis pedis pulses are detected w/ Doppler on the right side and detected w/ Doppler on the left side.      Comments: B/L DP audible distal to midfoot w/ Doppler; monophasic L & phasic R.  Musculoskeletal:         General: Tenderness present. No swelling or signs of injury.      Right lower leg: No edema.      Left lower leg: No edema.      Right foot: No deformity.      Left foot: Deformity (hallux malleus) present.        Feet:    Feet:      Right foot:      Skin integrity: Callus present.      Toenail Condition: Right toenails are long and ingrown. Fungal disease present.     Left foot:      Skin integrity: Callus present.      Toenail Condition: Left toenails are abnormally thick, long and ingrown.  Fungal disease present.     Comments: Significant hypertrophy & cryptosis B/L 1-5 nails w/ thickening, dystrophic & discolored appearance especially L>R medial hallux. Absent nail plate 3 L.  Skin:     General: Skin is cool and dry.      Capillary Refill: Capillary refill takes 2 to 3 seconds.      Findings: Lesion and rash present. No bruising or erythema. Rash is scaling.      Comments: Scaly and hyperpigmented distal medial digital tuft of hallux (>1cm diameter) w/ adjacent impingement of gryphotic nail L. No underlying ulceration nor open wound after debridement. No exudate nor drainage.    Skin is atrophic, lacking in turgor, dry and cold temperature BLE, L>R foot & lower leg.  No cardinal signs of infection BLE.       Neurological:      Mental Status: She is alert and oriented to person, place, and time.      Sensory: Sensory deficit present.      Motor: Weakness (L hemiparesis) present.      Gait: Gait abnormal (wheelchair bound).   Psychiatric:         Mood and Affect: Mood and affect normal.         Speech: Speech is delayed (aphasia s/p CVA).         Behavior: Behavior normal. Behavior is cooperative.         Cognition and Memory: Cognition normal.       Assessment:      Encounter Diagnoses   Name Primary?    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, unspecified hemiplegia type        PVD (peripheral  vascular disease)        Relevant Orders    Routine Foot Care    Onychomycosis with ingrown toenail        Ulcer of great toe, left, limited to breakdown of skin        Relevant Orders    Wound Debridement L great toe           Plan:       Diagnoses and all orders for this visit:    Ischemic toe  -     Ambulatory referral/consult to Podiatry    Long term current use of anticoagulant    Onychogryposis of toenail  -     Routine Foot Care    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    - Patient instructed on proper foot hygeine. We discussed wearing proper shoe gear for protection (transfer from wheelchair) though she is not ambulating, daily foot inspections, podiatric visits every 6 months, sooner prn, annual DM foot exam.      - With patient's permission, nails were aggressively reduced and debrided x 9 to their soft tissue attachment mechanically, removing all offending nail and debris. Utilizing sterile toenail nippers, I aggressively debrided the offending nail borders approximately 3 mm from its edge and carried the nail plate incision down at an angle in order to wedge out the offending cryptotic portion of the nail plate in toto. The area was cleansed with alcohol. Patient tolerated the procedure well and related significant relief.    The lesion L great toe was also aggressively debrided & reduced down to surrounding skin. Bandaid applied.  Crest pad dispensed for L hallux.

## 2023-01-30 NOTE — PROCEDURES
Routine Foot Care    Date/Time: 1/23/2023 11:30 AM  Performed by: Yue Castanon DPM  Authorized by: Yue Castanon DPM     Consent Done?:  Yes (Verbal)    Nail Care Type:  Debride(Left 1st Toe, Left 2nd Toe, Left 4th Toe, Right 2nd Toe, Right 1st Toe, Left 5th Toe, Right 4th Toe, Right 5th Toe and Right 3rd Toe)  Patient tolerance:  Patient tolerated the procedure well with no immediate complications  Wound Debridement L great toe    Date/Time: 1/23/2023 11:30 AM  Performed by: Yue Castanon DPM  Authorized by: Yue Castanon DPM     Consent Done?:  Yes (Verbal)    Wound Details:    Location:  Left foot    Location:  Left 1st Toe    Type of Debridement:  Excisional       Length (cm):  1       Area (sq cm):  1       Width (cm):  1       Percent Debrided (%):  100       Depth (cm):  0       Total Area Debrided (sq cm):  1    Depth of debridement:  Epidermis/Dermis    Tissue debrided:  Epidermis    Devitalized tissue debrided:  Callus and Necrotic/Eschar    Instruments:  Nippers and Blade    Bleeding:  Minimal  Hemostasis Achieved: Yes    Method Used:  Silver Nitrate  Patient tolerance:  Patient tolerated the procedure well with no immediate complications

## 2023-03-19 PROBLEM — I21.4 NSTEMI (NON-ST ELEVATED MYOCARDIAL INFARCTION): Status: ACTIVE | Noted: 2023-01-01

## 2023-03-19 PROBLEM — I69.352 HEMIPARESIS OF LEFT DOMINANT SIDE AS LATE EFFECT OF CEREBRAL INFARCTION: Status: ACTIVE | Noted: 2023-01-01

## 2023-03-23 PROBLEM — I10 ESSENTIAL HYPERTENSION: Status: RESOLVED | Noted: 2019-04-19 | Resolved: 2023-01-01

## 2023-03-23 PROBLEM — R79.89 ELEVATED TROPONIN: Status: RESOLVED | Noted: 2019-04-16 | Resolved: 2023-01-01

## 2023-03-23 PROBLEM — E87.5 HYPERKALEMIA: Status: RESOLVED | Noted: 2019-04-17 | Resolved: 2023-01-01

## 2023-03-25 PROBLEM — I48.21 PERMANENT ATRIAL FIBRILLATION: Status: ACTIVE | Noted: 2023-01-01

## 2023-03-27 PROBLEM — D63.8 ANEMIA OF CHRONIC DISEASE: Status: ACTIVE | Noted: 2023-01-01

## 2023-03-27 PROBLEM — E63.8 INADEQUATE DIETARY INTAKE OF PROTEIN: Status: ACTIVE | Noted: 2023-01-01

## 2023-03-27 PROBLEM — J44.1 COPD EXACERBATION: Status: ACTIVE | Noted: 2023-01-01

## 2023-03-28 PROBLEM — T17.500A MUCUS PLUGGING OF BRONCHI: Status: ACTIVE | Noted: 2023-01-01

## 2023-04-06 PROBLEM — I25.10 CORONARY ARTERY DISEASE: Status: ACTIVE | Noted: 2023-01-01

## 2023-04-06 PROBLEM — I50.33 ACUTE ON CHRONIC HEART FAILURE WITH PRESERVED EJECTION FRACTION (HFPEF): Status: ACTIVE | Noted: 2023-01-01

## 2023-04-07 PROBLEM — I50.32 CHRONIC DIASTOLIC HEART FAILURE: Status: ACTIVE | Noted: 2023-01-01

## 2023-04-07 PROBLEM — I48.19 PERSISTENT ATRIAL FIBRILLATION: Status: ACTIVE | Noted: 2023-01-01

## 2023-04-08 PROBLEM — F41.9 ANXIETY: Status: ACTIVE | Noted: 2023-01-01

## 2025-02-27 NOTE — ASSESSMENT & PLAN NOTE
Ambulatory Care Coordination Note     2/27/2025 1:26 PM     Patient outreach attempt by this ACM today to perform care management follow up . ACM was unable to reach the patient by telephone today;   left voice message requesting a return phone call to this ACM.     ACM: Irma Garcia LPN     PCP/Specialist follow up:   Future Appointments         Provider Specialty Dept Phone    4/17/2025 9:00 AM Jacki Yepez MD Internal Medicine [phone]            Follow Up:   Plan for next ACM outreach in approximately 1 week to complete:  ACM follow up .            Respiratory cultures growing serratia  Will continue cefepime with stop date 10 days later

## (undated) DEVICE — INTRODUCER TRACH TRAY BLU RHIN

## (undated) DEVICE — TRAY MINOR GEN SURG

## (undated) DEVICE — SPONGE IV DRAIN 4X4 STERILE

## (undated) DEVICE — NDL N SERIES MICRO-DISSECTION

## (undated) DEVICE — URINARY DRAINAGE BAG

## (undated) DEVICE — DRAPE THYROID WITH ARMBOARD

## (undated) DEVICE — GOWN SURGICAL X-LARGE

## (undated) DEVICE — GLOVE GAMMEX SURG LF PI SZ 7.5

## (undated) DEVICE — SEE MEDLINE ITEM 152622

## (undated) DEVICE — SEE MEDLINE ITEM 157117

## (undated) DEVICE — Device

## (undated) DEVICE — SEE MEDLINE ITEM 146417

## (undated) DEVICE — BLADE SURG CARBON STEEL SZ11

## (undated) DEVICE — SUT PROLENE 0-36 V-7

## (undated) DEVICE — SUT SILK 2-0 SH 18IN BLACK

## (undated) DEVICE — KIT PEG PULL STANDARD 20F

## (undated) DEVICE — SUT PROLENE 2-0 30 SH

## (undated) DEVICE — SEE MEDLINE ITEM 152487

## (undated) DEVICE — SEE MEDLINE ITEM 146313

## (undated) DEVICE — SUT 3-0 12-18IN SILK

## (undated) DEVICE — LUBRICANT SURGILUBE 2 OZ

## (undated) DEVICE — CHLORAPREP 10.5 ML APPLICATOR

## (undated) DEVICE — ELECTRODE REM PLYHSV RETURN 9